# Patient Record
Sex: FEMALE | Race: WHITE | NOT HISPANIC OR LATINO | Employment: OTHER
[De-identification: names, ages, dates, MRNs, and addresses within clinical notes are randomized per-mention and may not be internally consistent; named-entity substitution may affect disease eponyms.]

---

## 2017-03-15 ENCOUNTER — RECORDS - HEALTHEAST (OUTPATIENT)
Dept: ADMINISTRATIVE | Facility: OTHER | Age: 82
End: 2017-03-15

## 2017-03-28 ASSESSMENT — MIFFLIN-ST. JEOR: SCORE: 1202.46

## 2017-03-29 ENCOUNTER — SURGERY - HEALTHEAST (OUTPATIENT)
Dept: SURGERY | Facility: HOSPITAL | Age: 82
End: 2017-03-29

## 2017-03-29 ENCOUNTER — ANESTHESIA - HEALTHEAST (OUTPATIENT)
Dept: SURGERY | Facility: HOSPITAL | Age: 82
End: 2017-03-29

## 2017-04-26 ENCOUNTER — RECORDS - HEALTHEAST (OUTPATIENT)
Dept: ADMINISTRATIVE | Facility: OTHER | Age: 82
End: 2017-04-26

## 2017-05-16 ENCOUNTER — COMMUNICATION - HEALTHEAST (OUTPATIENT)
Dept: ADMINISTRATIVE | Facility: HOSPITAL | Age: 82
End: 2017-05-16

## 2017-05-18 ENCOUNTER — OFFICE VISIT - HEALTHEAST (OUTPATIENT)
Dept: ONCOLOGY | Facility: HOSPITAL | Age: 82
End: 2017-05-18

## 2017-05-18 ENCOUNTER — AMBULATORY - HEALTHEAST (OUTPATIENT)
Dept: INFUSION THERAPY | Facility: HOSPITAL | Age: 82
End: 2017-05-18

## 2017-05-18 DIAGNOSIS — C18.9 MALIGNANT NEOPLASM OF COLON (H): ICD-10-CM

## 2017-05-18 DIAGNOSIS — C50.411 BREAST CANCER OF UPPER-OUTER QUADRANT OF RIGHT FEMALE BREAST (H): ICD-10-CM

## 2017-06-05 ENCOUNTER — COMMUNICATION - HEALTHEAST (OUTPATIENT)
Dept: ONCOLOGY | Facility: HOSPITAL | Age: 82
End: 2017-06-05

## 2017-06-07 ENCOUNTER — RECORDS - HEALTHEAST (OUTPATIENT)
Dept: ADMINISTRATIVE | Facility: OTHER | Age: 82
End: 2017-06-07

## 2017-09-13 ENCOUNTER — RECORDS - HEALTHEAST (OUTPATIENT)
Dept: LAB | Facility: CLINIC | Age: 82
End: 2017-09-13

## 2017-09-13 LAB
CHOLEST SERPL-MCNC: 202 MG/DL
FASTING STATUS PATIENT QL REPORTED: ABNORMAL
HDLC SERPL-MCNC: 49 MG/DL
LDLC SERPL CALC-MCNC: 121 MG/DL
TRIGL SERPL-MCNC: 161 MG/DL

## 2017-11-30 ENCOUNTER — COMMUNICATION - HEALTHEAST (OUTPATIENT)
Dept: ONCOLOGY | Facility: HOSPITAL | Age: 82
End: 2017-11-30

## 2018-02-13 ENCOUNTER — RECORDS - HEALTHEAST (OUTPATIENT)
Dept: LAB | Facility: CLINIC | Age: 83
End: 2018-02-13

## 2018-02-13 ENCOUNTER — RECORDS - HEALTHEAST (OUTPATIENT)
Dept: ADMINISTRATIVE | Facility: OTHER | Age: 83
End: 2018-02-13

## 2018-02-14 LAB
ALBUMIN SERPL-MCNC: 3.4 G/DL (ref 3.5–5)
ALP SERPL-CCNC: 91 U/L (ref 45–120)
ALT SERPL W P-5'-P-CCNC: 19 U/L (ref 0–45)
ANION GAP SERPL CALCULATED.3IONS-SCNC: 9 MMOL/L (ref 5–18)
AST SERPL W P-5'-P-CCNC: 19 U/L (ref 0–40)
BILIRUB SERPL-MCNC: 0.3 MG/DL (ref 0–1)
BUN SERPL-MCNC: 13 MG/DL (ref 8–28)
CALCIUM SERPL-MCNC: 9.3 MG/DL (ref 8.5–10.5)
CHLORIDE BLD-SCNC: 105 MMOL/L (ref 98–107)
CO2 SERPL-SCNC: 25 MMOL/L (ref 22–31)
CREAT SERPL-MCNC: 0.71 MG/DL (ref 0.6–1.1)
GFR SERPL CREATININE-BSD FRML MDRD: >60 ML/MIN/1.73M2
GLUCOSE BLD-MCNC: 111 MG/DL (ref 70–125)
POTASSIUM BLD-SCNC: 4.7 MMOL/L (ref 3.5–5)
PROT SERPL-MCNC: 7.3 G/DL (ref 6–8)
SODIUM SERPL-SCNC: 139 MMOL/L (ref 136–145)
TSH SERPL DL<=0.005 MIU/L-ACNC: 1.21 UIU/ML (ref 0.3–5)

## 2018-02-15 LAB — BACTERIA SPEC CULT: NORMAL

## 2018-05-17 ENCOUNTER — RECORDS - HEALTHEAST (OUTPATIENT)
Dept: ADMINISTRATIVE | Facility: OTHER | Age: 83
End: 2018-05-17

## 2018-05-17 ENCOUNTER — COMMUNICATION - HEALTHEAST (OUTPATIENT)
Dept: ONCOLOGY | Facility: HOSPITAL | Age: 83
End: 2018-05-17

## 2018-05-18 ENCOUNTER — COMMUNICATION - HEALTHEAST (OUTPATIENT)
Dept: ONCOLOGY | Facility: HOSPITAL | Age: 83
End: 2018-05-18

## 2018-05-28 ENCOUNTER — COMMUNICATION - HEALTHEAST (OUTPATIENT)
Dept: ONCOLOGY | Facility: HOSPITAL | Age: 83
End: 2018-05-28

## 2018-06-05 ENCOUNTER — RECORDS - HEALTHEAST (OUTPATIENT)
Dept: ADMINISTRATIVE | Facility: OTHER | Age: 83
End: 2018-06-05

## 2018-06-18 ENCOUNTER — COMMUNICATION - HEALTHEAST (OUTPATIENT)
Dept: ONCOLOGY | Facility: HOSPITAL | Age: 83
End: 2018-06-18

## 2018-08-20 ENCOUNTER — COMMUNICATION - HEALTHEAST (OUTPATIENT)
Dept: ONCOLOGY | Facility: HOSPITAL | Age: 83
End: 2018-08-20

## 2018-08-20 DIAGNOSIS — C50.411 BREAST CANCER OF UPPER-OUTER QUADRANT OF RIGHT FEMALE BREAST (H): ICD-10-CM

## 2018-08-31 ENCOUNTER — COMMUNICATION - HEALTHEAST (OUTPATIENT)
Dept: ONCOLOGY | Facility: HOSPITAL | Age: 83
End: 2018-08-31

## 2018-09-04 ENCOUNTER — OFFICE VISIT - HEALTHEAST (OUTPATIENT)
Dept: ONCOLOGY | Facility: HOSPITAL | Age: 83
End: 2018-09-04

## 2018-09-04 ENCOUNTER — AMBULATORY - HEALTHEAST (OUTPATIENT)
Dept: INFUSION THERAPY | Facility: HOSPITAL | Age: 83
End: 2018-09-04

## 2018-09-04 DIAGNOSIS — Z79.811 AROMATASE INHIBITOR USE: ICD-10-CM

## 2018-09-04 DIAGNOSIS — C50.411 BREAST CANCER OF UPPER-OUTER QUADRANT OF RIGHT FEMALE BREAST (H): ICD-10-CM

## 2018-09-04 DIAGNOSIS — Z17.0 MALIGNANT NEOPLASM OF UPPER-OUTER QUADRANT OF RIGHT BREAST IN FEMALE, ESTROGEN RECEPTOR POSITIVE (H): ICD-10-CM

## 2018-09-04 DIAGNOSIS — C50.411 MALIGNANT NEOPLASM OF UPPER-OUTER QUADRANT OF RIGHT BREAST IN FEMALE, ESTROGEN RECEPTOR POSITIVE (H): ICD-10-CM

## 2018-09-04 LAB
ALBUMIN SERPL-MCNC: 3.6 G/DL (ref 3.5–5)
ALP SERPL-CCNC: 76 U/L (ref 45–120)
ALT SERPL W P-5'-P-CCNC: 17 U/L (ref 0–45)
ANION GAP SERPL CALCULATED.3IONS-SCNC: 5 MMOL/L (ref 5–18)
AST SERPL W P-5'-P-CCNC: 14 U/L (ref 0–40)
BASOPHILS # BLD AUTO: 0 THOU/UL (ref 0–0.2)
BASOPHILS NFR BLD AUTO: 1 % (ref 0–2)
BILIRUB SERPL-MCNC: 0.4 MG/DL (ref 0–1)
BUN SERPL-MCNC: 13 MG/DL (ref 8–28)
CALCIUM SERPL-MCNC: 9 MG/DL (ref 8.5–10.5)
CHLORIDE BLD-SCNC: 107 MMOL/L (ref 98–107)
CO2 SERPL-SCNC: 28 MMOL/L (ref 22–31)
CREAT SERPL-MCNC: 0.76 MG/DL (ref 0.6–1.1)
EOSINOPHIL # BLD AUTO: 0.1 THOU/UL (ref 0–0.4)
EOSINOPHIL NFR BLD AUTO: 2 % (ref 0–6)
ERYTHROCYTE [DISTWIDTH] IN BLOOD BY AUTOMATED COUNT: 14.1 % (ref 11–14.5)
GFR SERPL CREATININE-BSD FRML MDRD: >60 ML/MIN/1.73M2
GLUCOSE BLD-MCNC: 133 MG/DL (ref 70–125)
HCT VFR BLD AUTO: 35.9 % (ref 35–47)
HGB BLD-MCNC: 11.8 G/DL (ref 12–16)
LYMPHOCYTES # BLD AUTO: 3.4 THOU/UL (ref 0.8–4.4)
LYMPHOCYTES NFR BLD AUTO: 49 % (ref 20–40)
MCH RBC QN AUTO: 31.8 PG (ref 27–34)
MCHC RBC AUTO-ENTMCNC: 32.9 G/DL (ref 32–36)
MCV RBC AUTO: 97 FL (ref 80–100)
MONOCYTES # BLD AUTO: 0.8 THOU/UL (ref 0–0.9)
MONOCYTES NFR BLD AUTO: 11 % (ref 2–10)
NEUTROPHILS # BLD AUTO: 2.6 THOU/UL (ref 2–7.7)
NEUTROPHILS NFR BLD AUTO: 38 % (ref 50–70)
PLATELET # BLD AUTO: 195 THOU/UL (ref 140–440)
PMV BLD AUTO: 10 FL (ref 8.5–12.5)
POTASSIUM BLD-SCNC: 4.3 MMOL/L (ref 3.5–5)
PROT SERPL-MCNC: 7.1 G/DL (ref 6–8)
RBC # BLD AUTO: 3.71 MILL/UL (ref 3.8–5.4)
SODIUM SERPL-SCNC: 140 MMOL/L (ref 136–145)
WBC: 6.9 THOU/UL (ref 4–11)

## 2018-09-27 ENCOUNTER — COMMUNICATION - HEALTHEAST (OUTPATIENT)
Dept: ONCOLOGY | Facility: HOSPITAL | Age: 83
End: 2018-09-27

## 2019-03-11 ENCOUNTER — RECORDS - HEALTHEAST (OUTPATIENT)
Dept: LAB | Facility: CLINIC | Age: 84
End: 2019-03-11

## 2019-03-11 LAB
ALBUMIN SERPL-MCNC: 3.6 G/DL (ref 3.5–5)
ALP SERPL-CCNC: 55 U/L (ref 45–120)
ALT SERPL W P-5'-P-CCNC: 17 U/L (ref 0–45)
ANION GAP SERPL CALCULATED.3IONS-SCNC: 8 MMOL/L (ref 5–18)
AST SERPL W P-5'-P-CCNC: 15 U/L (ref 0–40)
BILIRUB SERPL-MCNC: 0.3 MG/DL (ref 0–1)
BUN SERPL-MCNC: 11 MG/DL (ref 8–28)
CALCIUM SERPL-MCNC: 9.1 MG/DL (ref 8.5–10.5)
CHLORIDE BLD-SCNC: 104 MMOL/L (ref 98–107)
CHOLEST SERPL-MCNC: 194 MG/DL
CO2 SERPL-SCNC: 26 MMOL/L (ref 22–31)
CREAT SERPL-MCNC: 0.7 MG/DL (ref 0.6–1.1)
FASTING STATUS PATIENT QL REPORTED: ABNORMAL
GFR SERPL CREATININE-BSD FRML MDRD: >60 ML/MIN/1.73M2
GLUCOSE BLD-MCNC: 87 MG/DL (ref 70–125)
HDLC SERPL-MCNC: 52 MG/DL
LDLC SERPL CALC-MCNC: 102 MG/DL
LIPASE SERPL-CCNC: 40 U/L (ref 0–52)
POTASSIUM BLD-SCNC: 4.5 MMOL/L (ref 3.5–5)
PROT SERPL-MCNC: 7.1 G/DL (ref 6–8)
SODIUM SERPL-SCNC: 138 MMOL/L (ref 136–145)
TRIGL SERPL-MCNC: 198 MG/DL

## 2019-04-16 ENCOUNTER — COMMUNICATION - HEALTHEAST (OUTPATIENT)
Dept: ONCOLOGY | Facility: HOSPITAL | Age: 84
End: 2019-04-16

## 2019-05-23 ENCOUNTER — AMBULATORY - HEALTHEAST (OUTPATIENT)
Dept: CARDIOLOGY | Facility: CLINIC | Age: 84
End: 2019-05-23

## 2019-05-23 ENCOUNTER — RECORDS - HEALTHEAST (OUTPATIENT)
Dept: ADMINISTRATIVE | Facility: OTHER | Age: 84
End: 2019-05-23

## 2019-05-28 ENCOUNTER — OFFICE VISIT - HEALTHEAST (OUTPATIENT)
Dept: CARDIOLOGY | Facility: CLINIC | Age: 84
End: 2019-05-28

## 2019-05-28 DIAGNOSIS — R01.1 HEART MURMUR: ICD-10-CM

## 2019-05-28 DIAGNOSIS — R07.89 CHEST PRESSURE: ICD-10-CM

## 2019-05-28 DIAGNOSIS — I10 BENIGN ESSENTIAL HYPERTENSION: ICD-10-CM

## 2019-05-28 ASSESSMENT — MIFFLIN-ST. JEOR: SCORE: 1173.43

## 2019-06-05 ENCOUNTER — HOSPITAL ENCOUNTER (OUTPATIENT)
Dept: CARDIOLOGY | Facility: CLINIC | Age: 84
Discharge: HOME | End: 2019-06-05
Attending: INTERNAL MEDICINE

## 2019-06-05 ENCOUNTER — HOSPITAL ENCOUNTER (OUTPATIENT)
Dept: NUCLEAR MEDICINE | Facility: CLINIC | Age: 84
Discharge: HOME | End: 2019-06-05
Attending: INTERNAL MEDICINE

## 2019-06-05 DIAGNOSIS — R07.89 CHEST PRESSURE: ICD-10-CM

## 2019-06-05 DIAGNOSIS — R01.1 HEART MURMUR: ICD-10-CM

## 2019-06-05 LAB
AORTIC ROOT: 3.5 CM
AORTIC VALVE MEAN VELOCITY: 81.9 CM/S
AV CUSP SEPERATION: 1.5 CM
AV CUSP SEPERATION: 1.5 CM
AV DIMENSIONLESS INDEX VTI: 0.9
AV MEAN GRADIENT: 3 MMHG
AV PEAK GRADIENT: 6.7 MMHG
AV VALVE AREA: 4.1 CM2
AV VELOCITY RATIO: 0.8
BSA FOR ECHO PROCEDURE: 1.84 M2
CV ECHO HEIGHT: 63 IN
CV ECHO WEIGHT: 169 LBS
CV STRESS CURRENT BP HE: NORMAL
CV STRESS CURRENT HR HE: 59
CV STRESS CURRENT HR HE: 62
CV STRESS CURRENT HR HE: 64
CV STRESS CURRENT HR HE: 73
CV STRESS CURRENT HR HE: 76
CV STRESS CURRENT HR HE: 76
CV STRESS CURRENT HR HE: 78
CV STRESS CURRENT HR HE: 79
CV STRESS CURRENT HR HE: 79
CV STRESS CURRENT HR HE: 80
CV STRESS DEVIATION TIME HE: NORMAL
CV STRESS ECHO PERCENT HR HE: NORMAL
CV STRESS EXERCISE STAGE HE: NORMAL
CV STRESS FINAL RESTING BP HE: NORMAL
CV STRESS FINAL RESTING HR HE: 76
CV STRESS MAX HR HE: 81
CV STRESS MAX TREADMILL GRADE HE: 0
CV STRESS MAX TREADMILL SPEED HE: 0
CV STRESS PEAK DIA BP HE: NORMAL
CV STRESS PEAK SYS BP HE: NORMAL
CV STRESS PHASE HE: NORMAL
CV STRESS PROTOCOL HE: NORMAL
CV STRESS RESTING PT POSITION HE: NORMAL
CV STRESS ST DEVIATION AMOUNT HE: NORMAL
CV STRESS ST DEVIATION ELEVATION HE: NORMAL
CV STRESS ST EVELATION AMOUNT HE: NORMAL
CV STRESS TEST TYPE HE: NORMAL
CV STRESS TOTAL STAGE TIME MIN 1 HE: NORMAL
DOP CALC AO PEAK VEL: 129 CM/S
DOP CALC AO VTI: 28.8 CM
DOP CALC LVOT AREA: 4.52 CM2
DOP CALC LVOT DIAMETER: 2.4 CM
DOP CALC LVOT PEAK VEL: 101 CM/S
DOP CALC LVOT STROKE VOLUME: 117.6 CM3
DOP CALC MV VTI: 40 CM
DOP CALCLVOT PEAK VEL VTI: 26 CM
EJECTION FRACTION: 66 % (ref 55–75)
FRACTIONAL SHORTENING: 30.2 % (ref 28–44)
INTERVENTRICULAR SEPTUM IN END DIASTOLE: 1.2 CM (ref 0.6–0.9)
IVS/PW RATIO: 1
LA AREA 1: 19.2 CM2
LA AREA 2: 24.2 CM2
LEFT ATRIUM LENGTH: 5.93 CM
LEFT ATRIUM SIZE: 2.9 CM
LEFT ATRIUM VOLUME INDEX: 36.2 ML/M2
LEFT ATRIUM VOLUME: 66.6 ML
LEFT VENTRICLE CARDIAC INDEX: 3.5 L/MIN/M2
LEFT VENTRICLE CARDIAC OUTPUT: 6.3 L/MIN
LEFT VENTRICLE DIASTOLIC VOLUME INDEX: 33.2 CM3/M2 (ref 29–61)
LEFT VENTRICLE DIASTOLIC VOLUME: 61 CM3 (ref 46–106)
LEFT VENTRICLE HEART RATE: 54 BPM
LEFT VENTRICLE MASS INDEX: 100.4 G/M2
LEFT VENTRICLE SYSTOLIC VOLUME INDEX: 11.4 CM3/M2 (ref 8–24)
LEFT VENTRICLE SYSTOLIC VOLUME: 21 CM3 (ref 14–42)
LEFT VENTRICULAR INTERNAL DIMENSION IN DIASTOLE: 4.3 CM (ref 3.8–5.2)
LEFT VENTRICULAR INTERNAL DIMENSION IN SYSTOLE: 3 CM (ref 2.2–3.5)
LEFT VENTRICULAR MASS: 184.7 G
LEFT VENTRICULAR OUTFLOW TRACT MEAN GRADIENT: 2 MMHG
LEFT VENTRICULAR OUTFLOW TRACT MEAN VELOCITY: 75.4 CM/S
LEFT VENTRICULAR OUTFLOW TRACT PEAK GRADIENT: 4 MMHG
LEFT VENTRICULAR POSTERIOR WALL IN END DIASTOLE: 1.2 CM (ref 0.6–0.9)
LV STROKE VOLUME INDEX: 63.9 ML/M2
MITRAL VALVE DECELERATION SLOPE: 2850 MM/S2
MITRAL VALVE E/A RATIO: 0.7
MITRAL VALVE MEAN INFLOW VELOCITY: 56.1 CM/S
MITRAL VALVE PEAK VELOCITY: 131 CM/S
MITRAL VALVE PRESSURE HALF-TIME: 98 MS
MV AREA VTI: 2.94 CM2
MV AVERAGE E/E' RATIO: 18.8 CM/S
MV DECELERATION TIME: 261 MS
MV E'TISSUE VEL-LAT: 4.97 CM/S
MV E'TISSUE VEL-MED: 3.8 CM/S
MV LATERAL E/E' RATIO: 16.6
MV MEAN GRADIENT: 2 MMHG
MV MEDIAL E/E' RATIO: 21.7
MV PEAK A VELOCITY: 115 CM/S
MV PEAK E VELOCITY: 82.3 CM/S
MV PEAK GRADIENT: 6.9 MMHG
MV VALVE AREA BY CONTINUITY EQUATION: 2.9 CM2
MV VALVE AREA PRESSURE 1/2 METHOD: 2.2 CM2
NUC REST DIASTOLIC VOLUME INDEX: 2704 LBS
NUC REST SYSTOLIC VOLUME INDEX: 63 IN
NUC STRESS EJECTION FRACTION: 64 %
STRESS ECHO BASELINE BP: NORMAL
STRESS ECHO BASELINE HR: 59
STRESS ECHO CALCULATED PERCENT HR: 60 %
STRESS ECHO LAST STRESS BP: NORMAL
STRESS ECHO LAST STRESS HR: 78
TRICUSPID REGURGITATION PEAK PRESSURE GRADIENT: 32.5 MMHG
TRICUSPID VALVE ANULAR PLANE SYSTOLIC EXCURSION: 2.2 CM
TRICUSPID VALVE PEAK REGURGITANT VELOCITY: 285 CM/S

## 2019-06-05 ASSESSMENT — MIFFLIN-ST. JEOR: SCORE: 1170.71

## 2019-06-07 ENCOUNTER — COMMUNICATION - HEALTHEAST (OUTPATIENT)
Dept: CARDIOLOGY | Facility: CLINIC | Age: 84
End: 2019-06-07

## 2019-06-10 ENCOUNTER — COMMUNICATION - HEALTHEAST (OUTPATIENT)
Dept: CARDIOLOGY | Facility: CLINIC | Age: 84
End: 2019-06-10

## 2019-06-20 ENCOUNTER — RECORDS - HEALTHEAST (OUTPATIENT)
Dept: LAB | Facility: CLINIC | Age: 84
End: 2019-06-20

## 2019-06-22 LAB — BACTERIA SPEC CULT: ABNORMAL

## 2019-08-21 ENCOUNTER — OFFICE VISIT - HEALTHEAST (OUTPATIENT)
Dept: CARDIOLOGY | Facility: CLINIC | Age: 84
End: 2019-08-21

## 2019-08-21 ENCOUNTER — RECORDS - HEALTHEAST (OUTPATIENT)
Dept: ADMINISTRATIVE | Facility: OTHER | Age: 84
End: 2019-08-21

## 2019-08-21 DIAGNOSIS — I10 ESSENTIAL HYPERTENSION: ICD-10-CM

## 2019-08-21 ASSESSMENT — MIFFLIN-ST. JEOR: SCORE: 1176.61

## 2019-09-16 ENCOUNTER — COMMUNICATION - HEALTHEAST (OUTPATIENT)
Dept: GERIATRICS | Facility: CLINIC | Age: 84
End: 2019-09-16

## 2019-09-16 ENCOUNTER — OFFICE VISIT - HEALTHEAST (OUTPATIENT)
Dept: GERIATRICS | Facility: CLINIC | Age: 84
End: 2019-09-16

## 2019-09-16 DIAGNOSIS — M19.90 INFLAMMATORY ARTHRITIS: ICD-10-CM

## 2019-09-16 DIAGNOSIS — G89.4 CHRONIC PAIN SYNDROME: ICD-10-CM

## 2019-09-16 DIAGNOSIS — K21.9 GASTROESOPHAGEAL REFLUX DISEASE WITHOUT ESOPHAGITIS: ICD-10-CM

## 2019-09-16 DIAGNOSIS — I10 ESSENTIAL HYPERTENSION: ICD-10-CM

## 2019-09-16 DIAGNOSIS — E63.9 NUTRITIONAL DEFICIENCY: ICD-10-CM

## 2019-09-16 DIAGNOSIS — R53.81 PHYSICAL DEBILITY: ICD-10-CM

## 2019-09-16 DIAGNOSIS — K59.04 CHRONIC IDIOPATHIC CONSTIPATION: ICD-10-CM

## 2019-09-17 ENCOUNTER — RECORDS - HEALTHEAST (OUTPATIENT)
Dept: LAB | Facility: CLINIC | Age: 84
End: 2019-09-17

## 2019-09-17 LAB
ANION GAP SERPL CALCULATED.3IONS-SCNC: 9 MMOL/L (ref 5–18)
BUN SERPL-MCNC: 14 MG/DL (ref 8–28)
CALCIUM SERPL-MCNC: 9.6 MG/DL (ref 8.5–10.5)
CHLORIDE BLD-SCNC: 102 MMOL/L (ref 98–107)
CO2 SERPL-SCNC: 25 MMOL/L (ref 22–31)
CREAT SERPL-MCNC: 0.77 MG/DL (ref 0.6–1.1)
GFR SERPL CREATININE-BSD FRML MDRD: >60 ML/MIN/1.73M2
GLUCOSE BLD-MCNC: 105 MG/DL (ref 70–125)
POTASSIUM BLD-SCNC: 4.9 MMOL/L (ref 3.5–5)
SODIUM SERPL-SCNC: 136 MMOL/L (ref 136–145)

## 2019-09-27 ENCOUNTER — OFFICE VISIT - HEALTHEAST (OUTPATIENT)
Dept: GERIATRICS | Facility: CLINIC | Age: 84
End: 2019-09-27

## 2019-09-27 DIAGNOSIS — M25.561 CHRONIC PAIN OF RIGHT KNEE: ICD-10-CM

## 2019-09-27 DIAGNOSIS — G89.29 CHRONIC PAIN OF RIGHT KNEE: ICD-10-CM

## 2019-09-27 DIAGNOSIS — M25.511 CHRONIC PAIN OF BOTH SHOULDERS: ICD-10-CM

## 2019-09-27 DIAGNOSIS — M15.0 PRIMARY OSTEOARTHRITIS INVOLVING MULTIPLE JOINTS: ICD-10-CM

## 2019-09-27 DIAGNOSIS — G89.29 CHRONIC PAIN OF BOTH SHOULDERS: ICD-10-CM

## 2019-09-27 DIAGNOSIS — G89.4 CHRONIC PAIN SYNDROME: ICD-10-CM

## 2019-09-27 DIAGNOSIS — M25.512 CHRONIC PAIN OF BOTH SHOULDERS: ICD-10-CM

## 2019-09-28 DIAGNOSIS — Z53.9 DIAGNOSIS NOT YET DEFINED: Primary | ICD-10-CM

## 2019-09-28 PROCEDURE — G0180 MD CERTIFICATION HHA PATIENT: HCPCS | Performed by: INTERNAL MEDICINE

## 2019-09-30 ENCOUNTER — COMMUNICATION - HEALTHEAST (OUTPATIENT)
Dept: GERIATRICS | Facility: CLINIC | Age: 84
End: 2019-09-30

## 2019-09-30 DIAGNOSIS — G89.29 CHRONIC PAIN OF RIGHT KNEE: ICD-10-CM

## 2019-09-30 DIAGNOSIS — G89.4 CHRONIC PAIN SYNDROME: ICD-10-CM

## 2019-09-30 DIAGNOSIS — M25.561 CHRONIC PAIN OF RIGHT KNEE: ICD-10-CM

## 2019-09-30 DIAGNOSIS — M70.51 PATELLAR BURSITIS, RIGHT: ICD-10-CM

## 2019-10-09 ENCOUNTER — COMMUNICATION - HEALTHEAST (OUTPATIENT)
Dept: GERIATRICS | Facility: CLINIC | Age: 84
End: 2019-10-09

## 2019-10-23 ENCOUNTER — OFFICE VISIT - HEALTHEAST (OUTPATIENT)
Dept: GERIATRICS | Facility: CLINIC | Age: 84
End: 2019-10-23

## 2019-10-23 ENCOUNTER — COMMUNICATION - HEALTHEAST (OUTPATIENT)
Dept: GERIATRICS | Facility: CLINIC | Age: 84
End: 2019-10-23

## 2019-10-23 DIAGNOSIS — G89.29 CHRONIC PAIN OF BOTH SHOULDERS: ICD-10-CM

## 2019-10-23 DIAGNOSIS — M15.0 PRIMARY OSTEOARTHRITIS INVOLVING MULTIPLE JOINTS: ICD-10-CM

## 2019-10-23 DIAGNOSIS — M25.561 CHRONIC PAIN OF RIGHT KNEE: ICD-10-CM

## 2019-10-23 DIAGNOSIS — M25.512 CHRONIC PAIN OF BOTH SHOULDERS: ICD-10-CM

## 2019-10-23 DIAGNOSIS — M25.511 CHRONIC PAIN OF BOTH SHOULDERS: ICD-10-CM

## 2019-10-23 DIAGNOSIS — G89.29 CHRONIC PAIN OF BOTH KNEES: ICD-10-CM

## 2019-10-23 DIAGNOSIS — M25.562 CHRONIC PAIN OF BOTH KNEES: ICD-10-CM

## 2019-10-23 DIAGNOSIS — M25.561 CHRONIC PAIN OF BOTH KNEES: ICD-10-CM

## 2019-10-23 DIAGNOSIS — G89.29 CHRONIC PAIN OF RIGHT KNEE: ICD-10-CM

## 2019-10-23 DIAGNOSIS — G89.4 CHRONIC PAIN SYNDROME: ICD-10-CM

## 2019-10-29 ENCOUNTER — COMMUNICATION - HEALTHEAST (OUTPATIENT)
Dept: ADMINISTRATIVE | Facility: HOSPITAL | Age: 84
End: 2019-10-29

## 2019-10-31 ENCOUNTER — OFFICE VISIT - HEALTHEAST (OUTPATIENT)
Dept: GERIATRICS | Facility: CLINIC | Age: 84
End: 2019-10-31

## 2019-10-31 DIAGNOSIS — M15.0 PRIMARY OSTEOARTHRITIS INVOLVING MULTIPLE JOINTS: ICD-10-CM

## 2019-10-31 DIAGNOSIS — M25.561 CHRONIC PAIN OF RIGHT KNEE: ICD-10-CM

## 2019-10-31 DIAGNOSIS — G89.29 CHRONIC PAIN OF RIGHT KNEE: ICD-10-CM

## 2019-10-31 DIAGNOSIS — G89.4 CHRONIC PAIN SYNDROME: ICD-10-CM

## 2019-10-31 DIAGNOSIS — Z71.89 ADVANCE CARE PLANNING: ICD-10-CM

## 2019-11-08 ENCOUNTER — OFFICE VISIT - HEALTHEAST (OUTPATIENT)
Dept: GERIATRICS | Facility: CLINIC | Age: 84
End: 2019-11-08

## 2019-11-08 ENCOUNTER — COMMUNICATION - HEALTHEAST (OUTPATIENT)
Dept: GERIATRICS | Facility: CLINIC | Age: 84
End: 2019-11-08

## 2019-11-08 DIAGNOSIS — G89.4 CHRONIC PAIN SYNDROME: ICD-10-CM

## 2019-11-08 DIAGNOSIS — H61.23 BILATERAL IMPACTED CERUMEN: ICD-10-CM

## 2019-11-08 DIAGNOSIS — G89.29 CHRONIC LEFT SHOULDER PAIN: ICD-10-CM

## 2019-11-08 DIAGNOSIS — M25.561 CHRONIC PAIN OF RIGHT KNEE: ICD-10-CM

## 2019-11-08 DIAGNOSIS — G89.29 CHRONIC PAIN OF RIGHT KNEE: ICD-10-CM

## 2019-11-08 DIAGNOSIS — R52 PAIN: ICD-10-CM

## 2019-11-08 DIAGNOSIS — H92.01 RIGHT EAR PAIN: ICD-10-CM

## 2019-11-08 DIAGNOSIS — M25.512 CHRONIC LEFT SHOULDER PAIN: ICD-10-CM

## 2019-11-11 ENCOUNTER — OFFICE VISIT - HEALTHEAST (OUTPATIENT)
Dept: GERIATRICS | Facility: CLINIC | Age: 84
End: 2019-11-11

## 2019-11-11 ENCOUNTER — COMMUNICATION - HEALTHEAST (OUTPATIENT)
Dept: GERIATRICS | Facility: CLINIC | Age: 84
End: 2019-11-11

## 2019-11-11 DIAGNOSIS — M25.561 CHRONIC PAIN OF RIGHT KNEE: ICD-10-CM

## 2019-11-11 DIAGNOSIS — G89.29 CHRONIC PAIN OF RIGHT KNEE: ICD-10-CM

## 2019-11-11 DIAGNOSIS — G89.4 CHRONIC PAIN SYNDROME: ICD-10-CM

## 2019-11-15 ENCOUNTER — OFFICE VISIT - HEALTHEAST (OUTPATIENT)
Dept: GERIATRICS | Facility: CLINIC | Age: 84
End: 2019-11-15

## 2019-11-15 DIAGNOSIS — G89.4 CHRONIC PAIN SYNDROME: ICD-10-CM

## 2019-11-15 DIAGNOSIS — M25.561 CHRONIC PAIN OF RIGHT KNEE: ICD-10-CM

## 2019-11-15 DIAGNOSIS — G89.29 CHRONIC PAIN OF RIGHT KNEE: ICD-10-CM

## 2019-11-18 ENCOUNTER — RECORDS - HEALTHEAST (OUTPATIENT)
Dept: LAB | Facility: CLINIC | Age: 84
End: 2019-11-18

## 2019-11-18 LAB
ALBUMIN SERPL-MCNC: 3.6 G/DL (ref 3.5–5)
ALP SERPL-CCNC: 49 U/L (ref 45–120)
ALT SERPL W P-5'-P-CCNC: 17 U/L (ref 0–45)
ANION GAP SERPL CALCULATED.3IONS-SCNC: 10 MMOL/L (ref 5–18)
AST SERPL W P-5'-P-CCNC: 17 U/L (ref 0–40)
BILIRUB SERPL-MCNC: 0.4 MG/DL (ref 0–1)
BUN SERPL-MCNC: 18 MG/DL (ref 8–28)
C REACTIVE PROTEIN LHE: 0.3 MG/DL (ref 0–0.8)
CALCIUM SERPL-MCNC: 10.3 MG/DL (ref 8.5–10.5)
CHLORIDE BLD-SCNC: 103 MMOL/L (ref 98–107)
CO2 SERPL-SCNC: 23 MMOL/L (ref 22–31)
CREAT SERPL-MCNC: 0.79 MG/DL (ref 0.6–1.1)
ERYTHROCYTE [DISTWIDTH] IN BLOOD BY AUTOMATED COUNT: 12.9 % (ref 11–14.5)
ERYTHROCYTE [SEDIMENTATION RATE] IN BLOOD BY WESTERGREN METHOD: 13 MM/HR (ref 0–20)
GFR SERPL CREATININE-BSD FRML MDRD: >60 ML/MIN/1.73M2
GLUCOSE BLD-MCNC: 114 MG/DL (ref 70–125)
HCT VFR BLD AUTO: 36.7 % (ref 35–47)
HGB BLD-MCNC: 11.7 G/DL (ref 12–16)
MCH RBC QN AUTO: 32.1 PG (ref 27–34)
MCHC RBC AUTO-ENTMCNC: 31.9 G/DL (ref 32–36)
MCV RBC AUTO: 101 FL (ref 80–100)
PLATELET # BLD AUTO: 223 THOU/UL (ref 140–440)
PMV BLD AUTO: 11 FL (ref 8.5–12.5)
POTASSIUM BLD-SCNC: 4.6 MMOL/L (ref 3.5–5)
PROT SERPL-MCNC: 7.2 G/DL (ref 6–8)
RBC # BLD AUTO: 3.64 MILL/UL (ref 3.8–5.4)
SODIUM SERPL-SCNC: 136 MMOL/L (ref 136–145)
URATE SERPL-MCNC: 5.2 MG/DL (ref 2–7.5)
WBC: 8.2 THOU/UL (ref 4–11)

## 2019-11-19 ENCOUNTER — COMMUNICATION - HEALTHEAST (OUTPATIENT)
Dept: GERIATRICS | Facility: CLINIC | Age: 84
End: 2019-11-19

## 2019-11-21 ENCOUNTER — OFFICE VISIT - HEALTHEAST (OUTPATIENT)
Dept: GERIATRICS | Facility: CLINIC | Age: 84
End: 2019-11-21

## 2019-11-21 DIAGNOSIS — G89.4 CHRONIC PAIN SYNDROME: ICD-10-CM

## 2019-11-21 DIAGNOSIS — G89.29 CHRONIC PAIN OF RIGHT KNEE: ICD-10-CM

## 2019-11-21 DIAGNOSIS — M25.561 CHRONIC PAIN OF RIGHT KNEE: ICD-10-CM

## 2019-12-02 ENCOUNTER — COMMUNICATION - HEALTHEAST (OUTPATIENT)
Dept: GERIATRICS | Facility: CLINIC | Age: 84
End: 2019-12-02

## 2019-12-02 ENCOUNTER — OFFICE VISIT - HEALTHEAST (OUTPATIENT)
Dept: GERIATRICS | Facility: CLINIC | Age: 84
End: 2019-12-02

## 2019-12-02 DIAGNOSIS — G89.4 CHRONIC PAIN SYNDROME: ICD-10-CM

## 2019-12-02 DIAGNOSIS — G89.29 CHRONIC PAIN OF RIGHT KNEE: ICD-10-CM

## 2019-12-02 DIAGNOSIS — M25.561 CHRONIC PAIN OF RIGHT KNEE: ICD-10-CM

## 2019-12-02 DIAGNOSIS — K59.04 CHRONIC IDIOPATHIC CONSTIPATION: ICD-10-CM

## 2019-12-13 ENCOUNTER — OFFICE VISIT - HEALTHEAST (OUTPATIENT)
Dept: GERIATRICS | Facility: CLINIC | Age: 84
End: 2019-12-13

## 2019-12-13 DIAGNOSIS — M25.561 CHRONIC PAIN OF RIGHT KNEE: ICD-10-CM

## 2019-12-13 DIAGNOSIS — G89.29 CHRONIC PAIN OF RIGHT KNEE: ICD-10-CM

## 2019-12-13 DIAGNOSIS — G89.4 CHRONIC PAIN SYNDROME: ICD-10-CM

## 2019-12-16 ENCOUNTER — OFFICE VISIT - HEALTHEAST (OUTPATIENT)
Dept: GERIATRICS | Facility: CLINIC | Age: 84
End: 2019-12-16

## 2019-12-16 ENCOUNTER — COMMUNICATION - HEALTHEAST (OUTPATIENT)
Dept: ONCOLOGY | Facility: HOSPITAL | Age: 84
End: 2019-12-16

## 2019-12-16 DIAGNOSIS — G89.29 CHRONIC PAIN OF RIGHT KNEE: ICD-10-CM

## 2019-12-16 DIAGNOSIS — G89.4 CHRONIC PAIN SYNDROME: ICD-10-CM

## 2019-12-16 DIAGNOSIS — M25.561 CHRONIC PAIN OF RIGHT KNEE: ICD-10-CM

## 2019-12-20 ENCOUNTER — COMMUNICATION - HEALTHEAST (OUTPATIENT)
Dept: GERIATRICS | Facility: CLINIC | Age: 84
End: 2019-12-20

## 2019-12-20 DIAGNOSIS — G89.4 CHRONIC PAIN SYNDROME: ICD-10-CM

## 2019-12-27 ENCOUNTER — OFFICE VISIT - HEALTHEAST (OUTPATIENT)
Dept: GERIATRICS | Facility: CLINIC | Age: 84
End: 2019-12-27

## 2019-12-27 DIAGNOSIS — Z79.899 MEDICATION MANAGEMENT: ICD-10-CM

## 2019-12-27 DIAGNOSIS — I10 ESSENTIAL HYPERTENSION: ICD-10-CM

## 2019-12-27 DIAGNOSIS — M25.561 CHRONIC PAIN OF RIGHT KNEE: ICD-10-CM

## 2019-12-27 DIAGNOSIS — G89.29 CHRONIC PAIN OF RIGHT KNEE: ICD-10-CM

## 2019-12-27 DIAGNOSIS — M25.512 CHRONIC LEFT SHOULDER PAIN: ICD-10-CM

## 2019-12-27 DIAGNOSIS — G89.29 CHRONIC LEFT SHOULDER PAIN: ICD-10-CM

## 2019-12-27 DIAGNOSIS — G89.4 CHRONIC PAIN SYNDROME: ICD-10-CM

## 2019-12-31 ENCOUNTER — OFFICE VISIT - HEALTHEAST (OUTPATIENT)
Dept: GERIATRICS | Facility: CLINIC | Age: 84
End: 2019-12-31

## 2019-12-31 DIAGNOSIS — I10 ESSENTIAL HYPERTENSION: ICD-10-CM

## 2019-12-31 DIAGNOSIS — G89.29 CHRONIC PAIN OF RIGHT KNEE: ICD-10-CM

## 2019-12-31 DIAGNOSIS — Z79.899 MEDICATION MANAGEMENT: ICD-10-CM

## 2019-12-31 DIAGNOSIS — G89.4 CHRONIC PAIN SYNDROME: ICD-10-CM

## 2019-12-31 DIAGNOSIS — G89.29 CHRONIC LEFT SHOULDER PAIN: ICD-10-CM

## 2019-12-31 DIAGNOSIS — M25.561 CHRONIC PAIN OF RIGHT KNEE: ICD-10-CM

## 2019-12-31 DIAGNOSIS — M25.512 CHRONIC LEFT SHOULDER PAIN: ICD-10-CM

## 2020-01-09 ENCOUNTER — RECORDS - HEALTHEAST (OUTPATIENT)
Dept: LAB | Facility: HOSPITAL | Age: 85
End: 2020-01-09

## 2020-01-09 LAB
C REACTIVE PROTEIN LHE: 0.6 MG/DL (ref 0–0.8)
ERYTHROCYTE [SEDIMENTATION RATE] IN BLOOD BY WESTERGREN METHOD: 20 MM/HR (ref 0–20)

## 2020-01-28 ENCOUNTER — OFFICE VISIT - HEALTHEAST (OUTPATIENT)
Dept: GERIATRICS | Facility: CLINIC | Age: 85
End: 2020-01-28

## 2020-01-28 DIAGNOSIS — R42 LIGHTHEADEDNESS: ICD-10-CM

## 2020-01-28 DIAGNOSIS — G89.4 CHRONIC PAIN SYNDROME: ICD-10-CM

## 2020-01-28 DIAGNOSIS — I10 ESSENTIAL HYPERTENSION: ICD-10-CM

## 2020-01-28 DIAGNOSIS — G89.29 CHRONIC PAIN OF RIGHT KNEE: ICD-10-CM

## 2020-01-28 DIAGNOSIS — M25.561 CHRONIC PAIN OF RIGHT KNEE: ICD-10-CM

## 2020-01-30 ENCOUNTER — COMMUNICATION - HEALTHEAST (OUTPATIENT)
Dept: GERIATRICS | Facility: CLINIC | Age: 85
End: 2020-01-30

## 2020-01-30 DIAGNOSIS — I10 ESSENTIAL HYPERTENSION: ICD-10-CM

## 2020-02-06 ENCOUNTER — OFFICE VISIT - HEALTHEAST (OUTPATIENT)
Dept: GERIATRICS | Facility: CLINIC | Age: 85
End: 2020-02-06

## 2020-02-06 DIAGNOSIS — K59.01 SLOW TRANSIT CONSTIPATION: ICD-10-CM

## 2020-02-06 DIAGNOSIS — M25.561 CHRONIC PAIN OF RIGHT KNEE: ICD-10-CM

## 2020-02-06 DIAGNOSIS — G89.29 CHRONIC PAIN OF RIGHT KNEE: ICD-10-CM

## 2020-02-06 DIAGNOSIS — G89.4 CHRONIC PAIN SYNDROME: ICD-10-CM

## 2020-02-06 DIAGNOSIS — M25.512 ACUTE PAIN OF LEFT SHOULDER: ICD-10-CM

## 2020-02-06 DIAGNOSIS — M62.838 MUSCLE SPASM: ICD-10-CM

## 2020-02-11 ENCOUNTER — RECORDS - HEALTHEAST (OUTPATIENT)
Dept: LAB | Facility: CLINIC | Age: 85
End: 2020-02-11

## 2020-02-12 LAB
ALBUMIN SERPL-MCNC: 3.5 G/DL (ref 3.5–5)
ALP SERPL-CCNC: 68 U/L (ref 45–120)
ALT SERPL W P-5'-P-CCNC: 14 U/L (ref 0–45)
ANION GAP SERPL CALCULATED.3IONS-SCNC: 9 MMOL/L (ref 5–18)
AST SERPL W P-5'-P-CCNC: 16 U/L (ref 0–40)
BILIRUB SERPL-MCNC: 0.4 MG/DL (ref 0–1)
BUN SERPL-MCNC: 13 MG/DL (ref 8–28)
CALCIUM SERPL-MCNC: 9.2 MG/DL (ref 8.5–10.5)
CHLORIDE BLD-SCNC: 101 MMOL/L (ref 98–107)
CO2 SERPL-SCNC: 24 MMOL/L (ref 22–31)
CREAT SERPL-MCNC: 0.74 MG/DL (ref 0.6–1.1)
ERYTHROCYTE [DISTWIDTH] IN BLOOD BY AUTOMATED COUNT: 12.8 % (ref 11–14.5)
GFR SERPL CREATININE-BSD FRML MDRD: >60 ML/MIN/1.73M2
GLUCOSE BLD-MCNC: 95 MG/DL (ref 70–125)
HCT VFR BLD AUTO: 37.2 % (ref 35–47)
HGB BLD-MCNC: 11.5 G/DL (ref 12–16)
MAGNESIUM SERPL-MCNC: 1.8 MG/DL (ref 1.8–2.6)
MCH RBC QN AUTO: 32.1 PG (ref 27–34)
MCHC RBC AUTO-ENTMCNC: 30.9 G/DL (ref 32–36)
MCV RBC AUTO: 104 FL (ref 80–100)
PLATELET # BLD AUTO: 246 THOU/UL (ref 140–440)
PMV BLD AUTO: 10.1 FL (ref 8.5–12.5)
POTASSIUM BLD-SCNC: 4.5 MMOL/L (ref 3.5–5)
PROT SERPL-MCNC: 7.1 G/DL (ref 6–8)
RBC # BLD AUTO: 3.58 MILL/UL (ref 3.8–5.4)
SODIUM SERPL-SCNC: 134 MMOL/L (ref 136–145)
WBC: 9.9 THOU/UL (ref 4–11)

## 2020-02-13 ENCOUNTER — OFFICE VISIT - HEALTHEAST (OUTPATIENT)
Dept: GERIATRICS | Facility: CLINIC | Age: 85
End: 2020-02-13

## 2020-02-13 DIAGNOSIS — I10 ESSENTIAL HYPERTENSION: ICD-10-CM

## 2020-02-13 DIAGNOSIS — G89.4 CHRONIC PAIN SYNDROME: ICD-10-CM

## 2020-02-13 DIAGNOSIS — M25.561 CHRONIC PAIN OF RIGHT KNEE: ICD-10-CM

## 2020-02-13 DIAGNOSIS — G89.29 CHRONIC PAIN OF RIGHT KNEE: ICD-10-CM

## 2020-02-13 DIAGNOSIS — R41.89 COGNITIVE IMPAIRMENT: ICD-10-CM

## 2020-02-20 ENCOUNTER — OFFICE VISIT - HEALTHEAST (OUTPATIENT)
Dept: GERIATRICS | Facility: CLINIC | Age: 85
End: 2020-02-20

## 2020-02-20 DIAGNOSIS — R41.89 COGNITIVE IMPAIRMENT: ICD-10-CM

## 2020-02-20 DIAGNOSIS — G89.29 CHRONIC PAIN OF RIGHT KNEE: ICD-10-CM

## 2020-02-20 DIAGNOSIS — M25.561 CHRONIC PAIN OF RIGHT KNEE: ICD-10-CM

## 2020-02-20 DIAGNOSIS — G89.4 CHRONIC PAIN SYNDROME: ICD-10-CM

## 2020-02-20 DIAGNOSIS — I10 ESSENTIAL HYPERTENSION: ICD-10-CM

## 2020-03-02 ENCOUNTER — COMMUNICATION - HEALTHEAST (OUTPATIENT)
Dept: GERIATRICS | Facility: CLINIC | Age: 85
End: 2020-03-02

## 2020-03-02 DIAGNOSIS — G89.4 CHRONIC PAIN SYNDROME: ICD-10-CM

## 2020-05-22 ENCOUNTER — COMMUNICATION - HEALTHEAST (OUTPATIENT)
Dept: GERIATRICS | Facility: CLINIC | Age: 85
End: 2020-05-22

## 2020-05-22 DIAGNOSIS — G89.4 CHRONIC PAIN SYNDROME: ICD-10-CM

## 2020-05-30 ENCOUNTER — COMMUNICATION - HEALTHEAST (OUTPATIENT)
Dept: CARDIOLOGY | Facility: CLINIC | Age: 85
End: 2020-05-30

## 2020-05-30 DIAGNOSIS — I10 BENIGN ESSENTIAL HYPERTENSION: ICD-10-CM

## 2020-06-29 ENCOUNTER — OFFICE VISIT - HEALTHEAST (OUTPATIENT)
Dept: GERIATRICS | Facility: CLINIC | Age: 85
End: 2020-06-29

## 2020-06-29 DIAGNOSIS — G89.4 CHRONIC PAIN SYNDROME: ICD-10-CM

## 2020-06-29 DIAGNOSIS — M25.512 CHRONIC LEFT SHOULDER PAIN: ICD-10-CM

## 2020-06-29 DIAGNOSIS — I10 ESSENTIAL HYPERTENSION: ICD-10-CM

## 2020-06-29 DIAGNOSIS — G89.29 CHRONIC LEFT SHOULDER PAIN: ICD-10-CM

## 2020-06-29 DIAGNOSIS — R54 AGE-RELATED PHYSICAL DEBILITY: ICD-10-CM

## 2020-06-29 DIAGNOSIS — M15.0 PRIMARY OSTEOARTHRITIS INVOLVING MULTIPLE JOINTS: ICD-10-CM

## 2020-07-02 ENCOUNTER — AMBULATORY - HEALTHEAST (OUTPATIENT)
Dept: PALLIATIVE MEDICINE | Facility: OTHER | Age: 85
End: 2020-07-02

## 2020-07-02 DIAGNOSIS — G89.4 CHRONIC PAIN SYNDROME: ICD-10-CM

## 2020-07-15 ENCOUNTER — OFFICE VISIT - HEALTHEAST (OUTPATIENT)
Dept: ONCOLOGY | Facility: HOSPITAL | Age: 85
End: 2020-07-15

## 2020-07-15 DIAGNOSIS — Z17.0 MALIGNANT NEOPLASM OF UPPER-OUTER QUADRANT OF RIGHT BREAST IN FEMALE, ESTROGEN RECEPTOR POSITIVE (H): ICD-10-CM

## 2020-07-15 DIAGNOSIS — C50.411 MALIGNANT NEOPLASM OF UPPER-OUTER QUADRANT OF RIGHT BREAST IN FEMALE, ESTROGEN RECEPTOR POSITIVE (H): ICD-10-CM

## 2020-08-10 ENCOUNTER — COMMUNICATION - HEALTHEAST (OUTPATIENT)
Dept: GERIATRICS | Facility: CLINIC | Age: 85
End: 2020-08-10

## 2020-08-10 DIAGNOSIS — G89.4 CHRONIC PAIN SYNDROME: ICD-10-CM

## 2020-08-24 ENCOUNTER — COMMUNICATION - HEALTHEAST (OUTPATIENT)
Dept: CARDIOLOGY | Facility: CLINIC | Age: 85
End: 2020-08-24

## 2020-08-24 DIAGNOSIS — I10 BENIGN ESSENTIAL HYPERTENSION: ICD-10-CM

## 2020-10-07 ENCOUNTER — OFFICE VISIT - HEALTHEAST (OUTPATIENT)
Dept: GERIATRICS | Facility: CLINIC | Age: 85
End: 2020-10-07

## 2020-10-07 ENCOUNTER — AMBULATORY - HEALTHEAST (OUTPATIENT)
Dept: GERIATRICS | Facility: CLINIC | Age: 85
End: 2020-10-07

## 2020-10-07 DIAGNOSIS — G89.4 CHRONIC PAIN SYNDROME: ICD-10-CM

## 2020-10-07 DIAGNOSIS — I10 ESSENTIAL HYPERTENSION: ICD-10-CM

## 2020-10-07 DIAGNOSIS — H91.13 PRESBYCUSIS OF BOTH EARS: ICD-10-CM

## 2020-10-07 DIAGNOSIS — Z96.21 COCHLEAR IMPLANT STATUS: ICD-10-CM

## 2020-10-07 DIAGNOSIS — K59.01 SLOW TRANSIT CONSTIPATION: ICD-10-CM

## 2020-10-13 ENCOUNTER — AMBULATORY - HEALTHEAST (OUTPATIENT)
Dept: GERIATRICS | Facility: CLINIC | Age: 85
End: 2020-10-13

## 2020-10-13 DIAGNOSIS — H91.13 PRESBYCUSIS OF BOTH EARS: ICD-10-CM

## 2020-10-15 ENCOUNTER — COMMUNICATION - HEALTHEAST (OUTPATIENT)
Dept: GERIATRICS | Facility: CLINIC | Age: 85
End: 2020-10-15

## 2020-10-19 ENCOUNTER — COMMUNICATION - HEALTHEAST (OUTPATIENT)
Dept: PALLIATIVE MEDICINE | Facility: OTHER | Age: 85
End: 2020-10-19

## 2020-10-19 ENCOUNTER — HOSPITAL ENCOUNTER (OUTPATIENT)
Dept: PALLIATIVE MEDICINE | Facility: OTHER | Age: 85
Discharge: HOME | End: 2020-10-19
Attending: NURSE PRACTITIONER

## 2020-10-19 DIAGNOSIS — M25.512 CHRONIC LEFT SHOULDER PAIN: ICD-10-CM

## 2020-10-19 DIAGNOSIS — M15.0 PRIMARY OSTEOARTHRITIS INVOLVING MULTIPLE JOINTS: ICD-10-CM

## 2020-10-19 DIAGNOSIS — M25.561 CHRONIC PAIN OF RIGHT KNEE: ICD-10-CM

## 2020-10-19 DIAGNOSIS — G89.29 CHRONIC PAIN OF RIGHT KNEE: ICD-10-CM

## 2020-10-19 DIAGNOSIS — G89.29 CHRONIC LEFT SHOULDER PAIN: ICD-10-CM

## 2020-10-19 DIAGNOSIS — G89.4 CHRONIC PAIN SYNDROME: ICD-10-CM

## 2020-10-19 DIAGNOSIS — M79.672 PAIN IN BOTH FEET: ICD-10-CM

## 2020-10-19 DIAGNOSIS — M79.671 PAIN IN BOTH FEET: ICD-10-CM

## 2020-10-19 ASSESSMENT — MIFFLIN-ST. JEOR: SCORE: 1157.1

## 2020-10-20 ENCOUNTER — COMMUNICATION - HEALTHEAST (OUTPATIENT)
Dept: CARDIOLOGY | Facility: CLINIC | Age: 85
End: 2020-10-20

## 2020-10-21 ENCOUNTER — OFFICE VISIT - HEALTHEAST (OUTPATIENT)
Dept: CARDIOLOGY | Facility: CLINIC | Age: 85
End: 2020-10-21

## 2020-10-21 DIAGNOSIS — I10 ESSENTIAL HYPERTENSION: ICD-10-CM

## 2020-10-21 DIAGNOSIS — I25.10 CORONARY ARTERY CALCIFICATION SEEN ON CAT SCAN: ICD-10-CM

## 2020-10-21 LAB
6MAM UR QL: NOT DETECTED
7AMINOCLONAZEPAM UR QL: NOT DETECTED
A-OH ALPRAZ UR QL: NOT DETECTED
ALPRAZ UR QL: NOT DETECTED
AMPHET UR QL SCN: NOT DETECTED
ANION GAP SERPL CALCULATED.3IONS-SCNC: 9 MMOL/L (ref 5–18)
BARBITURATES UR QL: NOT DETECTED
BUN SERPL-MCNC: 13 MG/DL (ref 8–28)
BUPRENORPHINE UR QL: NOT DETECTED
BZE UR QL: NOT DETECTED
CALCIUM SERPL-MCNC: 9 MG/DL (ref 8.5–10.5)
CARBOXYTHC UR QL: NOT DETECTED
CARISOPRODOL UR QL: NOT DETECTED
CHLORIDE BLD-SCNC: 103 MMOL/L (ref 98–107)
CHOLEST SERPL-MCNC: 191 MG/DL
CLONAZEPAM UR QL: NOT DETECTED
CO2 SERPL-SCNC: 26 MMOL/L (ref 22–31)
CODEINE UR QL: NOT DETECTED
CREAT SERPL-MCNC: 0.69 MG/DL (ref 0.6–1.1)
CREAT UR-MCNC: 44.4 MG/DL (ref 20–400)
DIAZEPAM UR QL: NOT DETECTED
ETHYL GLUCURONIDE UR QL: NOT DETECTED
FASTING STATUS PATIENT QL REPORTED: NO
FENTANYL UR QL: NOT DETECTED
GFR SERPL CREATININE-BSD FRML MDRD: >60 ML/MIN/1.73M2
GLUCOSE BLD-MCNC: 92 MG/DL (ref 70–125)
HDLC SERPL-MCNC: 46 MG/DL
HYDROCODONE UR QL: NOT DETECTED
HYDROMORPHONE UR QL: NOT DETECTED
LDLC SERPL CALC-MCNC: 112 MG/DL
LORAZEPAM UR QL: NOT DETECTED
MDA UR QL: NOT DETECTED
MDEA UR QL: NOT DETECTED
MDMA UR QL: NOT DETECTED
ME-PHENIDATE UR QL: NOT DETECTED
MEPERIDINE UR QL: NOT DETECTED
METHADONE UR QL: NOT DETECTED
METHAMPHET UR QL: NOT DETECTED
MIDAZOLAM UR QL SCN: NOT DETECTED
MORPHINE UR QL: NOT DETECTED
NORBUPRENORPHINE UR QL CFM: NOT DETECTED
NORDIAZEPAM UR QL: NOT DETECTED
NORFENTANYL UR QL: NOT DETECTED
NORHYDROCODONE UR QL CFM: NOT DETECTED
NOROXYCODONE UR QL CFM: NOT DETECTED
NOROXYMORPHONE UR QL SCN: NOT DETECTED
OXAZEPAM UR QL: NOT DETECTED
OXYCODONE UR QL: NOT DETECTED
OXYMORPHONE UR QL: NOT DETECTED
PATHOLOGY STUDY: NORMAL
PCP UR QL: NOT DETECTED
PHENTERMINE UR QL: NOT DETECTED
POTASSIUM BLD-SCNC: 5.1 MMOL/L (ref 3.5–5)
PROPOXYPH UR QL: NOT DETECTED
SERVICE CMNT-IMP: NORMAL
SODIUM SERPL-SCNC: 138 MMOL/L (ref 136–145)
TAPENTADOL UR QL SCN: NOT DETECTED
TAPENTADOL UR QL SCN: NOT DETECTED
TEMAZEPAM UR QL: NOT DETECTED
TRAMADOL UR QL: PRESENT
TRIGL SERPL-MCNC: 166 MG/DL
ZOLPIDEM UR QL: NOT DETECTED

## 2020-10-21 ASSESSMENT — MIFFLIN-ST. JEOR: SCORE: 1157.1

## 2020-11-02 ENCOUNTER — OFFICE VISIT - HEALTHEAST (OUTPATIENT)
Dept: AUDIOLOGY | Facility: CLINIC | Age: 85
End: 2020-11-02

## 2020-11-02 ENCOUNTER — OFFICE VISIT - HEALTHEAST (OUTPATIENT)
Dept: OTOLARYNGOLOGY | Facility: CLINIC | Age: 85
End: 2020-11-02

## 2020-11-02 DIAGNOSIS — H92.03 EAR PAIN, BILATERAL: ICD-10-CM

## 2020-11-02 DIAGNOSIS — H93.13 TINNITUS OF BOTH EARS: ICD-10-CM

## 2020-11-02 DIAGNOSIS — H90.3 SENSORINEURAL HEARING LOSS, BILATERAL: ICD-10-CM

## 2020-11-02 DIAGNOSIS — H90.3 BILATERAL SENSORINEURAL HEARING LOSS: ICD-10-CM

## 2020-11-06 ENCOUNTER — COMMUNICATION - HEALTHEAST (OUTPATIENT)
Dept: GERIATRICS | Facility: CLINIC | Age: 85
End: 2020-11-06

## 2020-11-06 DIAGNOSIS — G89.4 CHRONIC PAIN SYNDROME: ICD-10-CM

## 2020-11-09 ENCOUNTER — HOSPITAL ENCOUNTER (OUTPATIENT)
Dept: PALLIATIVE MEDICINE | Facility: OTHER | Age: 85
Discharge: HOME | End: 2020-11-09
Attending: NURSE PRACTITIONER

## 2020-11-09 ENCOUNTER — COMMUNICATION - HEALTHEAST (OUTPATIENT)
Dept: PALLIATIVE MEDICINE | Facility: OTHER | Age: 85
End: 2020-11-09

## 2020-11-09 DIAGNOSIS — M25.512 CHRONIC LEFT SHOULDER PAIN: ICD-10-CM

## 2020-11-09 DIAGNOSIS — G89.29 CHRONIC PAIN OF RIGHT KNEE: ICD-10-CM

## 2020-11-09 DIAGNOSIS — M25.561 CHRONIC PAIN OF RIGHT KNEE: ICD-10-CM

## 2020-11-09 DIAGNOSIS — G89.29 CHRONIC LEFT SHOULDER PAIN: ICD-10-CM

## 2020-11-09 ASSESSMENT — MIFFLIN-ST. JEOR: SCORE: 1170.71

## 2020-11-11 ENCOUNTER — RECORDS - HEALTHEAST (OUTPATIENT)
Dept: RADIOLOGY | Facility: CLINIC | Age: 85
End: 2020-11-11

## 2020-11-11 ENCOUNTER — TELEPHONE (OUTPATIENT)
Dept: OTOLARYNGOLOGY | Facility: CLINIC | Age: 85
End: 2020-11-11

## 2020-11-11 ENCOUNTER — TRANSCRIBE ORDERS (OUTPATIENT)
Dept: OTHER | Age: 85
End: 2020-11-11

## 2020-11-11 DIAGNOSIS — H90.3 SENSORINEURAL HEARING LOSS, BILATERAL: Primary | ICD-10-CM

## 2020-11-11 NOTE — TELEPHONE ENCOUNTER
[facility] Call Center    Phone Message    May a detailed message be left on voicemail: no   FYI: Patient is leaving [place] in 1 month and return in May 2021    Reason for Call: Appointment Intake    Referring Provider Name: Dr. Wenceslao Nicole at [facility] for Cochlear implant consult  Diagnosis and/or Symptoms: Sensorineural hearing loss, bilateral     Action Taken: Message routed to:  [facility] ([facility]IN[facility]-ENT-EYE-DENTAL [[id number]]    Travel Screening: Not Applicable

## 2020-11-15 ENCOUNTER — COMMUNICATION - HEALTHEAST (OUTPATIENT)
Dept: CARDIOLOGY | Facility: CLINIC | Age: 85
End: 2020-11-15

## 2020-11-15 DIAGNOSIS — I10 BENIGN ESSENTIAL HYPERTENSION: ICD-10-CM

## 2020-11-23 ENCOUNTER — COMMUNICATION - HEALTHEAST (OUTPATIENT)
Dept: PHYSICAL MEDICINE AND REHAB | Facility: CLINIC | Age: 85
End: 2020-11-23

## 2020-11-25 ENCOUNTER — HOSPITAL ENCOUNTER (OUTPATIENT)
Dept: PHYSICAL MEDICINE AND REHAB | Facility: CLINIC | Age: 85
Discharge: HOME | End: 2020-11-25
Attending: PAIN MEDICINE

## 2020-11-25 DIAGNOSIS — M25.561 CHRONIC PAIN OF RIGHT KNEE: ICD-10-CM

## 2020-11-25 DIAGNOSIS — G89.29 CHRONIC PAIN OF RIGHT KNEE: ICD-10-CM

## 2020-12-04 ENCOUNTER — COMMUNICATION - HEALTHEAST (OUTPATIENT)
Dept: PHYSICAL MEDICINE AND REHAB | Facility: CLINIC | Age: 85
End: 2020-12-04

## 2020-12-08 ENCOUNTER — OFFICE VISIT - HEALTHEAST (OUTPATIENT)
Dept: GERIATRICS | Facility: CLINIC | Age: 85
End: 2020-12-08

## 2020-12-08 DIAGNOSIS — I10 ESSENTIAL HYPERTENSION: ICD-10-CM

## 2020-12-08 DIAGNOSIS — G89.29 CHRONIC PAIN OF RIGHT KNEE: ICD-10-CM

## 2020-12-08 DIAGNOSIS — M15.0 PRIMARY OSTEOARTHRITIS INVOLVING MULTIPLE JOINTS: ICD-10-CM

## 2020-12-08 DIAGNOSIS — G89.4 CHRONIC PAIN SYNDROME: ICD-10-CM

## 2020-12-08 DIAGNOSIS — M25.561 CHRONIC PAIN OF RIGHT KNEE: ICD-10-CM

## 2020-12-18 ENCOUNTER — COMMUNICATION - HEALTHEAST (OUTPATIENT)
Dept: GERIATRICS | Facility: CLINIC | Age: 85
End: 2020-12-18

## 2020-12-31 ENCOUNTER — OFFICE VISIT - HEALTHEAST (OUTPATIENT)
Dept: GERIATRICS | Facility: CLINIC | Age: 85
End: 2020-12-31

## 2020-12-31 DIAGNOSIS — M15.0 PRIMARY OSTEOARTHRITIS INVOLVING MULTIPLE JOINTS: ICD-10-CM

## 2020-12-31 DIAGNOSIS — M25.511 CHRONIC RIGHT SHOULDER PAIN: ICD-10-CM

## 2020-12-31 DIAGNOSIS — M25.611 DECREASED RANGE OF MOTION OF RIGHT SHOULDER: ICD-10-CM

## 2020-12-31 DIAGNOSIS — G89.29 CHRONIC RIGHT SHOULDER PAIN: ICD-10-CM

## 2020-12-31 DIAGNOSIS — M75.41 IMPINGEMENT SYNDROME OF RIGHT SHOULDER: ICD-10-CM

## 2021-01-12 ENCOUNTER — COMMUNICATION - HEALTHEAST (OUTPATIENT)
Dept: GERIATRICS | Facility: CLINIC | Age: 86
End: 2021-01-12

## 2021-01-12 DIAGNOSIS — G89.4 CHRONIC PAIN SYNDROME: ICD-10-CM

## 2021-02-16 ENCOUNTER — OFFICE VISIT - HEALTHEAST (OUTPATIENT)
Dept: GERIATRICS | Facility: CLINIC | Age: 86
End: 2021-02-16

## 2021-02-16 DIAGNOSIS — G89.4 CHRONIC PAIN SYNDROME: ICD-10-CM

## 2021-02-16 DIAGNOSIS — G89.29 CHRONIC PAIN OF RIGHT KNEE: ICD-10-CM

## 2021-02-16 DIAGNOSIS — G89.29 CHRONIC RIGHT SHOULDER PAIN: ICD-10-CM

## 2021-02-16 DIAGNOSIS — M19.90 OSTEOARTHRITIS, UNSPECIFIED OSTEOARTHRITIS TYPE, UNSPECIFIED SITE: ICD-10-CM

## 2021-02-16 DIAGNOSIS — M25.561 CHRONIC PAIN OF RIGHT KNEE: ICD-10-CM

## 2021-02-16 DIAGNOSIS — M25.511 CHRONIC RIGHT SHOULDER PAIN: ICD-10-CM

## 2021-03-25 ENCOUNTER — COMMUNICATION - HEALTHEAST (OUTPATIENT)
Dept: GERIATRICS | Facility: CLINIC | Age: 86
End: 2021-03-25

## 2021-03-25 DIAGNOSIS — G89.4 CHRONIC PAIN SYNDROME: ICD-10-CM

## 2021-04-23 ENCOUNTER — COMMUNICATION - HEALTHEAST (OUTPATIENT)
Dept: GERIATRICS | Facility: CLINIC | Age: 86
End: 2021-04-23

## 2021-04-23 DIAGNOSIS — G89.4 CHRONIC PAIN SYNDROME: ICD-10-CM

## 2021-05-25 ENCOUNTER — OFFICE VISIT - HEALTHEAST (OUTPATIENT)
Dept: GERIATRICS | Facility: CLINIC | Age: 86
End: 2021-05-25

## 2021-05-25 ENCOUNTER — RECORDS - HEALTHEAST (OUTPATIENT)
Dept: LAB | Facility: CLINIC | Age: 86
End: 2021-05-25

## 2021-05-25 ENCOUNTER — COMMUNICATION - HEALTHEAST (OUTPATIENT)
Dept: GERIATRICS | Facility: CLINIC | Age: 86
End: 2021-05-25

## 2021-05-25 DIAGNOSIS — I10 ESSENTIAL HYPERTENSION: ICD-10-CM

## 2021-05-25 DIAGNOSIS — G89.29 CHRONIC PAIN OF RIGHT KNEE: ICD-10-CM

## 2021-05-25 DIAGNOSIS — G89.4 CHRONIC PAIN SYNDROME: ICD-10-CM

## 2021-05-25 DIAGNOSIS — R54 AGE-RELATED PHYSICAL DEBILITY: ICD-10-CM

## 2021-05-25 DIAGNOSIS — M25.561 CHRONIC PAIN OF RIGHT KNEE: ICD-10-CM

## 2021-05-26 VITALS
HEART RATE: 80 BPM | HEART RATE: 82 BPM | TEMPERATURE: 98.2 F | SYSTOLIC BLOOD PRESSURE: 136 MMHG | OXYGEN SATURATION: 96 % | DIASTOLIC BLOOD PRESSURE: 82 MMHG | OXYGEN SATURATION: 95 % | RESPIRATION RATE: 18 BRPM | RESPIRATION RATE: 16 BRPM

## 2021-05-26 VITALS
HEART RATE: 65 BPM | OXYGEN SATURATION: 97 % | SYSTOLIC BLOOD PRESSURE: 118 MMHG | RESPIRATION RATE: 18 BRPM | DIASTOLIC BLOOD PRESSURE: 62 MMHG

## 2021-05-26 VITALS
DIASTOLIC BLOOD PRESSURE: 68 MMHG | TEMPERATURE: 99.1 F | SYSTOLIC BLOOD PRESSURE: 112 MMHG | OXYGEN SATURATION: 97 % | RESPIRATION RATE: 18 BRPM | HEART RATE: 59 BPM

## 2021-05-26 VITALS — RESPIRATION RATE: 16 BRPM | HEART RATE: 80 BPM

## 2021-05-26 VITALS — RESPIRATION RATE: 20 BRPM | HEART RATE: 88 BPM

## 2021-05-26 VITALS
SYSTOLIC BLOOD PRESSURE: 112 MMHG | HEART RATE: 61 BPM | DIASTOLIC BLOOD PRESSURE: 82 MMHG | OXYGEN SATURATION: 96 % | RESPIRATION RATE: 18 BRPM | TEMPERATURE: 98.6 F

## 2021-05-26 VITALS — HEART RATE: 78 BPM | RESPIRATION RATE: 20 BRPM

## 2021-05-26 VITALS
HEART RATE: 79 BPM | RESPIRATION RATE: 16 BRPM | SYSTOLIC BLOOD PRESSURE: 120 MMHG | DIASTOLIC BLOOD PRESSURE: 82 MMHG | TEMPERATURE: 99.1 F | OXYGEN SATURATION: 95 %

## 2021-05-26 VITALS
RESPIRATION RATE: 18 BRPM | HEART RATE: 80 BPM | OXYGEN SATURATION: 96 % | DIASTOLIC BLOOD PRESSURE: 62 MMHG | TEMPERATURE: 97.6 F | SYSTOLIC BLOOD PRESSURE: 108 MMHG

## 2021-05-26 VITALS
OXYGEN SATURATION: 98 % | HEART RATE: 78 BPM | DIASTOLIC BLOOD PRESSURE: 62 MMHG | RESPIRATION RATE: 16 BRPM | SYSTOLIC BLOOD PRESSURE: 134 MMHG | TEMPERATURE: 98.6 F

## 2021-05-26 VITALS — HEART RATE: 80 BPM | RESPIRATION RATE: 16 BRPM

## 2021-05-26 VITALS — RESPIRATION RATE: 16 BRPM | HEART RATE: 82 BPM

## 2021-05-26 LAB
ANION GAP SERPL CALCULATED.3IONS-SCNC: 11 MMOL/L (ref 5–18)
BUN SERPL-MCNC: 14 MG/DL (ref 8–28)
CALCIUM SERPL-MCNC: 8.2 MG/DL (ref 8.5–10.5)
CHLORIDE BLD-SCNC: 105 MMOL/L (ref 98–107)
CO2 SERPL-SCNC: 21 MMOL/L (ref 22–31)
CREAT SERPL-MCNC: 0.71 MG/DL (ref 0.6–1.1)
GFR SERPL CREATININE-BSD FRML MDRD: >60 ML/MIN/1.73M2
GLUCOSE BLD-MCNC: 152 MG/DL (ref 70–125)
POTASSIUM BLD-SCNC: 4 MMOL/L (ref 3.5–5)
SODIUM SERPL-SCNC: 137 MMOL/L (ref 136–145)

## 2021-05-27 VITALS
SYSTOLIC BLOOD PRESSURE: 120 MMHG | OXYGEN SATURATION: 96 % | RESPIRATION RATE: 16 BRPM | HEART RATE: 80 BPM | DIASTOLIC BLOOD PRESSURE: 83 MMHG

## 2021-05-27 VITALS
DIASTOLIC BLOOD PRESSURE: 82 MMHG | OXYGEN SATURATION: 97 % | SYSTOLIC BLOOD PRESSURE: 120 MMHG | HEART RATE: 61 BPM | RESPIRATION RATE: 16 BRPM

## 2021-05-27 VITALS — HEART RATE: 82 BPM | RESPIRATION RATE: 16 BRPM

## 2021-05-27 VITALS
HEART RATE: 60 BPM | DIASTOLIC BLOOD PRESSURE: 56 MMHG | OXYGEN SATURATION: 98 % | RESPIRATION RATE: 16 BRPM | SYSTOLIC BLOOD PRESSURE: 122 MMHG

## 2021-05-27 VITALS
DIASTOLIC BLOOD PRESSURE: 80 MMHG | HEART RATE: 86 BPM | RESPIRATION RATE: 16 BRPM | SYSTOLIC BLOOD PRESSURE: 122 MMHG | OXYGEN SATURATION: 96 %

## 2021-05-27 NOTE — TELEPHONE ENCOUNTER
Marina calls in today wanting to know if she can stop her anastrozole as she is having too much bone achy and pains.  Per Dr Guillen, she can stop it but he still wants her to follow-up in the clinic in September 2019.  She will stop and verbalized understanding.    Cris Marquez RN

## 2021-05-29 ENCOUNTER — RECORDS - HEALTHEAST (OUTPATIENT)
Dept: ADMINISTRATIVE | Facility: CLINIC | Age: 86
End: 2021-05-29

## 2021-05-29 NOTE — PATIENT INSTRUCTIONS - HE
- Start taking spironolactone 25mg in the morning to help lower your blood pressure    - Echocardiogram to look at the pump function of your heart and to check out your valves    - Stress test to check the blood flow to your heart    - Call if the chest pain happens again in the mean time    - We will call with results and recommendations

## 2021-05-29 NOTE — TELEPHONE ENCOUNTER
----- Message from Yoly Dick sent at 6/10/2019  1:48 PM CDT -----  Contact: Outgoing  Caller: Roseanna    Primary cardiologist: Dr. Silva    Detailed reason for call: Marina is due for follow up w/Dr. Silva in July and she gets a ride from her niece Roseanna who is available now but will be unavailable in July. Roseanna would like a return call with the results of Marina's recent test(s), her ph nbr is [phone].     Best phone number: [phone]       Best time to contact: Any    Ok to leave a detailed message? Yes    Device? No

## 2021-05-29 NOTE — TELEPHONE ENCOUNTER
----- Message from Lisandra Bucio sent at 6/7/2019  3:14 PM CDT -----  Contact: Patient  General phone call:    Caller:   Primary cardiologist: Ricardo  Detailed reason for call: Pt states she had a vm from [facility] and thinks it was about her NM Stress Test results  New or active symptoms?   Best phone number: [phone]  Best time to contact:   Ok to leave a detailed message?   Device?     Additional Info:

## 2021-05-29 NOTE — TELEPHONE ENCOUNTER
Spoke with niece. Review of results and recommendations from [facility]. Verbalized understanding. Will connect with  directly for follow-up in 1-2 months, which may be in August per availability. Niece states that she would need a later appointment as she works. Niece denies any further questions, connecting with a  to complete. KALA,RN

## 2021-05-29 NOTE — PROGRESS NOTES
Thank you for asking the [facility] Heart Care team to see Marina Neves in consultation  to evaluate chest pain.      Assessment/Plan:   Chest pain - concerning for angina. Will set up for nuclear stress test    Elevated BNP and early systolic murmur - echo    Hypertension - start aldactone    F/U pending above results     Current History:   Marina Neves is a 85 y.o. pleasant woman who lives in a senior apartment and was out shopping with her niece last week and developed severe central chest pain while waiting in the car at the grocery. It did not radiate but did make her short of breath. It lasted 10-20 minutes and was gone by the time she got to the ER. EKG and troponin were negative but BNP was 208. She denies pnd/orthopnea, palpitations or syncope. She does sitting exercises without chest pain or dyspnea. There is no history of acid reflux or difficulty swallowing.     Past Medical History:     Past Medical History:   Diagnosis Date     Anemia      Arthritis      Bladder infection 01/03/2015    dx'd- on antibiotics     Blood type AB-     with Anti-C     Breast cancer (H)      Bursitis, knee      Cancer (H) 2005    left breast     Colon cancer (H)      Depression      Gout      History of transfusion      Hypertension      Insomnia      Macular degeneration      Osteopenia      Ovarian cyst      Rosacea      Vitamin D deficiency        Past Surgical History:     Past Surgical History:   Procedure Laterality Date     BACK SURGERY Bilateral     L3-L5, L5-S1 decompression lami     BACK SURGERY      posterior spinal reconstruction     BREAST SURGERY      left mastectomy     CATARACT EXTRACTION Bilateral      COLON SURGERY       DILATION AND CURETTAGE OF UTERUS      onsil     EYE SURGERY       GASTRECTOMY       left breast biopsy      needle core     left knee arthroscopy       MASTECTOMY, RADICAL Left      NEUROMA SURGERY Bilateral     feet     LA LAP,FULGURATE/EXCISE LESIONS N/A 3/29/2017    Procedure:  LAPAROSCOPIC BILATERAL SALPING OOPHORECTOMY PELVIC WASHINGS;  Surgeon: Eduardo Smart MD;  Location: [facility] OR;  Service: Gynecology     WI MASTECTOMY, SIMPLE, COMPLETE Right 1/7/2015    Procedure: RIGHT BREAST MASTECTOMY;  Surgeon: Meliton Bazan MD;  Location: [facility];  Service: General     WI PART REMOVAL COLON W ANASTOMOSIS N/A 6/16/2014    Procedure: COLECTOMY;  Surgeon: Meliton Bazan MD;  Location: [facility];  Service: General     REPLACEMENT TOTAL KNEE Right      right shoulder replacement       TONSILLECTOMY AND ADENOIDECTOMY         Family History:     Family History   Problem Relation Age of Onset     Squamous cell carcinoma Mother      Colon cancer Sister      Breast cancer Maternal Aunt      Bone cancer Maternal Aunt      Colon cancer Paternal Aunt      Breast cancer Cousin        Social History:    reports that she has never smoked. She has never used smokeless tobacco. She reports that she does not drink alcohol or use drugs.    Meds:     Current Outpatient Medications   Medication Sig     acetaminophen (TYLENOL) 650 MG CR tablet Take 650 mg by mouth 2 (two) times a day as needed for pain.     aspirin 81 MG EC tablet Take 81 mg by mouth daily.     atenolol (TENORMIN) 100 MG tablet Take 100 mg by mouth bedtime.      calcium carbonate-vitamin D2 500 mg(1,250mg) -200 unit tablet Take 1 tablet by mouth 2 (two) times a day.     cholecalciferol, vitamin D3, 1,000 unit tablet Take 2,000 Units by mouth daily.      docoshexanoic acid-eicosapent 500 mg (FISH OIL) 500-100 mg cap capsule Take 1,400 mg by mouth daily.      docusate sodium (COLACE) 100 MG capsule Take 100 mg by mouth 2 (two) times a day as needed for constipation.     losartan (COZAAR) 25 MG tablet 50 mg every morning.      multivitamin therapeutic (THERAGRAN) tablet Take 1 tablet by mouth daily.     polyvinyl alcohol (LIQUIFILM TEARS) 1.4 % ophthalmic solution Administer 1-2 drops to both eyes 4 (four) times a  "day as needed for dry eyes.     ranitidine (ZANTAC) 150 MG tablet Take 150 mg by mouth 2 (two) times a day as needed (upset stomach).      traMADol (ULTRAM) 50 mg tablet Take 1 tablet by mouth as needed.     VIT C/LUDY AC/LUT/COPPER/ZNOX (PRESERVISION LUTEIN ORAL) Take 1 tablet by mouth 2 (two) times a day.     anastrozole (ARIMIDEX) 1 mg tablet Take 1 tablet (1 mg total) by mouth daily.     loratadine (CLARITIN) 10 mg tablet Take 10 mg by mouth daily as needed for allergies.     spironolactone (ALDACTONE) 25 MG tablet Take 1 tablet (25 mg total) by mouth daily.       Allergies:   Blood-group specific substance; Celebrex [celecoxib]; Lisinopril; Tetanus vaccines and toxoid; Piroxicam; and Sulfa (sulfonamide antibiotics)    Review of Systems:   Review of Systems:   General: WNL  Eyes: WNL  Ears/Nose/Throat: WNL  Lungs: WNL  Heart: WNL  Stomach: WNL  Bladder: WNL  Muscle/Joints: WNL  Skin: WNL  Nervous System: WNL  Mental Health: WNL     Blood: WNL       Objective:      Physical Exam  @LASTENCWT:3@  5' 3\" (1.6 m)  @BMI:3@  /80 (Patient Site: Left Arm, Patient Position: Sitting, Cuff Size: Adult Regular)   Pulse (!) 58   Resp 16   Ht 5' 3\" (1.6 m)   Wt 169 lb 9.6 oz (76.9 kg)   BMI 30.04 kg/m      General Appearance:   Alert, cooperative and in no acute distress.   HEENT:  No scleral icterus; the mucous membranes were pink and moist.   Neck: JVP flat. No thyromegaly. No HJR   Chest: The spine was straight. The chest was symmetric.   Lungs:   Respirations unlabored; the lungs are clear to auscultation.   Cardiovascular:   S1 and S2 normal and with early systolic murmur. No clicks or rubs. No carotid bruits noted. Right DP, PT, and radial pulses 2+. Left DP, PT, and radial pulses 2+.   Abdomen:  No organomegaly, masses, bruits, or tenderness. Bowels sounds are present   Extremities: No cyanosis, clubbing, or edema.   Skin: No xanthelasma.   Neurologic: Mood and affect are appropriate.         Lab Review   Lab " Results   Component Value Date     05/21/2019     03/11/2019     09/04/2018    K 3.9 05/21/2019    K 4.5 03/11/2019    K 4.3 09/04/2018     05/21/2019     03/11/2019     09/04/2018    CO2 23 05/21/2019    CO2 26 03/11/2019    CO2 28 09/04/2018    BUN 11 05/21/2019    BUN 11 03/11/2019    BUN 13 09/04/2018    CREATININE 0.71 05/21/2019    CREATININE 0.70 03/11/2019    CREATININE 0.76 09/04/2018    CALCIUM 8.9 05/21/2019    CALCIUM 9.1 03/11/2019    CALCIUM 9.0 09/04/2018     Lab Results   Component Value Date    WBC 7.8 05/21/2019    WBC 6.9 09/04/2018    WBC 7.7 05/18/2017    WBC 12.9 (H) 08/28/2015    WBC 10.3 01/08/2015    WBC 6.9 01/03/2015    HGB 12.5 05/21/2019    HGB 11.8 (L) 09/04/2018    HGB 12.2 05/18/2017    HCT 39.0 05/21/2019    HCT 35.9 09/04/2018    HCT 37.7 05/18/2017    MCV 99 05/21/2019    MCV 97 09/04/2018    MCV 96 05/18/2017     05/21/2019     09/04/2018     05/18/2017     Lab Results   Component Value Date    CHOL 194 03/11/2019    CHOL 202 (H) 09/13/2017    TRIG 198 (H) 03/11/2019    TRIG 161 (H) 09/13/2017    HDL 52 03/11/2019    HDL 49 (L) 09/13/2017     Lab Results   Component Value Date     (H) 05/21/2019         Louann Silva M.D.

## 2021-05-29 NOTE — TELEPHONE ENCOUNTER
Patient contacted today, reviewed that we had spoken yesterday regarding her test results and that there is no documentation of any new information/or call from [facility] to her today.   Reviewed the information from yesterday and offered to call her niece with the information as well, which she declined.     See also phone note dated 6/6/19.      Juliette Jackson RN BSN CACP

## 2021-05-30 ENCOUNTER — RECORDS - HEALTHEAST (OUTPATIENT)
Dept: ADMINISTRATIVE | Facility: CLINIC | Age: 86
End: 2021-05-30

## 2021-05-30 VITALS — WEIGHT: 176 LBS | HEIGHT: 63 IN | BODY MASS INDEX: 31.18 KG/M2

## 2021-05-31 VITALS — WEIGHT: 172.5 LBS | BODY MASS INDEX: 30.56 KG/M2

## 2021-05-31 NOTE — PATIENT INSTRUCTIONS - HE
- Walk daily    - No change in your medications    - Let us know if you have any chest pain, otherwise you can follow up with Dr. Whatley

## 2021-05-31 NOTE — PROGRESS NOTES
Thank you for asking the [facility] Heart Care team to see Marina Neves.      Assessment/Plan:   Chest pain - no recurrences    HTN - controlled with addition of aldactone    F/U PCP     Current History:   Marina Neves is a 85 y.o. with hypertension who I met for complaints of chest pain with elevated BNP. Echo and stress test were grossly normal. Marina denies any further chest pain or dyspnea. She continues to grocery shop weekly.     Past Medical History:     Past Medical History:   Diagnosis Date     Anemia      Arthritis      Bladder infection 01/03/2015    dx'd- on antibiotics     Blood type AB-     with Anti-C     Breast cancer (H)      Bursitis, knee      Cancer (H) 2005    left breast     Colon cancer (H)      Depression      Gout      History of transfusion      Hypertension      Insomnia      Macular degeneration      Osteopenia      Ovarian cyst      Rosacea      Vitamin D deficiency        Past Surgical History:     Past Surgical History:   Procedure Laterality Date     BACK SURGERY Bilateral     L3-L5, L5-S1 decompression lami     BACK SURGERY      posterior spinal reconstruction     BREAST SURGERY      left mastectomy     CATARACT EXTRACTION Bilateral      COLON SURGERY       DILATION AND CURETTAGE OF UTERUS      onsil     EYE SURGERY       GASTRECTOMY       left breast biopsy      needle core     left knee arthroscopy       MASTECTOMY, RADICAL Left      NEUROMA SURGERY Bilateral     feet     NH LAP,FULGURATE/EXCISE LESIONS N/A 3/29/2017    Procedure: LAPAROSCOPIC BILATERAL SALPING OOPHORECTOMY PELVIC WASHINGS;  Surgeon: Eduardo Smart MD;  Location: [facility] OR;  Service: Gynecology     NH MASTECTOMY, SIMPLE, COMPLETE Right 1/7/2015    Procedure: RIGHT BREAST MASTECTOMY;  Surgeon: Meliton Bazan MD;  Location: [facility] OR;  Service: General     NH PART REMOVAL COLON W ANASTOMOSIS N/A 6/16/2014    Procedure: COLECTOMY;  Surgeon: Meliton Bazan MD;  Location: [facility]  OR;  Service: General     REPLACEMENT TOTAL KNEE Right      right shoulder replacement       TONSILLECTOMY AND ADENOIDECTOMY         Family History:     Family History   Problem Relation Age of Onset     Squamous cell carcinoma Mother      Colon cancer Sister      Breast cancer Maternal Aunt      Bone cancer Maternal Aunt      Colon cancer Paternal Aunt      Breast cancer Cousin        Social History:    reports that she has never smoked. She has never used smokeless tobacco. She reports that she does not drink alcohol or use drugs.    Meds:     Current Outpatient Medications   Medication Sig     acetaminophen (TYLENOL) 650 MG CR tablet Take 650 mg by mouth 2 (two) times a day as needed for pain.     aspirin 81 MG EC tablet Take 81 mg by mouth daily.     atenolol (TENORMIN) 100 MG tablet Take 100 mg by mouth bedtime.      calcium carbonate-vitamin D2 500 mg(1,250mg) -200 unit tablet Take 1 tablet by mouth 2 (two) times a day.     cholecalciferol, vitamin D3, 1,000 unit tablet Take 2,000 Units by mouth daily.      docoshexanoic acid-eicosapent 500 mg (FISH OIL) 500-100 mg cap capsule Take 1,400 mg by mouth daily.      docusate sodium (COLACE) 100 MG capsule Take 100 mg by mouth 2 (two) times a day as needed for constipation.     loratadine (CLARITIN) 10 mg tablet Take 10 mg by mouth daily as needed for allergies.     losartan (COZAAR) 25 MG tablet 50 mg every morning.      multivitamin therapeutic (THERAGRAN) tablet Take 1 tablet by mouth daily.     polyvinyl alcohol (LIQUIFILM TEARS) 1.4 % ophthalmic solution Administer 1-2 drops to both eyes 4 (four) times a day as needed for dry eyes.     ranitidine (ZANTAC) 150 MG tablet Take 150 mg by mouth 2 (two) times a day as needed (upset stomach).      spironolactone (ALDACTONE) 25 MG tablet Take 1 tablet (25 mg total) by mouth daily.     traMADol (ULTRAM) 50 mg tablet Take 1 tablet by mouth as needed.     VIT C/LUDY AC/LUT/COPPER/ZNOX (PRESERVISION LUTEIN ORAL) Take 1  "tablet by mouth 2 (two) times a day.     anastrozole (ARIMIDEX) 1 mg tablet Take 1 tablet (1 mg total) by mouth daily.       Allergies:   Blood-group specific substance; Celebrex [celecoxib]; Lisinopril; Tetanus vaccines and toxoid; Piroxicam; and Sulfa (sulfonamide antibiotics)    Review of Systems:   Review of Systems:   General: WNL  Eyes: WNL  Ears/Nose/Throat: WNL  Lungs: WNL  Heart: WNL  Stomach: WNL  Bladder: WNL  Muscle/Joints: Joint Pain  Skin: WNL  Nervous System: WNL  Mental Health: WNL     Blood: WNL       Objective:      Physical Exam  @LASTENCWT:3@  5' 3\" (1.6 m)  @BMI:3@  /70 (Patient Site: Left Arm, Patient Position: Sitting, Cuff Size: Adult Large)   Pulse 64   Resp 20   Ht 5' 3\" (1.6 m)   Wt 170 lb 4.8 oz (77.2 kg)   BMI 30.17 kg/m      General Appearance:   Alert, cooperative and in no acute distress.   HEENT:  No scleral icterus; the mucous membranes were pink and moist.   Neck: JVP flat. No thyromegaly. No HJR   Chest: The spine was straight. The chest was symmetric.   Lungs:   Respirations unlabored; the lungs are clear to auscultation.   Cardiovascular:   S1 and S2 normal and without murmur. No clicks or rubs. No carotid bruits noted. Right DP, PT, and radial pulses 2+. Left DP, PT, and radial pulses 2+.   Abdomen:  No organomegaly, masses, bruits, or tenderness. Bowels sounds are present   Extremities: No cyanosis, clubbing, or edema.   Skin: No xanthelasma.   Neurologic: Mood and affect are appropriate.         Lab Review   Lab Results   Component Value Date     05/21/2019     03/11/2019     09/04/2018    K 3.9 05/21/2019    K 4.5 03/11/2019    K 4.3 09/04/2018     05/21/2019     03/11/2019     09/04/2018    CO2 23 05/21/2019    CO2 26 03/11/2019    CO2 28 09/04/2018    BUN 11 05/21/2019    BUN 11 03/11/2019    BUN 13 09/04/2018    CREATININE 0.71 05/21/2019    CREATININE 0.70 03/11/2019    CREATININE 0.76 09/04/2018    CALCIUM 8.9 05/21/2019    " CALCIUM 9.1 03/11/2019    CALCIUM 9.0 09/04/2018     Lab Results   Component Value Date    WBC 7.8 05/21/2019    WBC 6.9 09/04/2018    WBC 7.7 05/18/2017    WBC 12.9 (H) 08/28/2015    WBC 10.3 01/08/2015    WBC 6.9 01/03/2015    HGB 12.5 05/21/2019    HGB 11.8 (L) 09/04/2018    HGB 12.2 05/18/2017    HCT 39.0 05/21/2019    HCT 35.9 09/04/2018    HCT 37.7 05/18/2017    MCV 99 05/21/2019    MCV 97 09/04/2018    MCV 96 05/18/2017     05/21/2019     09/04/2018     05/18/2017     Lab Results   Component Value Date    CHOL 194 03/11/2019    CHOL 202 (H) 09/13/2017    TRIG 198 (H) 03/11/2019    TRIG 161 (H) 09/13/2017    HDL 52 03/11/2019    HDL 49 (L) 09/13/2017     Lab Results   Component Value Date     (H) 05/21/2019         Louann Silva M.D.

## 2021-06-01 NOTE — PROGRESS NOTES
[facility]    Facility:   [facility] [[id number]]   Code Status: POLST AVAILABLE      CHIEF COMPLAINT/REASON FOR VISIT:  Chief Complaint   Patient presents with     Review Of Multiple Medical Conditions       HISTORY:      HPI: Marina is a 85 y.o. female who is a long-term care resident at [facility].  Marina  has a past medical history of Anemia, Arthritis, Bladder infection (01/03/2015), Blood type AB-, Breast cancer (H), Bursitis, knee, Cancer (H) (2005), Colon cancer (H), Depression, Gout, History of transfusion, Hypertension, Insomnia, Macular degeneration, Osteopenia, Ovarian cyst, Rosacea, and Vitamin D deficiency.    Today Ms. Neves is being evaluated for a review of multiple medical conditions.  She said that she has lived in [place] her whole life and does not know exactly how many years she's lived in the [facility] [place] but it has been quite awhile.  She has a niece nearby that helps her with getting her groceries and picking up her prescriptions.  She has a cousin also nearby that helps bring her to appointments during the warm months and lives in [place] the rest of the year.  She denies any specific concerns at this visit.  We reviewed her medications and their uses and she was very knowlegeble about timing, use, and dosages.  She manages her medications on her own and does not have any nursing services through [facility] at this time.  She did request that she has physical and occupational therapy for bilateral shoulder pain r/t osteoarthritis.  She said that she has been taking Tylenol three times a day for pain daily with moderate relief.  We discussed using low-dose prednisone to treat her arthritis for further pain management and she was open to trying this for pain relief.  Her blood pressure has been well-controlled on her current antihypertensive medications with -140s over the past year.  She monitors her blood pressure monthly at her  apartment health screenings.  She enjoys her group of friends in the apartment building and goes to many of the [employer] activities.  She said that she used to enjoy reading but her vision has deteriorated to the point where it is very frustrating for her to read.  The patient denied lightheadedness, dizziness, breathing difficulty, chest pain, palpitations, constipation, urinary symptoms, numbness or tingling in extremities, and pain.  She did not want any family members updated on her status at this time as she is her own decision-maker.    Past Medical History:   Diagnosis Date     Anemia      Arthritis      Bladder infection 01/03/2015    dx'd- on antibiotics     Blood type AB-     with Anti-C     Breast cancer (H)      Bursitis, knee      Cancer (H) 2005    left breast     Colon cancer (H)      Depression      Gout      History of transfusion      Hypertension      Insomnia      Macular degeneration      Osteopenia      Ovarian cyst      Rosacea      Vitamin D deficiency              Family History   Problem Relation Age of Onset     Squamous cell carcinoma Mother      Colon cancer Sister      Breast cancer Maternal Aunt      Bone cancer Maternal Aunt      Colon cancer Paternal Aunt      Breast cancer Cousin      Social History     Socioeconomic History     Marital status:      Spouse name: None     Number of children: None     Years of education: None     Highest education level: None   Occupational History     None   Social Needs     Financial resource strain: None     Food insecurity:     Worry: None     Inability: None     Transportation needs:     Medical: None     Non-medical: None   Tobacco Use     Smoking status: Never Smoker     Smokeless tobacco: Never Used   Substance and Sexual Activity     Alcohol use: No     Drug use: No     Sexual activity: Never   Lifestyle     Physical activity:     Days per week: None     Minutes per session: None     Stress: None   Relationships     Social  connections:     Talks on phone: None     Gets together: None     Attends Anabaptist service: None     Active member of club or organization: None     Attends meetings of clubs or organizations: None     Relationship status: None     Intimate partner violence:     Fear of current or ex partner: None     Emotionally abused: None     Physically abused: None     Forced sexual activity: None   Other Topics Concern     None   Social History Narrative     None       REVIEW OF SYSTEM:  Pertinent items are noted in HPI.  A 12 point comprehensive review of systems was negative except as noted.    PHYSICAL EXAM:     General Appearance:    Alert, cooperative, no distress, appears stated age   Head:    Normocephalic, without obvious abnormality, atraumatic   Eyes:    PERRL, conjunctiva/corneas clear, both eyes; wears glasses   Ears:    Normal external ear canals, both ears   Nose:   Nares normal, septum midline, mucosa normal, no drainage    or sinus tenderness   Throat:   Lips, mucosa, and tongue normal; teeth and gums normal   Neck:   Supple, symmetrical, trachea midline, no adenopathy;     thyroid:  no enlargement/tenderness/nodules; no carotid    bruit or JVD   Back:     Symmetric, no curvature, ROM normal, no CVA tenderness   Lungs:     Clear to auscultation bilaterally, respirations unlabored   Chest Wall:    No tenderness or deformity    Heart:    Regular rate and rhythm, S1 and S2 normal, no murmur, rub   or gallop   Abdomen:     Soft, non-tender, bowel sounds active all four quadrants,     no masses, no organomegaly   Extremities:   Extremities normal, atraumatic, no cyanosis or edema   Pulses:   2+ and symmetric all extremities   Skin:   Skin color, texture, turgor normal, no rashes or lesions   Neurologic:   Oriented to person, place, time, and situation; exhibits logical thought processes; generalized weakness; ambulatory with walker           LABS:     None ordered at this visit.    ASSESSMENT:      ICD-10-CM    1.  Inflammatory arthritis M19.90    2. Essential hypertension I10    3. Gastroesophageal reflux disease without esophagitis K21.9    4. Chronic idiopathic constipation K59.04    5. Nutritional deficiency E63.9    6. Chronic pain syndrome G89.4    7. Physical debility R53.81        PLAN:      START prednisone 10 mg daily for inflammatory arthritis with follow-up in 1-2 weeks.  Consult to [facility] PT/OT for physical debility.  Otherwise continue current care plan for all other chronic medical conditions, as they are stable. Encouraged patient to engage in healthy lifestyle behaviors such as engaging in social activities, exercising (PT/OT), eating well, and following care plan. Follow up for routine check-up, or sooner if needed. Will continue to monitor patient and work with nursing staff collaboratively to work toward positive patient outcomes.     Electronically signed by: Kiana Reyes CNP     Total floor/unit time was 90 minutes and 65 minutes was spent in counseling patient on pain management, bowel management, antihypertensive management, and acute rehabilitation for physical debility and coordination of care with home health PT/OT.

## 2021-06-01 NOTE — PROGRESS NOTES
[facility]    Facility:   [facility] [[id number]]   Code Status: POLST AVAILABLE      CHIEF COMPLAINT/REASON FOR VISIT:  Chief Complaint   Patient presents with     Problem Visit     arthritis       HISTORY:      HPI: Marina is a 85 y.o. female who is an assisted-living resident at [facility].  Marian  has a past medical history of Anemia, Arthritis, Bladder infection (01/03/2015), Blood type AB-, Breast cancer (H), Bursitis, knee, Cancer (H) (2005), Colon cancer (H), Depression, Gout, History of transfusion, Hypertension, Insomnia, Macular degeneration, Osteopenia, Ovarian cyst, Rosacea, and Vitamin D deficiency.    Today Ms. Neves is being evaluated for chronic pain.  She was started on low-dose prednisone two weeks ago and has not noticed any change in her right knee pain or bilateral shoulder pain.  Her main concern at this visit is increased right knee pain while working with physical therapy.  We discussed steroid injections to her knee and bilateral shoulders for chronic pain that she rated at 5-6/10 in her joints.  She has been taking Tylenol arthritis three times a day for pain and she tried a lidocaine pain patch on her right knee without pain relief.  She reported that she still has good range of motion in her bilateral shoulders but is limping due to her knee pain.  The patient denied lightheadedness, dizziness, breathing difficulty, chest pain, palpitations, constipation, urinary symptoms, and numbness or tingling in extremities.     Past Medical History:   Diagnosis Date     Anemia      Arthritis      Bladder infection 01/03/2015    dx'd- on antibiotics     Blood type AB-     with Anti-C     Breast cancer (H)      Bursitis, knee      Cancer (H) 2005    left breast     Colon cancer (H)      Depression      Gout      History of transfusion      Hypertension      Insomnia      Macular degeneration      Osteopenia      Ovarian cyst      Rosacea      Vitamin D  deficiency              Family History   Problem Relation Age of Onset     Squamous cell carcinoma Mother      Colon cancer Sister      Breast cancer Maternal Aunt      Bone cancer Maternal Aunt      Colon cancer Paternal Aunt      Breast cancer Cousin      Social History     Socioeconomic History     Marital status:      Spouse name: None     Number of children: None     Years of education: None     Highest education level: None   Occupational History     None   Social Needs     Financial resource strain: None     Food insecurity:     Worry: None     Inability: None     Transportation needs:     Medical: None     Non-medical: None   Tobacco Use     Smoking status: Never Smoker     Smokeless tobacco: Never Used   Substance and Sexual Activity     Alcohol use: No     Drug use: No     Sexual activity: Never   Lifestyle     Physical activity:     Days per week: None     Minutes per session: None     Stress: None   Relationships     Social connections:     Talks on phone: None     Gets together: None     Attends Latter day service: None     Active member of club or organization: None     Attends meetings of clubs or organizations: None     Relationship status: None     Intimate partner violence:     Fear of current or ex partner: None     Emotionally abused: None     Physically abused: None     Forced sexual activity: None   Other Topics Concern     None   Social History Narrative     None       REVIEW OF SYSTEM:  Pertinent items are noted in HPI.  A 12 point comprehensive review of systems was negative except as noted.    PHYSICAL EXAM:     General Appearance:    Alert, cooperative, no distress, appears stated age   Head:    Normocephalic, without obvious abnormality, atraumatic   Eyes:    PERRL, conjunctiva/corneas clear, both eyes; wears glasses   Ears:    Normal external ear canals, both ears; hearing impairment in bilateral ears with bilateral HAs   Nose:   Nares normal, septum midline, mucosa normal, no  drainage    or sinus tenderness   Throat:   Lips, mucosa, and tongue normal; teeth and gums normal   Neck:   Supple, symmetrical, trachea midline, no adenopathy;     thyroid:  no enlargement/tenderness/nodules; no carotid    bruit or JVD   Back:     Symmetric, no curvature, ROM normal, no CVA tenderness   Lungs:     Clear to auscultation bilaterally, respirations unlabored   Chest Wall:    No tenderness or deformity    Heart:    Regular rate and rhythm, S1 and S2 normal, no murmur, rub   or gallop   Abdomen:     Soft, non-tender, bowel sounds active all four quadrants,     no masses, no organomegaly   Extremities:   Extremities normal, atraumatic, no cyanosis or edema   Pulses:   2+ and symmetric all extremities   Skin:   Skin color, texture, turgor normal, no rashes or lesions   Neurologic:   Oriented to person and situation; forgetful at times; exhibits tangetial thought processes; generalized weakness; unsteady gait; ambulatory with walker           LABS:   Lab Results   Component Value Date    WBC 7.8 05/21/2019    HGB 12.5 05/21/2019    HCT 39.0 05/21/2019     05/21/2019    CHOL 194 03/11/2019    TRIG 198 (H) 03/11/2019    HDL 52 03/11/2019    ALT 18 05/21/2019    AST 20 05/21/2019     09/17/2019    K 4.9 09/17/2019     09/17/2019    CREATININE 0.77 09/17/2019    BUN 14 09/17/2019    CO2 25 09/17/2019    TSH 1.21 02/13/2018    INR 0.94 05/21/2019        ASSESSMENT:      ICD-10-CM    1. Chronic pain of right knee M25.561     G89.29    2. Chronic pain syndrome G89.4    3. Primary osteoarthritis involving multiple joints M15.0    4. Chronic pain of both shoulders M25.511     G89.29     M25.512        PLAN:      Chronic pain  -Check x-ray of right knee and bilateral shoulders prior to cortisone injection  -Continue PT/OT with [facility]  -Continue Tylenol as prescribed  -Encouraged patient to utilize integrative therapies such as distraction and deep breathing for pain management  -Notify  provider if patient has new or worsening pain     Otherwise continue current care plan for all other chronic medical conditions, as they are stable. Encouraged patient to engage in healthy lifestyle behaviors such as engaging in social activities, exercising (PT/OT), eating well, and following care plan. Follow up for routine check-up, or sooner if needed. Will continue to monitor patient and work with nursing staff collaboratively to work toward positive patient outcomes.     Electronically signed by: Kiana Reyes CNP

## 2021-06-02 ENCOUNTER — RECORDS - HEALTHEAST (OUTPATIENT)
Dept: ADMINISTRATIVE | Facility: CLINIC | Age: 86
End: 2021-06-02

## 2021-06-02 VITALS — WEIGHT: 171 LBS | BODY MASS INDEX: 30.29 KG/M2

## 2021-06-02 NOTE — PROGRESS NOTES
[facility]    Facility:   [facility] [[id number]]   Code Status: POLST AVAILABLE      CHIEF COMPLAINT/REASON FOR VISIT:  Chief Complaint   Patient presents with     Problem Visit     osteoarthritis       HISTORY:      HPI: Marina is a 85 y.o. female who is an assisted-living resident at [facility].  Marina  has a past medical history of Anemia, Arthritis, Bladder infection (01/03/2015), Blood type AB-, Breast cancer (H), Bursitis, knee, Cancer (H) (2005), Colon cancer (H), Depression, Gout, History of transfusion, Hypertension, Insomnia, Macular degeneration, Osteopenia, Ovarian cyst, Rosacea, and Vitamin D deficiency.    Today Ms. Neves is being evaluated for chronic pain.  We discussed her x-ray results of bilateral shoulders and bilateral knees done three weeks ago that she was given the results of three weeks ago and does not recall the phone conversation.  She continues to work with [facility] for bilateral knee and shoulder pain described as aching, intermittent pain rated 5-6/10.  She is currently taking Tylenol arthritis three times a day and tramadol four times a day PRN for pain with mild pain relief.  She asked that she get another medication to alleviate her pain at this time.  We discussed steroid injection after completion of PT and she was agreeable to this.  She has not had any functional limitations with her osteoarthritic pain with independent ADLs at this time.  We discussed meeting with her cousin to fill out her POLST as she is having increased memory impairment with last SLUMS 13 in August 2019 and she needs more assistance remembering information from healthcare visits.  Plan to meet for follow-up appointment on 10/31/19.  Her insurance has not approved her knee brace recommended by PT yet at this time.  The patient denied lightheadedness, dizziness, breathing difficulty, chest pain, palpitations, constipation, urinary symptoms, and numbness  or tingling in extremities.     Past Medical History:   Diagnosis Date     Anemia      Arthritis      Bladder infection 01/03/2015    dx'd- on antibiotics     Blood type AB-     with Anti-C     Breast cancer (H)      Bursitis, knee      Cancer (H) 2005    left breast     Colon cancer (H)      Depression      Gout      History of transfusion      Hypertension      Insomnia      Macular degeneration      Osteopenia      Ovarian cyst      Rosacea      Vitamin D deficiency              Family History   Problem Relation Age of Onset     Squamous cell carcinoma Mother      Colon cancer Sister      Breast cancer Maternal Aunt      Bone cancer Maternal Aunt      Colon cancer Paternal Aunt      Breast cancer Cousin      Social History     Socioeconomic History     Marital status:      Spouse name: Not on file     Number of children: Not on file     Years of education: Not on file     Highest education level: Not on file   Occupational History     Not on file   Social Needs     Financial resource strain: Not on file     Food insecurity:     Worry: Not on file     Inability: Not on file     Transportation needs:     Medical: Not on file     Non-medical: Not on file   Tobacco Use     Smoking status: Never Smoker     Smokeless tobacco: Never Used   Substance and Sexual Activity     Alcohol use: No     Drug use: No     Sexual activity: Never   Lifestyle     Physical activity:     Days per week: Not on file     Minutes per session: Not on file     Stress: Not on file   Relationships     Social connections:     Talks on phone: Not on file     Gets together: Not on file     Attends Episcopal service: Not on file     Active member of club or organization: Not on file     Attends meetings of clubs or organizations: Not on file     Relationship status: Not on file     Intimate partner violence:     Fear of current or ex partner: Not on file     Emotionally abused: Not on file     Physically abused: Not on file     Forced sexual  activity: Not on file   Other Topics Concern     Not on file   Social History Narrative     Not on file       REVIEW OF SYSTEM:  Pertinent items are noted in HPI.  A 12 point comprehensive review of systems was negative except as noted.    PHYSICAL EXAM:     General Appearance:    Alert, cooperative, no distress, appears stated age   Head:    Normocephalic, without obvious abnormality, atraumatic   Eyes:    PERRL, conjunctiva/corneas clear, both eyes; wears glasses   Ears:    Normal external ear canals, both ears; hearing impairment in bilateral ears with bilateral HAs   Nose:   Nares normal, septum midline, mucosa normal, no drainage    or sinus tenderness   Throat:   Lips, mucosa, and tongue normal; teeth and gums normal   Neck:   Supple, symmetrical, trachea midline, no adenopathy;     thyroid:  no enlargement/tenderness/nodules; no carotid    bruit or JVD   Back:     Symmetric, no curvature, ROM normal, no CVA tenderness   Lungs:     Clear to auscultation bilaterally, respirations unlabored   Chest Wall:    No tenderness or deformity    Heart:    Regular rate and rhythm, S1 and S2 normal, no murmur, rub   or gallop   Abdomen:     Soft, non-tender, bowel sounds active all four quadrants,     no masses, no organomegaly   Extremities:   Extremities normal, atraumatic, no cyanosis or edema   Pulses:   2+ and symmetric all extremities   Skin:   Skin color, texture, turgor normal, no rashes or lesions   Neurologic:   Oriented to person and situation; forgetful at times; exhibits tangetial thought processes; generalized weakness; unsteady gait; ambulatory with walker           LABS:   Lab Results   Component Value Date    WBC 7.8 05/21/2019    HGB 12.5 05/21/2019    HCT 39.0 05/21/2019     05/21/2019    CHOL 194 03/11/2019    TRIG 198 (H) 03/11/2019    HDL 52 03/11/2019    ALT 18 05/21/2019    AST 20 05/21/2019     09/17/2019    K 4.9 09/17/2019     09/17/2019    CREATININE 0.77 09/17/2019    BUN 14  09/17/2019    CO2 25 09/17/2019    TSH 1.21 02/13/2018    INR 0.94 05/21/2019        ASSESSMENT:      ICD-10-CM    1. Chronic pain syndrome G89.4    2. Chronic pain of right knee M25.561     G89.29    3. Primary osteoarthritis involving multiple joints M15.0        PLAN:      Chronic pain  -START diclofenac 1% gel apply 2 g to bilateral knees and bilateral shoulders three times a day   -Continue PT/OT with [facility] through 10/28/19  -Continue Tylenol 625 mg as prescribed  -Continue tramadol 50 mg four times a day PRN  -Encouraged patient to utilize integrative therapies such as distraction and deep breathing for pain management  -Notify provider if patient has new or worsening pain   -Follow-up appointment with PCP scheduled for 10/31/19 at 1000    Otherwise continue current care plan for all other chronic medical conditions, as they are stable. Encouraged patient to engage in healthy lifestyle behaviors such as engaging in social activities, exercising (PT/OT), eating well, and following care plan. Follow up for routine check-up, or sooner if needed. Will continue to monitor patient and work with nursing staff collaboratively to work toward positive patient outcomes.     Electronically signed by: Kiana Reyes, RYLIE

## 2021-06-02 NOTE — TELEPHONE ENCOUNTER
[facility] Nurse Triage Telephone Note      Provider: GUSTAVO Deshpande  Facility: [facility]     Facility Type: [facility]    Caller: Aparna- Jacques WEINER    Allergies: Blood-group specific substance; Celebrex [celecoxib]; Lisinopril; Tetanus vaccines and toxoid; Piroxicam; and Sulfa (sulfonamide antibiotics)    Reason for call: Awaiting knee brace, request to continue PT HC once weekly for 3 weeks for balance, gait, stregthening.    Verbal Order/Direction given by Provider: OK    Provider giving order: GUSTAVO Deshpande    Verbal order given to: Aparna Sharma RN

## 2021-06-02 NOTE — TELEPHONE ENCOUNTER
Patient would like something topical for management of osteoarthritis pain in bilateral knees and shoulders.  She is already currently taking Tylenol and tramadol for pain management at this time.

## 2021-06-03 ENCOUNTER — RECORDS - HEALTHEAST (OUTPATIENT)
Dept: ADMINISTRATIVE | Facility: CLINIC | Age: 86
End: 2021-06-03

## 2021-06-03 VITALS — BODY MASS INDEX: 30.05 KG/M2 | WEIGHT: 169.6 LBS | HEIGHT: 63 IN

## 2021-06-03 VITALS — WEIGHT: 170.3 LBS | HEIGHT: 63 IN | BODY MASS INDEX: 30.18 KG/M2

## 2021-06-03 VITALS — HEIGHT: 63 IN | BODY MASS INDEX: 29.95 KG/M2 | WEIGHT: 169 LBS

## 2021-06-03 NOTE — PROGRESS NOTES
[facility]    Facility:   [facility] [[id number]]   Code Status: POLST AVAILABLE      CHIEF COMPLAINT/REASON FOR VISIT:  Chief Complaint   Patient presents with     Problem Visit     pain management       HISTORY:      HPI: Marina is a 85 y.o. female who is an assisted-living resident at [facility].  Marina  has a past medical history of Anemia, Arthritis, Bladder infection (01/03/2015), Blood type AB-, Breast cancer (H), Bursitis, knee, Cancer (H) (2005), Colon cancer (H), Depression, Gout, History of transfusion, Hypertension, Insomnia, Macular degeneration, Osteopenia, Ovarian cyst, Rosacea, and Vitamin D deficiency.    Today Ms. Neevs is being evaluated for a review of multiple medical conditions.  Marina feels that the tramadol has not alleviated her pain at all and is upset that she has not had any pain relief with therapy and medication changes.  She described her pain as intermittent, aching pain rated 5-6/10 worse with ambulation.  She refused to buy knee brace as recommended by therapy.  She said that she is frustrated that she cannot have a cortisone injection in her knee.  We discussed changing her tramadol to oxycodone for pain management and she was agreeable to this plan with follow-up scheduled on Friday for further evaluation of chronic knee pain.  She had her ears irrigated today by nursing staff and she reported that her right ear pain has since resolved with continued moderate hearing impairment with hearing aids in place.  The patient denied lightheadedness, dizziness, breathing difficulty, chest pain, palpitations, constipation, urinary symptoms, and numbness or tingling in extremities.  She has started safety checks at her AL facility daily with her progressive cognitive impairment.    Past Medical History:   Diagnosis Date     Anemia      Arthritis      Bladder infection 01/03/2015    dx'd- on antibiotics     Blood type AB-     with Anti-C      Breast cancer (H)      Bursitis, knee      Cancer (H) 2005    left breast     Colon cancer (H)      Depression      Gout      History of transfusion      Hypertension      Insomnia      Macular degeneration      Osteopenia      Ovarian cyst      Rosacea      Vitamin D deficiency              Family History   Problem Relation Age of Onset     Squamous cell carcinoma Mother      Colon cancer Sister      Breast cancer Maternal Aunt      Bone cancer Maternal Aunt      Colon cancer Paternal Aunt      Breast cancer Cousin      Social History     Socioeconomic History     Marital status:      Spouse name: None     Number of children: None     Years of education: None     Highest education level: None   Occupational History     None   Social Needs     Financial resource strain: None     Food insecurity:     Worry: None     Inability: None     Transportation needs:     Medical: None     Non-medical: None   Tobacco Use     Smoking status: Never Smoker     Smokeless tobacco: Never Used   Substance and Sexual Activity     Alcohol use: No     Drug use: No     Sexual activity: Never   Lifestyle     Physical activity:     Days per week: None     Minutes per session: None     Stress: None   Relationships     Social connections:     Talks on phone: None     Gets together: None     Attends Christianity service: None     Active member of club or organization: None     Attends meetings of clubs or organizations: None     Relationship status: None     Intimate partner violence:     Fear of current or ex partner: None     Emotionally abused: None     Physically abused: None     Forced sexual activity: None   Other Topics Concern     None   Social History Narrative     None       REVIEW OF SYSTEM:  Pertinent items are noted in HPI.  A 12 point comprehensive review of systems was negative except as noted.    PHYSICAL EXAM:     General Appearance:    Alert, cooperative, no distress, appears stated age   Head:    Normocephalic, without  obvious abnormality, atraumatic   Eyes:    PERRL, conjunctiva/corneas clear, both eyes; wears glasses   Ears:    Normal external ear canals, both ears; hearing impairment in bilateral ears with bilateral Has; bilateral cerumen impaction   Nose:   Nares normal, septum midline, mucosa normal, no drainage    or sinus tenderness   Throat:   Lips, mucosa, and tongue normal; teeth and gums normal   Neck:   Supple, symmetrical, trachea midline, no adenopathy;     thyroid:  no enlargement/tenderness/nodules; no carotid    bruit or JVD   Back:     Symmetric, no curvature, ROM normal, no CVA tenderness   Lungs:     Clear to auscultation bilaterally, respirations unlabored   Chest Wall:    No tenderness or deformity    Heart:    Regular rate and rhythm, S1 and S2 normal, no murmur, rub   or gallop   Abdomen:     Soft, non-tender, bowel sounds active all four quadrants,     no masses, no organomegaly   Extremities:   Extremities normal, atraumatic, no cyanosis or edema; point tenderness over lower patella   Pulses:   2+ and symmetric all extremities   Skin:   Skin color, texture, turgor normal, no rashes or lesions   Neurologic:   Oriented to person and situation; forgetful at times; exhibits tangetial thought processes; generalized weakness; unsteady gait; ambulatory with walker           LABS:   Lab Results   Component Value Date    WBC 7.8 05/21/2019    HGB 12.5 05/21/2019    HCT 39.0 05/21/2019     05/21/2019    CHOL 194 03/11/2019    TRIG 198 (H) 03/11/2019    HDL 52 03/11/2019    ALT 18 05/21/2019    AST 20 05/21/2019     09/17/2019    K 4.9 09/17/2019     09/17/2019    CREATININE 0.77 09/17/2019    BUN 14 09/17/2019    CO2 25 09/17/2019    TSH 1.21 02/13/2018    INR 0.94 05/21/2019        ASSESSMENT:      ICD-10-CM    1. Chronic pain of right knee M25.561     G89.29    2. Chronic pain syndrome G89.4        PLAN:      Chronic pain  -START oxycodone 2.5 mg three times a day for pain  -DISCONTINUE  diclofenac 1% gel apply 2 g to bilateral knees and bilateral shoulders three times a day   -Continue Tylenol 625 mg as prescribed  -DISCONTINUE tramadol 100 mg three times a day PRN  -Encouraged patient to utilize integrative therapies such as distraction and deep breathing for pain management  -Notify provider if patient has new or worsening pain   -Follow-up appointment with PCP on 11/11/19    Cerumen impaction/Right ear pain  -Notify provider if patient has s/s of systemic infection including fever, chills, night sweats     Otherwise continue current care plan for all other chronic medical conditions, as they are stable. Encouraged patient to engage in healthy lifestyle behaviors such as engaging in social activities, exercising (PT/OT), eating well, and following care plan. Follow up for routine check-up, or sooner if needed. Will continue to monitor patient and work with nursing staff collaboratively to work toward positive patient outcomes.     Total floor/unit time was 60 minutes and 45 minutes was spent in counseling patient on pain management, hearing management, and follow-up plan and coordination of care with patient and family.      Electronically signed by: Kiana Reyes, RYLIE

## 2021-06-03 NOTE — PROGRESS NOTES
"[facility]    Facility:   [facility] [[id number]]   Code Status:   PCP: Kiana Reyes CNP   Phone: [phone]   Fax: [phone]  Patient seen on-site for C/O left shoulder pain, also has had Right shoulder pain, Right knee pain. Both R Shoulder and Right knee has been replaced; Patient lives in [facility]; using Walker. For ADL's      Exam : Today, seated, allows PROM to both UE; able to raise both right and Left shoulders above shoulder height;   Left Shoulder AROM 90 FE/ ER 30/ IR hip/ Abduction 45/ Has given me hs of joints replaced , however does not remember time, place, surgeon, or hospital.I have discussed reason for visit; with patient and friends attending today's visit.   Patient stated \" my shoulder feel's better today, and provides my with topical ointment that she has been using; BIOFREEZE;    I have offered a steroid injection: Patient would prefer to wait :    Assessment/Plan:     Will continue to follow. PRN for left shoulder pain    Subjective:       Current Outpatient Medications   Medication Sig Dispense Refill     acetaminophen (TYLENOL) 650 MG CR tablet Take 650 mg by mouth 2 (two) times a day as needed for pain.       anastrozole (ARIMIDEX) 1 mg tablet Take 1 tablet (1 mg total) by mouth daily. 90 tablet 3     aspirin 81 MG EC tablet Take 81 mg by mouth daily.       atenolol (TENORMIN) 100 MG tablet Take 100 mg by mouth bedtime.        calcium carbonate-vitamin D2 500 mg(1,250mg) -200 unit tablet Take 1 tablet by mouth 2 (two) times a day.       cholecalciferol, vitamin D3, 1,000 unit tablet Take 2,000 Units by mouth daily.        diclofenac sodium (VOLTAREN) 1 % Gel Apply 2g to bilateral knees and shoulders three times daily as needed for pain. 1 Tube 0     docoshexanoic acid-eicosapent 500 mg (FISH OIL) 500-100 mg cap capsule Take 1,400 mg by mouth daily.        docusate sodium (COLACE) 100 MG capsule Take 100 mg by mouth 2 (two) times a day as needed for " constipation.       loratadine (CLARITIN) 10 mg tablet Take 10 mg by mouth daily as needed for allergies.       losartan (COZAAR) 25 MG tablet 50 mg every morning.        multivitamin therapeutic (THERAGRAN) tablet Take 1 tablet by mouth daily.       polyvinyl alcohol (LIQUIFILM TEARS) 1.4 % ophthalmic solution Administer 1-2 drops to both eyes 4 (four) times a day as needed for dry eyes. 15 mL 0     ranitidine (ZANTAC) 150 MG tablet Take 150 mg by mouth 2 (two) times a day as needed (upset stomach).        spironolactone (ALDACTONE) 25 MG tablet Take 1 tablet (25 mg total) by mouth daily. 30 tablet 11     traMADol (ULTRAM) 50 mg tablet Take 2 tablets (100 mg total) by mouth 3 (three) times a day as needed. 180 tablet 0     VIT C/LUDY AC/LUT/COPPER/ZNOX (PRESERVISION LUTEIN ORAL) Take 1 tablet by mouth 2 (two) times a day.       No current facility-administered medications for this visit.        Allergies   Allergen Reactions     Blood-Group Specific Substance Other (See Comments)     Anti-D and Anti-C present. A delay in compatible RBC's may occur.     Celebrex [Celecoxib] Unknown     Avoids due to sulfa allergy     Lisinopril Cough     Tetanus Vaccines And Toxoid Other (See Comments)     High fever (temp 104), arm swelling, fever blisters     Piroxicam Rash     Sulfa (Sulfonamide Antibiotics) Rash       Immunization History   Administered Date(s) Administered     Influenza [id number], 12/07/2009     Influenza, inj, historic,unspecified 10/28/2006, 10/20/2007, 10/25/2008, 09/09/2009, 09/22/2010, 08/25/2011, 09/28/2012, 10/15/2013, 10/08/2014, 10/05/2016     Pneumo Conj 13-V (2010&after) 10/08/2014     Pneumo Polysac 23-V 08/25/1999, 11/18/2010, 08/25/2011     Td,adult,historic,unspecified 07/24/1998, 11/03/2008     Tdap 07/24/1998, 11/03/2008     ZOSTER, LIVE 04/02/2009       Patient Active Problem List   Diagnosis     Malignant neoplasm of colon, unspecified site     HTN (hypertension)     S/P right colectomy      Nutritional deficiency     Dry eyes     GERD (gastroesophageal reflux disease)     Constipation     Allergy     Breast cancer of upper-outer quadrant of right female breast (H)     Colitis     Abdominal pain in female patient     Osteopenia     Inflammatory arthritis     Chronic pain syndrome     Chronic pain of right knee     Patellar bursitis, right       Past Medical History:   Diagnosis Date     Anemia      Arthritis      Bladder infection 01/03/2015    dx'd- on antibiotics     Blood type AB-     with Anti-C     Breast cancer (H)      Bursitis, knee      Cancer (H) 2005    left breast     Colon cancer (H)      Depression      Gout      History of transfusion      Hypertension      Insomnia      Macular degeneration      Osteopenia      Ovarian cyst      Rosacea      Vitamin D deficiency        Social History     Socioeconomic History     Marital status:      Spouse name: Not on file     Number of children: Not on file     Years of education: Not on file     Highest education level: Not on file   Occupational History     Not on file   Social Needs     Financial resource strain: Not on file     Food insecurity:     Worry: Not on file     Inability: Not on file     Transportation needs:     Medical: Not on file     Non-medical: Not on file   Tobacco Use     Smoking status: Never Smoker     Smokeless tobacco: Never Used   Substance and Sexual Activity     Alcohol use: No     Drug use: No     Sexual activity: Never   Lifestyle     Physical activity:     Days per week: Not on file     Minutes per session: Not on file     Stress: Not on file   Relationships     Social connections:     Talks on phone: Not on file     Gets together: Not on file     Attends Mosque service: Not on file     Active member of club or organization: Not on file     Attends meetings of clubs or organizations: Not on file     Relationship status: Not on file     Intimate partner violence:     Fear of current or ex partner: Not on file      Emotionally abused: Not on file     Physically abused: Not on file     Forced sexual activity: Not on file   Other Topics Concern     Not on file   Social History Narrative     Not on file               Review of Systems            Objective:          Electronically signed by: DEBRA Cano PA-C

## 2021-06-03 NOTE — PROGRESS NOTES
[facility]    Facility:   [facility] [[id number]]   Code Status: POLST AVAILABLE      CHIEF COMPLAINT/REASON FOR VISIT:  Chief Complaint   Patient presents with     Problem Visit     pain management       HISTORY:      HPI: Marina is a 85 y.o. female who is an assisted-living resident at [facility].  Marina  has a past medical history of Anemia, Arthritis, Bladder infection (01/03/2015), Blood type AB-, Breast cancer (H), Bursitis, knee, Cancer (H) (2005), Colon cancer (H), Depression, Gout, History of transfusion, Hypertension, Insomnia, Macular degeneration, Osteopenia, Ovarian cyst, Rosacea, and Vitamin D deficiency.    Today Ms. Neves is being evaluated for chronic pain.  Marina completed therapy with [facility] on 10/28/19.  She refused to buy the knee brace that was recommended by physical therapy for knee pain because it had metal in it and she did not like that style.  We discussed other options for braces and wraps and she did not want to try any at this time.  She did not start the diclofenac gel that was ordered at her last visit because she was nervous about side effects listed on the box and could not remember how to get a hold of her provider for further information.  She continues to rate her chronic bilateral knee and shoulder pain at 5-6/10 using Tylenol and tramadol for management of intermittent, aching pain.  Her cousin and his wife who are her next of kin were also at this visit to discuss advanced care planning.  We discussed her most recent SLUMS score as 13/30 indicative of dementia.  Her family and Marina do not feel that she has any memory impairment.  It was recommended that she undergo further neuropsychiatric testing to determine her decision-making capacity and they declined at this time.  Her cousin and his wife will be leaving for [place] in the next couple weeks and it was recommended that Marina sign onto nursing services at her  AL to have someone check in on her daily while they are out of state and they agreed to this plan.  She said that she would like full treatment for all health conditions at this time and would like to be hospitalized for anything.  The patient denied lightheadedness, dizziness, breathing difficulty, chest pain, palpitations, constipation, urinary symptoms, and numbness or tingling in extremities.    Past Medical History:   Diagnosis Date     Anemia      Arthritis      Bladder infection 01/03/2015    dx'd- on antibiotics     Blood type AB-     with Anti-C     Breast cancer (H)      Bursitis, knee      Cancer (H) 2005    left breast     Colon cancer (H)      Depression      Gout      History of transfusion      Hypertension      Insomnia      Macular degeneration      Osteopenia      Ovarian cyst      Rosacea      Vitamin D deficiency              Family History   Problem Relation Age of Onset     Squamous cell carcinoma Mother      Colon cancer Sister      Breast cancer Maternal Aunt      Bone cancer Maternal Aunt      Colon cancer Paternal Aunt      Breast cancer Cousin      Social History     Socioeconomic History     Marital status:      Spouse name: None     Number of children: None     Years of education: None     Highest education level: None   Occupational History     None   Social Needs     Financial resource strain: None     Food insecurity:     Worry: None     Inability: None     Transportation needs:     Medical: None     Non-medical: None   Tobacco Use     Smoking status: Never Smoker     Smokeless tobacco: Never Used   Substance and Sexual Activity     Alcohol use: No     Drug use: No     Sexual activity: Never   Lifestyle     Physical activity:     Days per week: None     Minutes per session: None     Stress: None   Relationships     Social connections:     Talks on phone: None     Gets together: None     Attends Tenriism service: None     Active member of club or organization: None     Attends  meetings of clubs or organizations: None     Relationship status: None     Intimate partner violence:     Fear of current or ex partner: None     Emotionally abused: None     Physically abused: None     Forced sexual activity: None   Other Topics Concern     None   Social History Narrative     None       REVIEW OF SYSTEM:  Pertinent items are noted in HPI.  A 12 point comprehensive review of systems was negative except as noted.    PHYSICAL EXAM:     General Appearance:    Alert, cooperative, no distress, appears stated age   Head:    Normocephalic, without obvious abnormality, atraumatic   Eyes:    PERRL, conjunctiva/corneas clear, both eyes; wears glasses   Ears:    Normal external ear canals, both ears; hearing impairment in bilateral ears with bilateral HAs   Nose:   Nares normal, septum midline, mucosa normal, no drainage    or sinus tenderness   Throat:   Lips, mucosa, and tongue normal; teeth and gums normal   Neck:   Supple, symmetrical, trachea midline, no adenopathy;     thyroid:  no enlargement/tenderness/nodules; no carotid    bruit or JVD   Back:     Symmetric, no curvature, ROM normal, no CVA tenderness   Lungs:     Clear to auscultation bilaterally, respirations unlabored   Chest Wall:    No tenderness or deformity    Heart:    Regular rate and rhythm, S1 and S2 normal, no murmur, rub   or gallop   Abdomen:     Soft, non-tender, bowel sounds active all four quadrants,     no masses, no organomegaly   Extremities:   Extremities normal, atraumatic, no cyanosis or edema   Pulses:   2+ and symmetric all extremities   Skin:   Skin color, texture, turgor normal, no rashes or lesions   Neurologic:   Oriented to person and situation; forgetful at times; exhibits tangetial thought processes; generalized weakness; unsteady gait; ambulatory with walker           LABS:   Lab Results   Component Value Date    WBC 7.8 05/21/2019    HGB 12.5 05/21/2019    HCT 39.0 05/21/2019     05/21/2019    CHOL 194  03/11/2019    TRIG 198 (H) 03/11/2019    HDL 52 03/11/2019    ALT 18 05/21/2019    AST 20 05/21/2019     09/17/2019    K 4.9 09/17/2019     09/17/2019    CREATININE 0.77 09/17/2019    BUN 14 09/17/2019    CO2 25 09/17/2019    TSH 1.21 02/13/2018    INR 0.94 05/21/2019        ASSESSMENT:      ICD-10-CM    1. Chronic pain of right knee M25.561     G89.29    2. Chronic pain syndrome G89.4    3. Primary osteoarthritis involving multiple joints M15.0    4. Advance care planning Z71.89        PLAN:      Chronic pain  -START diclofenac 1% gel apply 2 g to bilateral knees and bilateral shoulders three times a day   -Continue Tylenol 625 mg as prescribed  -Continue tramadol 50 mg four times a day PRN  -Encouraged patient to utilize integrative therapies such as distraction and deep breathing for pain management  -Notify provider if patient has new or worsening pain   -Follow-up appointment with PCP as needed    Advanced care planning  -Confirmed POLST with patient and her next of kin    Otherwise continue current care plan for all other chronic medical conditions, as they are stable. Encouraged patient to engage in healthy lifestyle behaviors such as engaging in social activities, exercising (PT/OT), eating well, and following care plan. Follow up for routine check-up, or sooner if needed. Will continue to monitor patient and work with nursing staff collaboratively to work toward positive patient outcomes.     Total floor/unit time was 60 minutes and 60 minutes was spent in counseling patient on pain management and advanced care planning and coordination of care with her next of kin.    Electronically signed by: Kiana Reyes, CNP

## 2021-06-03 NOTE — PROGRESS NOTES
[facility]    Facility:   [facility] [[id number]]   Code Status: POLST AVAILABLE      CHIEF COMPLAINT/REASON FOR VISIT:  Chief Complaint   Patient presents with     Problem Visit     pain management       HISTORY:      HPI: Marina is a 85 y.o. female who is an assisted-living resident at [facility].  Marina  has a past medical history of Anemia, Arthritis, Bladder infection (01/03/2015), Blood type AB-, Breast cancer (H), Bursitis, knee, Cancer (H) (2005), Colon cancer (H), Depression, Gout, History of transfusion, Hypertension, Insomnia, Macular degeneration, Osteopenia, Ovarian cyst, Rosacea, and Vitamin D deficiency.    Today Ms. Neves is being evaluated for pain management.  Marina reported that her right knee pain has been worse over the past week with rating 7-8/10 described as aching intermittent pain.  She is currently taking Tylenol two times a day, ibuprofen 200 mg three times a day, and oxycodone 2.5 mg three times a day for moderate pain relief.  She never filled her Cymbalta as her niece said that she doesn't need it because she does not have depressive symptoms.  We discussed the purpose of duloxetine for chronic pain syndrome and she said that she would like to hold off on starting this at this visit.  She was agreeable to changing her oxycodone back to tramadol for pain as she felt like it was more useful.  She was also encouraged to use heat and cold therapy for pain management.  The patient denied lightheadedness, dizziness, breathing difficulty, chest pain, palpitations, constipation, urinary symptoms, and numbness or tingling in extremities.     Past Medical History:   Diagnosis Date     Anemia      Arthritis      Bladder infection 01/03/2015    dx'd- on antibiotics     Blood type AB-     with Anti-C     Breast cancer (H)      Bursitis, knee      Cancer (H) 2005    left breast     Colon cancer (H)      Depression      Gout      History of transfusion       Hypertension      Insomnia      Macular degeneration      Osteopenia      Ovarian cyst      Rosacea      Vitamin D deficiency              Family History   Problem Relation Age of Onset     Squamous cell carcinoma Mother      Colon cancer Sister      Breast cancer Maternal Aunt      Bone cancer Maternal Aunt      Colon cancer Paternal Aunt      Breast cancer Cousin      Social History     Socioeconomic History     Marital status:      Spouse name: Not on file     Number of children: Not on file     Years of education: Not on file     Highest education level: Not on file   Occupational History     Not on file   Social Needs     Financial resource strain: Not on file     Food insecurity:     Worry: Not on file     Inability: Not on file     Transportation needs:     Medical: Not on file     Non-medical: Not on file   Tobacco Use     Smoking status: Never Smoker     Smokeless tobacco: Never Used   Substance and Sexual Activity     Alcohol use: No     Drug use: No     Sexual activity: Never   Lifestyle     Physical activity:     Days per week: Not on file     Minutes per session: Not on file     Stress: Not on file   Relationships     Social connections:     Talks on phone: Not on file     Gets together: Not on file     Attends Baptism service: Not on file     Active member of club or organization: Not on file     Attends meetings of clubs or organizations: Not on file     Relationship status: Not on file     Intimate partner violence:     Fear of current or ex partner: Not on file     Emotionally abused: Not on file     Physically abused: Not on file     Forced sexual activity: Not on file   Other Topics Concern     Not on file   Social History Narrative     Not on file       REVIEW OF SYSTEM:  Pertinent items are noted in HPI.  A 12 point comprehensive review of systems was negative except as noted.    PHYSICAL EXAM:     General Appearance:    Alert, cooperative, no distress, appears stated age   Head:     Normocephalic, without obvious abnormality, atraumatic   Eyes:    PERRL, conjunctiva/corneas clear, both eyes; wears glasses   Ears:    Normal external ear canals, both ears; hearing impairment in bilateral ears with bilateral Has; bilateral cerumen impaction   Nose:   Nares normal, septum midline, mucosa normal, no drainage    or sinus tenderness   Throat:   Lips, mucosa, and tongue normal; teeth and gums normal   Neck:   Supple, symmetrical, trachea midline, no adenopathy;     thyroid:  no enlargement/tenderness/nodules; no carotid    bruit or JVD   Back:     Symmetric, no curvature, ROM normal, no CVA tenderness   Lungs:     Clear to auscultation bilaterally, respirations unlabored   Chest Wall:    No tenderness or deformity    Heart:    Regular rate and rhythm, S1 and S2 normal, no murmur, rub   or gallop   Abdomen:     Soft, non-tender, bowel sounds active all four quadrants,     no masses, no organomegaly   Extremities:   Extremities normal, atraumatic, no cyanosis or edema; point tenderness over lower right patella   Pulses:   2+ and symmetric all extremities   Skin:   Skin color, texture, turgor normal, no rashes or lesions   Neurologic:   Oriented to person and situation; forgetful at times; exhibits tangetial thought processes; generalized weakness; ambulatory with walker           LABS:   Lab Results   Component Value Date    WBC 8.2 11/18/2019    HGB 11.7 (L) 11/18/2019    HCT 36.7 11/18/2019     11/18/2019    CHOL 194 03/11/2019    TRIG 198 (H) 03/11/2019    HDL 52 03/11/2019    ALT 17 11/18/2019    AST 17 11/18/2019     11/18/2019    K 4.6 11/18/2019     11/18/2019    CREATININE 0.79 11/18/2019    BUN 18 11/18/2019    CO2 23 11/18/2019    TSH 1.21 02/13/2018    INR 0.94 05/21/2019        ASSESSMENT:      ICD-10-CM    1. Chronic pain of right knee M25.561     G89.29    2. Chronic pain syndrome G89.4        PLAN:      Chronic pain  -Continue ibuprofen 200 mg three times a  day  -DISCONTINUE oxycodone 2.5 mg three times a day for pain  -Continue Tylenol 625 mg as prescribed  -RESTART tramadol 100 mg three times a day   -Encouraged patient to utilize integrative therapies such as distraction and deep breathing for pain management  -Notify provider if patient has new or worsening pain   -Follow-up appointment with PCP on 12/2/19    Otherwise continue current care plan for all other chronic medical conditions, as they are stable. Encouraged patient to engage in healthy lifestyle behaviors such as engaging in social activities, exercising (PT/OT), eating well, and following care plan. Follow up for routine check-up, or sooner if needed. Will continue to monitor patient and work with nursing staff collaboratively to work toward positive patient outcomes.     Electronically signed by: Kiana Reyes CNP

## 2021-06-03 NOTE — PROGRESS NOTES
[facility]    Facility:   [facility] [[id number]]   Code Status: POLST AVAILABLE      CHIEF COMPLAINT/REASON FOR VISIT:  Chief Complaint   Patient presents with     Problem Visit     pain management       HISTORY:      HPI: Marina is a 85 y.o. female who is an assisted-living resident at [facility].  Marina  has a past medical history of Anemia, Arthritis, Bladder infection (01/03/2015), Blood type AB-, Breast cancer (H), Bursitis, knee, Cancer (H) (2005), Colon cancer (H), Depression, Gout, History of transfusion, Hypertension, Insomnia, Macular degeneration, Osteopenia, Ovarian cyst, Rosacea, and Vitamin D deficiency.    Today Ms. Neves is being evaluated for pain management.  She was transitioned from tramadol to oxycodone earlier this week as she denied pain relief with tramadol therapy.  Marina feels that he pain has been unbearable over the past two days with aching, constant pain rated 8-9/10.  She said that this has not affected her functional ability at all and she still continues to participate in facility activities and perform all of her ADLs independently.  She said that she cannot rest as that is too boring for her.  We discussed that she could choose to get the right knee brace that PT recommended for pain management with ambulation and she continues to refuse it at this time.  We discussed further pain evaluation of knee pain looking at inflammatory markers next week and she was agreeable to this plan.  She has ibuprofen available at her apartment today and we discussed taking it for 1-2 weeks to manage her acute increase in pain with pain goal 2-3/10.  She also continues to use Tylenol arthritis and Biofreeze with moderate short-acting pain relief at this time.  We discussed her going out to orthopedic provider for further evaluation of her chronic pain and she said that she does not want to do this as they have told her in the past that she would be a  poor surgical candidate due to her multiple comorbidities. We discussed adding Cymbalta as a pain adjuvant for chronic pain at this time and she was agreeable to this plan with anticipated benefit after 2-4 weeks.  Plan discussed for follow-up visit on 11/21/19 at 1100 for evaluation of pain regimen.  The patient denied lightheadedness, dizziness, breathing difficulty, chest pain, palpitations, constipation, urinary symptoms, and numbness or tingling in extremities.     Past Medical History:   Diagnosis Date     Anemia      Arthritis      Bladder infection 01/03/2015    dx'd- on antibiotics     Blood type AB-     with Anti-C     Breast cancer (H)      Bursitis, knee      Cancer (H) 2005    left breast     Colon cancer (H)      Depression      Gout      History of transfusion      Hypertension      Insomnia      Macular degeneration      Osteopenia      Ovarian cyst      Rosacea      Vitamin D deficiency              Family History   Problem Relation Age of Onset     Squamous cell carcinoma Mother      Colon cancer Sister      Breast cancer Maternal Aunt      Bone cancer Maternal Aunt      Colon cancer Paternal Aunt      Breast cancer Cousin      Social History     Socioeconomic History     Marital status:      Spouse name: None     Number of children: None     Years of education: None     Highest education level: None   Occupational History     None   Social Needs     Financial resource strain: None     Food insecurity:     Worry: None     Inability: None     Transportation needs:     Medical: None     Non-medical: None   Tobacco Use     Smoking status: Never Smoker     Smokeless tobacco: Never Used   Substance and Sexual Activity     Alcohol use: No     Drug use: No     Sexual activity: Never   Lifestyle     Physical activity:     Days per week: None     Minutes per session: None     Stress: None   Relationships     Social connections:     Talks on phone: None     Gets together: None     Attends Scientology  service: None     Active member of club or organization: None     Attends meetings of clubs or organizations: None     Relationship status: None     Intimate partner violence:     Fear of current or ex partner: None     Emotionally abused: None     Physically abused: None     Forced sexual activity: None   Other Topics Concern     None   Social History Narrative     None       REVIEW OF SYSTEM:  Pertinent items are noted in HPI.  A 12 point comprehensive review of systems was negative except as noted.    PHYSICAL EXAM:     General Appearance:    Alert, cooperative, no distress, appears stated age   Head:    Normocephalic, without obvious abnormality, atraumatic   Eyes:    PERRL, conjunctiva/corneas clear, both eyes; wears glasses   Ears:    Normal external ear canals, both ears; hearing impairment in bilateral ears with bilateral Has; bilateral cerumen impaction   Nose:   Nares normal, septum midline, mucosa normal, no drainage    or sinus tenderness   Throat:   Lips, mucosa, and tongue normal; teeth and gums normal   Neck:   Supple, symmetrical, trachea midline, no adenopathy;     thyroid:  no enlargement/tenderness/nodules; no carotid    bruit or JVD   Back:     Symmetric, no curvature, ROM normal, no CVA tenderness   Lungs:     Clear to auscultation bilaterally, respirations unlabored   Chest Wall:    No tenderness or deformity    Heart:    Regular rate and rhythm, S1 and S2 normal, no murmur, rub   or gallop   Abdomen:     Soft, non-tender, bowel sounds active all four quadrants,     no masses, no organomegaly   Extremities:   Extremities normal, atraumatic, no cyanosis or edema; point tenderness over lower right patella   Pulses:   2+ and symmetric all extremities   Skin:   Skin color, texture, turgor normal, no rashes or lesions   Neurologic:   Oriented to person and situation; forgetful at times; exhibits tangetial thought processes; generalized weakness; ambulatory with walker           LABS:   Lab Results    Component Value Date    WBC 8.2 11/18/2019    HGB 11.7 (L) 11/18/2019    HCT 36.7 11/18/2019     11/18/2019    CHOL 194 03/11/2019    TRIG 198 (H) 03/11/2019    HDL 52 03/11/2019    ALT 17 11/18/2019    AST 17 11/18/2019     11/18/2019    K 4.6 11/18/2019     11/18/2019    CREATININE 0.79 11/18/2019    BUN 18 11/18/2019    CO2 23 11/18/2019    TSH 1.21 02/13/2018    INR 0.94 05/21/2019        ASSESSMENT:      ICD-10-CM    1. Chronic pain of right knee M25.561     G89.29    2. Chronic pain syndrome G89.4        PLAN:      Chronic pain  -Check CMP, CBC, Uric acid level, ESR, and CRP on 11/18/19  -START ibuprofen 200 mg three times a day  -START Cymbalta 20 mg daily   -Continue oxycodone 2.5 mg three times a day for pain  -DISCONTINUE diclofenac 1% gel apply 2 g to bilateral knees and bilateral shoulders three times a day   -Continue Tylenol 625 mg as prescribed  -DISCONTINUE tramadol 100 mg three times a day PRN  -Encouraged patient to utilize integrative therapies such as distraction and deep breathing for pain management  -Notify provider if patient has new or worsening pain   -Follow-up appointment with PCP on 11/21/19    Otherwise continue current care plan for all other chronic medical conditions, as they are stable. Encouraged patient to engage in healthy lifestyle behaviors such as engaging in social activities, exercising (PT/OT), eating well, and following care plan. Follow up for routine check-up, or sooner if needed. Will continue to monitor patient and work with nursing staff collaboratively to work toward positive patient outcomes.     Total floor/unit time was 60 minutes and 55 minutes was spent in counseling patient on pain management and follow-up plan and coordination of care with patient and family.    Electronically signed by: Kiana Reyes CNP

## 2021-06-03 NOTE — PROGRESS NOTES
[facility]    Facility:   [facility] [[id number]]   Code Status: POLST AVAILABLE      CHIEF COMPLAINT/REASON FOR VISIT:  Chief Complaint   Patient presents with     Review Of Multiple Medical Conditions       HISTORY:      HPI: Marina is a 85 y.o. female who is an assisted-living resident at [facility].  Marina  has a past medical history of Anemia, Arthritis, Bladder infection (01/03/2015), Blood type AB-, Breast cancer (H), Bursitis, knee, Cancer (H) (2005), Colon cancer (H), Depression, Gout, History of transfusion, Hypertension, Insomnia, Macular degeneration, Osteopenia, Ovarian cyst, Rosacea, and Vitamin D deficiency.    Today Ms. Neves is being evaluated for a review of multiple medical conditions.  Marina reported that diclofenac gel has not made any difference in her bilateral knee and shoulder pain described as intermittent, aching pain rated 5-6/10 today.  She continues to take Tylenol 1 g three times a day and tramadol 50 mg four times a day for pain management.  We discussed increasing her tramadol for chronic pain and she was agreeable to this plan with her cousin helping get her medications for her.  Ortho PA plan to inject cortisone in left shoulder for chronic pain today.  She also reported that she has been having intermittent, sharp right ear pain over that past 24 hours that has improved today.  She has a bilateral cerumen impaction and is agreeable to using Debrox drops over the next three days with irrigation prior to starting treatment for ear pain.  The patient denied lightheadedness, dizziness, breathing difficulty, chest pain, palpitations, constipation, urinary symptoms, numbness or tingling in extremities, and pain.  Nursing staff will be starting services next week per report.    Past Medical History:   Diagnosis Date     Anemia      Arthritis      Bladder infection 01/03/2015    dx'd- on antibiotics     Blood type AB-     with Anti-C      Breast cancer (H)      Bursitis, knee      Cancer (H) 2005    left breast     Colon cancer (H)      Depression      Gout      History of transfusion      Hypertension      Insomnia      Macular degeneration      Osteopenia      Ovarian cyst      Rosacea      Vitamin D deficiency              Family History   Problem Relation Age of Onset     Squamous cell carcinoma Mother      Colon cancer Sister      Breast cancer Maternal Aunt      Bone cancer Maternal Aunt      Colon cancer Paternal Aunt      Breast cancer Cousin      Social History     Socioeconomic History     Marital status:      Spouse name: Not on file     Number of children: Not on file     Years of education: Not on file     Highest education level: Not on file   Occupational History     Not on file   Social Needs     Financial resource strain: Not on file     Food insecurity:     Worry: Not on file     Inability: Not on file     Transportation needs:     Medical: Not on file     Non-medical: Not on file   Tobacco Use     Smoking status: Never Smoker     Smokeless tobacco: Never Used   Substance and Sexual Activity     Alcohol use: No     Drug use: No     Sexual activity: Never   Lifestyle     Physical activity:     Days per week: Not on file     Minutes per session: Not on file     Stress: Not on file   Relationships     Social connections:     Talks on phone: Not on file     Gets together: Not on file     Attends [employer] service: Not on file     Active member of club or organization: Not on file     Attends meetings of clubs or organizations: Not on file     Relationship status: Not on file     Intimate partner violence:     Fear of current or ex partner: Not on file     Emotionally abused: Not on file     Physically abused: Not on file     Forced sexual activity: Not on file   Other Topics Concern     Not on file   Social History Narrative     Not on file       REVIEW OF SYSTEM:  Pertinent items are noted in HPI.  A 12 point comprehensive  review of systems was negative except as noted.    PHYSICAL EXAM:     General Appearance:    Alert, cooperative, no distress, appears stated age   Head:    Normocephalic, without obvious abnormality, atraumatic   Eyes:    PERRL, conjunctiva/corneas clear, both eyes; wears glasses   Ears:    Normal external ear canals, both ears; hearing impairment in bilateral ears with bilateral Has; bilateral cerumen impaction   Nose:   Nares normal, septum midline, mucosa normal, no drainage    or sinus tenderness   Throat:   Lips, mucosa, and tongue normal; teeth and gums normal   Neck:   Supple, symmetrical, trachea midline, no adenopathy;     thyroid:  no enlargement/tenderness/nodules; no carotid    bruit or JVD   Back:     Symmetric, no curvature, ROM normal, no CVA tenderness   Lungs:     Clear to auscultation bilaterally, respirations unlabored   Chest Wall:    No tenderness or deformity    Heart:    Regular rate and rhythm, S1 and S2 normal, no murmur, rub   or gallop   Abdomen:     Soft, non-tender, bowel sounds active all four quadrants,     no masses, no organomegaly   Extremities:   Extremities normal, atraumatic, no cyanosis or edema   Pulses:   2+ and symmetric all extremities   Skin:   Skin color, texture, turgor normal, no rashes or lesions   Neurologic:   Oriented to person and situation; forgetful at times; exhibits tangetial thought processes; generalized weakness; unsteady gait; ambulatory with walker           LABS:   Lab Results   Component Value Date    WBC 7.8 05/21/2019    HGB 12.5 05/21/2019    HCT 39.0 05/21/2019     05/21/2019    CHOL 194 03/11/2019    TRIG 198 (H) 03/11/2019    HDL 52 03/11/2019    ALT 18 05/21/2019    AST 20 05/21/2019     09/17/2019    K 4.9 09/17/2019     09/17/2019    CREATININE 0.77 09/17/2019    BUN 14 09/17/2019    CO2 25 09/17/2019    TSH 1.21 02/13/2018    INR 0.94 05/21/2019        ASSESSMENT:      ICD-10-CM    1. Chronic pain of right knee M25.561      G89.29    2. Chronic pain syndrome G89.4    3. Bilateral impacted cerumen H61.23    4. Right ear pain H92.01        PLAN:      Chronic pain  -DISCONTINUE diclofenac 1% gel apply 2 g to bilateral knees and bilateral shoulders three times a day   -Continue Tylenol 625 mg as prescribed  -INCREASE tramadol from 50 mg four times a day PRN to 100 mg three times a day PRN  -Encouraged patient to utilize integrative therapies such as distraction and deep breathing for pain management  -Notify provider if patient has new or worsening pain   -Follow-up appointment with PCP on 11/11/19    Cerumen impaction/Right ear pain  -START Debrox 5 gtts daily x 3 days  -Nursing staff to irrigate bilateral ears on 11/11/19 AM  -Notify provider if patient has s/s of systemic infection including fever, chills, night sweats     Otherwise continue current care plan for all other chronic medical conditions, as they are stable. Encouraged patient to engage in healthy lifestyle behaviors such as engaging in social activities, exercising (PT/OT), eating well, and following care plan. Follow up for routine check-up, or sooner if needed. Will continue to monitor patient and work with nursing staff collaboratively to work toward positive patient outcomes.     Electronically signed by: Kiana Reyes CNP

## 2021-06-04 VITALS
SYSTOLIC BLOOD PRESSURE: 146 MMHG | TEMPERATURE: 97.7 F | DIASTOLIC BLOOD PRESSURE: 87 MMHG | OXYGEN SATURATION: 98 % | BODY MASS INDEX: 29.42 KG/M2 | WEIGHT: 166.1 LBS | HEART RATE: 99 BPM

## 2021-06-04 NOTE — PROGRESS NOTES
[facility]    Facility:   [facility] [[id number]]   Code Status: POLST AVAILABLE      CHIEF COMPLAINT/REASON FOR VISIT:  Chief Complaint   Patient presents with     Problem Visit     right knee pain       HISTORY:      HPI: Marina is a 85 y.o. female who is an assisted-living resident at [facility].  Marina  has a past medical history of Anemia, Arthritis, Bladder infection (01/03/2015), Blood type AB-, Breast cancer (H), Bursitis, knee, Cancer (H) (2005), Colon cancer (H), Depression, Gout, History of transfusion, Hypertension, Insomnia, Macular degeneration, Osteopenia, Ovarian cyst, Rosacea, and Vitamin D deficiency.    Today Ms. Neves is being evaluated for pain management per patient request.  Marina called the nursing service three times yesterday for help with medication management and to notify provider of continued right knee pain.  Marina noted that the quality, intensity, and location of her pain have not changed over the past week since our last visit.  She continues to described her pain as aching, intermittent right knee pain rated 5-7/10 exacerbated by ambulation.  She continues to take tramadol 100 mg three times a day, Tylenol arthritis, Biofreeze gel, and duloxetine for pain at this time.  She was started on the duloxetine for chronic pain management 10 days ago and does not feel that she has seen any benefit yet at this time.  We discussed giving the medication time to start working and she was agreeable to this.  We also discussed trying to obtain the brace that the PT recommended when she was doing therapy as she is saying that she is willing to try anything at this point to alleviate her pain.  She continues to walk around the facility throughout the day and has not seen any functional limitations with her right knee pain at this time.  She feels that a reasonable pain goal would be 3/10 which she feels that she gets down to while she is resting in  her recliner.  We discussed further evaluation of her right knee pain ultrasound vs MRI and she opted for ultrasound today as she does not want to leave her apartment for outside appointments at this time.  The patient denied changes in mood or appetite, sleep disturbances, lightheadedness, dizziness, breathing difficulty, chest pain, palpitations, constipation, urinary symptoms, and numbness or tingling in extremities.     Past Medical History:   Diagnosis Date     Anemia      Arthritis      Bladder infection 01/03/2015    dx'd- on antibiotics     Blood type AB-     with Anti-C     Breast cancer (H)      Bursitis, knee      Cancer (H) 2005    left breast     Colon cancer (H)      Depression      Gout      History of transfusion      Hypertension      Insomnia      Macular degeneration      Osteopenia      Ovarian cyst      Rosacea      Vitamin D deficiency              Family History   Problem Relation Age of Onset     Squamous cell carcinoma Mother      Colon cancer Sister      Breast cancer Maternal Aunt      Bone cancer Maternal Aunt      Colon cancer Paternal Aunt      Breast cancer Cousin      Social History     Socioeconomic History     Marital status:      Spouse name: None     Number of children: None     Years of education: None     Highest education level: None   Occupational History     None   Social Needs     Financial resource strain: None     Food insecurity:     Worry: None     Inability: None     Transportation needs:     Medical: None     Non-medical: None   Tobacco Use     Smoking status: Never Smoker     Smokeless tobacco: Never Used   Substance and Sexual Activity     Alcohol use: No     Drug use: No     Sexual activity: Never   Lifestyle     Physical activity:     Days per week: None     Minutes per session: None     Stress: None   Relationships     Social connections:     Talks on phone: None     Gets together: None     Attends Confucianist service: None     Active member of club or  organization: None     Attends meetings of clubs or organizations: None     Relationship status: None     Intimate partner violence:     Fear of current or ex partner: None     Emotionally abused: None     Physically abused: None     Forced sexual activity: None   Other Topics Concern     None   Social History Narrative     None       REVIEW OF SYSTEM:  Pertinent items are noted in HPI.  A 12 point comprehensive review of systems was negative except as noted.    PHYSICAL EXAM:     General Appearance:    Alert, cooperative, no distress, appears stated age   Head:    Normocephalic, without obvious abnormality, atraumatic   Eyes:    PERRL, conjunctiva/corneas clear, both eyes; wears glasses   Ears:    Normal external ear canals, both ears; hearing impairment in bilateral ears with bilateral Has; bilateral cerumen impaction   Nose:   Nares normal, septum midline, mucosa normal, no drainage    or sinus tenderness   Throat:   Lips, mucosa, and tongue normal; teeth and gums normal   Neck:   Supple, symmetrical, trachea midline, no adenopathy;     thyroid:  no enlargement/tenderness/nodules; no carotid    bruit or JVD   Back:     Symmetric, no curvature, ROM normal, no CVA tenderness   Lungs:     Clear to auscultation bilaterally, respirations unlabored   Chest Wall:    No tenderness or deformity    Heart:    Regular rate and rhythm, S1 and S2 normal, no murmur, rub   or gallop   Abdomen:     Soft, non-tender, bowel sounds active all four quadrants,     no masses, no organomegaly   Extremities:   Extremities normal, atraumatic, no cyanosis or edema; point tenderness over lower right patella   Pulses:   2+ and symmetric all extremities   Skin:   Skin color, texture, turgor normal, no rashes or lesions   Neurologic:   Oriented to person and situation; forgetful at times; exhibits tangetial thought processes; generalized weakness; ambulatory with walker           LABS:   Lab Results   Component Value Date    WBC 8.2 11/18/2019     HGB 11.7 (L) 11/18/2019    HCT 36.7 11/18/2019     11/18/2019    CHOL 194 03/11/2019    TRIG 198 (H) 03/11/2019    HDL 52 03/11/2019    ALT 17 11/18/2019    AST 17 11/18/2019     11/18/2019    K 4.6 11/18/2019     11/18/2019    CREATININE 0.79 11/18/2019    BUN 18 11/18/2019    CO2 23 11/18/2019    TSH 1.21 02/13/2018    INR 0.94 05/21/2019        ASSESSMENT:      ICD-10-CM    1. Chronic pain of right knee M25.561     G89.29    2. Chronic pain syndrome G89.4        PLAN:      Chronic pain  -Ultrasound of right knee to rule out acute processes ordered ASAP  -Continue duloxetine 20 mg daily at HS  -Continue Tylenol 625 mg as prescribed  -Continue tramadol 100 mg three times a day   -Encouraged patient to utilize integrative therapies such as distraction and deep breathing for pain management  -Notify provider if patient has new or worsening pain   -Follow-up appointment with PCP on 12/16/19    Otherwise continue current care plan for all other chronic medical conditions, as they are stable. Encouraged patient to engage in healthy lifestyle behaviors such as engaging in social activities, exercising (PT/OT), eating well, and following care plan. Follow up for routine check-up, or sooner if needed. Will continue to monitor patient and work with nursing staff collaboratively to work toward positive patient outcomes.     Electronically signed by: Kiana Reyes, RYLIE

## 2021-06-04 NOTE — PROGRESS NOTES
[facility]    Facility:   [facility] [[id number]]   Code Status: POLST AVAILABLE      CHIEF COMPLAINT/REASON FOR VISIT:  Chief Complaint   Patient presents with     FVP Care Coordination - Regulatory       HISTORY:      HPI: Marina is a 85 y.o. female who is an assisted-living resident at [facility].  Marina  has a past medical history of Anemia, Arthritis, Bladder infection (01/03/2015), Blood type AB-, Breast cancer (H), Bursitis, knee, Cancer (H) (2005), Colon cancer (H), Depression, Gout, History of transfusion, Hypertension, Insomnia, Macular degeneration, Osteopenia, Ovarian cyst, Rosacea, and Vitamin D deficiency.    Today Ms. Neves is being evaluated for a review of multiple medical conditions.  Marina called nurse triage about left shoulder pain that she would like evaluated.  Orthopedic PA is available to see patient in her apartment later this morning for joint injection that she had previously declined in left shoulder for pain related to osteoarthritis.  She reported that she is not able to move her left shoulder without pain describing her pain as aching, intermittent pain rated 7/10 at this time.  She said that she continues to have right knee pain described as aching, intermittent rated 4-8/10 with movement.  She is agreeable to further evaluation at [facility] for continued right knee pain at this visit.  Marina also noted that she has felt woozy when she changes positions in the morning.  Her SBP has been 100-130s over the past few months at her appointments with low heart rate 50-70s taking losartan, spironolactone, and atenolol for antihypertensive management at this time.  We discussed decreasing her atenolol as likely culprit for her lightheadedness and she was agreeable to this plan.  She asked if there were any other medications that could be decreased at this visit and she was agreeable to discontinuation of aspirin for primary CVD  prevention at this time.  The patient denied sleep disturbances, changes in mood or appetite, dizziness, breathing difficulty, chest pain, palpitations, constipation, urinary symptoms, and numbness or tingling in extremities.     Past Medical History:   Diagnosis Date     Anemia      Arthritis      Bladder infection 01/03/2015    dx'd- on antibiotics     Blood type AB-     with Anti-C     Breast cancer (H)      Bursitis, knee      Cancer (H) 2005    left breast     Colon cancer (H)      Depression      Gout      History of transfusion      Hypertension      Insomnia      Macular degeneration      Osteopenia      Ovarian cyst      Rosacea      Vitamin D deficiency              Family History   Problem Relation Age of Onset     Squamous cell carcinoma Mother      Colon cancer Sister      Breast cancer Maternal Aunt      Bone cancer Maternal Aunt      Colon cancer Paternal Aunt      Breast cancer Cousin      Social History     Socioeconomic History     Marital status:      Spouse name: None     Number of children: None     Years of education: None     Highest education level: None   Occupational History     None   Social Needs     Financial resource strain: None     Food insecurity:     Worry: None     Inability: None     Transportation needs:     Medical: None     Non-medical: None   Tobacco Use     Smoking status: Never Smoker     Smokeless tobacco: Never Used   Substance and Sexual Activity     Alcohol use: No     Drug use: No     Sexual activity: Never   Lifestyle     Physical activity:     Days per week: None     Minutes per session: None     Stress: None   Relationships     Social connections:     Talks on phone: None     Gets together: None     Attends Latter day service: None     Active member of club or organization: None     Attends meetings of clubs or organizations: None     Relationship status: None     Intimate partner violence:     Fear of current or ex partner: None     Emotionally abused: None      Physically abused: None     Forced sexual activity: None   Other Topics Concern     None   Social History Narrative     None       REVIEW OF SYSTEM:  Pertinent items are noted in HPI.  A 12 point comprehensive review of systems was negative except as noted.    PHYSICAL EXAM:     General Appearance:    Alert, cooperative, no distress, appears stated age   Head:    Normocephalic, without obvious abnormality, atraumatic   Eyes:    PERRL, conjunctiva/corneas clear, both eyes; wears glasses   Ears:    Normal external ear canals, both ears; hearing impairment in bilateral ears with bilateral Has; bilateral cerumen impaction   Nose:   Nares normal, septum midline, mucosa normal, no drainage    or sinus tenderness   Throat:   Lips, mucosa, and tongue normal; teeth and gums normal   Neck:   Supple, symmetrical, trachea midline, no adenopathy;     thyroid:  no enlargement/tenderness/nodules; no carotid    bruit or JVD   Back:     Symmetric, no curvature, ROM normal, no CVA tenderness   Lungs:     Clear to auscultation bilaterally, respirations unlabored   Chest Wall:    No tenderness or deformity    Heart:    Regular rate and rhythm, S1 and S2 normal, no murmur, rub   or gallop   Abdomen:     Soft, non-tender, bowel sounds active all four quadrants,     no masses, no organomegaly   Extremities:   Extremities normal, atraumatic, no cyanosis or edema; point tenderness over lower right patella   Pulses:   2+ and symmetric all extremities   Skin:   Skin color, texture, turgor normal, no rashes or lesions   Neurologic:   Oriented to person and situation; forgetful at times; exhibits tangetial thought processes; generalized weakness; ambulatory with walker           LABS:   Lab Results   Component Value Date    WBC 8.2 11/18/2019    HGB 11.7 (L) 11/18/2019    HCT 36.7 11/18/2019     11/18/2019    CHOL 194 03/11/2019    TRIG 198 (H) 03/11/2019    HDL 52 03/11/2019    ALT 17 11/18/2019    AST 17 11/18/2019      11/18/2019    K 4.6 11/18/2019     11/18/2019    CREATININE 0.79 11/18/2019    BUN 18 11/18/2019    CO2 23 11/18/2019    TSH 1.21 02/13/2018    INR 0.94 05/21/2019        ASSESSMENT:      ICD-10-CM    1. Chronic left shoulder pain M25.512     G89.29    2. Chronic pain of right knee M25.561     G89.29    3. Chronic pain syndrome G89.4    4. Essential hypertension I10    5. Medication management Z79.899        PLAN:      Chronic pain  -Consult to orthopedic provider for right knee pain  -Continue duloxetine 20 mg daily at HS  -Continue Tylenol 625 mg as prescribed  -Continue tramadol 100 mg three times a day   -Encouraged patient to utilize integrative therapies such as distraction and deep breathing for pain management  -Notify provider if patient has new or worsening pain     Left shoulder pain  -cortisone injection per orthopedic PA today for pain  -Follow-up appointment on 12/31/19     Hypertension  -DECREASE atenolol from 100 mg to 50 mg daily  -Continue losartan 75 mg daily  -Continue spironolactone 25 mg daily  -Encouraged cardiac diet, low sodium diet, exercise and stress reduction techniques.   -Emphasized importance of blood pressure control.   -Notify provider if pt's SBP<90 or >180 and DBP <50 or >100     Medication management  -Discontinue low-dose aspirin    Otherwise continue current care plan for all other chronic medical conditions, as they are stable. Encouraged patient to engage in healthy lifestyle behaviors such as engaging in social activities, exercising (PT/OT), eating well, and following care plan. Follow up for routine check-up, or sooner if needed. Will continue to monitor patient and work with nursing staff collaboratively to work toward positive patient outcomes.     Electronically signed by: Kiana Reyes, CNP

## 2021-06-04 NOTE — PATIENT INSTRUCTIONS - HE
[facility]  Ortho Nursing home visit      Marina Neves is a 85 y.o. female who resides at [facility] with NP.    Patient is seen today for Left shoulder pain , has hs of chronic shoulder pain, 2nd to OA, and RTC pathology, I have seen patient in the past, with refusal to have shoulder injection, today however, she is welcoming th opportunity .    I have been asked to see her again today due to left shoulder pain.      Past Medical History:   Diagnosis Date     Anemia      Arthritis      Bladder infection 01/03/2015    dx'd- on antibiotics     Blood type AB-     with Anti-C     Breast cancer (H)      Bursitis, knee      Cancer (H) 2005    left breast     Colon cancer (H)      Depression      Gout      History of transfusion      Hypertension      Insomnia      Macular degeneration      Osteopenia      Ovarian cyst      Rosacea      Vitamin D deficiency       Past Surgical History:   Procedure Laterality Date     BACK SURGERY Bilateral     L3-L5, L5-S1 decompression lami     BACK SURGERY      posterior spinal reconstruction     BREAST SURGERY      left mastectomy     CATARACT EXTRACTION Bilateral      COLON SURGERY       DILATION AND CURETTAGE OF UTERUS      onsil     EYE SURGERY       GASTRECTOMY       left breast biopsy      needle core     left knee arthroscopy       MASTECTOMY, RADICAL Left      NEUROMA SURGERY Bilateral     feet     WI LAP,FULGURATE/EXCISE LESIONS N/A 3/29/2017    Procedure: LAPAROSCOPIC BILATERAL SALPING OOPHORECTOMY PELVIC WASHINGS;  Surgeon: Eduardo Smart MD;  Location: [facility] OR;  Service: Gynecology     WI MASTECTOMY, SIMPLE, COMPLETE Right 1/7/2015    Procedure: RIGHT BREAST MASTECTOMY;  Surgeon: Meliton Bazan MD;  Location: [facility] OR;  Service: General     WI PART REMOVAL COLON W ANASTOMOSIS N/A 6/16/2014    Procedure: COLECTOMY;  Surgeon: Meliton Bazan MD;  Location: [facility] OR;  Service: General     REPLACEMENT TOTAL  KNEE Right      right shoulder replacement       TONSILLECTOMY AND ADENOIDECTOMY          Allergies   Allergen Reactions     Blood-Group Specific Substance Other (See Comments)     Anti-D and Anti-C present. A delay in compatible RBC's may occur.     Celebrex [Celecoxib] Unknown     Avoids due to sulfa allergy     Lisinopril Cough     Tetanus Vaccines And Toxoid Other (See Comments)     High fever (temp 104), arm swelling, fever blisters     Piroxicam Rash     Sulfa (Sulfonamide Antibiotics) Rash      There were no vitals taken for this visit.     Exam:  Seated today with NP; allows PROM to left shoulder, with moderate crepitus, no swelling or effusion, AROM 90 FE/ 45 ER/ L-5 IR/ 40 abduction          X-rays not done today,    ASSESSMENT/ PLAN    After patients consent for left shoulder injection; Left shoulder prepped, injected with 1 cc depo medrol, 4cc 1% lidocaine, sub/acromial injection; tolerated well no complaints,    ICE/ no heat, will continue to follow,    [id number]          Alberto HOWARD-C With Kiana Reyes NP  [phone] Cell

## 2021-06-04 NOTE — PROGRESS NOTES
[facility]    Facility:   [facility] [[id number]]   Code Status: POLST AVAILABLE      CHIEF COMPLAINT/REASON FOR VISIT:  Chief Complaint   Patient presents with     Problem Visit     pain management       HISTORY:      HPI: Marina is a 85 y.o. female who is an assisted-living resident at [facility].  Marina  has a past medical history of Anemia, Arthritis, Bladder infection (01/03/2015), Blood type AB-, Breast cancer (H), Bursitis, knee, Cancer (H) (2005), Colon cancer (H), Depression, Gout, History of transfusion, Hypertension, Insomnia, Macular degeneration, Osteopenia, Ovarian cyst, Rosacea, and Vitamin D deficiency.    Today Ms. Neves is being evaluated for pain management per patient request.  Marina continues to described her pain as aching, intermittent pain rated 4-7/10 today.  She is taking duloxetine, Tylenol, tramadol, and Biofreeze for pain management.  We discussed that the results of her right knee ultrasound as negative for any acute processes on Friday 12/16/19.  We discussed further evaluation of her right knee pain with MRI and she said that she would like to check that it would be covered by her insurance before it is ordered.  She denied any other concerns at this visit.  The patient denied lightheadedness, dizziness, breathing difficulty, chest pain, palpitations, constipation, urinary symptoms, and numbness or tingling in extremities.     Past Medical History:   Diagnosis Date     Anemia      Arthritis      Bladder infection 01/03/2015    dx'd- on antibiotics     Blood type AB-     with Anti-C     Breast cancer (H)      Bursitis, knee      Cancer (H) 2005    left breast     Colon cancer (H)      Depression      Gout      History of transfusion      Hypertension      Insomnia      Macular degeneration      Osteopenia      Ovarian cyst      Rosacea      Vitamin D deficiency              Family History   Problem Relation Age of Onset     Squamous cell  carcinoma Mother      Colon cancer Sister      Breast cancer Maternal Aunt      Bone cancer Maternal Aunt      Colon cancer Paternal Aunt      Breast cancer Cousin      Social History     Socioeconomic History     Marital status:      Spouse name: Not on file     Number of children: Not on file     Years of education: Not on file     Highest education level: Not on file   Occupational History     Not on file   Social Needs     Financial resource strain: Not on file     Food insecurity:     Worry: Not on file     Inability: Not on file     Transportation needs:     Medical: Not on file     Non-medical: Not on file   Tobacco Use     Smoking status: Never Smoker     Smokeless tobacco: Never Used   Substance and Sexual Activity     Alcohol use: No     Drug use: No     Sexual activity: Never   Lifestyle     Physical activity:     Days per week: Not on file     Minutes per session: Not on file     Stress: Not on file   Relationships     Social connections:     Talks on phone: Not on file     Gets together: Not on file     Attends Denominational service: Not on file     Active member of club or organization: Not on file     Attends meetings of clubs or organizations: Not on file     Relationship status: Not on file     Intimate partner violence:     Fear of current or ex partner: Not on file     Emotionally abused: Not on file     Physically abused: Not on file     Forced sexual activity: Not on file   Other Topics Concern     Not on file   Social History Narrative     Not on file       REVIEW OF SYSTEM:  Pertinent items are noted in HPI.  A 12 point comprehensive review of systems was negative except as noted.    PHYSICAL EXAM:     General Appearance:    Alert, cooperative, no distress, appears stated age   Head:    Normocephalic, without obvious abnormality, atraumatic   Eyes:    PERRL, conjunctiva/corneas clear, both eyes; wears glasses   Ears:    Normal external ear canals, both ears; hearing impairment in bilateral  ears with bilateral Has; bilateral cerumen impaction   Nose:   Nares normal, septum midline, mucosa normal, no drainage    or sinus tenderness   Throat:   Lips, mucosa, and tongue normal; teeth and gums normal   Neck:   Supple, symmetrical, trachea midline, no adenopathy;     thyroid:  no enlargement/tenderness/nodules; no carotid    bruit or JVD   Back:     Symmetric, no curvature, ROM normal, no CVA tenderness   Lungs:     Clear to auscultation bilaterally, respirations unlabored   Chest Wall:    No tenderness or deformity    Heart:    Regular rate and rhythm, S1 and S2 normal, no murmur, rub   or gallop   Abdomen:     Soft, non-tender, bowel sounds active all four quadrants,     no masses, no organomegaly   Extremities:   Extremities normal, atraumatic, no cyanosis or edema; point tenderness over lower right patella   Pulses:   2+ and symmetric all extremities   Skin:   Skin color, texture, turgor normal, no rashes or lesions   Neurologic:   Oriented to person and situation; forgetful at times; exhibits tangetial thought processes; generalized weakness; ambulatory with walker           LABS:   Lab Results   Component Value Date    WBC 8.2 11/18/2019    HGB 11.7 (L) 11/18/2019    HCT 36.7 11/18/2019     11/18/2019    CHOL 194 03/11/2019    TRIG 198 (H) 03/11/2019    HDL 52 03/11/2019    ALT 17 11/18/2019    AST 17 11/18/2019     11/18/2019    K 4.6 11/18/2019     11/18/2019    CREATININE 0.79 11/18/2019    BUN 18 11/18/2019    CO2 23 11/18/2019    TSH 1.21 02/13/2018    INR 0.94 05/21/2019        ASSESSMENT:      ICD-10-CM    1. Chronic pain of right knee M25.561     G89.29    2. Chronic pain syndrome G89.4        PLAN:      Chronic pain  -MRI of right knee per patient's insurance  -Continue duloxetine 20 mg daily at HS  -Continue Tylenol 625 mg as prescribed  -Continue tramadol 100 mg three times a day   -Encouraged patient to utilize integrative therapies such as distraction and deep breathing  for pain management  -Notify provider if patient has new or worsening pain     Otherwise continue current care plan for all other chronic medical conditions, as they are stable. Encouraged patient to engage in healthy lifestyle behaviors such as engaging in social activities, exercising (PT/OT), eating well, and following care plan. Follow up for routine check-up, or sooner if needed. Will continue to monitor patient and work with nursing staff collaboratively to work toward positive patient outcomes.     Electronically signed by: Kiana eRyes CNP

## 2021-06-04 NOTE — PROGRESS NOTES
[facility]    Facility:   [facility] [[id number]]   Code Status: POLST AVAILABLE      CHIEF COMPLAINT/REASON FOR VISIT:  Chief Complaint   Patient presents with     Review Of Multiple Medical Conditions       HISTORY:      HPI: Marina is a 85 y.o. female who is an assisted-living resident at [facility].  Marina  has a past medical history of Anemia, Arthritis, Bladder infection (01/03/2015), Blood type AB-, Breast cancer (H), Bursitis, knee, Cancer (H) (2005), Colon cancer (H), Depression, Gout, History of transfusion, Hypertension, Insomnia, Macular degeneration, Osteopenia, Ovarian cyst, Rosacea, and Vitamin D deficiency.    Today Ms. Neves is being evaluated for a review of multiple medical conditions.  Marina reported that her left shoulder pain has resolved since her cortisone injection last week.  She reported that she continues to have aching, intermittent right knee pain rated 3-7/10 and has not made her orthopedic appointment yet at this time as she has been waiting to secure a ride.  We discussed her medications and she would like to discontinue anything that isn't necessary at this time with discontinuation of her calcium supplementation and multivitamin at this visit.  She has been tolerating decrease in her atenolol dose with decreased lightheadedness and SBP 110s and HR 60s.  The patient denied lightheadedness, dizziness, breathing difficulty, chest pain, palpitations, constipation, urinary symptoms, and numbness or tingling in extremities.     Past Medical History:   Diagnosis Date     Anemia      Arthritis      Bladder infection 01/03/2015    dx'd- on antibiotics     Blood type AB-     with Anti-C     Breast cancer (H)      Bursitis, knee      Cancer (H) 2005    left breast     Colon cancer (H)      Depression      Gout      History of transfusion      Hypertension      Insomnia      Macular degeneration      Osteopenia      Ovarian cyst      Rosacea       Vitamin D deficiency              Family History   Problem Relation Age of Onset     Squamous cell carcinoma Mother      Colon cancer Sister      Breast cancer Maternal Aunt      Bone cancer Maternal Aunt      Colon cancer Paternal Aunt      Breast cancer Cousin      Social History     Socioeconomic History     Marital status:      Spouse name: Not on file     Number of children: Not on file     Years of education: Not on file     Highest education level: Not on file   Occupational History     Not on file   Social Needs     Financial resource strain: Not on file     Food insecurity:     Worry: Not on file     Inability: Not on file     Transportation needs:     Medical: Not on file     Non-medical: Not on file   Tobacco Use     Smoking status: Never Smoker     Smokeless tobacco: Never Used   Substance and Sexual Activity     Alcohol use: No     Drug use: No     Sexual activity: Never   Lifestyle     Physical activity:     Days per week: Not on file     Minutes per session: Not on file     Stress: Not on file   Relationships     Social connections:     Talks on phone: Not on file     Gets together: Not on file     Attends Jehovah's witness service: Not on file     Active member of club or organization: Not on file     Attends meetings of clubs or organizations: Not on file     Relationship status: Not on file     Intimate partner violence:     Fear of current or ex partner: Not on file     Emotionally abused: Not on file     Physically abused: Not on file     Forced sexual activity: Not on file   Other Topics Concern     Not on file   Social History Narrative     Not on file       REVIEW OF SYSTEM:  Pertinent items are noted in HPI.  A 12 point comprehensive review of systems was negative except as noted.    PHYSICAL EXAM:     General Appearance:    Alert, cooperative, no distress, appears stated age   Head:    Normocephalic, without obvious abnormality, atraumatic   Eyes:    PERRL, conjunctiva/corneas clear, both  eyes; wears glasses   Ears:    Normal external ear canals, both ears; hearing impairment in bilateral ears with bilateral Has; bilateral cerumen impaction   Nose:   Nares normal, septum midline, mucosa normal, no drainage    or sinus tenderness   Throat:   Lips, mucosa, and tongue normal; teeth and gums normal   Neck:   Supple, symmetrical, trachea midline, no adenopathy;     thyroid:  no enlargement/tenderness/nodules; no carotid    bruit or JVD   Back:     Symmetric, no curvature, ROM normal, no CVA tenderness   Lungs:     Clear to auscultation bilaterally, respirations unlabored   Chest Wall:    No tenderness or deformity    Heart:    Regular rate and rhythm, S1 and S2 normal, no murmur, rub   or gallop   Abdomen:     Soft, non-tender, bowel sounds active all four quadrants,     no masses, no organomegaly   Extremities:   Extremities normal, atraumatic, no cyanosis or edema; point tenderness over lower right patella   Pulses:   2+ and symmetric all extremities   Skin:   Skin color, texture, turgor normal, no rashes or lesions   Neurologic:   Oriented to person and situation; forgetful at times; exhibits tangetial thought processes; generalized weakness; ambulatory with walker           LABS:   Lab Results   Component Value Date    WBC 8.2 11/18/2019    HGB 11.7 (L) 11/18/2019    HCT 36.7 11/18/2019     11/18/2019    CHOL 194 03/11/2019    TRIG 198 (H) 03/11/2019    HDL 52 03/11/2019    ALT 17 11/18/2019    AST 17 11/18/2019     11/18/2019    K 4.6 11/18/2019     11/18/2019    CREATININE 0.79 11/18/2019    BUN 18 11/18/2019    CO2 23 11/18/2019    TSH 1.21 02/13/2018    INR 0.94 05/21/2019        ASSESSMENT:      ICD-10-CM    1. Chronic pain of right knee M25.561     G89.29    2. Chronic pain syndrome G89.4    3. Essential hypertension I10    4. Chronic left shoulder pain M25.512     G89.29    5. Medication management Z79.899        PLAN:      Chronic pain  -Consult to orthopedic provider for  right knee pain  -Continue duloxetine 20 mg daily at HS  -Continue Tylenol 625 mg as prescribed  -Continue tramadol 100 mg three times a day   -Encouraged patient to utilize integrative therapies such as distraction and deep breathing for pain management  -Notify provider if patient has new or worsening pain     Left shoulder pain- Pain resolved    Hypertension  -Continue atenolol 50 mg daily  -Continue losartan 75 mg daily  -Continue spironolactone 25 mg daily  -Encouraged cardiac diet, low sodium diet, exercise and stress reduction techniques.   -Emphasized importance of blood pressure control.   -Notify provider if pt's SBP<90 or >180 and DBP <50 or >100     Medication management  -Discontinue calcium supplement and multivitamin    Otherwise continue current care plan for all other chronic medical conditions, as they are stable. Encouraged patient to engage in healthy lifestyle behaviors such as engaging in social activities, exercising (PT/OT), eating well, and following care plan. Follow up for routine check-up, or sooner if needed. Will continue to monitor patient and work with nursing staff collaboratively to work toward positive patient outcomes.     Electronically signed by: Kiana Reyes, RYLIE

## 2021-06-04 NOTE — PROGRESS NOTES
[facility]    Facility:   [facility] [[id number]]   Code Status: POLST AVAILABLE      CHIEF COMPLAINT/REASON FOR VISIT:  Chief Complaint   Patient presents with     Problem Visit     right knee pain       HISTORY:      HPI: Marina is a 85 y.o. female who is an assisted-living resident at [facility].  Marina  has a past medical history of Anemia, Arthritis, Bladder infection (01/03/2015), Blood type AB-, Breast cancer (H), Bursitis, knee, Cancer (H) (2005), Colon cancer (H), Depression, Gout, History of transfusion, Hypertension, Insomnia, Macular degeneration, Osteopenia, Ovarian cyst, Rosacea, and Vitamin D deficiency.    Today Ms. Neves is being evaluated for pain management per patient request.  Marina called nursing service multiple times since her previous visit and reported that she has uncontrolled pain and needed to be seen ASAP.  Marina described her pain as being the same as it has always been rated 4-7/10 with aching, intermittent right knee pain exacerbated by ambulating.  We discussed including rest periods when she is up ambulating around her AL facility to improve her pain tolerance.  We also discussed using adjust therapies such as heat and ice to help manage her pain when it is above her goal of 3/10.  She is currently taking Tylenol, ibuprofen, and tramadol for pain management at this time.  Marina reported that she did not have an appreciable difference in her pain with ibuprofen over the past week and we discussed discontinuation due to risk for adverse effects and she was agreeable to this plan.  We also discussed the possibility of Marina ordering her knee brace as recommended by physical therapy and she continues to say that she would rather try other therapies at this time.  She was agreeable to starting duloxetine for chronic pain management at this time and said that she would have her niece pick it up from the pharmacy today to start this  evening.  Marina has been having difficulty remembering her pain regimen with her losing her paper that her regimen is written down on calling nursing triage to help her recall her regimen multiple times since her last visit.  We discussed that she could go to an orthopedic provider or pain clinic for further evaluation of her pain with continued uncontrolled pain and she declined these recommendations at this time.  We discussed further imaging of her knee and she declined at this time because she does not want to go out for anything.  The patient denied lightheadedness, dizziness, breathing difficulty, chest pain, palpitations, constipation, urinary symptoms, and numbness or tingling in extremities.     Past Medical History:   Diagnosis Date     Anemia      Arthritis      Bladder infection 01/03/2015    dx'd- on antibiotics     Blood type AB-     with Anti-C     Breast cancer (H)      Bursitis, knee      Cancer (H) 2005    left breast     Colon cancer (H)      Depression      Gout      History of transfusion      Hypertension      Insomnia      Macular degeneration      Osteopenia      Ovarian cyst      Rosacea      Vitamin D deficiency              Family History   Problem Relation Age of Onset     Squamous cell carcinoma Mother      Colon cancer Sister      Breast cancer Maternal Aunt      Bone cancer Maternal Aunt      Colon cancer Paternal Aunt      Breast cancer Cousin      Social History     Socioeconomic History     Marital status:      Spouse name: Not on file     Number of children: Not on file     Years of education: Not on file     Highest education level: Not on file   Occupational History     Not on file   Social Needs     Financial resource strain: Not on file     Food insecurity:     Worry: Not on file     Inability: Not on file     Transportation needs:     Medical: Not on file     Non-medical: Not on file   Tobacco Use     Smoking status: Never Smoker     Smokeless tobacco: Never Used    Substance and Sexual Activity     Alcohol use: No     Drug use: No     Sexual activity: Never   Lifestyle     Physical activity:     Days per week: Not on file     Minutes per session: Not on file     Stress: Not on file   Relationships     Social connections:     Talks on phone: Not on file     Gets together: Not on file     Attends Jain service: Not on file     Active member of club or organization: Not on file     Attends meetings of clubs or organizations: Not on file     Relationship status: Not on file     Intimate partner violence:     Fear of current or ex partner: Not on file     Emotionally abused: Not on file     Physically abused: Not on file     Forced sexual activity: Not on file   Other Topics Concern     Not on file   Social History Narrative     Not on file       REVIEW OF SYSTEM:  Pertinent items are noted in HPI.  A 12 point comprehensive review of systems was negative except as noted.    PHYSICAL EXAM:     General Appearance:    Alert, cooperative, no distress, appears stated age   Head:    Normocephalic, without obvious abnormality, atraumatic   Eyes:    PERRL, conjunctiva/corneas clear, both eyes; wears glasses   Ears:    Normal external ear canals, both ears; hearing impairment in bilateral ears with bilateral Has; bilateral cerumen impaction   Nose:   Nares normal, septum midline, mucosa normal, no drainage    or sinus tenderness   Throat:   Lips, mucosa, and tongue normal; teeth and gums normal   Neck:   Supple, symmetrical, trachea midline, no adenopathy;     thyroid:  no enlargement/tenderness/nodules; no carotid    bruit or JVD   Back:     Symmetric, no curvature, ROM normal, no CVA tenderness   Lungs:     Clear to auscultation bilaterally, respirations unlabored   Chest Wall:    No tenderness or deformity    Heart:    Regular rate and rhythm, S1 and S2 normal, no murmur, rub   or gallop   Abdomen:     Soft, non-tender, bowel sounds active all four quadrants,     no masses, no  organomegaly   Extremities:   Extremities normal, atraumatic, no cyanosis or edema; point tenderness over lower right patella   Pulses:   2+ and symmetric all extremities   Skin:   Skin color, texture, turgor normal, no rashes or lesions   Neurologic:   Oriented to person and situation; forgetful at times; exhibits tangetial thought processes; generalized weakness; ambulatory with walker           LABS:   Lab Results   Component Value Date    WBC 8.2 11/18/2019    HGB 11.7 (L) 11/18/2019    HCT 36.7 11/18/2019     11/18/2019    CHOL 194 03/11/2019    TRIG 198 (H) 03/11/2019    HDL 52 03/11/2019    ALT 17 11/18/2019    AST 17 11/18/2019     11/18/2019    K 4.6 11/18/2019     11/18/2019    CREATININE 0.79 11/18/2019    BUN 18 11/18/2019    CO2 23 11/18/2019    TSH 1.21 02/13/2018    INR 0.94 05/21/2019        ASSESSMENT:      ICD-10-CM    1. Chronic pain syndrome G89.4    2. Chronic pain of right knee M25.561     G89.29        PLAN:      Chronic pain  -START duloxetine 20 mg daily at HS  -DISCONTINUE ibuprofen 200 mg three times a day  -Continue Tylenol 625 mg as prescribed  -Continue tramadol 100 mg three times a day   -Encouraged patient to utilize integrative therapies such as distraction and deep breathing for pain management  -Notify provider if patient has new or worsening pain   -Follow-up appointment with PCP on 12/16/19    Otherwise continue current care plan for all other chronic medical conditions, as they are stable. Encouraged patient to engage in healthy lifestyle behaviors such as engaging in social activities, exercising (PT/OT), eating well, and following care plan. Follow up for routine check-up, or sooner if needed. Will continue to monitor patient and work with nursing staff collaboratively to work toward positive patient outcomes.     Electronically signed by: Kiana Reyes CNP     Total floor/unit time was 55 minutes and 55 minutes was spent in counseling patient on pain  management in coordination of care with patient and family.

## 2021-06-05 VITALS
BODY MASS INDEX: 29.41 KG/M2 | WEIGHT: 166 LBS | HEART RATE: 92 BPM | DIASTOLIC BLOOD PRESSURE: 80 MMHG | SYSTOLIC BLOOD PRESSURE: 122 MMHG | HEIGHT: 63 IN | RESPIRATION RATE: 18 BRPM

## 2021-06-05 VITALS — WEIGHT: 166 LBS | BODY MASS INDEX: 29.41 KG/M2 | HEIGHT: 63 IN

## 2021-06-05 VITALS — HEIGHT: 63 IN | WEIGHT: 169 LBS | BODY MASS INDEX: 29.95 KG/M2

## 2021-06-05 NOTE — PROGRESS NOTES
[facility]    Facility:   [facility] [[id number]]   Code Status: POLST AVAILABLE      CHIEF COMPLAINT/REASON FOR VISIT:  Chief Complaint   Patient presents with     Problem Visit     pain management       HISTORY:      HPI: Marina is a 85 y.o. female who is an assisted-living resident at [facility].  Marina  has a past medical history of Anemia, Arthritis, Bladder infection (01/03/2015), Blood type AB-, Breast cancer (H), Bursitis, knee, Cancer (H) (2005), Colon cancer (H), Depression, Gout, History of transfusion, Hypertension, Insomnia, Macular degeneration, Osteopenia, Ovarian cyst, Rosacea, and Vitamin D deficiency.    Today Ms. Neves is being evaluated for a review of multiple medical conditions per patient request.  Marina noted that she continues to have lightheadedness with movement that improved a little with decrease in her atenolol dose.  Her blood pressure has been SBP 120s over the past three months and she can tolerate further decrease in her antihypertensive medications at this time.  She said that her pain in her right knee has improved with PT/OT at the [facility].  She continues to take Tylenol PRN, Biofreeze PRN, duloxetine 20 mg at HS, and tramadol 100 mg three times a day.  She was agreeable to decreasing the tramadol with improvement in her pain to help alleviate lightheadedness as well.  She denied pain at this visit sitting in her recliner.  The patient denied changes in mood or appetite, sleep disturbances, breathing difficulty, chest pain, palpitations, constipation, urinary symptoms, numbness or tingling in extremities, and pain.  Nursing staff noted that the patient is no longer receiving daily checks as she refuses to pay for it.  She told me that she does not feel that she needs checks at this time as her neighbors check in on her frequently.      Past Medical History:   Diagnosis Date     Anemia      Arthritis      Bladder  infection 01/03/2015    dx'd- on antibiotics     Blood type AB-     with Anti-C     Breast cancer (H)      Bursitis, knee      Cancer (H) 2005    left breast     Colon cancer (H)      Depression      Gout      History of transfusion      Hypertension      Insomnia      Macular degeneration      Osteopenia      Ovarian cyst      Rosacea      Vitamin D deficiency              Family History   Problem Relation Age of Onset     Squamous cell carcinoma Mother      Colon cancer Sister      Breast cancer Maternal Aunt      Bone cancer Maternal Aunt      Colon cancer Paternal Aunt      Breast cancer Cousin      Social History     Socioeconomic History     Marital status:      Spouse name: None     Number of children: None     Years of education: None     Highest education level: None   Occupational History     None   Social Needs     Financial resource strain: None     Food insecurity:     Worry: None     Inability: None     Transportation needs:     Medical: None     Non-medical: None   Tobacco Use     Smoking status: Never Smoker     Smokeless tobacco: Never Used   Substance and Sexual Activity     Alcohol use: No     Drug use: No     Sexual activity: Never   Lifestyle     Physical activity:     Days per week: None     Minutes per session: None     Stress: None   Relationships     Social connections:     Talks on phone: None     Gets together: None     Attends Anabaptist service: None     Active member of club or organization: None     Attends meetings of clubs or organizations: None     Relationship status: None     Intimate partner violence:     Fear of current or ex partner: None     Emotionally abused: None     Physically abused: None     Forced sexual activity: None   Other Topics Concern     None   Social History Narrative     None       REVIEW OF SYSTEM:  Pertinent items are noted in HPI.  A 12 point comprehensive review of systems was negative except as noted.    PHYSICAL EXAM:     General Appearance:     Alert, cooperative, no distress, appears stated age   Head:    Normocephalic, without obvious abnormality, atraumatic   Eyes:    PERRL, conjunctiva/corneas clear, both eyes; wears glasses   Ears:    Normal external ear canals, both ears; hearing impairment in bilateral ears with bilateral Has; bilateral cerumen impaction   Nose:   Nares normal, septum midline, mucosa normal, no drainage    or sinus tenderness   Throat:   Lips, mucosa, and tongue normal; teeth and gums normal   Neck:   Supple, symmetrical, trachea midline, no adenopathy;     thyroid:  no enlargement/tenderness/nodules; no carotid    bruit or JVD   Back:     Symmetric, no curvature, ROM normal, no CVA tenderness   Lungs:     Clear to auscultation bilaterally, respirations unlabored   Chest Wall:    No tenderness or deformity    Heart:    Regular rate and rhythm, S1 and S2 normal, no murmur, rub   or gallop   Abdomen:     Soft, non-tender, bowel sounds active all four quadrants,     no masses, no organomegaly   Extremities:   Extremities normal, atraumatic, no cyanosis or edema; point tenderness over lower right patella   Pulses:   2+ and symmetric all extremities   Skin:   Skin color, texture, turgor normal, no rashes or lesions   Neurologic:   Oriented to person and situation; forgetful at times; exhibits tangetial thought processes; generalized weakness; ambulatory with walker           LABS:   Lab Results   Component Value Date    WBC 8.2 11/18/2019    HGB 11.7 (L) 11/18/2019    HCT 36.7 11/18/2019     11/18/2019    CHOL 194 03/11/2019    TRIG 198 (H) 03/11/2019    HDL 52 03/11/2019    ALT 17 11/18/2019    AST 17 11/18/2019     11/18/2019    K 4.6 11/18/2019     11/18/2019    CREATININE 0.79 11/18/2019    BUN 18 11/18/2019    CO2 23 11/18/2019    TSH 1.21 02/13/2018    INR 0.94 05/21/2019        ASSESSMENT:      ICD-10-CM    1. Chronic pain of right knee M25.561     G89.29    2. Chronic pain syndrome G89.4    3. Essential  hypertension I10    4. Lightheadedness R42        PLAN:      Chronic pain  -Continue PT/OT as recommended  -Continue duloxetine 20 mg daily at HS  -Continue Tylenol 625 mg as prescribed  -DECREASE tramadol from 100 mg to 50 mg three times a day   -Encouraged patient to utilize integrative therapies such as distraction and deep breathing for pain management  -Notify provider if patient has new or worsening pain     Hypertension/Lightheadedness  -DECREASE atenolol from 50 mg to 25 mg daily  -Continue losartan 75 mg daily  -Continue spironolactone 25 mg daily  -Encouraged cardiac diet, low sodium diet, exercise and stress reduction techniques.   -Emphasized importance of blood pressure control.   -Notify provider if pt's SBP<90 or >180 and DBP <50 or >100     Otherwise continue current care plan for all other chronic medical conditions, as they are stable. Encouraged patient to engage in healthy lifestyle behaviors such as engaging in social activities, exercising (PT/OT), eating well, and following care plan. Follow up for routine check-up, or sooner if needed. Will continue to monitor patient and work with nursing staff collaboratively to work toward positive patient outcomes.     Electronically signed by: Kiana Reyes, CNP

## 2021-06-06 NOTE — PROGRESS NOTES
[facility]    Facility:   [facility] [[id number]]   Code Status: POLST AVAILABLE      CHIEF COMPLAINT/REASON FOR VISIT:  Chief Complaint   Patient presents with     Problem Visit     pain management       HISTORY:      HPI: Marina is a 85 y.o. female who is an assisted-living resident at [facility].  Marina  has a past medical history of Anemia, Arthritis, Bladder infection (01/03/2015), Blood type AB-, Breast cancer (H), Bursitis, knee, Cancer (H) (2005), Colon cancer (H), Depression, Gout, History of transfusion, Hypertension, Insomnia, Macular degeneration, Osteopenia, Ovarian cyst, Rosacea, and Vitamin D deficiency.    Today Ms. Neves is being evaluated for a review of multiple medical conditions per patient request.  Marina's concern at this visit is her continued chronic right knee pain described as aching intermittent rated 0/10 at rest and 5/10 with movement.  We discussed her pain regimen and she said that her left shoulder pain resolved with ibuprofen therapy.  She has also been utilizing Tylenol, tramadol, and Biofreeze for chronic pain management.   Marina denied lightheadedness, dizziness, breathing difficulty, chest pain, palpitations, constipation, urinary symptoms, and numbness or tingling in extremities.  She had been taking duloxetine for pain management but had the pill bottle hidden under her TV stand and does not remember when she stopped taking it.  She has 19 pills missing out of 90 af this time.  We discussed discontinuation of this medication as she is unclear whether she has been taking it or not.  She reported that she only takes the medications that are in her medicine basket in her medicine cabinet.  We discussed having nursing staff assist her with setting up her medications again at this visit as she is having difficulty remembering which medications she is taking and has been misplacing medication bottles and she said that she would not  like to pay for this service at her facility.  She was agreeable to talking to her niece, Roseanna, to see if she would be able to help her manage her medications weekly.  Roseanna was updated and was agreeable to setting up Marina's medications in a pill box for her.  We also discussed helping Marina make medical decisions as her next of kin as her cognitive impairment has progressed with increased forgetfulness.  Roseanna reported that she would start to look into getting financial and healthcare POA for when Marina's cognition deteriorates further.    Past Medical History:   Diagnosis Date     Anemia      Arthritis      Bladder infection 01/03/2015    dx'd- on antibiotics     Blood type AB-     with Anti-C     Breast cancer (H)      Bursitis, knee      Cancer (H) 2005    left breast     Colon cancer (H)      Depression      Gout      History of transfusion      Hypertension      Insomnia      Macular degeneration      Osteopenia      Ovarian cyst      Rosacea      Vitamin D deficiency              Family History   Problem Relation Age of Onset     Squamous cell carcinoma Mother      Colon cancer Sister      Breast cancer Maternal Aunt      Bone cancer Maternal Aunt      Colon cancer Paternal Aunt      Breast cancer Cousin      Social History     Socioeconomic History     Marital status:      Spouse name: None     Number of children: None     Years of education: None     Highest education level: None   Occupational History     None   Social Needs     Financial resource strain: None     Food insecurity:     Worry: None     Inability: None     Transportation needs:     Medical: None     Non-medical: None   Tobacco Use     Smoking status: Never Smoker     Smokeless tobacco: Never Used   Substance and Sexual Activity     Alcohol use: No     Drug use: No     Sexual activity: Never   Lifestyle     Physical activity:     Days per week: None     Minutes per session: None     Stress: None   Relationships     Social connections:      Talks on phone: None     Gets together: None     Attends Christianity service: None     Active member of club or organization: None     Attends meetings of clubs or organizations: None     Relationship status: None     Intimate partner violence:     Fear of current or ex partner: None     Emotionally abused: None     Physically abused: None     Forced sexual activity: None   Other Topics Concern     None   Social History Narrative     None       REVIEW OF SYSTEM:  Pertinent items are noted in HPI.  A 12 point comprehensive review of systems was negative except as noted.    PHYSICAL EXAM:     General Appearance:    Alert, cooperative, no distress, appears stated age   Head:    Normocephalic, without obvious abnormality, atraumatic   Eyes:    PERRL, conjunctiva/corneas clear, both eyes; wears glasses   Ears:    Normal external ear canals, both ears; hearing impairment in bilateral ears with bilateral Has; bilateral cerumen impaction   Nose:   Nares normal, septum midline, mucosa normal, no drainage    or sinus tenderness   Throat:   Lips, mucosa, and tongue normal; teeth and gums normal   Neck:   Supple, symmetrical, trachea midline, no adenopathy;     thyroid:  no enlargement/tenderness/nodules; no carotid    bruit or JVD   Back:     Symmetric, no curvature, ROM normal, no CVA tenderness   Lungs:     Clear to auscultation bilaterally, respirations unlabored   Chest Wall:    No tenderness or deformity    Heart:    Regular rate and rhythm, S1 and S2 normal, no murmur, rub   or gallop   Abdomen:     Soft, non-tender, bowel sounds active all four quadrants,     no masses, no organomegaly   Extremities:   Extremities normal, atraumatic, no cyanosis or edema; point tenderness over lower right patella   Pulses:   2+ and symmetric all extremities   Skin:   Skin color, texture, turgor normal, no rashes or lesions   Neurologic:   Oriented to person and situation; forgetful at times; exhibits tangetial thought processes;  generalized weakness; ambulatory with walker           LABS:   Lab Results   Component Value Date    WBC 9.9 02/12/2020    HGB 11.5 (L) 02/12/2020    HCT 37.2 02/12/2020     02/12/2020    CHOL 194 03/11/2019    TRIG 198 (H) 03/11/2019    HDL 52 03/11/2019    ALT 14 02/12/2020    AST 16 02/12/2020     (L) 02/12/2020    K 4.5 02/12/2020     02/12/2020    CREATININE 0.74 02/12/2020    BUN 13 02/12/2020    CO2 24 02/12/2020    TSH 1.21 02/13/2018    INR 0.94 05/21/2019        ASSESSMENT:      ICD-10-CM    1. Chronic pain of right knee M25.561     G89.29    2. Chronic pain syndrome G89.4    3. Essential hypertension I10    4. Cognitive impairment R41.89        PLAN:      Chronic pain  -DISCONTINUE ibuprofen 400 mg three times a day  -Continue PT/OT as recommended  -DISCONTINUE duloxetine 20 mg daily at HS  -Continue Tylenol 625 mg as prescribed  -Continue tramadol from 50 mg three times a day   -Encouraged patient to ice left shoulder three times a day and PRN  -Encouraged patient to utilize integrative therapies such as distraction and deep breathing for pain management  -Notify provider if patient has new or worsening pain     Hypertension   -DISCONTINUE atenolol  -Continue spironolactone as prescribed  -Continue losartan as prescribed  -Encouraged cardiac diet, low sodium diet, exercise and stress reduction techniques.   -Emphasized importance of blood pressure control.   -Notify provider if pt's SBP<90 or >180 and DBP <50 or >100     Cognitive impairment  -Pt's niece to set up her pills for her weekly    Otherwise continue current care plan for all other chronic medical conditions, as they are stable. Encouraged patient to engage in healthy lifestyle behaviors such as engaging in social activities, exercising (PT/OT), eating well, and following care plan. Follow up for routine check-up, or sooner if needed. Will continue to monitor patient and work with nursing staff collaboratively to work toward  positive patient outcomes.     Electronically signed by: Kiana Reyes, RYLIE

## 2021-06-06 NOTE — PROGRESS NOTES
[facility]    Facility:   [facility] [[id number]]   Code Status: POLST AVAILABLE      CHIEF COMPLAINT/REASON FOR VISIT:  Chief Complaint   Patient presents with     Review Of Multiple Medical Conditions       HISTORY:      HPI: Marina is a 85 y.o. female who is an assisted-living resident at [facility].  Marina  has a past medical history of Anemia, Arthritis, Bladder infection (01/03/2015), Blood type AB-, Breast cancer (H), Bursitis, knee, Cancer (H) (2005), Colon cancer (H), Depression, Gout, History of transfusion, Hypertension, Insomnia, Macular degeneration, Osteopenia, Ovarian cyst, Rosacea, and Vitamin D deficiency.    Today Ms. Neves is being evaluated for a review of multiple medical conditions per patient request.  Marina said that her pain has been uncontrolled over the past.  She reported that her right knee pain has been stable taking Tylenol, tramadol, and Biofreeze for pain management.  She has been working with PT and OT at the [facility] for her right knee pain.  She said that she has had acute left shoulder pain rated 5-10/10 aching pain with her ROM intact.  She asked if she could have another steroid injection with last one on 12/27/20 which would be too early at this point.  She denied any acute injuries to her left shoulder.  We discussed a short course of ibuprofen as this helped previously with acute pain and icing her shoulder three times a day and she was agreeable to this plan at this time.  She said that her lightheadedness has resolved since her last visit when her tramadol and atenolol were decreased last week.  Her blood pressure has been stable with SBP 120s with goal SBP < 150.  She also asked if she could take more of her Miralax daily for constipation and she was encouraged to take an extra dose of her daily Miralax if she has gone more than three days without a BM.  Marina also noted that she would like to re-start taking magnesium  oxide 400 mg tabs that she has in her medicine cabinet for muscle spasm.  We discussed benefits versus risk of magnesium supplementation and she was agreeable to electrolyte laboratory monitoring next week.  Her follow-up with her PCP will be at 1100 on 2/13/20.  The patient denied lightheadedness, dizziness, breathing difficulty, chest pain, palpitations, urinary symptoms, and numbness or tingling in extremities.     Past Medical History:   Diagnosis Date     Anemia      Arthritis      Bladder infection 01/03/2015    dx'd- on antibiotics     Blood type AB-     with Anti-C     Breast cancer (H)      Bursitis, knee      Cancer (H) 2005    left breast     Colon cancer (H)      Depression      Gout      History of transfusion      Hypertension      Insomnia      Macular degeneration      Osteopenia      Ovarian cyst      Rosacea      Vitamin D deficiency              Family History   Problem Relation Age of Onset     Squamous cell carcinoma Mother      Colon cancer Sister      Breast cancer Maternal Aunt      Bone cancer Maternal Aunt      Colon cancer Paternal Aunt      Breast cancer Cousin      Social History     Socioeconomic History     Marital status:      Spouse name: None     Number of children: None     Years of education: None     Highest education level: None   Occupational History     None   Social Needs     Financial resource strain: None     Food insecurity:     Worry: None     Inability: None     Transportation needs:     Medical: None     Non-medical: None   Tobacco Use     Smoking status: Never Smoker     Smokeless tobacco: Never Used   Substance and Sexual Activity     Alcohol use: No     Drug use: No     Sexual activity: Never   Lifestyle     Physical activity:     Days per week: None     Minutes per session: None     Stress: None   Relationships     Social connections:     Talks on phone: None     Gets together: None     Attends Protestant service: None     Active member of club or  organization: None     Attends meetings of clubs or organizations: None     Relationship status: None     Intimate partner violence:     Fear of current or ex partner: None     Emotionally abused: None     Physically abused: None     Forced sexual activity: None   Other Topics Concern     None   Social History Narrative     None       REVIEW OF SYSTEM:  Pertinent items are noted in HPI.  A 12 point comprehensive review of systems was negative except as noted.    PHYSICAL EXAM:     General Appearance:    Alert, cooperative, no distress, appears stated age   Head:    Normocephalic, without obvious abnormality, atraumatic   Eyes:    PERRL, conjunctiva/corneas clear, both eyes; wears glasses   Ears:    Normal external ear canals, both ears; hearing impairment in bilateral ears with bilateral Has; bilateral cerumen impaction   Nose:   Nares normal, septum midline, mucosa normal, no drainage    or sinus tenderness   Throat:   Lips, mucosa, and tongue normal; teeth and gums normal   Neck:   Supple, symmetrical, trachea midline, no adenopathy;     thyroid:  no enlargement/tenderness/nodules; no carotid    bruit or JVD   Back:     Symmetric, no curvature, ROM normal, no CVA tenderness   Lungs:     Clear to auscultation bilaterally, respirations unlabored   Chest Wall:    No tenderness or deformity    Heart:    Regular rate and rhythm, S1 and S2 normal, no murmur, rub   or gallop   Abdomen:     Soft, non-tender, bowel sounds active all four quadrants,     no masses, no organomegaly   Extremities:   Extremities normal, atraumatic, no cyanosis or edema; point tenderness over lower right patella; point tenderness on left shoulder glenohumeral joint   Pulses:   2+ and symmetric all extremities   Skin:   Skin color, texture, turgor normal, no rashes or lesions   Neurologic:   Oriented to person and situation; forgetful at times; exhibits tangetial thought processes; generalized weakness; ambulatory with walker           LABS:    Lab Results   Component Value Date    WBC 8.2 11/18/2019    HGB 11.7 (L) 11/18/2019    HCT 36.7 11/18/2019     11/18/2019    CHOL 194 03/11/2019    TRIG 198 (H) 03/11/2019    HDL 52 03/11/2019    ALT 17 11/18/2019    AST 17 11/18/2019     11/18/2019    K 4.6 11/18/2019     11/18/2019    CREATININE 0.79 11/18/2019    BUN 18 11/18/2019    CO2 23 11/18/2019    TSH 1.21 02/13/2018    INR 0.94 05/21/2019        ASSESSMENT:      ICD-10-CM    1. Chronic pain of right knee M25.561     G89.29    2. Chronic pain syndrome G89.4    3. Acute pain of left shoulder M25.512    4. Slow transit constipation K59.01    5. Muscle spasm M62.838        PLAN:      Chronic pain  -START ibuprofen 400 mg three times a day  -Continue PT/OT as recommended  -Continue duloxetine 20 mg daily at HS  -Continue Tylenol 625 mg as prescribed  -Continue tramadol from 50 mg three times a day   -Encouraged patient to ice left shoulder three times a day and PRN  -Encouraged patient to utilize integrative therapies such as distraction and deep breathing for pain management  -Notify provider if patient has new or worsening pain     Constipation  -Continue Miralax 17 g daily  -START Miralax 17 g daily PRN  -Continue senna-docusate as prescribed  -Encouraged patient to increase fiber in diet, eat a lot of fruits and vegetables, increase water intake  -Exercise as much as possible  -Notify provider if patient has loose stools or no BM for >3 days     Muscle spasm  -START magnesium oxide 400 mg daily  -Check Mg and BMP on 2/12/20    Otherwise continue current care plan for all other chronic medical conditions, as they are stable. Encouraged patient to engage in healthy lifestyle behaviors such as engaging in social activities, exercising (PT/OT), eating well, and following care plan. Follow up for routine check-up, or sooner if needed. Will continue to monitor patient and work with nursing staff collaboratively to work toward positive  patient outcomes.     Electronically signed by: Kiana Reyes, RYLIE

## 2021-06-06 NOTE — PATIENT INSTRUCTIONS - HE
Results for orders placed or performed in visit on 02/12/20   Comprehensive Metabolic Panel   Result Value Ref Range    Sodium 134 (L) 136 - 145 mmol/L    Potassium 4.5 3.5 - 5.0 mmol/L    Chloride 101 98 - 107 mmol/L    CO2 24 22 - 31 mmol/L    Anion Gap, Calculation 9 5 - 18 mmol/L    Glucose 95 70 - 125 mg/dL    BUN 13 8 - 28 mg/dL    Creatinine 0.74 0.60 - 1.10 mg/dL    GFR MDRD Af Amer >60 >60 mL/min/1.73m2    GFR MDRD Non Af Amer >60 >60 mL/min/1.73m2    Bilirubin, Total 0.4 0.0 - 1.0 mg/dL    Calcium 9.2 8.5 - 10.5 mg/dL    Protein, Total 7.1 6.0 - 8.0 g/dL    Albumin 3.5 3.5 - 5.0 g/dL    Alkaline Phosphatase 68 45 - 120 U/L    AST 16 0 - 40 U/L    ALT 14 0 - 45 U/L     Magnesium 1.8 ref range 1.8-2.6 on 2/12/20    Lab Results   Component Value Date    WBC 9.9 02/12/2020    HGB 11.5 (L) 02/12/2020    HCT 37.2 02/12/2020     (H) 02/12/2020     02/12/2020     Lab Results   Component Value Date    CRP 0.6 01/09/2020     Lab Results   Component Value Date    SEDRATE 20 01/09/2020      Lab Results   Component Value Date    URICACID 5.2 11/18/2019      Your laboratory results are at your baseline over the past few months.

## 2021-06-06 NOTE — PROGRESS NOTES
[facility]    Facility:   [facility] [[id number]]   Code Status: POLST AVAILABLE      CHIEF COMPLAINT/REASON FOR VISIT:  Chief Complaint   Patient presents with     Review Of Multiple Medical Conditions       HISTORY:      HPI: Marina is a 85 y.o. female who is an assisted-living resident at [facility].  Marina  has a past medical history of Anemia, Arthritis, Bladder infection (01/03/2015), Blood type AB-, Breast cancer (H), Bursitis, knee, Cancer (H) (2005), Colon cancer (H), Depression, Gout, History of transfusion, Hypertension, Insomnia, Macular degeneration, Osteopenia, Ovarian cyst, Rosacea, and Vitamin D deficiency.    Today Ms. Neves is being evaluated for a review of multiple medical conditions per patient request.  Marina's niece, Roseanna, left a list of concerns for her aunt to address at this visit.  We discussed that the patient was titrated off her atenolol due to the patient reporting lightheadedness with standing up in the morning at her 12/27/19 visit and decreased heart rate 50-60s over the past few months.  We also discussed discontinuation of her calcium supplement and multivitamin to decrease risk for constipation that she often discusses and risk for polypharmacy effects with > 7 medications at this time.  Marina noted that she did not recall ever having lightheadedness or why her medications have been adjusted.  She said that she has lost the papers with her written instructions from her visits.  Her niece also noted that she has increased bilateral ankle edema taking spironolactone for chronic diuresis.  We discussed options to treat her increased edema including having nurses help her with knee-high compression stockings, ordering compression stockings for her to put on herself, and increasing her spironolactone.  Marina said that she does not want to pay for nursing services.  She does not feel that she can put on compression stockings on her own  due to arthritis in her hands.  She does not want her spironolactone increased due to increased risk for dry eye at this time.  She was encouraged to elevate her feet when she is in her apartment as much as possible for treatment of edema.  Her blood pressure has been stable with discontinuation of atenolol with SBP 120s over the past month.  Marina was agreeable to further evaluation of her cognition with OT at [facility] to try to remain in her AL apartment as long as possible without utilizing nursing services.  Her niece, Roseanna, will be setting up her medications per her medication list which was reviewed at this visit.  Marina continues to have pain in her right knee but denied pain at this time upon palpation sitting in her recliner.  She also denied pain in her left shoulder and has been reporting continued left shoulder pain to her niece prompting request to pain clinic for chronic pain management.  Marina was agreeable to following with pain clinic for pain management with the help of her niece.  The patient denied changes in mood or appetite, sleep disturbances, lightheadedness, dizziness, breathing difficulty, chest pain, palpitations, urinary symptoms, and numbness or tingling in extremities.     Past Medical History:   Diagnosis Date     Anemia      Arthritis      Bladder infection 01/03/2015    dx'd- on antibiotics     Blood type AB-     with Anti-C     Breast cancer (H)      Bursitis, knee      Cancer (H) 2005    left breast     Colon cancer (H)      Depression      Gout      History of transfusion      Hypertension      Insomnia      Macular degeneration      Osteopenia      Ovarian cyst      Rosacea      Vitamin D deficiency              Family History   Problem Relation Age of Onset     Squamous cell carcinoma Mother      Colon cancer Sister      Breast cancer Maternal Aunt      Bone cancer Maternal Aunt      Colon cancer Paternal Aunt      Breast cancer Cousin      Social History      Socioeconomic History     Marital status:      Spouse name: None     Number of children: None     Years of education: None     Highest education level: None   Occupational History     None   Social Needs     Financial resource strain: None     Food insecurity:     Worry: None     Inability: None     Transportation needs:     Medical: None     Non-medical: None   Tobacco Use     Smoking status: Never Smoker     Smokeless tobacco: Never Used   Substance and Sexual Activity     Alcohol use: No     Drug use: No     Sexual activity: Never   Lifestyle     Physical activity:     Days per week: None     Minutes per session: None     Stress: None   Relationships     Social connections:     Talks on phone: None     Gets together: None     Attends Orthodox service: None     Active member of club or organization: None     Attends meetings of clubs or organizations: None     Relationship status: None     Intimate partner violence:     Fear of current or ex partner: None     Emotionally abused: None     Physically abused: None     Forced sexual activity: None   Other Topics Concern     None   Social History Narrative     None       REVIEW OF SYSTEM:  Pertinent items are noted in HPI.  A 12 point comprehensive review of systems was negative except as noted.    PHYSICAL EXAM:     General Appearance:    Alert, cooperative, no distress, appears stated age   Head:    Normocephalic, without obvious abnormality, atraumatic   Eyes:    PERRL, conjunctiva/corneas clear, both eyes; wears glasses   Ears:    Normal external ear canals, both ears; hearing impairment in bilateral ears with bilateral Has; bilateral cerumen impaction   Nose:   Nares normal, septum midline, mucosa normal, no drainage    or sinus tenderness   Throat:   Lips, mucosa, and tongue normal; teeth and gums normal   Neck:   Supple, symmetrical, trachea midline, no adenopathy;     thyroid:  no enlargement/tenderness/nodules; no carotid    bruit or JVD   Back:      Symmetric, no curvature, ROM normal, no CVA tenderness   Lungs:     Clear to auscultation bilaterally, respirations unlabored   Chest Wall:    No tenderness or deformity    Heart:    Regular rate and rhythm, S1 and S2 normal, no murmur, rub   or gallop   Abdomen:     Soft, non-tender, bowel sounds active all four quadrants,     no masses, no organomegaly   Extremities:   Extremities normal, atraumatic, no cyanosis; moderate nonpitting BLE edema   Pulses:   2+ and symmetric all extremities   Skin:   Skin color, texture, turgor normal, no rashes or lesions   Neurologic:   Oriented to person and situation; forgetful at times; exhibits tangetial thought processes; generalized weakness; ambulatory with walker           LABS:   Lab Results   Component Value Date    WBC 9.9 02/12/2020    HGB 11.5 (L) 02/12/2020    HCT 37.2 02/12/2020     02/12/2020    CHOL 194 03/11/2019    TRIG 198 (H) 03/11/2019    HDL 52 03/11/2019    ALT 14 02/12/2020    AST 16 02/12/2020     (L) 02/12/2020    K 4.5 02/12/2020     02/12/2020    CREATININE 0.74 02/12/2020    BUN 13 02/12/2020    CO2 24 02/12/2020    TSH 1.21 02/13/2018    INR 0.94 05/21/2019        ASSESSMENT:      ICD-10-CM    1. Chronic pain of right knee M25.561     G89.29    2. Chronic pain syndrome G89.4    3. Essential hypertension I10    4. Cognitive impairment R41.89        PLAN:      Chronic pain  -Continue PT/OT as recommended  -Continue Tylenol 625 mg as prescribed  -Continue tramadol from 50 mg three times a day   -Encouraged patient to ice left shoulder three times a day and PRN  -Encouraged patient to utilize integrative therapies such as distraction and deep breathing for pain management  -Notify provider if patient has new or worsening pain   -Pt's niece to help her set-up pain clinic appointment    Hypertension   -Continue spironolactone as prescribed  -Continue losartan as prescribed  -Encouraged cardiac diet, low sodium diet, exercise and stress  reduction techniques.   -Emphasized importance of blood pressure control.   -Notify provider if pt's SBP<90 or >180 and DBP <50 or >100     Cognitive impairment  -Consult placed to [facility] OT for cognitive impairment  -Pt's niece to set up her pills for her weekly    Otherwise continue current care plan for all other chronic medical conditions, as they are stable. Encouraged patient to engage in healthy lifestyle behaviors such as engaging in social activities, exercising (PT/OT), eating well, and following care plan. Follow up for routine check-up, or sooner if needed. Will continue to monitor patient and work with nursing staff collaboratively to work toward positive patient outcomes.     Electronically signed by: Kiana Reyes, RYLIE
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
## 2021-06-09 NOTE — ANESTHESIA CARE TRANSFER NOTE
Last vitals:   Vitals:    03/29/17 1116   BP: (!) 220/98   Pulse: 66   Resp: 16   Temp: 36.1  C (96.9  F)   SpO2: 98%     Patient's level of consciousness is drowsy  Spontaneous respirations: yes  Maintains airway independently: yes  Dentition unchanged: yes  Oropharynx: oropharynx clear of all foreign objects    QCDR Measures:  ASA# 20 - Surgical Safety Checklist: ASA20A - Safety Checks Done  PQRS# 430 - Adult PONV Prevention: 4558F - Pt received => 2 anti-emetic agents (different classes) preop & intraop  ASA# 8 - Peds PONV Prevention: NA - Not pediatric patient, not GA or 2 or more risk factors NOT present  PQRS# 424 - Yady-op Temp Management: 4559F - At least one body temp DOCUMENTED => 35.5C or 95.9F within required timeframe  PQRS# 426 - PACU Transfer Protocol: - Transfer of care checklist used  ASA# 14 - Acute Post-op Pain: ASA14B - Patient did NOT experience pain >= 7 out of 10     Additional Fentanyl 50 mcg administered, BP trending down. Patient currently denies pain.     I completed my SBAR handoff to the receiving nurse per policy and procedure.

## 2021-06-09 NOTE — PROGRESS NOTES
[facility]    Facility:   [facility] [[id number]]   Code Status: POLST AVAILABLE      CHIEF COMPLAINT/REASON FOR VISIT:  Chief Complaint   Patient presents with     P Care Coordination - Regulatory       HISTORY:      HPI: Marina is a 86 y.o. female who is an assisted-living resident at [facility].  Marina  has a past medical history of Anemia, Arthritis, Bladder infection (01/03/2015), Blood type AB-, Breast cancer (H), Bursitis, knee, Cancer (H) (2005), Colon cancer (H), Depression, Gout, History of transfusion, Hypertension, Insomnia, Macular degeneration, Osteopenia, Ovarian cyst, Rosacea, and Vitamin D deficiency.    Today Ms. Neves is being evaluated for a review of multiple medical conditions.  Marina reported that everything is been stable for over the past 3 months.  She continues to have 5-6 out of 10 at multiple joint aching, intermittent pain.  She continues to take Tylenol as needed, ice packs, and tramadol 3 times daily for her chronic pain management.  She has an ice pack on her left shoulder at this visit and reported 8 out of 10 sharp pain.  She was agreeable to plan to follow-up with orthopedic provider who provided previous steroid injection in her apartment later this week.  She has not followed up with pain clinic at this time due to pandemic precautions.  She was agreeable to plan for pain consult to [facility] pain clinic with her cousin Yo scheduling the appointment and taking her.  Her blood pressure is low she has been monitoring it through her facility with systolic blood pressure 120s to 130s on her current antihypertensive regimen.  The patient denied changes in mood or appetite, sleep disturbances, lightheadedness, dizziness, breathing difficulty, chest pain, palpitations, constipation, urinary symptoms, and numbness or tingling in extremities.    FACE-TO-FACE ENCOUNTER  The patient has mobility limitation significantly impairing her  ability to participate in mobility-related activities of daily living, such as toileting, feeding, dressing, grooming, and bathing in customary locations in the home. The mobility limitation cannot be sufficiently and safely resolved without use of an appropriately fitted walker. The patient or caregiver is able to safely use a walker. The patient's functional mobility deficit can be sufficiently resolved by the use of a walker.  The patient has mobility limitations that impairs her ability to perform ADLs without use of a walker to be mobile in the home.    Length of Need:  99 years  Weight: 75.3 kg  Height:  152 cm      Past Medical History:   Diagnosis Date     Anemia      Arthritis      Bladder infection 01/03/2015    dx'd- on antibiotics     Blood type AB-     with Anti-C     Breast cancer (H)      Bursitis, knee      Cancer (H) 2005    left breast     Colon cancer (H)      Depression      Gout      History of transfusion      Hypertension      Insomnia      Macular degeneration      Osteopenia      Ovarian cyst      Rosacea      Vitamin D deficiency              Family History   Problem Relation Age of Onset     Squamous cell carcinoma Mother      Colon cancer Sister      Breast cancer Maternal Aunt      Bone cancer Maternal Aunt      Colon cancer Paternal Aunt      Breast cancer Cousin      Social History     Socioeconomic History     Marital status:      Spouse name: Not on file     Number of children: Not on file     Years of education: Not on file     Highest education level: Not on file   Occupational History     Not on file   Social Needs     Financial resource strain: Not on file     Food insecurity     Worry: Not on file     Inability: Not on file     Transportation needs     Medical: Not on file     Non-medical: Not on file   Tobacco Use     Smoking status: Never Smoker     Smokeless tobacco: Never Used   Substance and Sexual Activity     Alcohol use: No     Drug use: No     Sexual activity:  Never   Lifestyle     Physical activity     Days per week: Not on file     Minutes per session: Not on file     Stress: Not on file   Relationships     Social connections     Talks on phone: Not on file     Gets together: Not on file     Attends Religion service: Not on file     Active member of club or organization: Not on file     Attends meetings of clubs or organizations: Not on file     Relationship status: Not on file     Intimate partner violence     Fear of current or ex partner: Not on file     Emotionally abused: Not on file     Physically abused: Not on file     Forced sexual activity: Not on file   Other Topics Concern     Not on file   Social History Narrative     Not on file       REVIEW OF SYSTEM:  Pertinent items are noted in HPI.  A 12 point comprehensive review of systems was negative except as noted.    PHYSICAL EXAM:     General Appearance:    Alert, cooperative, no distress, appears stated age   Head:    Normocephalic, without obvious abnormality, atraumatic   Eyes:    PERRL, conjunctiva/corneas clear, both eyes; wears glasses   Ears:    Normal external ear canals, both ears; hearing impairment in bilateral ears with bilateral Has; bilateral cerumen impaction   Nose:   Nares normal, septum midline, mucosa normal, no drainage    or sinus tenderness   Throat:   Lips, mucosa, and tongue normal; teeth and gums normal   Neck:   Supple, symmetrical, trachea midline, no adenopathy;     thyroid:  no enlargement/tenderness/nodules; no carotid    bruit or JVD   Back:     Symmetric, no curvature, ROM normal, no CVA tenderness   Lungs:     Clear to auscultation bilaterally, respirations unlabored   Chest Wall:    No tenderness or deformity    Heart:    Regular rate and rhythm, S1 and S2 normal, no murmur, rub   or gallop   Abdomen:     Soft, non-tender, bowel sounds active all four quadrants,     no masses, no organomegaly   Extremities:   Extremities normal, atraumatic, no cyanosis; moderate nonpitting  BLE edema   Pulses:   2+ and symmetric all extremities   Skin:   Skin color, texture, turgor normal, no rashes or lesions   Neurologic:   Oriented to person and situation; forgetful at times; exhibits tangetial thought processes; generalized weakness; ambulatory with walker           LABS:   Lab Results   Component Value Date    WBC 9.9 02/12/2020    HGB 11.5 (L) 02/12/2020    HCT 37.2 02/12/2020     02/12/2020    CHOL 194 03/11/2019    TRIG 198 (H) 03/11/2019    HDL 52 03/11/2019    ALT 14 02/12/2020    AST 16 02/12/2020     (L) 02/12/2020    K 4.5 02/12/2020     02/12/2020    CREATININE 0.74 02/12/2020    BUN 13 02/12/2020    CO2 24 02/12/2020    TSH 1.21 02/13/2018    INR 0.94 05/21/2019      Case Management:  I have reviewed the Assisted Living care plan, current immunizations and preventive care/cancer screening.. Future cancer screening is not clinically indicated secondary to age/goals of care.   Patient's desire to return to the community is not assessible due to cognitive impairment.    Advance Directive Discussion:    I reviewed the current advanced directives as reflected in [facility] and the facility chart. I did not due to cognitive impairment review the advance directives with the resident.     Team Discussion:  I communicated with the appropriate disciplines involved with the Plan of Care:   Nursing      Patient Goal:  Patient's goal is pain control and comfort and family's/responsible party's goal for the patient is comfort.    Information reviewed:  Medications, vital signs, orders, and nursing notes.     ASSESSMENT:      ICD-10-CM    1. Essential hypertension  I10    2. Chronic pain syndrome  G89.4    3. Chronic left shoulder pain  M25.512     G89.29        PLAN:      Left shoulder pain  -Consult placed to  Geriatrics orthopedic provider  -Consider steroid injection for pain management    Chronic pain syndrome  -Consult placed to [facility] pain clinic for chronic pain  syndrome  -Continue Tylenol 625 mg as prescribed  -Continue tramadol from 50 mg three times a day   -Encouraged patient to ice left shoulder three times a day and PRN  -Encouraged patient to utilize integrative therapies such as distraction and deep breathing for pain management  -Notify provider if patient has new or worsening pain     Hypertension   -Continue spironolactone as prescribed  -Continue losartan as prescribed  -Encouraged cardiac diet, low sodium diet, exercise and stress reduction techniques.   -Emphasized importance of blood pressure control.   -Notify provider if pt's SBP<90 or >180 and DBP <50 or >100     Otherwise continue current care plan for all other chronic medical conditions, as they are stable. Encouraged patient to engage in healthy lifestyle behaviors such as engaging in social activities, exercising (PT/OT), eating well, and following care plan. Follow up for routine check-up, or sooner if needed. Will continue to monitor patient and work with nursing staff collaboratively to work toward positive patient outcomes.     Electronically signed by: Kiana Reyes CNP

## 2021-06-09 NOTE — ANESTHESIA POSTPROCEDURE EVALUATION
Patient: Marina Neves  LAPAROSCOPIC BILATERAL SALPING OOPHORECTOMY PELVIC WASHINGS  Anesthesia type: general    Patient location: Phase II Recovery  Last vitals:   Vitals:    03/29/17 1330   BP: 143/66   Pulse: 79   Resp: 16   Temp:    SpO2: 97%     Post vital signs: stable  Level of consciousness: awake and responds to simple questions  Post-anesthesia pain: pain controlled  Post-anesthesia nausea and vomiting: no  Pulmonary: unassisted, return to baseline  Cardiovascular: stable and blood pressure at baseline  Hydration: adequate  Anesthetic events: no    QCDR Measures:  ASA# 11 - Yady-op Cardiac Arrest: ASA11B - Patient did NOT experience unanticipated cardiac arrest  ASA# 12 - Yady-op Mortality Rate: ASA12B - Patient did NOT die  ASA# 13 - PACU Re-Intubation Rate: ASA13B - Patient did NOT require a new airway mgmt  ASA# 10 - Composite Anes Safety: ASA10A - No serious adverse event  ASA# 38 - New Corneal Injury: ASA38A - No new exposure keratitis or corneal abrasion in PACU    Additional Notes:
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
## 2021-06-12 NOTE — TELEPHONE ENCOUNTER
Discussion with pt primary care provider -    PT has been done for the shoulder and knee - last completed 12/2019. I this it is reasonable to consider the genicular nerve block. For the right knee. Can also consider a block for the shoulder.    Discussion of Duloxetine - provider will look into this medication to determine if the pt has elected to initiate. She will assist with getting the medication ordered if needed.    Discussion of opioid medication consider butrans would need to have a nurse available to manage this medication once per week. Provider indicates this service is available.

## 2021-06-12 NOTE — PATIENT INSTRUCTIONS - HE
You have downsloping hearing loss in both ears.  Your word understanding is very poor in the left ear (4% correct); reduced in the right ear (56%)  You would benefit from cochlear implantation

## 2021-06-12 NOTE — TELEPHONE ENCOUNTER
Wellness Screening Tool  Symptom Screening:  Do you have one of the following NEW symptoms:    Fever (subjective or >100.0)?  No    A new cough?  No    Shortness of breath?  No     Chills? No     New loss of taste or smell? No     Generalized body aches? No     New persistent headache? No     New sore throat? No     Nausea, vomiting, or diarrhea?  No    Within the past 2 weeks, have you been exposed to someone with a known positive illness below:    COVID-19 (known or suspected)?  No    Chicken pox?  No    Mealses?  No    Pertussis?  No    Patient notified of visitor policy- They may have one person accompany them to their appointment, but they will need to wear a mask and will be screened upon arrival for symptoms: Yes  Pt informed to wear a mask: Yes  Pt notified if they develop any symptoms listed above, prior to their appointment, they are to call the clinic directly at [phone] for further instructions.  Yes  Patient's appointment status: Patient will be seen in clinic as scheduled on 10/21

## 2021-06-12 NOTE — PROGRESS NOTES
CHIEF COMPLAINT:   Chief Complaint   Patient presents with     Hearing Loss     hearing loss, my be interested in CI         HISTORY OF PRESENT ILLNESS    Marina was seen at the behest of Kiana Reyes for hearing loss.  Currently wears a hearing aid in the right ear which is from [employer] which she derives little benefit.   No dizziness, ear pain, or ear drainage.  No history of otologic surgery.  She is accompanied by her daughter today.   She is interested in cochlear implantation surgery.  She will be leaving for [place] in 1 month returning in May of 2021.   Chief Complaint   Patient presents with     Hearing Loss     hearing loss, my be interested in CI            REVIEW OF SYSTEMS    Review of Systems: a 10-system review is reviewed at this encounter.  See scanned document.     Blood-group specific substance, Celebrex [celecoxib], Lisinopril, Oxybutynin, Tetanus vaccines and toxoid, Tolterodine, Piroxicam, and Sulfa (sulfonamide antibiotics)     PHYSICAL EXAM:        HEAD: Normal appearance and symmetry:  No cutaneous lesions.      EARS:    Normal TM's bilaterally. Normal auditory canals and external ears. Non-tender.         NOSE:    Dorsum:   straight  Septum: normal  Mucosa:  moist  Inferior turbinates:  normal       ORAL CAVITY/OROPHARYNX:    Lips:  Normal.  Tongue: normal, midline  Mucosa:   no lesions  Tonsils:  2+      NECK:  Trachea:  midline.   Thyroid:  normal   Adenopathy:  none       NEURO:   Alert and Oriented    GAIT AND STATION:  normal     RESPIRATORY:   Symmetry and Respiratory effort    PSYCH:   normal mood and affect    SKIN:  warm and dry     Audiogram:  Moderate to severe SNHL in both ears with 56% WR on the right, and only 4% WR on the left.     IMPRESSION:    Encounter Diagnosis   Name Primary?     Bilateral sensorineural hearing loss Yes          RECOMMENDATIONS:      Orders Placed This Encounter   Procedures     Ambulatory referral to Audiology     Referral Priority:   Routine      Referral Type:   Audiology     Referral Reason:   Evaluation and Treatment     Requested Specialty:   Audiology     Number of Visits Requested:   1      No medications were ordered this encounter      Patient is a candidate for cochlear implantation.  She will be referred referred to the [facility] for further evaluation evaluation and management.  All questions were answered she is agreeable to this plan of care

## 2021-06-12 NOTE — PROGRESS NOTES
Subjective:   Marina Neves is a 86 y.o. female who presents to [facility] for evaluation of PMH significant for cancer, CHF and surgical hisotry significant for breast surgery, colon surgery, knee replacement, cataract surgery. Evaluated today for  Chronic pain of the rt knee, left shoulder and bilateral feet. Reviewed the rooming evaluation. Patient was last seen 10/19/20 reviewed record.       Visit Details  Type of Service: Clinic Visit  Today I wrote every thing in large print on paper and held it for the pt to read this was a much better way to communicate with her.     CC: Pain. Review of current status.     Major issues:  1. Chronic pain of right knee    2. Chronic left shoulder pain      Patient Active Problem List   Diagnosis     Malignant neoplasm of colon, unspecified site     HTN (hypertension)     S/P right colectomy     Nutritional deficiency     Dry eyes     GERD (gastroesophageal reflux disease)     Constipation     Allergy     Breast cancer of upper-outer quadrant of right female breast (H)     Colitis     Abdominal pain in female patient     Osteopenia     Chronic pain syndrome     Chronic pain of right knee     Patellar bursitis, right     Age-related physical debility     Primary osteoarthritis involving multiple joints     Coronary artery calcification seen on CAT scan       HPI:    Location of the pain: right knee, left shoulder and bilateral feet - bit dorsal and some at the pad of the foot (she reports she has a hammer toe and this accounts for some of her discomfort)  Severity:  8  Timing: all the time except when sitting  Quality: sharp, tingling  Aggravating factors: standing, walking  Alleviating factors: medication, sleep, heat, cold, sitting down, relaxation  DME: walker, ice pack, new orthopedic shoes    Severity: Today: 8      Functional Symptoms: Pain interferes with:  Sleep: she is sleeping ok  Walking:               Ambulation/Transfer: Pt is  "ambulatory.She uses a walker.  It is painful to walk and she is unable to use stairs. Transfers independently.   Work:               Employment: retired - seamstress               Volunteering: She use to be a person who volunteered in her building  ADL's:               Bathing: showers and has a stool. She has a sprayer and many  bars low tub              Dressing: she is getting dressed ok.              Cooking: limiting her water after 4PM - lives in [facility] she is getting meals delivered to her apartment.               Shopping: Roseanna is doing her grocery shopping. She will go to meet her and she will bring the groceries up on her walker and with then put her groceries away              Housekeeping: she is making her bed daily she is doing her cleaning and occasionally will come and clean.               Toileting: independent  Concentration: no concerns however she finds the pain is distracting and \"I hardly know who I am then\"  Transportation: family will drive her - some issues wihen family is out of town in [place] during he winter months  Relationships/Social: Mostly socializing with family. She is locked down in her apartment except to get mail and groceries.  Services: none at this time        Pain Plan of Care Review:   Medication:   Last opioid dose was Tramadol last taken on 11/9/20 @ 1pm Takes one at 7am, 1pm and 7 pm typically      Current Outpatient Medications:      acetaminophen (TYLENOL) 650 MG CR tablet, Take 650 mg by mouth 2 (two) times a day as needed for pain., Disp: , Rfl:      CHOLECALCIFEROL 25 mcg (1,000 unit) tablet, Take 1 tablet by mouth 2 (two) times a day., Disp: , Rfl:      docoshexanoic acid-eicosapent 500 mg (FISH OIL) 500-100 mg cap capsule, Take 1,400 mg by mouth daily. , Disp: , Rfl:      docusate sodium (COLACE) 100 MG capsule, Take 100 mg by mouth 2 (two) times a day as needed for constipation., Disp: , Rfl:      ibuprofen (ADVIL,MOTRIN) 200 MG tablet, Take 200 mg by " mouth every 6 (six) hours as needed for pain., Disp: , Rfl:      lifitegrast (XIIDRA) 5 % Dpet, Apply to eye 2 (two) times a day., Disp: , Rfl:      losartan (COZAAR) 25 MG tablet, 50 mg every morning. , Disp: , Rfl:      magnesium oxide (MAG-OX) 400 mg (241.3 mg magnesium) tablet, Take 400 mg by mouth daily., Disp: , Rfl:      polyvinyl alcohol (LIQUIFILM TEARS) 1.4 % ophthalmic solution, Administer 1-2 drops to both eyes 4 (four) times a day as needed for dry eyes., Disp: 15 mL, Rfl: 0     spironolactone (ALDACTONE) 25 MG tablet, Take 1 tablet (25 mg total) by mouth daily., Disp: 90 tablet, Rfl: 0     traMADoL (ULTRAM) 50 mg tablet, Take 1 tablet (50 mg total) by mouth 3 (three) times a day., Disp: 90 tablet, Rfl: 2     VIT C/LUDY AC/LUT/COPPER/ZNOX (PRESERVISION LUTEIN ORAL), Take 1 tablet by mouth 2 (two) times a day., Disp: , Rfl:        Interventional:   Discussed with the pt today genicular nerve block and RFA - she is also interested in the shoulder being done as well    Rehabilitation:  Physical Therapy: discussed with PCP pt has completed PT for both the knee and the shoulder in the last year      Review Of System: As reviewed on the patient intake paperwork  Constitutional- -  Integumentary: -   Head: -   Ears: -   Eyes: cataracts  Nose: -   Throat: -  Respiratory/Cardiovascular- -  Breast: -  GI: -   :  -  GYN-  -   Vascular- -  Musculoskeletal- muscle/joint stiffenss   Neuro- tingling   Hematologic/Immunologic: -  Endocrine- -   Psych-  -  Withdrawal symptoms: -    Social  Family History   Problem Relation Age of Onset     Squamous cell carcinoma Mother      Colon cancer Sister      Breast cancer Maternal Aunt      Bone cancer Maternal Aunt      Colon cancer Paternal Aunt      Breast cancer Cousin      Social History     Tobacco Use   Smoking Status Never Smoker   Smokeless Tobacco Never Used     Social History     Substance and Sexual Activity   Alcohol Use No     Social History     Substance and  "Sexual Activity   Drug Use No     Social History     Substance and Sexual Activity   Sexual Activity Never       Objective:     Vitals:    11/09/20 1434   BP: 136/78   Pulse: 88   Resp: 18   Weight: 169 lb (76.7 kg)   Height: 5' 3\" (1.6 m)   PainSc:   8       Constitutional:  Pleasant and cooperative female who presents with family today.   Psychiatric: Mood and affect are appropriate for the situation, setting and topic of discussion.  Patient does not appear sedated.  Integumentary:  Observed skin WNL.   HEENT: EOM's grossly intact.  She is unable to hear the provider - moved to writing on paper the visit  Chest: Breathing is non-labored.   Neurological:  Alert and oriented in all spheres including: time, place, person and situation.  Durable Medical Equipment: mask and walker    Diagnostics:   Lab:  Reviewed note 10/19/20 Last dose of medication was tramadol 10/19/20 0700     UDT:  Detected tramadol (expected)    :   11/9/2020 Reviewed to aid with decision regarding medication management  Narcotic= 330  Sedative= 130  Stimulant= 000  OD risk= 090    Scheduled medication from other professionals: +    Medication(s): tramadol     single provider prescribing    Assessment:   Marina Neves is a 86 y.o. female seen in clinic today for cancer, CHF and surgical hisotry significant for breast surgery, colon surgery, knee replacement, cataract surgery. Evaluated today for  Chronic pain of the rt knee, left shoulder and bilateral feet.    Following the last visit communicated with the family to obtain agreement from all stakeholders.  Patient  Primary care  Yo Cousin  Roseanna Toribio     Family will be leaving for [place] and they will not be available to bring the pt to visit. Will facilitate care through her primary and nursing services.     PT was done within the last year this was confirmed with primary care. Ordering injections with the [facility]. Consider diagnostics.     AVS 18 font due to low vision. Office " visit conducted by writing on paper in large print so the patient could read. Need a copy of the diagnostics     *Universal Precautions:   UDT/Swab-  10/19/2020   Consent- if prescribing opioids  Agreement- if prescribing opioids  Pharmacy- as documented   Count- n/a  Psychological evaluation - information provided  Pharmacogenetic testing- n/a  MME-   MTM - consider  Naloxone safety:           The patient has chronic pain and is being recommended for start on a ER/LA opioid for pain symptoms severe enough to warrant around the clock care with an opioid medication.    Management of opioid medication is inherently a moderate to high complex medial interaction based on the risk management required at each contact r/t risks and side effects.      Previously tried medications: H=Helpful. NH=Not Helpful. U=Unsure. (n/a)=Not applicable  Acetaminophen: (h)  NSAIDs:              Ibuprofen: (na)               Naproxen (na)               Celecobix (na)              Diclofenac (na)  Gabapentinoids:               Gabapentin: (na)              Pregabalin: (na)  Antidepressants:               Amitriptyline: (na)              Nortriptyline: (na)              Duloxetine: (at this time unclear if she started the duloxetine to aid with her pain)              Venlafaxine: (na)  Muscle Relaxants:               Tizanidine: (na)              Methocarbamol: (na)              Cyclobenzaprine: (na)              Metaxalone: (na)              Carisoprodol: (na)              Baclofen: (na)    Topicals:               Lidocaine: (na)              Diclofenac gel: (na)              Compounded pain creams: (na)              OTC/Herbal topical pain applications: (biofreeze - helps for a short time - I have tried every rub that is out there)  Opioids:               Codeine: (na)              Hydrocodone: (na)              Oxycodone: (I see oxycodone discussed in the chart but I do not see this medication on the )              Tramadol: (H)               Morphine: (na)              Hydromorphone: (na)              Methadone: (na)              Fentanyl: (na)              Buprenorphine: (butrans 5mcg patch is being recommended - needs to have a nurse do the patch change weekly and I expect I will hear how she is doing and will be able to make adjustments in the patch - pt in agreement)              Tapentadol: (na)              Oxymorphone: (na)     Plan:   Plan of Care / NextSteps:     Contact [phone] to reserve a time. You need to follow up by: 1-2 months     Education:  We are now titled [facility].    Please call Monday-Friday for problems or questions and one of the clinical support staff (CSS) will help to get things figured out. The number is [phone].     [employer] is a means to send an e-mail (Teliport[employer] message) to communicate any concerns.     Today we reviewed the plan of care and answered questions.      Records: Reviewed to assist with preparation for the office visit and are reflected throughout the note.    Primary Care: You need to have an annual physical and check in with your primary care folks on a regular basis. Talk with your primary care about the frequency of expected visits.    COVID19: Contact your primary care clinic for any health related concerns or for any recommendations. You may also communicate with your primary care clinic for questions; The [facility] Hotline phone number is [phone] or [phone]; or you may login to [email or web address] or call [phone].      Consultation/Specialists: Consulting the [facility] Dr. Rascon to look at the (right) genicular nerve block and potential radiofrequency of the knee. I will also request he look at the shoulder (Left)    Medication prescribed / to be continued:   Medication prescribed today:    I will talk with Kenya about getting the butrans started. I need a nurse to be changing the patch one per week. I expect we will need to  make adjustments and I need help to make this happen.    I would like you to start the duloxetine since this should help with the pain. I understood you may have a prescription if you do not I will connect with Kenya about helping to get that going.         Savannah Lea APRN FNP-BC  [address] [facility] [address]   [phone]  [phone]

## 2021-06-12 NOTE — PROGRESS NOTES
Patient presents to the clinic today for a follow up with Savannah Lea CNP regarding shoulder and knee pain. Patient describes their pain as aching and throbbing. Her/His function score is 8.    Does the pain interfere with:  Work ----- yes  Walking ------ yes  Sleep ------- yes  Daily activities ------yes  Relationships -------yes  F=8

## 2021-06-12 NOTE — TELEPHONE ENCOUNTER
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 She indicates she has taken her aunt to multiple people to address her pain symptoms - multiple folks for her knee and shoulder. We discussed the options for care. She endorses she thinks it would be assistive to consider the duloxetine she thinks COVID has been hard on her aunt. She endorses her aunt does prefer physicians. She is getting calls from her aunt. At this time she does think the family is doing all they are able to be supportive given the restrictions COVID  has placed. Confirmed she has the number for the clinic and the lines of communication are open.      TC placed to Yo - number on the ARASELI is incorrect. Called Danika and was able to connect with both. She is climbing the walls. Quite a bit of stress. Surrounding care.     Discussed will move forward and work through the primary as much sa possible given the Toña and Danika will be headed out of town.

## 2021-06-12 NOTE — TELEPHONE ENCOUNTER
Placed to Yo following the office visit [phone] - unable to leave a V.M.    Placed call to primary care Kenya - left V.M.   Looking to coordinate to have care come from her service to limit the office visits to the pain center. Interested in determining if willing to start Butrans 5mcg patch and reduction of the tramadol to discontinue. Would like to discuss nurse feedback about how the pt is doing since I expect we will need to adjust the dose of the Butrans.    Placed a call to Roseanna [phone] discussed the visit and the plan going forward - duloxetine, butrans (nurse administered) and the referral to the [facility] Center for the procedures. No specific questions at this time.

## 2021-06-12 NOTE — PATIENT INSTRUCTIONS - HE
Plan:   Plan and next steps - please review and refer back to the after visit summary - key pieces are highlighted    Follow up: 3-4 weeks (40minute appt with Cristiane CARR)      Education:  Today you saw Cristiane Lea Nurse Practitioner.    The plan of care was reviewed, justification provided and questions answered.     I recognized you may have participated in treatments in the past. When troubles first start it is often an expectation you will be cured. Once we are looking at a long term experience, when cure is not likely, the achievable outcomes of treatment are adjust and full participation is expected.    Pain is complex and the treatment approaches are complex. Address of pain will focus on non-medication, non-opioid (non-narcotic) medication, noting occasionally opioids (narcotics) are included into a pain management program. There have been many changes over the last several years as it relates to opioid (narcotic) medication and these changes apply to everyone.     Health Maintenance: You will continue to see primary care for your general health care.    Behavioral Medicine: The body accepts sensory input. The input travels to the brain where the brain based on previous experience, education or culture comes up with a response. Since your brain is very powerful you have the opportunity to learn to adjust the response. Research has demonstrated over-and-over that people who are active participants with behavioral medicine have better management of their pain. COVID has been very hard for people and feels discouraged by COVID has influenced pain some as well.     Interventional: A genicular nerve block may offer some assistance for the right knee. We can consider repeating the shoulder injection and we can consider a block of the shoulder as well.     Rehabilitation: I am recommending PT for the left shoulder and the right knee - this is the first place to start    Integrative Approaches: Acupuncture is an  option.    Medication: [facility] does not assume responsibility for prescribing at the time of a consult. I did not provide you with any prescriptions. Continue with your current provider and they will continue your pain care in the fashion they deem is most appropriate.     You do not like pills so I think topicals would be reasonable you may find voltaren topically over the counter 4gm three times a day to the knee and shoulder assistive. This si no longer covered by insurance since it went over the counter. Topical lidocaine may help with some of the discomfort - salan paws are a lidocaine patch that can be applied to the knee and shoulder.     Butrans is an opioid that is a patch that will give coverage over 24 hours and this can be used in people who have never taken an opioid so this is an option for you.    Duloxetine (Cymbalta) is a medication used for depression and for pain management. The focus here is for pain management there are a lot of studies to address pain and support duloxetine for pain.     Diagnostics: UDT collected 10/19/20 results are pending.  UDT  Patient required a random Urine Drug Testing, due to the need to comply with [place] Model Policy Guidelines and CDC Guideline for the use of any controlled substances. This is to ensure that patient is compliant with treatment, and monitor for risks such as diversion, abuse, or any other aberrant behaviors. Patient is either being considered for or taking a controlled substance. Unexpected findings will be discussed and treatment decision may be adjusted. Testing is being implemented across the board randomly w/o bias related to age, race, gender, socioeconomic status or Faith affiliation.     Records: Reviewed to assist with preparation for the office visit and are reflected throughout the note. Patient intake sheet also reviewed.    Social: Release of Information to talk with shalomclaudio Roseanna to understand her  expectation for addressing the symptoms.

## 2021-06-12 NOTE — PATIENT INSTRUCTIONS - HE
Plan:   Plan of Care / NextSteps:     Contact [phone] to reserve a time. You need to follow up by: 1-2 months     We are going to try to work with Kenya so you do not need visits here at the pain center    Education:  We are now titled [facility].    Please call Monday-Friday for problems or questions and one of the clinical support staff (CSS) will help to get things figured out. The number is [phone].     [employer] is a means to send an e-mail ([employer]) to communicate any concerns.     Today we reviewed the plan of care and answered questions.      Records: Reviewed to assist with preparation for the office visit and are reflected throughout the note.    Primary Care: You need to have an annual physical and check in with your primary care folks on a regular basis. Talk with your primary care about the frequency of expected visits.    COVID19: Contact your primary care clinic for any health related concerns or for any recommendations. You may also communicate with your primary care clinic for questions; The [facility] Hotline phone number is [phone] or [phone]; or you may login to [email or web address] or call [phone].      Consultation/Specialists: Consulting the [facility] Dr. Rascon to look at the (right) genicular nerve block and potential radiofrequency of the knee. I will also request he look at the shoulder (Left)    Medication prescribed / to be continued:   Medication prescribed today:    I will talk with Kenya about getting the butrans started. I need a nurse to be changing the patch one per week. I expect we will need to make adjustments and I need help to make this happen.    I would like you to start the duloxetine since this should help with the pain. I understood you may have a prescription if you do not I will connect with Kenya about helping to get that going.

## 2021-06-12 NOTE — TELEPHONE ENCOUNTER
"[facility] Nurse Triage Telephone Note      Provider: GUSTAVO Deshpande  Facility: [facility]     Facility Type: [facility]    Caller: Elizabeth  Call Back Number:  [phone]    Allergies: Blood-group specific substance, Celebrex [celecoxib], Lisinopril, Oxybutynin, Tetanus vaccines and toxoid, Tolterodine, Piroxicam, and Sulfa (sulfonamide antibiotics)    Reason for call: Nurse reporting that patient is c/o a \"roaring in her right ear\".  No ear pain noted.  No nasal congestion or cough.  VSS.  Patient does endorse feeling dizzy with walking and nurse encouraged her to keep using her walker.  Nurse examined patient's ear with an otoscope, and notice a small amount of cerumen.  Of note, patient is scheduled to see audiology and ENT on 11/2/20.       Verbal Order/Direction given by Provider: Call ENT.      Provider giving order: GUSTAVO Deshpande    Verbal order given to: Elizabeth Deal RN      "

## 2021-06-12 NOTE — PROGRESS NOTES
"PAIN CLINIC NEW PATIENT H&P    Subjective:      Marina Neves is a 86 y.o. female who presents for a new patient evaluation per Kiana Reyes, CNP . Consult form indicates chronic pain. Consult initiated 7/2/20. Reviewed rooming evaluation and patient intake form.    Visit Details  Clinic visit   Start Time: 0910  End Time: 1009    CC: Pain    Functional Goals:  Marina specific goals with the pain center are pain relief     Marina indicates pain is limiting and they would like to walk without pain     HPI: Pain Story:  Records reviewed: intake paperwork      Reviewed note 6/29/20  'Today Ms. Neves is being evaluated for a review of multiple medical conditions.  Marina reported that everything is been stable for over the past 3 months.  She continues to have 5-6 out of 10 at multiple joint aching, intermittent pain.  She continues to take Tylenol as needed, ice packs, and tramadol 3 times daily for her chronic pain management.  She has an ice pack on her left shoulder at this visit and reported 8 out of 10 sharp pain.  She was agreeable to plan to follow-up with orthopedic provider who provided previous steroid injection in her apartment later this week.  She has not followed up with pain clinic at this time due to pandemic precautions.  She was agreeable to plan for pain consult to [facility] pain clinic with her cousin Yo scheduling the appointment and taking her.\"    Work/Auto no    Marina began experiencing pain pain she had a knee replacement and has been struggling with pain after her knee surgery. She reports a right shoulder replacement and significant pain in the left shoulder. She reports her feet hurt she has new shoes she indicates she has a hammer toe and thinks some of the foot pain may come from the hammer toe. She does reports a bit of discomfort on the dorsal aspect of the foot.     History was difficult to obtain b/c the pt is hard of hear and low vision and she was unable to communicate " well.  Obtained some information from family present at the visit.      Relevant Diagnoses:  1. Chronic pain syndrome    2. Chronic pain of right knee    3. Primary osteoarthritis involving multiple joints    4. Chronic left shoulder pain    5. Pain in both feet        Associated symptoms:    Weight loss: -   Weight gain: -   Fever and/or Chills: -   Rash: -   Swelling: -   Numbness: no   Weakness: left shoulder feels week   Bladder or Bowel loss of control: no issues   Night pain:no  Location of the pain: right knee, left shoulder and bilateral feet - bit dorsal and some at the pad of the foot (she reports she has a hammer toe and this accounts for some of her discomfort)  Severity:  Average: 6. Best past week: 3. Worst past week 10.  Usual Pain Intensity: moderate  Timing: all the time except when sitting  Quality: sharp, tingling  Aggravating factors: standing, walking  Alleviating factors: medication, sleep, heat, cold, sitting down, relaxation  DME: walker, ice pack, new orthopedic shoes    Functional Symptoms: Pain interferes with:  Sleep: she is sleeping ok  Walking:    Ambulation/Transfer: Pt is ambulatory.She uses a walker.  It is painful to walk and she is unable to use stairs. Transfers independently.   Work:    Employment: retired - seamstress    Volunteering: She use to be a person who volunteered in her building  ADL's:    Bathing: showers and has a stool. She has a sprayer and many  bars low tub   Dressing: she is getting dressed ok.   Cooking: limiting her water after 4PM - lives in [facility] she is getting meals delivered to her apartment.    Shopping: Roseanna is doing her grocery shopping. She will go to meet her and she will bring the groceries up on her walker and with then put her groceries away   Housekeeping: she is making her bed daily she is doing her cleaning and occasionally will come and clean.    Toileting: independent  Concentration: no concerns however she finds the pain is  "distracting and \"I hardly know who I am then\"  Transportation: family will drive her - some issues heather family is out of town in [place] during he winter months  Relationships/Social: Mostly socializing with family. She is locked down in her apartment except to get mail and groceries.  Services: none at this time    Previous Treatment for Pain:    Interventional: Per discussion with the patient and review of the record Marina had the following interventional approaches:   Injections: interested in an injection.    Joint injection: left shoulder injection x 2 with limited benefit     Past Surgical History:   Procedure Laterality Date     BACK SURGERY Bilateral     L3-L5, L5-S1 decompression lami     BACK SURGERY      posterior spinal reconstruction     BREAST SURGERY      left mastectomy     CATARACT EXTRACTION Bilateral      COLON SURGERY       DILATION AND CURETTAGE OF UTERUS      onsil     EYE SURGERY       GASTRECTOMY       left breast biopsy      needle core     left knee arthroscopy       MASTECTOMY, RADICAL Left      NEUROMA SURGERY Bilateral     feet     ID LAP,FULGURATE/EXCISE LESIONS N/A 3/29/2017    Procedure: LAPAROSCOPIC BILATERAL SALPING OOPHORECTOMY PELVIC WASHINGS;  Surgeon: Eduardo Smart MD;  Location: [facility];  Service: Gynecology     ID MASTECTOMY, SIMPLE, COMPLETE Right 1/7/2015    Procedure: RIGHT BREAST MASTECTOMY;  Surgeon: Meliton Bazan MD;  Location: [facility] OR;  Service: General     ID PART REMOVAL COLON W ANASTOMOSIS N/A 6/16/2014    Procedure: COLECTOMY;  Surgeon: Meliton Bazan MD;  Location: [facility];  Service: General     REPLACEMENT TOTAL KNEE Right      right shoulder replacement       TONSILLECTOMY AND ADENOIDECTOMY         Rehabilitation PT/OT: Marina reports participation in PT/OT. No PT for the left shoulder. After surgery had PT for the rt knee.    Pain Psychology/Counseling/Behavioral Medicine:  Patient no specific behavioral health " "strategies for management of pain. She finds the pain irritating she tells the pain to \"shut up & go away\"    Integrative Approaches:  Marina reports participating in:  Chiropractic: no  Acupuncture: no  Massage: self  Heat/Ice: ice  Home Exercise Program: she does as much as she is able. Walking is very important for her to exercising. She does a lot of squat type exercises. Picking stuff up off the floor  Meditation/Spiritual Practices: Worship services on channel 13.     Behavioral Medicine/Mental Health History:   Patient denies being diagnosed with  Patient denies current psychiatrist or psychologist.  Patient denies  hospitalizations for mental illness.   Patient denies suicidal ideation. Patient denies previous suicidal attempts.  Patient denies physical/sexual/emotional abuse or neglect.  Patient denies current concerns about possible abuse/neglect in their home.     Family reports a lot of calls about pain and unhappiness. It has been worse with COVID19.  The senior apartments are opening up the schedule to allow visitation. She is also not able to eat with others and this is believed to be influential. Family is getting calls every 2 days. The calls last 1-1.5 hours. The calls are a source of stress.     Pt reports she does not call Roseanna rhoades/malena Condon is working. She reports Roseanna is getting her groceries. There is a lack of clarity if when folks are out of town if Roseanna will be able to help with visits.    Chemical History:   Patient no any chemical dependency evaluation or treatment in the past.  Nicotine use: no  Alcohol Use:  no  Marijuana: no- neighbor brought it up as an option but she is not interested  Kratom: no  LSD no  Methamphetamine: no  Heroin: no  Cocaine: no  Use of prescribed medication in a fashion other than intended: no  Use of others opioids: no  Legal history related to drugs or alcohol (DUI/DWI/Possession): no      Impact of current Pain treatment:   Analgesia: fair when sitting    Pertinent " Pain Medications:  Last dose of medication was tramadol 10/19/20 0700    She does not want to take pills b/c she is afraid of having stomach trouble.    She currently takes:       Current Outpatient Medications:      acetaminophen (TYLENOL) 650 MG CR tablet, Take 650 mg by mouth 2 (two) times a day as needed for pain., Disp: , Rfl:      CHOLECALCIFEROL 25 mcg (1,000 unit) tablet, Take 1 tablet by mouth 2 (two) times a day., Disp: , Rfl:      docoshexanoic acid-eicosapent 500 mg (FISH OIL) 500-100 mg cap capsule, Take 1,400 mg by mouth daily. , Disp: , Rfl:      docusate sodium (COLACE) 100 MG capsule, Take 100 mg by mouth 2 (two) times a day as needed for constipation., Disp: , Rfl:      lifitegrast (XIIDRA) 5 % Dpet, Apply to eye 2 (two) times a day., Disp: , Rfl:      losartan (COZAAR) 25 MG tablet, 50 mg every morning. , Disp: , Rfl:      magnesium oxide (MAG-OX) 400 mg (241.3 mg magnesium) tablet, Take 400 mg by mouth daily., Disp: , Rfl:      polyvinyl alcohol (LIQUIFILM TEARS) 1.4 % ophthalmic solution, Administer 1-2 drops to both eyes 4 (four) times a day as needed for dry eyes., Disp: 15 mL, Rfl: 0     spironolactone (ALDACTONE) 25 MG tablet, Take 1 tablet (25 mg total) by mouth daily., Disp: 90 tablet, Rfl: 0     traMADoL (ULTRAM) 50 mg tablet, Take 1 tablet (50 mg total) by mouth 3 (three) times a day., Disp: 90 tablet, Rfl: 2 she will notice impact in about 10 minutes she she indicates the medication is working ok -     VIT C/LUDY AC/LUT/COPPER/ZNOX (PRESERVISION LUTEIN ORAL), Take 1 tablet by mouth 2 (two) times a day., Disp: , Rfl:     Previously tried medications: H=Helpful. NH=Not Helpful. U=Unsure. (n/a)=Not applicable  Acetaminophen: (h)  NSAIDs:   Ibuprofen: (na)    Naproxen (na)    Celecobix (na)   Diclofenac (na)  Gabapentinoids:    Gabapentin: (na)   Pregabalin: (na)  Antidepressants:    Amitriptyline: (na)   Nortriptyline: (na)   Duloxetine: (na)   Venlafaxine: (na)  Muscle Relaxants:     Tizanidine: (na)   Methocarbamol: (na)   Cyclobenzaprine: (na)   Metaxalone: (na)   Carisoprodol: (na)   Baclofen: (na)   Topicals:    Lidocaine: (na)   Diclofenac gel: (na)   Compounded pain creams: (na)   OTC/Herbal topical pain applications: (biofreeze - helps for a short time - I have tried every rub that is out there)  Opioids:    Codeine: (na)   Hydrocodone: (na)   Oxycodone: (I see oxycodone discussed in the chart but I do not see this medication on the )   Tramadol: (H)   Morphine: (na)   Hydromorphone: (na)   Methadone: (na)   Fentanyl: (na)   Buprenorphine: (na)   Tapentadol: (na)   Oxymorphone: (na)    Drug Allergies: as documented:    Pain Clinic Hx: no    Past Medical History:   Diagnosis Date     Age-related physical debility      Anemia      Arthritis      Bladder infection 01/03/2015    dx'd- on antibiotics     Blood type AB-     with Anti-C     Breast cancer (H)      Bursitis, knee      Cancer (H) 2005    left breast     Colon cancer (H)      Depression      Gout      History of transfusion      Hypertension      Insomnia      Macular degeneration      Osteoarthritis of multiple joints      Osteopenia      Ovarian cyst      Rosacea      Vitamin D deficiency        Family History   Problem Relation Age of Onset     Squamous cell carcinoma Mother      Colon cancer Sister      Breast cancer Maternal Aunt      Bone cancer Maternal Aunt      Colon cancer Paternal Aunt      Breast cancer Cousin        Social hx:  Marital status:   Patient lives in a senior apartment   Patient resides: alone  In a crisis patient would rely on cou sin    It is noted mark Condon, Pearl martin Jerry and the primary care provider are not always in agreement with the plan of care. There has been an understanding that the pt niece and the pt do not want her seeing nurse practitioners. This information had not been communicated to the pain center.    Social History     Tobacco Use   Smoking Status Never Smoker  "  Smokeless Tobacco Never Used     Social History     Substance and Sexual Activity   Alcohol Use No     Social History     Substance and Sexual Activity   Drug Use No     Social History     Substance and Sexual Activity   Sexual Activity Never         Review Of System: As reviewed on the patient intake paperwork  Constitutional- -  Integumentary: -   Head: -   Ears: -   Eyes: cataracts  Nose: -   Throat: -  Respiratory/Cardiovascular- -  Breast: -  GI: -   :  -  GYN-  -   Vascular- -  Musculoskeletal- muscle/joint stiffenss   Neuro- tingling   Hematologic/Immunologic: -  Endocrine- -   Psych-  -  Withdrawal symptoms: -    Objective:     Vitals:    10/19/20 0909   BP: 140/82   Pulse: 100   Resp: 20   Temp: 98  F (36.7  C)   TempSrc: Oral   Weight: 166 lb (75.3 kg)   Height: 5' 3\" (1.6 m)   PainSc:   8   PainLoc: Shoulder       Constitutional:  Pleasant and cooperative female who presents cousin Danika Alberto today.  She has pants on but she keeps the right pant keg pulled up over her knee. Pt is very [place] and the visit was very difficult. She is low vision and so writing is difficult as well.     Psychiatric: Mood and affect are appropriate for the situation, setting and topic of discussion.  Patient does not appear sedated.     Integumentary:  Observed skin WNL.      HEENT: EOM's grossly intact.       Chest: Non-labored breathing.     Spine:   Cervical Spine: Tenderness more limited Tension +  w/ palpation of the  upper trapezius musculature.      Thoracic Spine: Decreased ROM of the left shoulder. Discomfort reported: +. Tension + Tenderness - w/ palpation of the thoracic paraspinals, infra/supra spinatus and the teres. Tenderness + w/ palpation of the AC joint, biceps tendon, biceps. No chest wall tenderness.      Pelvis: . Tenderness no with palpation of the greater trochanteric region  bilaterally. Tenderness no  with palpation over the SI joint bilaterally. Tension and tenderness are noted with palpation of " the gluteus patricia, medius and minimus. Tenderness none along the coccyx. Tenderness + w/ evaluation of the IT band right.      Knee:  + bony deformity secondary to osteoarthritis changes right. . ROM fair.  Tenderness + with palpation of the lateral,  joint line right. Tenderness limited with palpation of the patellar tendon. Tenderness Limited with palpation of the popliteal region right. Tenderness +, Tension + with palpation of the gastrocnemius or soleus right.     Ankle/Foot: She is able to engage in foot tapping     Neurological:   Alert and oriented in all spheres including: time, place, person and situation.    Durable Medical Equipment:walker, mask, ice back, orthopedic shoes    Lab:  Last UDT on 10/19/2020 was obtained.      Imaging:  I do not see any diagnostics in the chart related to the rt knee, left shoulder or the feet.    :   10/19/2020 Reviewed to aid with decision regarding medication management  Narcotic= 331  Sedative= 130  Stimulant= 000  Overdose Risk= 090    Last script:  Date: 10/7/20  Medication: tramadol  Dose: 50,mg   Dispensed: 90   Prescriber: AB     Single prescriber    Assessment: Dated 10/19/2020      ORT  10/19/2020   Assessment:   Marina Neves is a 86 y.o. female PMH significant for cancer, CHF and surgical hisotry significant for breast surgery, colon surgery, knee replacement, cataract surgery. Evaluated today for  Chronic pain of the rt knee, left shoulder and bilateral feet.    Visit was difficult as the pt could not hear me and it made communication exceptionally complex. Family offered support for the communication. I think it will be prudent to communicate with all stakeholders and the pt was in agreement b/c I appears differing opinions on approaches to care exist. One key piece was the preference for a physician and lack of communication to the pain center when scheduling.     I do no think in the world of chronic pain we can ignore mood an the influence of mood on  pain and there has been a lot of stressors with limited outlets. I see this as a strain for the patient. This strain for her is transferring to family via telephone and the general helplessness of the situation surrounding COVID is a strain on all involved. Family supporting one another as it relates to these stressors for themselves and for the pain is an important part of the plan of care. Will make an effort to communicate with family members individually.    I am of the opinion the place to start is with physical therapy and I understand this is available in the patients building. I have listed injection options and medication options. Consider diagnostics.    AVS 18 font due to low vision.    *Universal Precautions:   UDT/Swab-  10/19/2020   Consent- if prescribing opioids  Agreement- if prescribing opioids  Pharmacy- as documented   Count- n/a  Psychological evaluation - information provided  Pharmacogenetic testing- n/a  MME-   MTM - consider  Naloxone safety:       Plan:   Plan and next steps - please review and refer back to the after visit summary - key pieces are highlighted    Follow up: 3-4 weeks (40minute appt with Cristiane CARR)      Education:  Today you saw Cristiane Lea Nurse Practitioner.    The plan of care was reviewed, justification provided and questions answered.     I recognized you may have participated in treatments in the past. When troubles first start it is often an expectation you will be cured. Once we are looking at a long term experience, when cure is not likely, the achievable outcomes of treatment are adjust and full participation is expected.    Pain is complex and the treatment approaches are complex. Address of pain will focus on non-medication, non-opioid (non-narcotic) medication, noting occasionally opioids (narcotics) are included into a pain management program. There have been many changes over the last several years as it relates to opioid (narcotic) medication and these  changes apply to everyone.     Health Maintenance: You will continue to see primary care for your general health care.    Behavioral Medicine: The body accepts sensory input. The input travels to the brain where the brain based on previous experience, education or culture comes up with a response. Since your brain is very powerful you have the opportunity to learn to adjust the response. Research has demonstrated over-and-over that people who are active participants with behavioral medicine have better management of their pain. COVID has been very hard for people and feels discouraged by COVID has influenced pain some as well.     Interventional: A genicular nerve block may offer some assistance for the right knee. We can consider repeating the shoulder injection and we can consider a block of the shoulder as well.     Rehabilitation: I am recommending PT for the left shoulder and the right knee - this is the first place to start    Integrative Approaches: Acupuncture is an option.    Medication: [facility] does not assume responsibility for prescribing at the time of a consult. I did not provide you with any prescriptions. Continue with your current provider and they will continue your pain care in the fashion they deem is most appropriate.     You do not like pills so I think topicals would be reasonable you may find voltaren topically over the counter 4gm three times a day to the knee and shoulder assistive. This si no longer covered by insurance since it went over the counter. Topical lidocaine may help with some of the discomfort - salan paws are a lidocaine patch that can be applied to the knee and shoulder.     Butrans is an opioid that is a patch that will give coverage over 24 hours and this can be used in people who have never taken an opioid so this is an option for you.    Duloxetine (Cymbalta) is a medication used for depression and for pain management. The focus here is  for pain management there are a lot of studies to address pain and support duloxetine for pain.     Diagnostics: UDT collected 10/19/20 results are pending.  UDT  Patient required a random Urine Drug Testing, due to the need to comply with [place] [employer] Policy Guidelines and CDC Guideline for the use of any controlled substances. This is to ensure that patient is compliant with treatment, and monitor for risks such as diversion, abuse, or any other aberrant behaviors. Patient is either being considered for or taking a controlled substance. Unexpected findings will be discussed and treatment decision may be adjusted. Testing is being implemented across the board randomly w/o bias related to age, race, gender, socioeconomic status or Restorationist affiliation.     Records: Reviewed to assist with preparation for the office visit and are reflected throughout the note. Patient intake sheet also reviewed.    Social: Release of Information to talk with mark Condon to understand her expectation for addressing the symptoms.         Savannah HERNANDEZ FNP-BC  [address] [facility] [address]   [phone]  [phone]

## 2021-06-12 NOTE — PROGRESS NOTES
[facility]    Facility:   [facility] [[id number]]   Code Status: POLST AVAILABLE      CHIEF COMPLAINT/REASON FOR VISIT:  Chief Complaint   Patient presents with     P Care Coordination - Regulatory       HISTORY:      HPI: Marina is a 86 y.o. female who is an assisted-living resident at [facility].  Marina  has a past medical history of Age-related physical debility, Anemia, Arthritis, Bladder infection (01/03/2015), Blood type AB-, Breast cancer (H), Bursitis, knee, Cancer (H) (2005), Colon cancer (H), Depression, Gout, History of transfusion, Hypertension, Insomnia, Macular degeneration, Osteoarthritis of multiple joints, Osteopenia, Ovarian cyst, Rosacea, and Vitamin D deficiency.    Today Ms. Neves is being evaluated for a review of multiple medical conditions.  Marina reported that she continues to have chronic pain that does not keep her from performing her ADLs throughout the day.  She rated her pain at 5 out of 10 today mainly in her left shoulder and left knee described as aching intermittent pain.  She continues to take tramadol 50 mg 3 times a day and Tylenol as needed for pain management.  She is frustrated with her vision impairment and hearing impairment that limit her from communicating with her family and friends.  She saw her audiologist and they recommended cochlear implant to improve her hearing but she is not able to do that at this time as it requires many appointments and her family will be leaving for [place] soon.  She said that her bowels have been moving regularly taking her Colace every day with last bowel movement yesterday.  Her family was updated with this visit and they noted that she has been going out to [facility] pain clinic and they recommended that she restart her duloxetine for chronic pain.  She also vocalized that she has been frustrated being isolated in her apartment due to pandemic precautions in her building.  The patient  denied fever, chills, diaphoresis, changes in mood or appetite, sleep disturbances, nasal congestion, sore throat, lightheadedness, dizziness, breathing difficulty, chest pain, palpitations, constipation, urinary symptoms, or numbness or tingling in extremities.      Past Medical History:   Diagnosis Date     Age-related physical debility      Anemia      Arthritis      Bladder infection 01/03/2015    dx'd- on antibiotics     Blood type AB-     with Anti-C     Breast cancer (H)      Bursitis, knee      Cancer (H) 2005    left breast     Colon cancer (H)      Depression      Gout      History of transfusion      Hypertension      Insomnia      Macular degeneration      Osteoarthritis of multiple joints      Osteopenia      Ovarian cyst      Rosacea      Vitamin D deficiency              Family History   Problem Relation Age of Onset     Squamous cell carcinoma Mother      Colon cancer Sister      Breast cancer Maternal Aunt      Bone cancer Maternal Aunt      Colon cancer Paternal Aunt      Breast cancer Cousin      Social History     Socioeconomic History     Marital status:      Spouse name: Not on file     Number of children: Not on file     Years of education: Not on file     Highest education level: Not on file   Occupational History     Not on file   Social Needs     Financial resource strain: Not on file     Food insecurity     Worry: Not on file     Inability: Not on file     Transportation needs     Medical: Not on file     Non-medical: Not on file   Tobacco Use     Smoking status: Never Smoker     Smokeless tobacco: Never Used   Substance and Sexual Activity     Alcohol use: No     Drug use: No     Sexual activity: Never   Lifestyle     Physical activity     Days per week: Not on file     Minutes per session: Not on file     Stress: Not on file   Relationships     Social connections     Talks on phone: Not on file     Gets together: Not on file     Attends Yazidi service: Not on file     Active  member of club or organization: Not on file     Attends meetings of clubs or organizations: Not on file     Relationship status: Not on file     Intimate partner violence     Fear of current or ex partner: Not on file     Emotionally abused: Not on file     Physically abused: Not on file     Forced sexual activity: Not on file   Other Topics Concern     Not on file   Social History Narrative     Not on file       REVIEW OF SYSTEM:  Pertinent items are noted in HPI.  A 12 point comprehensive review of systems was negative except as noted.    PHYSICAL EXAM:     General Appearance:    Alert, cooperative, no distress, appears stated age   Head:    Normocephalic, without obvious abnormality, atraumatic   Eyes:    PERRL, conjunctiva/corneas clear, both eyes; wears glasses   Ears:    Normal external ear canals, both ears; hearing impairment in bilateral ears with bilateral Has; bilateral cerumen impaction   Nose:   Nares normal, septum midline, mucosa normal, no drainage    or sinus tenderness   Throat:   Lips, mucosa, and tongue normal; teeth and gums normal   Neck:   Supple, symmetrical, trachea midline, no adenopathy;     thyroid:  no enlargement/tenderness/nodules; no carotid    bruit or JVD   Back:     Symmetric, no curvature, ROM normal, no CVA tenderness   Lungs:     Clear to auscultation bilaterally, respirations unlabored   Chest Wall:    No tenderness or deformity    Heart:    Regular rate and rhythm, S1 and S2 normal, no murmur, rub   or gallop   Abdomen:     Soft, non-tender, bowel sounds active all four quadrants,     no masses, no organomegaly   Extremities:   Extremities normal, atraumatic, no cyanosis; moderate nonpitting BLE edema   Pulses:   2+ and symmetric all extremities   Skin:   Skin color, texture, turgor normal, no rashes or lesions   Neurologic:   Oriented to person and situation; forgetful at times; exhibits tangetial thought processes; generalized weakness; ambulatory with walker           LABS:    Lab Results   Component Value Date    WBC 9.9 02/12/2020    HGB 11.5 (L) 02/12/2020    HCT 37.2 02/12/2020     02/12/2020    CHOL 191 10/21/2020    TRIG 166 (H) 10/21/2020    HDL 46 (L) 10/21/2020    ALT 14 02/12/2020    AST 16 02/12/2020     10/21/2020    K 5.1 (H) 10/21/2020     10/21/2020    CREATININE 0.69 10/21/2020    BUN 13 10/21/2020    CO2 26 10/21/2020    TSH 1.21 02/13/2018    INR 0.94 05/21/2019      Case Management:  I have reviewed the Assisted Living care plan, current immunizations and preventive care/cancer screening.. Future cancer screening is not clinically indicated secondary to age/goals of care.   Patient's desire to return to the community is not assessible due to cognitive impairment.    Advance Directive Discussion:    I reviewed the current advanced directives as reflected in [facility] and the facility chart. I did not due to cognitive impairment review the advance directives with the resident.     Team Discussion:  I communicated with the appropriate disciplines involved with the Plan of Care:   Nursing      Patient Goal:  Patient's goal is pain control and comfort and family's/responsible party's goal for the patient is comfort.    Information reviewed:  Medications, vital signs, orders, and nursing notes.     ASSESSMENT:      ICD-10-CM    1. Essential hypertension  I10    2. Chronic pain syndrome  G89.4    3. Slow transit constipation  K59.01        PLAN:    Constipation  -Continue colace as prescribed  -Encouraged patient to increase fiber in diet, eat a lot of fruits and vegetables, increase water intake  -Exercise as much as possible  -Notify provider if patient has loose stools or no BM for >3 days     Chronic pain syndrome  -Consult placed to [facility] pain clinic for chronic pain syndrome  -Continue Tylenol 625 mg as prescribed  -Continue tramadol from 50 mg three times a day   -Encouraged patient to ice left shoulder three times a day and PRN  -Encouraged patient  to utilize integrative therapies such as distraction and deep breathing for pain management  -Notify provider if patient has new or worsening pain     Hypertension   -Continue spironolactone as prescribed  -Continue losartan as prescribed  -Encouraged cardiac diet, low sodium diet, exercise and stress reduction techniques.   -Emphasized importance of blood pressure control.   -Notify provider if pt's SBP<90 or >180 and DBP <50 or >100     Otherwise continue current care plan for all other chronic medical conditions, as they are stable. Encouraged patient to engage in healthy lifestyle behaviors such as engaging in social activities, exercising (PT/OT), eating well, and following care plan. Follow up for routine check-up, or sooner if needed. Will continue to monitor patient and work with nursing staff collaboratively to work toward positive patient outcomes.     Electronically signed by: Kiana Reyes CNP

## 2021-06-13 NOTE — PROGRESS NOTES
[facility]    Facility:   [facility] [[id number]]   Code Status: UNKNOWN      CHIEF COMPLAINT/REASON FOR VISIT:  Chief Complaint   Patient presents with     Problem Visit     asked to see, pain,        HISTORY:      HPI: Marina is a 86 y.o. female who I was asked to see secondary to history of chronic pain including the left shoulder and osteoarthritis of her bilateral knees.  She even had one total knee replacement.  The pain clinic did call me and they were wondering if we could try the Butrans 5 mcg patch weekly for her chronic pain.  I did write for that prescription but unfortunately is not covered through her insurance and I did not find out about that right away so therefore we will have to have another conversation in the future regarding pain management control.  During her visit today I did talk about her current pain medications also did again talk about cortisone injections.  She is able to move about her apartment with her walker.  Vital signs are not regularly performed in the apartment.  She does get her medications from the staff on a weekly basis.  We also had [place] her   and his love of pheasant hunting and [place] hunting in [place].  She also did enjoy fishing and does enjoy smoke fish.  She does have eye issues macular degeneration and does have an ophthalmology visit today and also hard of hearing and does wear hearing aids.  I did have a chance to talk about pain management with her as well as her current medications as well as recommendation from the pain clinic.  Her appetite is good.  Sleeping well at night.    Past Medical History:   Diagnosis Date     Age-related physical debility      Anemia      Arthritis      Bladder infection 2015    dx'd- on antibiotics     Blood type AB-     with Anti-C     Breast cancer (H)      Bursitis, knee      Cancer (H)     left breast      Colon cancer (H)      Depression      Gout      History of transfusion      Hypertension      Insomnia      Macular degeneration      Osteoarthritis of multiple joints      Osteopenia      Ovarian cyst      Rosacea      Vitamin D deficiency              Family History   Problem Relation Age of Onset     Squamous cell carcinoma Mother      Colon cancer Sister      Breast cancer Maternal Aunt      Bone cancer Maternal Aunt      Colon cancer Paternal Aunt      Breast cancer Cousin      Social History     Socioeconomic History     Marital status:      Spouse name: Not on file     Number of children: Not on file     Years of education: Not on file     Highest education level: Not on file   Occupational History     Not on file   Social Needs     Financial resource strain: Not on file     Food insecurity     Worry: Not on file     Inability: Not on file     Transportation needs     Medical: Not on file     Non-medical: Not on file   Tobacco Use     Smoking status: Never Smoker     Smokeless tobacco: Never Used   Substance and Sexual Activity     Alcohol use: No     Drug use: No     Sexual activity: Never   Lifestyle     Physical activity     Days per week: Not on file     Minutes per session: Not on file     Stress: Not on file   Relationships     Social connections     Talks on phone: Not on file     Gets together: Not on file     Attends Faith service: Not on file     Active member of club or organization: Not on file     Attends meetings of clubs or organizations: Not on file     Relationship status: Not on file     Intimate partner violence     Fear of current or ex partner: Not on file     Emotionally abused: Not on file     Physically abused: Not on file     Forced sexual activity: Not on file   Other Topics Concern     Not on file   Social History Narrative     Not on file         Review of Systems  Currently denies any new symptoms of fever chills fatigue cough or cold or flulike symptoms nausea vomiting  diarrhea dysuria headaches rashes stiff neck swollen glands.      Current Outpatient Medications:      acetaminophen (TYLENOL) 650 MG CR tablet, Take 650 mg by mouth 2 (two) times a day as needed for pain., Disp: , Rfl:      CHOLECALCIFEROL 25 mcg (1,000 unit) tablet, Take 1 tablet by mouth 2 (two) times a day., Disp: , Rfl:      docoshexanoic acid-eicosapent 500 mg (FISH OIL) 500-100 mg cap capsule, Take 1,400 mg by mouth daily. , Disp: , Rfl:      docusate sodium (COLACE) 100 MG capsule, Take 100 mg by mouth 2 (two) times a day as needed for constipation., Disp: , Rfl:      ibuprofen (ADVIL,MOTRIN) 200 MG tablet, Take 200 mg by mouth every 6 (six) hours as needed for pain., Disp: , Rfl:      lifitegrast (XIIDRA) 5 % Dpet, Apply to eye 2 (two) times a day., Disp: , Rfl:      losartan (COZAAR) 25 MG tablet, 50 mg every morning. , Disp: , Rfl:      magnesium oxide (MAG-OX) 400 mg (241.3 mg magnesium) tablet, Take 400 mg by mouth daily., Disp: , Rfl:      polyvinyl alcohol (LIQUIFILM TEARS) 1.4 % ophthalmic solution, Administer 1-2 drops to both eyes 4 (four) times a day as needed for dry eyes., Disp: 15 mL, Rfl: 0     spironolactone (ALDACTONE) 25 MG tablet, Take 1 tablet (25 mg total) by mouth daily., Disp: 90 tablet, Rfl: 2     traMADoL (ULTRAM) 50 mg tablet, Take 1 tablet (50 mg total) by mouth 3 (three) times a day., Disp: 90 tablet, Rfl: 2     VIT C/LUDY AC/LUT/COPPER/ZNOX (PRESERVISION LUTEIN ORAL), Take 1 tablet by mouth 2 (two) times a day., Disp: , Rfl:     Vitals:    12/08/20 1336   Pulse: 78   Resp: 20       Physical Exam  No acute distress.  Head is normocephalic.  Hearing aids.  Neck is supple without adenopathy.  Lung sounds are clear throughout.  Cardiovascular has an S1-S2 regular rate and rhythm.  No lower extremity edema.  Gastrointestinal nontender nondistended to musculoskeletal history of osteoarthritis to her major joints including a knee replacement.  Psychiatric: Pleasant affect.  LABS:   Lab  Results   Component Value Date    WBC 9.9 02/12/2020    HGB 11.5 (L) 02/12/2020    HCT 37.2 02/12/2020     (H) 02/12/2020     02/12/2020     Results for orders placed or performed in visit on 10/21/20   Basic Metabolic Panel   Result Value Ref Range    Sodium 138 136 - 145 mmol/L    Potassium 5.1 (H) 3.5 - 5.0 mmol/L    Chloride 103 98 - 107 mmol/L    CO2 26 22 - 31 mmol/L    Anion Gap, Calculation 9 5 - 18 mmol/L    Glucose 92 70 - 125 mg/dL    Calcium 9.0 8.5 - 10.5 mg/dL    BUN 13 8 - 28 mg/dL    Creatinine 0.69 0.60 - 1.10 mg/dL    GFR MDRD Af Amer >60 >60 mL/min/1.73m2    GFR MDRD Non Af Amer >60 >60 mL/min/1.73m2           ASSESSMENT:      ICD-10-CM    1. Primary osteoarthritis involving multiple joints  M89.49    2. Essential hypertension  I10    3. Chronic pain of right knee  M25.561     G89.29    4. Chronic pain syndrome  G89.4        PLAN:    I did discuss her pain medication as well as her history of chronic pain and the use of ibuprofen and tramadol.  At the time I was not aware that she could not get Butrans because of insurance reasons so we will have to come up with another plan in the near future.  Otherwise she does have an ophthalmology visit today.  She did not have any other questions.      Electronically signed by: Michael Duane Johnson, CNP

## 2021-06-13 NOTE — TELEPHONE ENCOUNTER
TC placed to [place] case is being turned over to Bry Jurado - Call paced to discussed the case and the pt. Bry Jurado is ok with getting treatment started - discussed goal of Butrans 5mcg patch to be changed by a nurse once per week. Also discussed initiating cymbalta. I have discussed treatment with the family and all members are in agreement.

## 2021-06-13 NOTE — PATIENT INSTRUCTIONS - HE
Please complete your pain diary and return the diary to the [facility] at your earliest convenience.  The [facility] will contact you to schedule your next appointment after your pain diary is reviewed by your doctor.  Thank you.    DISCHARGE INSTRUCTIONS    During office hours (8:00 a.m.- 4:00 p.m.) questions or concerns may be answered  by calling Spine Center Navigation Nurses at  [phone].  Messages received after hours will be returned the following business day.      In the case of an emergency, please dial 911 or seek assistance at the nearest Emergency Room/Urgent Care facility.     All Patients:  ? You may experience an increase in your symptoms for the first 2 days, once the numbing medication wears off.    ? You may resume your regular medication, no pain medication until you have completed your diary    ? You may shower. No swimming, tub bath or hot tub for 24 hours; remove bandage after 4 hours    ? Continue your activities that can cause you pain to test the blocks.                         ? You should not drive for the next 3 to 5 hours (have someone drive you)           POSSIBLE PROCEDURE SIDE EFFECTS  -Call Spine Center if concerned-    Increased Pain  Increased numbness/tingling     Nausea/Vomiting  Bruising/bleeding at site (hematoma)             Swelling at site (edema) Headache  Difficulty walking  Infection        Fever greater than 100.5

## 2021-06-13 NOTE — TELEPHONE ENCOUNTER
"[facility] Nurse Triage Telephone Note      Provider: MARY Arreola  Facility: [facility]     Facility Type: [facility]    Caller: patient  Call Back Number:  [phone]    Allergies: Blood-group specific substance, Celebrex [celecoxib], Lisinopril, Oxybutynin, Tetanus vaccines and toxoid, Tolterodine, Piroxicam, and Sulfa (sulfonamide antibiotics)    Reason for call: Patient is calling along with the  at the [facility] about concerns of no BM x 7 days.  Patient is passing gas without issue.  No abdominal pain or distention reported.  She has been taking Colace 100mg two times a day.  She also is asking about what further can be done about her chronic shoulder pain.  She has orders for Tramadol 50mg three times a day, but says she doesn't like it because it makes her \"sleepy\".       Verbal Order/Direction given by Provider: Increase Colace to 2 tabs two times a day.  See if patient would reconsider steroid injections in her shoulder.      Provider giving order: MARY Arreola    Verbal order given to: (); patient unable to hear on the phone.        Meliton Deal RN      "

## 2021-06-13 NOTE — TELEPHONE ENCOUNTER
The patient's cousin, Silvino, contacted this author requesting additional information on the diagnostic knee injections that Marina was scheduled to have completed by Dr. Barahona on [facility].      Because the patient has not been included on the consent to communicate form by the patient, he was informed that specific questions regarding Marina's care were not able to be answered at this time.    General questions regarding the procedure were answered to Silvino's satisfaction and he was encouraged to have Marina include him on the consent to communicate (if she wished to do so) at the time of her upcoming visit.    He verbalized understanding of the plan.

## 2021-06-13 NOTE — TELEPHONE ENCOUNTER
Patient's emergency contact, Yo Alberto, contacted and informed that Dr. Barahona was recommending that the patient follow-up with the Pain Center to discuss care plan recommendations as her knee blocks were unsuccessful.    He verbalized understanding and stated they would contact the Pain Center to set up an appointment.  The patient was encouraged to contact the [facility] Center if she had any questions or concerns in the meantime.

## 2021-06-13 NOTE — TELEPHONE ENCOUNTER
Silvino who is Marina's cousin (handles all of her appointments) and his spouse Pearl called to clarify appointment date/time. They have additional questions about this injection (R Knee block) and would like a call back. The patient is scheduled for 11/25/20 with Dr. Barahona.

## 2021-06-14 NOTE — PROGRESS NOTES
[facility]    Facility:   [facility] [[id number]]   Code Status: UNKNOWN      CHIEF COMPLAINT/REASON FOR VISIT:  Chief Complaint   Patient presents with     Problem Visit     rt shoulder pain,       HISTORY:      HPI: Marina is a 86 y.o. female who I was asked to see secondary to persistent right shoulder discomfort which is chronic with a history of a shoulder replacement but also recently went to the emergency department on December 23, 2020 secondary to shoulder pain and also discussed again a cortisone injection into the right shoulder.  When she was in the emergency department x-rays of bilateral shoulders were obtained.  The left shoulder has arthritic changes the right does show intact hardware from previous shoulder replacement.  She does have chronic decreased range of motion and in particular the right shoulder does have an impingement which she states does impede her activities of daily living in looking at her and putting her through her range of motion is quite limited.  Also again talked about the risks and benefits of a steroid injection using Kenalog and Xylocaine into the shoulder joint.  She states she has had them before with variable results and I agreed with that and I confirmed that the results are variable but at any rate she would like to pursue the injection and in general hope that it is helpful so she could decrease her pain as well as improve her quality of life and independence.  While in the apartment she does use her 4 wheeled walker for mobility.    Past Medical History:   Diagnosis Date     Age-related physical debility      Anemia      Arthritis      Bladder infection 01/03/2015    dx'd- on antibiotics     Blood type AB-     with Anti-C     Breast cancer (H)      Bursitis, knee      Cancer (H) 2005    left breast     Colon cancer (H)      Depression      Gout      History of transfusion      Hypertension      Insomnia      Macular degeneration       Osteoarthritis of multiple joints      Osteopenia      Ovarian cyst      Rosacea      Vitamin D deficiency              Family History   Problem Relation Age of Onset     Squamous cell carcinoma Mother      Colon cancer Sister      Breast cancer Maternal Aunt      Bone cancer Maternal Aunt      Colon cancer Paternal Aunt      Breast cancer Cousin      Social History     Socioeconomic History     Marital status:      Spouse name: Not on file     Number of children: Not on file     Years of education: Not on file     Highest education level: Not on file   Occupational History     Not on file   Social Needs     Financial resource strain: Not on file     Food insecurity     Worry: Not on file     Inability: Not on file     Transportation needs     Medical: Not on file     Non-medical: Not on file   Tobacco Use     Smoking status: Never Smoker     Smokeless tobacco: Never Used   Substance and Sexual Activity     Alcohol use: No     Drug use: No     Sexual activity: Never   Lifestyle     Physical activity     Days per week: Not on file     Minutes per session: Not on file     Stress: Not on file   Relationships     Social connections     Talks on phone: Not on file     Gets together: Not on file     Attends [employer] service: Not on file     Active member of club or organization: Not on file     Attends meetings of clubs or organizations: Not on file     Relationship status: Not on file     Intimate partner violence     Fear of current or ex partner: Not on file     Emotionally abused: Not on file     Physically abused: Not on file     Forced sexual activity: Not on file   Other Topics Concern     Not on file   Social History Narrative     Not on file         Review of Systems  She currently denies any new symptoms of fever chills fatigue cough or cold flulike symptoms nausea vomiting diarrhea dysuria headache or stiff neck.    Current Outpatient Medications   Medication Sig     acetaminophen (TYLENOL) 650 MG CR  tablet Take 650 mg by mouth 2 (two) times a day as needed for pain.     CHOLECALCIFEROL 25 mcg (1,000 unit) tablet Take 1 tablet by mouth 2 (two) times a day.     docoshexanoic acid-eicosapent 500 mg (FISH OIL) 500-100 mg cap capsule Take 1,400 mg by mouth daily.      docusate sodium (COLACE) 100 MG capsule Take 200 mg by mouth 2 (two) times a day.      ibuprofen (ADVIL,MOTRIN) 200 MG tablet Take 200 mg by mouth every 6 (six) hours as needed for pain.     lifitegrast (XIIDRA) 5 % Dpet Apply to eye 2 (two) times a day.     losartan (COZAAR) 25 MG tablet 50 mg every morning.      magnesium oxide (MAG-OX) 400 mg (241.3 mg magnesium) tablet Take 400 mg by mouth daily.     polyvinyl alcohol (LIQUIFILM TEARS) 1.4 % ophthalmic solution Administer 1-2 drops to both eyes 4 (four) times a day as needed for dry eyes.     spironolactone (ALDACTONE) 25 MG tablet Take 1 tablet (25 mg total) by mouth daily.     traMADoL (ULTRAM) 50 mg tablet Take 1 tablet (50 mg total) by mouth 3 (three) times a day.     VIT C/LUDY AC/LUT/COPPER/ZNOX (PRESERVISION LUTEIN ORAL) Take 1 tablet by mouth 2 (two) times a day.       There were no vitals filed for this visit.  Heart rate 78 respirations 16  Physical Exam  Lung sounds are clear.  Cardiovascular is normal without murmurs.  S1-S2.  Left shoulder does have arthritis she states it is sore but in going through the internal and external rotation abduction abduction she does not have a lot of extra limitation although she does have some arthritic changes in the right she has significant decreased range of motion with internal and external rotation abduction abduction she also has tenderness with the range of motion.  Good CMS to her hands.  Once again did address the variability of the injection she did give her consent so the right posterior shoulder was prepped and then injected with a total of 40 mg of Kenalog mixed with 2% Xylocaine.  She was injected in the posterior fashion and actually a  tolerated extremely well.  The area was prepped once again cleansed.  She is aware of the aftereffects of the injection as well as how to take care of this in her apartment with ice packs if she would need some otherwise she can use some Tylenol.  The nursing staff in the apartment as well as the charge nurse are also aware of doing the injection today.  She did not have any issues after some time from after a conversation and no side effects of the medication or the injection.  LABS:   Lab Results   Component Value Date    WBC 9.9 02/12/2020    HGB 11.5 (L) 02/12/2020    HCT 37.2 02/12/2020     (H) 02/12/2020     02/12/2020         ASSESSMENT:      ICD-10-CM    1. Chronic right shoulder pain  M25.511     G89.29    2. Impingement syndrome of right shoulder  M75.41    3. Primary osteoarthritis involving multiple joints  M89.49    4. Decreased range of motion of right shoulder  M25.611        PLAN:    Once again she is aware of the injection itself that the results could be variable.  She actually is asking as well for injections of her left shoulder and bilateral knees which we did address that as well and certainly the concerns regarding her chronic arthritic conditions and that the injections will not cure the problem that this point the right shoulder may improve and that she may have increased mobility with ADLs in her apartment but will not be permanent.  She did not have any other questions.    Electronically signed by: Michael Duane Johnson, CNP

## 2021-06-15 NOTE — PROGRESS NOTES
[facility]    Facility:   [facility] [[id number]]   Code Status: UNKNOWN      CHIEF COMPLAINT/REASON FOR VISIT:  Chief Complaint   Patient presents with     Problem Visit     med mgmt, asked to see.        HISTORY:      HPI: Marina is a 86 y.o. female who I was asked to follow-up and revisit with secondary to a history of chronic pain or history of chronic pain syndrome along with her current medications as well as discussion of her right shoulder and right knee.  Regarding her right shoulder I did inject her right shoulder with cortisone back in December 2020 and she did get a good month plus of relief with the injection but she states that now starting to bother her once again.  She also has a chronic right knee which many years ago she did have a total knee arthroplasty.  She does try to ambulate with her four-wheel walker.  Along with the pain she does take Ultram 50 mg 3 times a day and also does have ibuprofen as needed as well as Tylenol but I did talk to her about scheduling an extra strength Tylenol 3 times a day with her tramadol to see if that will help her out with her pain.  I also talked about chronic osteoarthritic pain that she might get some success release to consult with a rheumatologist or perhaps she can go revisit the pain clinic.  Various pain patches are not covered which we have tried in the past.  Also she has used various over-the-counter creams and gels including IcyHot Aspercreme and Biofreeze.  They are temporary in their usefulness.  I also the chance to go over her other medications for hypertension as well as talk about nutrition and also talk about depression and anxiety secondary to having chronic pain and she is aware of Cymbalta.  We also talked about perhaps talking with a counselor.  I also then eventually called her cousin Danika who asked me to call her after the visit for follow-up.    Past Medical History:   Diagnosis Date     Age-related  physical debility      Anemia      Arthritis      Bladder infection 01/03/2015    dx'd- on antibiotics     Blood type AB-     with Anti-C     Breast cancer (H)      Bursitis, knee      Cancer (H) 2005    left breast     Colon cancer (H)      Depression      Gout      History of transfusion      Hypertension      Insomnia      Macular degeneration      Osteoarthritis of multiple joints      Osteopenia      Ovarian cyst      Rosacea      Vitamin D deficiency              Family History   Problem Relation Age of Onset     Squamous cell carcinoma Mother      Colon cancer Sister      Breast cancer Maternal Aunt      Bone cancer Maternal Aunt      Colon cancer Paternal Aunt      Breast cancer Cousin      Social History     Socioeconomic History     Marital status:      Spouse name: Not on file     Number of children: Not on file     Years of education: Not on file     Highest education level: Not on file   Occupational History     Not on file   Social Needs     Financial resource strain: Not on file     Food insecurity     Worry: Not on file     Inability: Not on file     Transportation needs     Medical: Not on file     Non-medical: Not on file   Tobacco Use     Smoking status: Never Smoker     Smokeless tobacco: Never Used   Substance and Sexual Activity     Alcohol use: No     Drug use: No     Sexual activity: Never   Lifestyle     Physical activity     Days per week: Not on file     Minutes per session: Not on file     Stress: Not on file   Relationships     Social connections     Talks on phone: Not on file     Gets together: Not on file     Attends [employer] service: Not on file     Active member of club or organization: Not on file     Attends meetings of clubs or organizations: Not on file     Relationship status: Not on file     Intimate partner violence     Fear of current or ex partner: Not on file     Emotionally abused: Not on file     Physically abused: Not on file     Forced sexual activity: Not on  file   Other Topics Concern     Not on file   Social History Narrative     Not on file         Review of Systems  Currently denies any new symptoms of fever chills fatigue cough or cold or flulike symptoms nausea vomiting diarrhea dysuria headaches rashes stiff neck swollen glands.    Current Outpatient Medications   Medication Sig     acetaminophen (TYLENOL) 650 MG CR tablet Take 650 mg by mouth 2 (two) times a day as needed for pain.     CHOLECALCIFEROL 25 mcg (1,000 unit) tablet Take 1 tablet by mouth 2 (two) times a day.     docoshexanoic acid-eicosapent 500 mg (FISH OIL) 500-100 mg cap capsule Take 1,400 mg by mouth daily.      docusate sodium (COLACE) 100 MG capsule Take 200 mg by mouth 2 (two) times a day.      ibuprofen (ADVIL,MOTRIN) 200 MG tablet Take 200 mg by mouth every 6 (six) hours as needed for pain.     lifitegrast (XIIDRA) 5 % Dpet Apply to eye 2 (two) times a day.     losartan (COZAAR) 25 MG tablet 50 mg every morning.      magnesium oxide (MAG-OX) 400 mg (241.3 mg magnesium) tablet Take 400 mg by mouth daily.     polyvinyl alcohol (LIQUIFILM TEARS) 1.4 % ophthalmic solution Administer 1-2 drops to both eyes 4 (four) times a day as needed for dry eyes.     spironolactone (ALDACTONE) 25 MG tablet Take 1 tablet (25 mg total) by mouth daily.     traMADoL (ULTRAM) 50 mg tablet Take 1 tablet (50 mg total) by mouth 3 (three) times a day.     VIT C/LUDY AC/LUT/COPPER/ZNOX (PRESERVISION LUTEIN ORAL) Take 1 tablet by mouth 2 (two) times a day.       There were no vitals filed for this visit.  Heart rate 82 respirations 18  Physical Exam  No acute distress.  Head is normocephalic.  Hearing aids.  Neck is supple without adenopathy.  Lung sounds are clear throughout.  Cardiovascular has an S1-S2 regular rate and rhythm.  No lower extremity edema.  Gastrointestinal nontender nondistended to musculoskeletal history of osteoarthritis to her major joints including a right knee replacement, chronic shoulder  degenerative joint disease. Psychiatric: Pleasant affect.    LABS:   Lab Results   Component Value Date    WBC 9.9 02/12/2020    HGB 11.5 (L) 02/12/2020    HCT 37.2 02/12/2020     (H) 02/12/2020     02/12/2020     Results for orders placed or performed in visit on 10/21/20   Basic Metabolic Panel   Result Value Ref Range    Sodium 138 136 - 145 mmol/L    Potassium 5.1 (H) 3.5 - 5.0 mmol/L    Chloride 103 98 - 107 mmol/L    CO2 26 22 - 31 mmol/L    Anion Gap, Calculation 9 5 - 18 mmol/L    Glucose 92 70 - 125 mg/dL    Calcium 9.0 8.5 - 10.5 mg/dL    BUN 13 8 - 28 mg/dL    Creatinine 0.69 0.60 - 1.10 mg/dL    GFR MDRD Af Amer >60 >60 mL/min/1.73m2    GFR MDRD Non Af Amer >60 >60 mL/min/1.73m2           ASSESSMENT:      ICD-10-CM    1. Osteoarthritis, unspecified osteoarthritis type, unspecified site  M19.90    2. Chronic pain of right knee  M25.561     G89.29    3. Chronic right shoulder pain  M25.511     G89.29    4. Chronic pain syndrome  G89.4        PLAN:    I did again discuss with her the injections and how they were can hold her temporary also she can see a rheumatologist she can also go to a pain clinic she can also try Cymbalta or we have been talked about perhaps going to long-term care if her need and goals have changed significantly and unable to completely care for herself.  She will think about some of these options and let me know her answer.    For documentation purposes total visit 45 minutes which were 50% initially was spent with the patient going over her care, medications, chronic pain, and nutrition various over-the-counter creams and gels, antidepressants, other specialists and the remainder the time was spent talking with her cousin sandra..    Electronically signed by: Michael Duane Johnson, CNP

## 2021-06-17 NOTE — PROGRESS NOTES
[facility]    Facility:   [facility] [[id number]]   Code Status: POLST AVAILABLE      CHIEF COMPLAINT/REASON FOR VISIT:  Chief Complaint   Patient presents with     P Care Coordination - Regulatory       HISTORY:      HPI: Marina is a 87 y.o. female who is being seen secondary to quarterly visit secondary to discuss chronic medical conditions as well as medication management.  She is happy to have the conversation today.  She does complain of her chronic pain.  In the past has been to the pain clinic.  She currently is being treated for pain with Tylenol scheduled and tramadol scheduled.  We again talked about her chronic pain and her pain does revolve around her shoulders and in particular her right knee.  She is still able to get from a seated position to a standing position and use her four-wheel walker to move about and to ambulate.  I did observe her do some ambulation today as well as sitting and standing.  We talked about her pain management at this point does not want to go back to the pain clinic.  Many months ago I did inject her shoulder secondary to arthritis and impingement which was effective.  We also talked about her complaints of foot pain and in looking at her and with her it does appear to have a neuropathy component so at this point we will add Lyrica 25 mg twice daily to see if that will help we can certainly increment the dose in the near future as well.  She is also due for a BMP her last was in November and see results below.  She is on spironolactone for diuretic and for blood pressure.  They check her blood pressures once a month.  She does have an automatic cuff but not able to use it.  Listening to her heart is regular no significant edema.  Her appetite is good.  She is also hard of hearing and does wear glasses.  I did go through the opportunity to go through each one of her medications.    Past Medical History:   Diagnosis Date     Age-related physical  debility      Anemia      Arthritis      Bladder infection 01/03/2015    dx'd- on antibiotics     Blood type AB-     with Anti-C     Breast cancer (H)      Bursitis, knee      Cancer (H) 2005    left breast     Colon cancer (H)      Depression      Gout      History of transfusion      Hypertension      Insomnia      Macular degeneration      Osteoarthritis of multiple joints      Osteopenia      Ovarian cyst      Rosacea      Vitamin D deficiency              Family History   Problem Relation Age of Onset     Squamous cell carcinoma Mother      Colon cancer Sister      Breast cancer Maternal Aunt      Bone cancer Maternal Aunt      Colon cancer Paternal Aunt      Breast cancer Cousin      Social History     Socioeconomic History     Marital status:      Spouse name: Not on file     Number of children: Not on file     Years of education: Not on file     Highest education level: Not on file   Occupational History     Not on file   Social Needs     Financial resource strain: Not on file     Food insecurity     Worry: Not on file     Inability: Not on file     Transportation needs     Medical: Not on file     Non-medical: Not on file   Tobacco Use     Smoking status: Never Smoker     Smokeless tobacco: Never Used   Substance and Sexual Activity     Alcohol use: No     Drug use: No     Sexual activity: Never   Lifestyle     Physical activity     Days per week: Not on file     Minutes per session: Not on file     Stress: Not on file   Relationships     Social connections     Talks on phone: Not on file     Gets together: Not on file     Attends Protestant service: Not on file     Active member of club or organization: Not on file     Attends meetings of clubs or organizations: Not on file     Relationship status: Not on file     Intimate partner violence     Fear of current or ex partner: Not on file     Emotionally abused: Not on file     Physically abused: Not on file     Forced sexual activity: Not on file    Other Topics Concern     Not on file   Social History Narrative     Not on file         Review of Systems  Currently denies any new symptoms of fever chills fatigue cough or cold or flulike symptoms nausea vomiting diarrhea dysuria headaches rashes stiff neck swollen glands.    Current Outpatient Medications   Medication Sig     pregabalin (LYRICA) 25 MG capsule Take 25 mg by mouth 2 (two) times a day.     acetaminophen (TYLENOL) 650 MG CR tablet Take 650 mg by mouth 2 (two) times a day as needed for pain.     CHOLECALCIFEROL 25 mcg (1,000 unit) tablet Take 1 tablet by mouth 2 (two) times a day.     docoshexanoic acid-eicosapent 500 mg (FISH OIL) 500-100 mg cap capsule Take 1,400 mg by mouth daily.      docusate sodium (COLACE) 100 MG capsule Take 200 mg by mouth 2 (two) times a day.      ibuprofen (ADVIL,MOTRIN) 200 MG tablet Take 200 mg by mouth every 6 (six) hours as needed for pain.     lifitegrast (XIIDRA) 5 % Dpet Apply to eye 2 (two) times a day.     losartan (COZAAR) 25 MG tablet 50 mg every morning.      magnesium oxide (MAG-OX) 400 mg (241.3 mg magnesium) tablet Take 400 mg by mouth daily.     polyvinyl alcohol (LIQUIFILM TEARS) 1.4 % ophthalmic solution Administer 1-2 drops to both eyes 4 (four) times a day as needed for dry eyes.     spironolactone (ALDACTONE) 25 MG tablet Take 1 tablet (25 mg total) by mouth daily.     traMADoL (ULTRAM) 50 mg tablet Take 1 tablet (50 mg total) by mouth 3 (three) times a day.     VIT C/LUDY AC/LUT/COPPER/ZNOX (PRESERVISION LUTEIN ORAL) Take 1 tablet by mouth 2 (two) times a day.       There were no vitals filed for this visit.  Her heart rate is 78 with respirations of 18.  Physical Exam  Pleasant female.  No acute distress.  Head is normocephalic.  Hearing aids.  Glasses.  Neck is supple without adenopathy.  Lung sounds are clear throughout.  Cardiovascular has an S1-S2 regular rate and rhythm.  No lower extremity edema.  Gastrointestinal nontender nondistended to  musculoskeletal history of osteoarthritis to her major joints including a right knee replacement, chronic shoulder degenerative joint disease. Psychiatric: Pleasant affect.     LABS:   Lab Results   Component Value Date    WBC 9.9 02/12/2020    HGB 11.5 (L) 02/12/2020    HCT 37.2 02/12/2020     (H) 02/12/2020     02/12/2020     Results for orders placed or performed in visit on 10/21/20   Basic Metabolic Panel   Result Value Ref Range    Sodium 138 136 - 145 mmol/L    Potassium 5.1 (H) 3.5 - 5.0 mmol/L    Chloride 103 98 - 107 mmol/L    CO2 26 22 - 31 mmol/L    Anion Gap, Calculation 9 5 - 18 mmol/L    Glucose 92 70 - 125 mg/dL    Calcium 9.0 8.5 - 10.5 mg/dL    BUN 13 8 - 28 mg/dL    Creatinine 0.69 0.60 - 1.10 mg/dL    GFR MDRD Af Amer >60 >60 mL/min/1.73m2    GFR MDRD Non Af Amer >60 >60 mL/min/1.73m2         Case Management:  I have reviewed the Assisted Living care plan, current immunizations and preventive care/cancer screening.. Future cancer screening is not clinically indicated secondary to age/goals of care.   Patient's desire to return to the community is present, but is not able due to care needs .    Information reviewed:  Medications, vital signs, orders, and nursing notes.    ASSESSMENT:      ICD-10-CM    1. Chronic pain syndrome  G89.4    2. Chronic pain of right knee  M25.561     G89.29    3. Essential hypertension  I10    4. Age-related physical debility  R54        PLAN:    There were no other staff available to communicate with today.  In the meantime we will get a BMP for tomorrow since the last was in October and she is on spironolactone plus we will be adding in Lyrica 25 mg twice daily.  We did talk about the medication itself as well as any potential side effects as well as the untoward reactions and therapeutic effects.  The hope is that she will have some improvement to her pain and neuropathy symptoms.  In the meantime we did talk about the automatic blood pressure cuff.   She also keep me updated for any other problems or concerns along the way.  She does see podiatry tomorrow.  She did not have any other questions.      Electronically signed by: Michael Duane Johnson, CNP

## 2021-06-19 NOTE — LETTER
Letter by Kiana Reyes CNP at      Author: Kiana Reyes CNP Service: -- Author Type: --    Filed:  Encounter Date: 11/8/2019 Status: Signed         Patient: Marina Neves   MR Number: [id number]   YOB: 1934   Date of Visit: 11/8/2019     [facility]    Facility:   [facility] [[id number]]   Code Status: POLST AVAILABLE      CHIEF COMPLAINT/REASON FOR VISIT:  Chief Complaint   Patient presents with   ? Review Of Multiple Medical Conditions       HISTORY:      HPI: Marina is a 85 y.o. female who is an assisted-living resident at [facility].  Marina  has a past medical history of Anemia, Arthritis, Bladder infection (01/03/2015), Blood type AB-, Breast cancer (H), Bursitis, knee, Cancer (H) (2005), Colon cancer (H), Depression, Gout, History of transfusion, Hypertension, Insomnia, Macular degeneration, Osteopenia, Ovarian cyst, Rosacea, and Vitamin D deficiency.    Today Ms. Neves is being evaluated for a review of multiple medical conditions.  Marina reported that diclofenac gel has not made any difference in her bilateral knee and shoulder pain described as intermittent, aching pain rated 5-6/10 today.  She continues to take Tylenol 1 g three times a day and tramadol 50 mg four times a day for pain management.  We discussed increasing her tramadol for chronic pain and she was agreeable to this plan with her cousin helping get her medications for her.  Ortho PA plan to inject cortisone in left shoulder for chronic pain today.  She also reported that she has been having intermittent, sharp right ear pain over that past 24 hours that has improved today.  She has a bilateral cerumen impaction and is agreeable to using Debrox drops over the next three days with irrigation prior to starting treatment for ear pain.  The patient denied lightheadedness, dizziness, breathing difficulty, chest pain, palpitations, constipation, urinary symptoms, numbness or  tingling in extremities, and pain.  Nursing staff will be starting services next week per report.    Past Medical History:   Diagnosis Date   ? Anemia    ? Arthritis    ? Bladder infection 01/03/2015    dx'd- on antibiotics   ? Blood type AB-     with Anti-C   ? Breast cancer (H)    ? Bursitis, knee    ? Cancer (H) 2005    left breast   ? Colon cancer (H)    ? Depression    ? Gout    ? History of transfusion    ? Hypertension    ? Insomnia    ? Macular degeneration    ? Osteopenia    ? Ovarian cyst    ? Rosacea    ? Vitamin D deficiency              Family History   Problem Relation Age of Onset   ? Squamous cell carcinoma Mother    ? Colon cancer Sister    ? Breast cancer Maternal Aunt    ? Bone cancer Maternal Aunt    ? Colon cancer Paternal Aunt    ? Breast cancer Cousin      Social History     Socioeconomic History   ? Marital status:      Spouse name: Not on file   ? Number of children: Not on file   ? Years of education: Not on file   ? Highest education level: Not on file   Occupational History   ? Not on file   Social Needs   ? Financial resource strain: Not on file   ? Food insecurity:     Worry: Not on file     Inability: Not on file   ? Transportation needs:     Medical: Not on file     Non-medical: Not on file   Tobacco Use   ? Smoking status: Never Smoker   ? Smokeless tobacco: Never Used   Substance and Sexual Activity   ? Alcohol use: No   ? Drug use: No   ? Sexual activity: Never   Lifestyle   ? Physical activity:     Days per week: Not on file     Minutes per session: Not on file   ? Stress: Not on file   Relationships   ? Social connections:     Talks on phone: Not on file     Gets together: Not on file     Attends Rastafarian service: Not on file     Active member of club or organization: Not on file     Attends meetings of clubs or organizations: Not on file     Relationship status: Not on file   ? Intimate partner violence:     Fear of current or ex partner: Not on file     Emotionally  abused: Not on file     Physically abused: Not on file     Forced sexual activity: Not on file   Other Topics Concern   ? Not on file   Social History Narrative   ? Not on file       REVIEW OF SYSTEM:  Pertinent items are noted in HPI.  A 12 point comprehensive review of systems was negative except as noted.    PHYSICAL EXAM:     General Appearance:    Alert, cooperative, no distress, appears stated age   Head:    Normocephalic, without obvious abnormality, atraumatic   Eyes:    PERRL, conjunctiva/corneas clear, both eyes; wears glasses   Ears:    Normal external ear canals, both ears; hearing impairment in bilateral ears with bilateral Has; bilateral cerumen impaction   Nose:   Nares normal, septum midline, mucosa normal, no drainage    or sinus tenderness   Throat:   Lips, mucosa, and tongue normal; teeth and gums normal   Neck:   Supple, symmetrical, trachea midline, no adenopathy;     thyroid:  no enlargement/tenderness/nodules; no carotid    bruit or JVD   Back:     Symmetric, no curvature, ROM normal, no CVA tenderness   Lungs:     Clear to auscultation bilaterally, respirations unlabored   Chest Wall:    No tenderness or deformity    Heart:    Regular rate and rhythm, S1 and S2 normal, no murmur, rub   or gallop   Abdomen:     Soft, non-tender, bowel sounds active all four quadrants,     no masses, no organomegaly   Extremities:   Extremities normal, atraumatic, no cyanosis or edema   Pulses:   2+ and symmetric all extremities   Skin:   Skin color, texture, turgor normal, no rashes or lesions   Neurologic:   Oriented to person and situation; forgetful at times; exhibits tangetial thought processes; generalized weakness; unsteady gait; ambulatory with walker           LABS:   Lab Results   Component Value Date    WBC 7.8 05/21/2019    HGB 12.5 05/21/2019    HCT 39.0 05/21/2019     05/21/2019    CHOL 194 03/11/2019    TRIG 198 (H) 03/11/2019    HDL 52 03/11/2019    ALT 18 05/21/2019    AST 20 05/21/2019      09/17/2019    K 4.9 09/17/2019     09/17/2019    CREATININE 0.77 09/17/2019    BUN 14 09/17/2019    CO2 25 09/17/2019    TSH 1.21 02/13/2018    INR 0.94 05/21/2019        ASSESSMENT:      ICD-10-CM    1. Chronic pain of right knee M25.561     G89.29    2. Chronic pain syndrome G89.4    3. Bilateral impacted cerumen H61.23    4. Right ear pain H92.01        PLAN:      Chronic pain  -DISCONTINUE diclofenac 1% gel apply 2 g to bilateral knees and bilateral shoulders three times a day   -Continue Tylenol 625 mg as prescribed  -INCREASE tramadol from 50 mg four times a day PRN to 100 mg three times a day PRN  -Encouraged patient to utilize integrative therapies such as distraction and deep breathing for pain management  -Notify provider if patient has new or worsening pain   -Follow-up appointment with PCP on 11/11/19    Cerumen impaction/Right ear pain  -START Debrox 5 gtts daily x 3 days  -Nursing staff to irrigate bilateral ears on 11/11/19 AM  -Notify provider if patient has s/s of systemic infection including fever, chills, night sweats     Otherwise continue current care plan for all other chronic medical conditions, as they are stable. Encouraged patient to engage in healthy lifestyle behaviors such as engaging in social activities, exercising (PT/OT), eating well, and following care plan. Follow up for routine check-up, or sooner if needed. Will continue to monitor patient and work with nursing staff collaboratively to work toward positive patient outcomes.     Electronically signed by: Kiana Reyes CNP

## 2021-06-19 NOTE — LETTER
Letter by Kiana Reyes CNP at      Author: Kiana Reyes CNP Service: -- Author Type: --    Filed:  Encounter Date: 10/31/2019 Status: Signed         Patient: Marina Neves   MR Number: [id number]   YOB: 1934   Date of Visit: 10/31/2019     [facility]    Facility:   [facility] [[id number]]   Code Status: POLST AVAILABLE      CHIEF COMPLAINT/REASON FOR VISIT:  Chief Complaint   Patient presents with   ? Problem Visit     pain management       HISTORY:      HPI: Marina is a 85 y.o. female who is an assisted-living resident at [facility].  Marina  has a past medical history of Anemia, Arthritis, Bladder infection (01/03/2015), Blood type AB-, Breast cancer (H), Bursitis, knee, Cancer (H) (2005), Colon cancer (H), Depression, Gout, History of transfusion, Hypertension, Insomnia, Macular degeneration, Osteopenia, Ovarian cyst, Rosacea, and Vitamin D deficiency.    Today Ms. Neves is being evaluated for chronic pain.  Marina completed therapy with [facility] on 10/28/19.  She refused to buy the knee brace that was recommended by physical therapy for knee pain because it had metal in it and she did not like that style.  We discussed other options for braces and wraps and she did not want to try any at this time.  She did not start the diclofenac gel that was ordered at her last visit because she was nervous about side effects listed on the box and could not remember how to get a hold of her provider for further information.  She continues to rate her chronic bilateral knee and shoulder pain at 5-6/10 using Tylenol and tramadol for management of intermittent, aching pain.  Her cousin and his wife who are her next of kin were also at this visit to discuss advanced care planning.  We discussed her most recent SLUMS score as 13/30 indicative of dementia.  Her family and Marina do not feel that she has any memory impairment.  It was recommended  that she undergo further neuropsychiatric testing to determine her decision-making capacity and they declined at this time.  Her cousin and his wife will be leaving for [place] in the next couple weeks and it was recommended that Marina sign onto nursing services at her AL to have someone check in on her daily while they are out of state and they agreed to this plan.  She said that she would like full treatment for all health conditions at this time and would like to be hospitalized for anything.  The patient denied lightheadedness, dizziness, breathing difficulty, chest pain, palpitations, constipation, urinary symptoms, and numbness or tingling in extremities.    Past Medical History:   Diagnosis Date   ? Anemia    ? Arthritis    ? Bladder infection 01/03/2015    dx'd- on antibiotics   ? Blood type AB-     with Anti-C   ? Breast cancer (H)    ? Bursitis, knee    ? Cancer (H) 2005    left breast   ? Colon cancer (H)    ? Depression    ? Gout    ? History of transfusion    ? Hypertension    ? Insomnia    ? Macular degeneration    ? Osteopenia    ? Ovarian cyst    ? Rosacea    ? Vitamin D deficiency              Family History   Problem Relation Age of Onset   ? Squamous cell carcinoma Mother    ? Colon cancer Sister    ? Breast cancer Maternal Aunt    ? Bone cancer Maternal Aunt    ? Colon cancer Paternal Aunt    ? Breast cancer Cousin      Social History     Socioeconomic History   ? Marital status:      Spouse name: None   ? Number of children: None   ? Years of education: None   ? Highest education level: None   Occupational History   ? None   Social Needs   ? Financial resource strain: None   ? Food insecurity:     Worry: None     Inability: None   ? Transportation needs:     Medical: None     Non-medical: None   Tobacco Use   ? Smoking status: Never Smoker   ? Smokeless tobacco: Never Used   Substance and Sexual Activity   ? Alcohol use: No   ? Drug use: No   ? Sexual activity: Never   Lifestyle   ?  Physical activity:     Days per week: None     Minutes per session: None   ? Stress: None   Relationships   ? Social connections:     Talks on phone: None     Gets together: None     Attends Anabaptist service: None     Active member of club or organization: None     Attends meetings of clubs or organizations: None     Relationship status: None   ? Intimate partner violence:     Fear of current or ex partner: None     Emotionally abused: None     Physically abused: None     Forced sexual activity: None   Other Topics Concern   ? None   Social History Narrative   ? None       REVIEW OF SYSTEM:  Pertinent items are noted in HPI.  A 12 point comprehensive review of systems was negative except as noted.    PHYSICAL EXAM:     General Appearance:    Alert, cooperative, no distress, appears stated age   Head:    Normocephalic, without obvious abnormality, atraumatic   Eyes:    PERRL, conjunctiva/corneas clear, both eyes; wears glasses   Ears:    Normal external ear canals, both ears; hearing impairment in bilateral ears with bilateral HAs   Nose:   Nares normal, septum midline, mucosa normal, no drainage    or sinus tenderness   Throat:   Lips, mucosa, and tongue normal; teeth and gums normal   Neck:   Supple, symmetrical, trachea midline, no adenopathy;     thyroid:  no enlargement/tenderness/nodules; no carotid    bruit or JVD   Back:     Symmetric, no curvature, ROM normal, no CVA tenderness   Lungs:     Clear to auscultation bilaterally, respirations unlabored   Chest Wall:    No tenderness or deformity    Heart:    Regular rate and rhythm, S1 and S2 normal, no murmur, rub   or gallop   Abdomen:     Soft, non-tender, bowel sounds active all four quadrants,     no masses, no organomegaly   Extremities:   Extremities normal, atraumatic, no cyanosis or edema   Pulses:   2+ and symmetric all extremities   Skin:   Skin color, texture, turgor normal, no rashes or lesions   Neurologic:   Oriented to person and situation;  forgetful at times; exhibits tangetial thought processes; generalized weakness; unsteady gait; ambulatory with walker           LABS:   Lab Results   Component Value Date    WBC 7.8 05/21/2019    HGB 12.5 05/21/2019    HCT 39.0 05/21/2019     05/21/2019    CHOL 194 03/11/2019    TRIG 198 (H) 03/11/2019    HDL 52 03/11/2019    ALT 18 05/21/2019    AST 20 05/21/2019     09/17/2019    K 4.9 09/17/2019     09/17/2019    CREATININE 0.77 09/17/2019    BUN 14 09/17/2019    CO2 25 09/17/2019    TSH 1.21 02/13/2018    INR 0.94 05/21/2019        ASSESSMENT:      ICD-10-CM    1. Chronic pain of right knee M25.561     G89.29    2. Chronic pain syndrome G89.4    3. Primary osteoarthritis involving multiple joints M15.0    4. Advance care planning Z71.89        PLAN:      Chronic pain  -START diclofenac 1% gel apply 2 g to bilateral knees and bilateral shoulders three times a day   -Continue Tylenol 625 mg as prescribed  -Continue tramadol 50 mg four times a day PRN  -Encouraged patient to utilize integrative therapies such as distraction and deep breathing for pain management  -Notify provider if patient has new or worsening pain   -Follow-up appointment with PCP as needed    Advanced care planning  -Confirmed POLST with patient and her next of kin    Otherwise continue current care plan for all other chronic medical conditions, as they are stable. Encouraged patient to engage in healthy lifestyle behaviors such as engaging in social activities, exercising (PT/OT), eating well, and following care plan. Follow up for routine check-up, or sooner if needed. Will continue to monitor patient and work with nursing staff collaboratively to work toward positive patient outcomes.     Total floor/unit time was 60 minutes and 60 minutes was spent in counseling patient on pain management and advanced care planning and coordination of care with her next of kin.    Electronically signed by: Kiana Reyes, CNP

## 2021-06-19 NOTE — LETTER
"Letter by DEBRA Cano PA-C at      Author: DEBRA Cano PA-C Service: -- Author Type: --    Filed:  Encounter Date: 11/8/2019 Status: Signed         Patient: Marina Neves   MR Number: [id number]   YOB: 1934   Date of Visit: 11/8/2019     [facility]    Facility:   [facility] [[id number]]   Code Status:   PCP: Kiana Reyes CNP   Phone: [phone]   Fax: [phone]  Patient seen on-site for C/O left shoulder pain, also has had Right shoulder pain, Right knee pain. Both R Shoulder and Right knee has been replaced; Patient lives in [facility]; using Walker. For ADL's      Exam : Today, seated, allows PROM to both UE; able to raise both right and Left shoulders above shoulder height;   Left Shoulder AROM 90 FE/ ER 30/ IR hip/ Abduction 45/ Has given me hs of joints replaced , however does not remember time, place, surgeon, or hospital.I have discussed reason for visit; with patient and friends attending today's visit.   Patient stated \" my shoulder feel's better today, and provides my with topical ointment that she has been using; BIOFREEZE;    I have offered a steroid injection: Patient would prefer to wait :    Assessment/Plan:     Will continue to follow. PRN for left shoulder pain    Subjective:       Current Outpatient Medications   Medication Sig Dispense Refill   ? acetaminophen (TYLENOL) 650 MG CR tablet Take 650 mg by mouth 2 (two) times a day as needed for pain.     ? anastrozole (ARIMIDEX) 1 mg tablet Take 1 tablet (1 mg total) by mouth daily. 90 tablet 3   ? aspirin 81 MG EC tablet Take 81 mg by mouth daily.     ? atenolol (TENORMIN) 100 MG tablet Take 100 mg by mouth bedtime.      ? calcium carbonate-vitamin D2 500 mg(1,250mg) -200 unit tablet Take 1 tablet by mouth 2 (two) times a day.     ? cholecalciferol, vitamin D3, 1,000 unit tablet Take 2,000 Units by mouth daily.      ? diclofenac sodium (VOLTAREN) 1 % Gel Apply 2g to bilateral knees and " shoulders three times daily as needed for pain. 1 Tube 0   ? docoshexanoic acid-eicosapent 500 mg (FISH OIL) 500-100 mg cap capsule Take 1,400 mg by mouth daily.      ? docusate sodium (COLACE) 100 MG capsule Take 100 mg by mouth 2 (two) times a day as needed for constipation.     ? loratadine (CLARITIN) 10 mg tablet Take 10 mg by mouth daily as needed for allergies.     ? losartan (COZAAR) 25 MG tablet 50 mg every morning.      ? multivitamin therapeutic (THERAGRAN) tablet Take 1 tablet by mouth daily.     ? polyvinyl alcohol (LIQUIFILM TEARS) 1.4 % ophthalmic solution Administer 1-2 drops to both eyes 4 (four) times a day as needed for dry eyes. 15 mL 0   ? ranitidine (ZANTAC) 150 MG tablet Take 150 mg by mouth 2 (two) times a day as needed (upset stomach).      ? spironolactone (ALDACTONE) 25 MG tablet Take 1 tablet (25 mg total) by mouth daily. 30 tablet 11   ? traMADol (ULTRAM) 50 mg tablet Take 2 tablets (100 mg total) by mouth 3 (three) times a day as needed. 180 tablet 0   ? VIT C/LUDY AC/LUT/COPPER/ZNOX (PRESERVISION LUTEIN ORAL) Take 1 tablet by mouth 2 (two) times a day.       No current facility-administered medications for this visit.        Allergies   Allergen Reactions   ? Blood-Group Specific Substance Other (See Comments)     Anti-D and Anti-C present. A delay in compatible RBC's may occur.   ? Celebrex [Celecoxib] Unknown     Avoids due to sulfa allergy   ? Lisinopril Cough   ? Tetanus Vaccines And Toxoid Other (See Comments)     High fever (temp 104), arm swelling, fever blisters   ? Piroxicam Rash   ? Sulfa (Sulfonamide Antibiotics) Rash       Immunization History   Administered Date(s) Administered   ? Influenza [id number], 12/07/2009   ? Influenza, inj, historic,unspecified 10/28/2006, 10/20/2007, 10/25/2008, 09/09/2009, 09/22/2010, 08/25/2011, 09/28/2012, 10/15/2013, 10/08/2014, 10/05/2016   ? Pneumo Conj 13-V (2010&after) 10/08/2014   ? Pneumo Polysac 23-V 08/25/1999, 11/18/2010, 08/25/2011   ?  Td,adult,historic,unspecified 07/24/1998, 11/03/2008   ? Tdap 07/24/1998, 11/03/2008   ? ZOSTER, LIVE 04/02/2009       Patient Active Problem List   Diagnosis   ? Malignant neoplasm of colon, unspecified site   ? HTN (hypertension)   ? S/P right colectomy   ? Nutritional deficiency   ? Dry eyes   ? GERD (gastroesophageal reflux disease)   ? Constipation   ? Allergy   ? Breast cancer of upper-outer quadrant of right female breast (H)   ? Colitis   ? Abdominal pain in female patient   ? Osteopenia   ? Inflammatory arthritis   ? Chronic pain syndrome   ? Chronic pain of right knee   ? Patellar bursitis, right       Past Medical History:   Diagnosis Date   ? Anemia    ? Arthritis    ? Bladder infection 01/03/2015    dx'd- on antibiotics   ? Blood type AB-     with Anti-C   ? Breast cancer (H)    ? Bursitis, knee    ? Cancer (H) 2005    left breast   ? Colon cancer (H)    ? Depression    ? Gout    ? History of transfusion    ? Hypertension    ? Insomnia    ? Macular degeneration    ? Osteopenia    ? Ovarian cyst    ? Rosacea    ? Vitamin D deficiency        Social History     Socioeconomic History   ? Marital status:      Spouse name: Not on file   ? Number of children: Not on file   ? Years of education: Not on file   ? Highest education level: Not on file   Occupational History   ? Not on file   Social Needs   ? Financial resource strain: Not on file   ? Food insecurity:     Worry: Not on file     Inability: Not on file   ? Transportation needs:     Medical: Not on file     Non-medical: Not on file   Tobacco Use   ? Smoking status: Never Smoker   ? Smokeless tobacco: Never Used   Substance and Sexual Activity   ? Alcohol use: No   ? Drug use: No   ? Sexual activity: Never   Lifestyle   ? Physical activity:     Days per week: Not on file     Minutes per session: Not on file   ? Stress: Not on file   Relationships   ? Social connections:     Talks on phone: Not on file     Gets together: Not on file     Attends  Baptist service: Not on file     Active member of club or organization: Not on file     Attends meetings of clubs or organizations: Not on file     Relationship status: Not on file   ? Intimate partner violence:     Fear of current or ex partner: Not on file     Emotionally abused: Not on file     Physically abused: Not on file     Forced sexual activity: Not on file   Other Topics Concern   ? Not on file   Social History Narrative   ? Not on file               Review of Systems            Objective:          Electronically signed by: DEBRA Cano PA-C

## 2021-06-19 NOTE — LETTER
Letter by Kiana Reyes CNP at      Author: Kiana Reyes CNP Service: -- Author Type: --    Filed:  Encounter Date: 9/16/2019 Status: Signed         Patient: Marina Neves   MR Number: [id number]   YOB: 1934   Date of Visit: 9/16/2019     [facility]    Facility:   [facility] [[id number]]   Code Status: POLST AVAILABLE      CHIEF COMPLAINT/REASON FOR VISIT:  Chief Complaint   Patient presents with   ? Review Of Multiple Medical Conditions       HISTORY:      HPI: Marina is a 85 y.o. female who is a long-term care resident at [facility].  Marina  has a past medical history of Anemia, Arthritis, Bladder infection (01/03/2015), Blood type AB-, Breast cancer (H), Bursitis, knee, Cancer (H) (2005), Colon cancer (H), Depression, Gout, History of transfusion, Hypertension, Insomnia, Macular degeneration, Osteopenia, Ovarian cyst, Rosacea, and Vitamin D deficiency.    Today Ms. Neves is being evaluated for a review of multiple medical conditions.  She said that she has lived in [place] her whole life and does not know exactly how many years she's lived in the [facility] but it has been quite awhile.  She has a niece nearby that helps her with getting her groceries and picking up her prescriptions.  She has a cousin also nearby that helps bring her to appointments during the warm months and lives in [place] the rest of the year.  She denies any specific concerns at this visit.  We reviewed her medications and their uses and she was very knowlegeble about timing, use, and dosages.  She manages her medications on her own and does not have any nursing services through [facility] at this time.  She did request that she has physical and occupational therapy for bilateral shoulder pain r/t osteoarthritis.  She said that she has been taking Tylenol three times a day for pain daily with moderate relief.  We discussed using low-dose prednisone to treat her  arthritis for further pain management and she was open to trying this for pain relief.  Her blood pressure has been well-controlled on her current antihypertensive medications with -140s over the past year.  She monitors her blood pressure monthly at her apartment health screenings.  She enjoys her group of friends in the apartment building and goes to many of the [place] activities.  She said that she used to enjoy reading but her vision has deteriorated to the point where it is very frustrating for her to read.  The patient denied lightheadedness, dizziness, breathing difficulty, chest pain, palpitations, constipation, urinary symptoms, numbness or tingling in extremities, and pain.  She did not want any family members updated on her status at this time as she is her own decision-maker.    Past Medical History:   Diagnosis Date   ? Anemia    ? Arthritis    ? Bladder infection 01/03/2015    dx'd- on antibiotics   ? Blood type AB-     with Anti-C   ? Breast cancer (H)    ? Bursitis, knee    ? Cancer (H) 2005    left breast   ? Colon cancer (H)    ? Depression    ? Gout    ? History of transfusion    ? Hypertension    ? Insomnia    ? Macular degeneration    ? Osteopenia    ? Ovarian cyst    ? Rosacea    ? Vitamin D deficiency              Family History   Problem Relation Age of Onset   ? Squamous cell carcinoma Mother    ? Colon cancer Sister    ? Breast cancer Maternal Aunt    ? Bone cancer Maternal Aunt    ? Colon cancer Paternal Aunt    ? Breast cancer Cousin      Social History     Socioeconomic History   ? Marital status:      Spouse name: None   ? Number of children: None   ? Years of education: None   ? Highest education level: None   Occupational History   ? None   Social Needs   ? Financial resource strain: None   ? Food insecurity:     Worry: None     Inability: None   ? Transportation needs:     Medical: None     Non-medical: None   Tobacco Use   ? Smoking status: Never Smoker   ?  Smokeless tobacco: Never Used   Substance and Sexual Activity   ? Alcohol use: No   ? Drug use: No   ? Sexual activity: Never   Lifestyle   ? Physical activity:     Days per week: None     Minutes per session: None   ? Stress: None   Relationships   ? Social connections:     Talks on phone: None     Gets together: None     Attends Christianity service: None     Active member of club or organization: None     Attends meetings of clubs or organizations: None     Relationship status: None   ? Intimate partner violence:     Fear of current or ex partner: None     Emotionally abused: None     Physically abused: None     Forced sexual activity: None   Other Topics Concern   ? None   Social History Narrative   ? None       REVIEW OF SYSTEM:  Pertinent items are noted in HPI.  A 12 point comprehensive review of systems was negative except as noted.    PHYSICAL EXAM:     General Appearance:    Alert, cooperative, no distress, appears stated age   Head:    Normocephalic, without obvious abnormality, atraumatic   Eyes:    PERRL, conjunctiva/corneas clear, both eyes; wears glasses   Ears:    Normal external ear canals, both ears   Nose:   Nares normal, septum midline, mucosa normal, no drainage    or sinus tenderness   Throat:   Lips, mucosa, and tongue normal; teeth and gums normal   Neck:   Supple, symmetrical, trachea midline, no adenopathy;     thyroid:  no enlargement/tenderness/nodules; no carotid    bruit or JVD   Back:     Symmetric, no curvature, ROM normal, no CVA tenderness   Lungs:     Clear to auscultation bilaterally, respirations unlabored   Chest Wall:    No tenderness or deformity    Heart:    Regular rate and rhythm, S1 and S2 normal, no murmur, rub   or gallop   Abdomen:     Soft, non-tender, bowel sounds active all four quadrants,     no masses, no organomegaly   Extremities:   Extremities normal, atraumatic, no cyanosis or edema   Pulses:   2+ and symmetric all extremities   Skin:   Skin color, texture, turgor  normal, no rashes or lesions   Neurologic:   Oriented to person, place, time, and situation; exhibits logical thought processes; generalized weakness; ambulatory with walker           LABS:     None ordered at this visit.    ASSESSMENT:      ICD-10-CM    1. Inflammatory arthritis M19.90    2. Essential hypertension I10    3. Gastroesophageal reflux disease without esophagitis K21.9    4. Chronic idiopathic constipation K59.04    5. Nutritional deficiency E63.9    6. Chronic pain syndrome G89.4    7. Physical debility R53.81        PLAN:      START prednisone 10 mg daily for inflammatory arthritis with follow-up in 1-2 weeks.  Consult to [facility] PT/OT for physical debility.  Otherwise continue current care plan for all other chronic medical conditions, as they are stable. Encouraged patient to engage in healthy lifestyle behaviors such as engaging in social activities, exercising (PT/OT), eating well, and following care plan. Follow up for routine check-up, or sooner if needed. Will continue to monitor patient and work with nursing staff collaboratively to work toward positive patient outcomes.     Electronically signed by: Kiana Reyes CNP     Total floor/unit time was 90 minutes and 65 minutes was spent in counseling patient on pain management, bowel management, antihypertensive management, and acute rehabilitation for physical debility and coordination of care with home health PT/OT.

## 2021-06-19 NOTE — LETTER
Letter by Kiana Reyes CNP at      Author: Kiana Reyes CNP Service: -- Author Type: --    Filed:  Encounter Date: 11/15/2019 Status: Signed         Patient: Marina Neves   MR Number: [id number]   YOB: 1934   Date of Visit: 11/15/2019     [facility]    Facility:   [facility] [[id number]]   Code Status: POLST AVAILABLE      CHIEF COMPLAINT/REASON FOR VISIT:  Chief Complaint   Patient presents with   ? Problem Visit     pain management       HISTORY:      HPI: Marina is a 85 y.o. female who is an assisted-living resident at [facility].  Marina  has a past medical history of Anemia, Arthritis, Bladder infection (01/03/2015), Blood type AB-, Breast cancer (H), Bursitis, knee, Cancer (H) (2005), Colon cancer (H), Depression, Gout, History of transfusion, Hypertension, Insomnia, Macular degeneration, Osteopenia, Ovarian cyst, Rosacea, and Vitamin D deficiency.    Today Ms. Neves is being evaluated for pain management.  She was transitioned from tramadol to oxycodone earlier this week as she denied pain relief with tramadol therapy.  Marina feels that he pain has been unbearable over the past two days with aching, constant pain rated 8-9/10.  She said that this has not affected her functional ability at all and she still continues to participate in facility activities and perform all of her ADLs independently.  She said that she cannot rest as that is too boring for her.  We discussed that she could choose to get the right knee brace that PT recommended for pain management with ambulation and she continues to refuse it at this time.  We discussed further pain evaluation of knee pain looking at inflammatory markers next week and she was agreeable to this plan.  She has ibuprofen available at her apartment today and we discussed taking it for 1-2 weeks to manage her acute increase in pain with pain goal 2-3/10.  She also continues to use Tylenol  arthritis and Biofreeze with moderate short-acting pain relief at this time.  We discussed her going out to orthopedic provider for further evaluation of her chronic pain and she said that she does not want to do this as they have told her in the past that she would be a poor surgical candidate due to her multiple comorbidities. We discussed adding Cymbalta as a pain adjuvant for chronic pain at this time and she was agreeable to this plan with anticipated benefit after 2-4 weeks.  Plan discussed for follow-up visit on 11/21/19 at 1100 for evaluation of pain regimen.  The patient denied lightheadedness, dizziness, breathing difficulty, chest pain, palpitations, constipation, urinary symptoms, and numbness or tingling in extremities.     Past Medical History:   Diagnosis Date   ? Anemia    ? Arthritis    ? Bladder infection 01/03/2015    dx'd- on antibiotics   ? Blood type AB-     with Anti-C   ? Breast cancer (H)    ? Bursitis, knee    ? Cancer (H) 2005    left breast   ? Colon cancer (H)    ? Depression    ? Gout    ? History of transfusion    ? Hypertension    ? Insomnia    ? Macular degeneration    ? Osteopenia    ? Ovarian cyst    ? Rosacea    ? Vitamin D deficiency              Family History   Problem Relation Age of Onset   ? Squamous cell carcinoma Mother    ? Colon cancer Sister    ? Breast cancer Maternal Aunt    ? Bone cancer Maternal Aunt    ? Colon cancer Paternal Aunt    ? Breast cancer Cousin      Social History     Socioeconomic History   ? Marital status:      Spouse name: None   ? Number of children: None   ? Years of education: None   ? Highest education level: None   Occupational History   ? None   Social Needs   ? Financial resource strain: None   ? Food insecurity:     Worry: None     Inability: None   ? Transportation needs:     Medical: None     Non-medical: None   Tobacco Use   ? Smoking status: Never Smoker   ? Smokeless tobacco: Never Used   Substance and Sexual Activity   ? Alcohol  use: No   ? Drug use: No   ? Sexual activity: Never   Lifestyle   ? Physical activity:     Days per week: None     Minutes per session: None   ? Stress: None   Relationships   ? Social connections:     Talks on phone: None     Gets together: None     Attends Spiritism service: None     Active member of club or organization: None     Attends meetings of clubs or organizations: None     Relationship status: None   ? Intimate partner violence:     Fear of current or ex partner: None     Emotionally abused: None     Physically abused: None     Forced sexual activity: None   Other Topics Concern   ? None   Social History Narrative   ? None       REVIEW OF SYSTEM:  Pertinent items are noted in HPI.  A 12 point comprehensive review of systems was negative except as noted.    PHYSICAL EXAM:     General Appearance:    Alert, cooperative, no distress, appears stated age   Head:    Normocephalic, without obvious abnormality, atraumatic   Eyes:    PERRL, conjunctiva/corneas clear, both eyes; wears glasses   Ears:    Normal external ear canals, both ears; hearing impairment in bilateral ears with bilateral Has; bilateral cerumen impaction   Nose:   Nares normal, septum midline, mucosa normal, no drainage    or sinus tenderness   Throat:   Lips, mucosa, and tongue normal; teeth and gums normal   Neck:   Supple, symmetrical, trachea midline, no adenopathy;     thyroid:  no enlargement/tenderness/nodules; no carotid    bruit or JVD   Back:     Symmetric, no curvature, ROM normal, no CVA tenderness   Lungs:     Clear to auscultation bilaterally, respirations unlabored   Chest Wall:    No tenderness or deformity    Heart:    Regular rate and rhythm, S1 and S2 normal, no murmur, rub   or gallop   Abdomen:     Soft, non-tender, bowel sounds active all four quadrants,     no masses, no organomegaly   Extremities:   Extremities normal, atraumatic, no cyanosis or edema; point tenderness over lower right patella   Pulses:   2+ and  symmetric all extremities   Skin:   Skin color, texture, turgor normal, no rashes or lesions   Neurologic:   Oriented to person and situation; forgetful at times; exhibits tangetial thought processes; generalized weakness; ambulatory with walker           LABS:   Lab Results   Component Value Date    WBC 8.2 11/18/2019    HGB 11.7 (L) 11/18/2019    HCT 36.7 11/18/2019     11/18/2019    CHOL 194 03/11/2019    TRIG 198 (H) 03/11/2019    HDL 52 03/11/2019    ALT 17 11/18/2019    AST 17 11/18/2019     11/18/2019    K 4.6 11/18/2019     11/18/2019    CREATININE 0.79 11/18/2019    BUN 18 11/18/2019    CO2 23 11/18/2019    TSH 1.21 02/13/2018    INR 0.94 05/21/2019        ASSESSMENT:      ICD-10-CM    1. Chronic pain of right knee M25.561     G89.29    2. Chronic pain syndrome G89.4        PLAN:      Chronic pain  -Check CMP, CBC, Uric acid level, ESR, and CRP on 11/18/19  -START ibuprofen 200 mg three times a day  -START Cymbalta 20 mg daily   -Continue oxycodone 2.5 mg three times a day for pain  -DISCONTINUE diclofenac 1% gel apply 2 g to bilateral knees and bilateral shoulders three times a day   -Continue Tylenol 625 mg as prescribed  -DISCONTINUE tramadol 100 mg three times a day PRN  -Encouraged patient to utilize integrative therapies such as distraction and deep breathing for pain management  -Notify provider if patient has new or worsening pain   -Follow-up appointment with PCP on 11/21/19    Otherwise continue current care plan for all other chronic medical conditions, as they are stable. Encouraged patient to engage in healthy lifestyle behaviors such as engaging in social activities, exercising (PT/OT), eating well, and following care plan. Follow up for routine check-up, or sooner if needed. Will continue to monitor patient and work with nursing staff collaboratively to work toward positive patient outcomes.     Total floor/unit time was 60 minutes and 55 minutes was spent in counseling  patient on pain management and follow-up plan and coordination of care with patient and family.    Electronically signed by: Kiana Reyes CNP

## 2021-06-19 NOTE — LETTER
Letter by Kiana Reyes CNP at      Author: Kiana Reyes CNP Service: -- Author Type: --    Filed:  Encounter Date: 10/23/2019 Status: Signed         Patient: Marina Neves   MR Number: [id number]   YOB: 1934   Date of Visit: 10/23/2019     [facility]    Facility:   [facility] [[id number]]   Code Status: POLST AVAILABLE      CHIEF COMPLAINT/REASON FOR VISIT:  Chief Complaint   Patient presents with   ? Problem Visit     osteoarthritis       HISTORY:      HPI: Marina is a 85 y.o. female who is an assisted-living resident at [facility].  Marina  has a past medical history of Anemia, Arthritis, Bladder infection (01/03/2015), Blood type AB-, Breast cancer (H), Bursitis, knee, Cancer (H) (2005), Colon cancer (H), Depression, Gout, History of transfusion, Hypertension, Insomnia, Macular degeneration, Osteopenia, Ovarian cyst, Rosacea, and Vitamin D deficiency.    Today Ms. Neves is being evaluated for chronic pain.  We discussed her x-ray results of bilateral shoulders and bilateral knees done three weeks ago that she was given the results of three weeks ago and does not recall the phone conversation.  She continues to work with [facility] PT for bilateral knee and shoulder pain described as aching, intermittent pain rated 5-6/10.  She is currently taking Tylenol arthritis three times a day and tramadol four times a day PRN for pain with mild pain relief.  She asked that she get another medication to alleviate her pain at this time.  We discussed steroid injection after completion of PT and she was agreeable to this.  She has not had any functional limitations with her osteoarthritic pain with independent ADLs at this time.  We discussed meeting with her cousin to fill out her POLST as she is having increased memory impairment with last SLUMS 13 in August 2019 and she needs more assistance remembering information from healthcare visits.  Plan to  meet for follow-up appointment on 10/31/19.  Her insurance has not approved her knee brace recommended by PT yet at this time.  The patient denied lightheadedness, dizziness, breathing difficulty, chest pain, palpitations, constipation, urinary symptoms, and numbness or tingling in extremities.     Past Medical History:   Diagnosis Date   ? Anemia    ? Arthritis    ? Bladder infection 01/03/2015    dx'd- on antibiotics   ? Blood type AB-     with Anti-C   ? Breast cancer (H)    ? Bursitis, knee    ? Cancer (H) 2005    left breast   ? Colon cancer (H)    ? Depression    ? Gout    ? History of transfusion    ? Hypertension    ? Insomnia    ? Macular degeneration    ? Osteopenia    ? Ovarian cyst    ? Rosacea    ? Vitamin D deficiency              Family History   Problem Relation Age of Onset   ? Squamous cell carcinoma Mother    ? Colon cancer Sister    ? Breast cancer Maternal Aunt    ? Bone cancer Maternal Aunt    ? Colon cancer Paternal Aunt    ? Breast cancer Cousin      Social History     Socioeconomic History   ? Marital status:      Spouse name: Not on file   ? Number of children: Not on file   ? Years of education: Not on file   ? Highest education level: Not on file   Occupational History   ? Not on file   Social Needs   ? Financial resource strain: Not on file   ? Food insecurity:     Worry: Not on file     Inability: Not on file   ? Transportation needs:     Medical: Not on file     Non-medical: Not on file   Tobacco Use   ? Smoking status: Never Smoker   ? Smokeless tobacco: Never Used   Substance and Sexual Activity   ? Alcohol use: No   ? Drug use: No   ? Sexual activity: Never   Lifestyle   ? Physical activity:     Days per week: Not on file     Minutes per session: Not on file   ? Stress: Not on file   Relationships   ? Social connections:     Talks on phone: Not on file     Gets together: Not on file     Attends Adventism service: Not on file     Active member of club or organization: Not on  file     Attends meetings of clubs or organizations: Not on file     Relationship status: Not on file   ? Intimate partner violence:     Fear of current or ex partner: Not on file     Emotionally abused: Not on file     Physically abused: Not on file     Forced sexual activity: Not on file   Other Topics Concern   ? Not on file   Social History Narrative   ? Not on file       REVIEW OF SYSTEM:  Pertinent items are noted in HPI.  A 12 point comprehensive review of systems was negative except as noted.    PHYSICAL EXAM:     General Appearance:    Alert, cooperative, no distress, appears stated age   Head:    Normocephalic, without obvious abnormality, atraumatic   Eyes:    PERRL, conjunctiva/corneas clear, both eyes; wears glasses   Ears:    Normal external ear canals, both ears; hearing impairment in bilateral ears with bilateral HAs   Nose:   Nares normal, septum midline, mucosa normal, no drainage    or sinus tenderness   Throat:   Lips, mucosa, and tongue normal; teeth and gums normal   Neck:   Supple, symmetrical, trachea midline, no adenopathy;     thyroid:  no enlargement/tenderness/nodules; no carotid    bruit or JVD   Back:     Symmetric, no curvature, ROM normal, no CVA tenderness   Lungs:     Clear to auscultation bilaterally, respirations unlabored   Chest Wall:    No tenderness or deformity    Heart:    Regular rate and rhythm, S1 and S2 normal, no murmur, rub   or gallop   Abdomen:     Soft, non-tender, bowel sounds active all four quadrants,     no masses, no organomegaly   Extremities:   Extremities normal, atraumatic, no cyanosis or edema   Pulses:   2+ and symmetric all extremities   Skin:   Skin color, texture, turgor normal, no rashes or lesions   Neurologic:   Oriented to person and situation; forgetful at times; exhibits tangetial thought processes; generalized weakness; unsteady gait; ambulatory with walker           LABS:   Lab Results   Component Value Date    WBC 7.8 05/21/2019    HGB 12.5  05/21/2019    HCT 39.0 05/21/2019     05/21/2019    CHOL 194 03/11/2019    TRIG 198 (H) 03/11/2019    HDL 52 03/11/2019    ALT 18 05/21/2019    AST 20 05/21/2019     09/17/2019    K 4.9 09/17/2019     09/17/2019    CREATININE 0.77 09/17/2019    BUN 14 09/17/2019    CO2 25 09/17/2019    TSH 1.21 02/13/2018    INR 0.94 05/21/2019        ASSESSMENT:      ICD-10-CM    1. Chronic pain syndrome G89.4    2. Chronic pain of right knee M25.561     G89.29    3. Primary osteoarthritis involving multiple joints M15.0        PLAN:      Chronic pain  -START diclofenac 1% gel apply 2 g to bilateral knees and bilateral shoulders three times a day   -Continue PT/OT with [facility] through 10/28/19  -Continue Tylenol 625 mg as prescribed  -Continue tramadol 50 mg four times a day PRN  -Encouraged patient to utilize integrative therapies such as distraction and deep breathing for pain management  -Notify provider if patient has new or worsening pain   -Follow-up appointment with PCP scheduled for 10/31/19 at 1000    Otherwise continue current care plan for all other chronic medical conditions, as they are stable. Encouraged patient to engage in healthy lifestyle behaviors such as engaging in social activities, exercising (PT/OT), eating well, and following care plan. Follow up for routine check-up, or sooner if needed. Will continue to monitor patient and work with nursing staff collaboratively to work toward positive patient outcomes.     Electronically signed by: Kiana Reyes CNP

## 2021-06-19 NOTE — LETTER
Letter by Kiana Reyes CNP at      Author: Kiana Reyes CNP Service: -- Author Type: --    Filed:  Encounter Date: 11/21/2019 Status: Signed         Patient: Marina Neves   MR Number: [id number]   YOB: 1934   Date of Visit: 11/21/2019     [facility]    Facility:   [facility] [[id number]]   Code Status: POLST AVAILABLE      CHIEF COMPLAINT/REASON FOR VISIT:  Chief Complaint   Patient presents with   ? Problem Visit     pain management       HISTORY:      HPI: Marina is a 85 y.o. female who is an assisted-living resident at [facility].  Marina  has a past medical history of Anemia, Arthritis, Bladder infection (01/03/2015), Blood type AB-, Breast cancer (H), Bursitis, knee, Cancer (H) (2005), Colon cancer (H), Depression, Gout, History of transfusion, Hypertension, Insomnia, Macular degeneration, Osteopenia, Ovarian cyst, Rosacea, and Vitamin D deficiency.    Today Ms. Neves is being evaluated for pain management.  Marina reported that her right knee pain has been worse over the past week with rating 7-8/10 described as aching intermittent pain.  She is currently taking Tylenol two times a day, ibuprofen 200 mg three times a day, and oxycodone 2.5 mg three times a day for moderate pain relief.  She never filled her Cymbalta as her niece said that she doesn't need it because she does not have depressive symptoms.  We discussed the purpose of duloxetine for chronic pain syndrome and she said that she would like to hold off on starting this at this visit.  She was agreeable to changing her oxycodone back to tramadol for pain as she felt like it was more useful.  She was also encouraged to use heat and cold therapy for pain management.  The patient denied lightheadedness, dizziness, breathing difficulty, chest pain, palpitations, constipation, urinary symptoms, and numbness or tingling in extremities.     Past Medical History:   Diagnosis Date   ?  Anemia    ? Arthritis    ? Bladder infection 01/03/2015    dx'd- on antibiotics   ? Blood type AB-     with Anti-C   ? Breast cancer (H)    ? Bursitis, knee    ? Cancer (H) 2005    left breast   ? Colon cancer (H)    ? Depression    ? Gout    ? History of transfusion    ? Hypertension    ? Insomnia    ? Macular degeneration    ? Osteopenia    ? Ovarian cyst    ? Rosacea    ? Vitamin D deficiency              Family History   Problem Relation Age of Onset   ? Squamous cell carcinoma Mother    ? Colon cancer Sister    ? Breast cancer Maternal Aunt    ? Bone cancer Maternal Aunt    ? Colon cancer Paternal Aunt    ? Breast cancer Cousin      Social History     Socioeconomic History   ? Marital status:      Spouse name: Not on file   ? Number of children: Not on file   ? Years of education: Not on file   ? Highest education level: Not on file   Occupational History   ? Not on file   Social Needs   ? Financial resource strain: Not on file   ? Food insecurity:     Worry: Not on file     Inability: Not on file   ? Transportation needs:     Medical: Not on file     Non-medical: Not on file   Tobacco Use   ? Smoking status: Never Smoker   ? Smokeless tobacco: Never Used   Substance and Sexual Activity   ? Alcohol use: No   ? Drug use: No   ? Sexual activity: Never   Lifestyle   ? Physical activity:     Days per week: Not on file     Minutes per session: Not on file   ? Stress: Not on file   Relationships   ? Social connections:     Talks on phone: Not on file     Gets together: Not on file     Attends Synagogue service: Not on file     Active member of club or organization: Not on file     Attends meetings of clubs or organizations: Not on file     Relationship status: Not on file   ? Intimate partner violence:     Fear of current or ex partner: Not on file     Emotionally abused: Not on file     Physically abused: Not on file     Forced sexual activity: Not on file   Other Topics Concern   ? Not on file   Social  History Narrative   ? Not on file       REVIEW OF SYSTEM:  Pertinent items are noted in HPI.  A 12 point comprehensive review of systems was negative except as noted.    PHYSICAL EXAM:     General Appearance:    Alert, cooperative, no distress, appears stated age   Head:    Normocephalic, without obvious abnormality, atraumatic   Eyes:    PERRL, conjunctiva/corneas clear, both eyes; wears glasses   Ears:    Normal external ear canals, both ears; hearing impairment in bilateral ears with bilateral Has; bilateral cerumen impaction   Nose:   Nares normal, septum midline, mucosa normal, no drainage    or sinus tenderness   Throat:   Lips, mucosa, and tongue normal; teeth and gums normal   Neck:   Supple, symmetrical, trachea midline, no adenopathy;     thyroid:  no enlargement/tenderness/nodules; no carotid    bruit or JVD   Back:     Symmetric, no curvature, ROM normal, no CVA tenderness   Lungs:     Clear to auscultation bilaterally, respirations unlabored   Chest Wall:    No tenderness or deformity    Heart:    Regular rate and rhythm, S1 and S2 normal, no murmur, rub   or gallop   Abdomen:     Soft, non-tender, bowel sounds active all four quadrants,     no masses, no organomegaly   Extremities:   Extremities normal, atraumatic, no cyanosis or edema; point tenderness over lower right patella   Pulses:   2+ and symmetric all extremities   Skin:   Skin color, texture, turgor normal, no rashes or lesions   Neurologic:   Oriented to person and situation; forgetful at times; exhibits tangetial thought processes; generalized weakness; ambulatory with walker           LABS:   Lab Results   Component Value Date    WBC 8.2 11/18/2019    HGB 11.7 (L) 11/18/2019    HCT 36.7 11/18/2019     11/18/2019    CHOL 194 03/11/2019    TRIG 198 (H) 03/11/2019    HDL 52 03/11/2019    ALT 17 11/18/2019    AST 17 11/18/2019     11/18/2019    K 4.6 11/18/2019     11/18/2019    CREATININE 0.79 11/18/2019    BUN 18  11/18/2019    CO2 23 11/18/2019    TSH 1.21 02/13/2018    INR 0.94 05/21/2019        ASSESSMENT:      ICD-10-CM    1. Chronic pain of right knee M25.561     G89.29    2. Chronic pain syndrome G89.4        PLAN:      Chronic pain  -Continue ibuprofen 200 mg three times a day  -DISCONTINUE oxycodone 2.5 mg three times a day for pain  -Continue Tylenol 625 mg as prescribed  -RESTART tramadol 100 mg three times a day   -Encouraged patient to utilize integrative therapies such as distraction and deep breathing for pain management  -Notify provider if patient has new or worsening pain   -Follow-up appointment with PCP on 12/2/19    Otherwise continue current care plan for all other chronic medical conditions, as they are stable. Encouraged patient to engage in healthy lifestyle behaviors such as engaging in social activities, exercising (PT/OT), eating well, and following care plan. Follow up for routine check-up, or sooner if needed. Will continue to monitor patient and work with nursing staff collaboratively to work toward positive patient outcomes.     Electronically signed by: Kiana Reyes, CNP

## 2021-06-19 NOTE — LETTER
Letter by Kiana Reyes CNP at      Author: Kiana Reyes CNP Service: -- Author Type: --    Filed:  Encounter Date: 9/27/2019 Status: Signed         Patient: Marina Neves   MR Number: [id number]   YOB: 1934   Date of Visit: 9/27/2019     [facility]    Facility:   [facility] [[id number]]   Code Status: POLST AVAILABLE      CHIEF COMPLAINT/REASON FOR VISIT:  Chief Complaint   Patient presents with   ? Problem Visit     arthritis       HISTORY:      HPI: Marina is a 85 y.o. female who is an assisted-living resident at [facility].  Marina  has a past medical history of Anemia, Arthritis, Bladder infection (01/03/2015), Blood type AB-, Breast cancer (H), Bursitis, knee, Cancer (H) (2005), Colon cancer (H), Depression, Gout, History of transfusion, Hypertension, Insomnia, Macular degeneration, Osteopenia, Ovarian cyst, Rosacea, and Vitamin D deficiency.    Today Ms. Neves is being evaluated for chronic pain.  She was started on low-dose prednisone two weeks ago and has not noticed any change in her right knee pain or bilateral shoulder pain.  Her main concern at this visit is increased right knee pain while working with physical therapy.  We discussed steroid injections to her knee and bilateral shoulders for chronic pain that she rated at 5-6/10 in her joints.  She has been taking Tylenol arthritis three times a day for pain and she tried a lidocaine pain patch on her right knee without pain relief.  She reported that she still has good range of motion in her bilateral shoulders but is limping due to her knee pain.  The patient denied lightheadedness, dizziness, breathing difficulty, chest pain, palpitations, constipation, urinary symptoms, and numbness or tingling in extremities.     Past Medical History:   Diagnosis Date   ? Anemia    ? Arthritis    ? Bladder infection 01/03/2015    dx'd- on antibiotics   ? Blood type AB-     with Anti-C   ?  Breast cancer (H)    ? Bursitis, knee    ? Cancer (H) 2005    left breast   ? Colon cancer (H)    ? Depression    ? Gout    ? History of transfusion    ? Hypertension    ? Insomnia    ? Macular degeneration    ? Osteopenia    ? Ovarian cyst    ? Rosacea    ? Vitamin D deficiency              Family History   Problem Relation Age of Onset   ? Squamous cell carcinoma Mother    ? Colon cancer Sister    ? Breast cancer Maternal Aunt    ? Bone cancer Maternal Aunt    ? Colon cancer Paternal Aunt    ? Breast cancer Cousin      Social History     Socioeconomic History   ? Marital status:      Spouse name: None   ? Number of children: None   ? Years of education: None   ? Highest education level: None   Occupational History   ? None   Social Needs   ? Financial resource strain: None   ? Food insecurity:     Worry: None     Inability: None   ? Transportation needs:     Medical: None     Non-medical: None   Tobacco Use   ? Smoking status: Never Smoker   ? Smokeless tobacco: Never Used   Substance and Sexual Activity   ? Alcohol use: No   ? Drug use: No   ? Sexual activity: Never   Lifestyle   ? Physical activity:     Days per week: None     Minutes per session: None   ? Stress: None   Relationships   ? Social connections:     Talks on phone: None     Gets together: None     Attends [employer] service: None     Active member of club or organization: None     Attends meetings of clubs or organizations: None     Relationship status: None   ? Intimate partner violence:     Fear of current or ex partner: None     Emotionally abused: None     Physically abused: None     Forced sexual activity: None   Other Topics Concern   ? None   Social History Narrative   ? None       REVIEW OF SYSTEM:  Pertinent items are noted in HPI.  A 12 point comprehensive review of systems was negative except as noted.    PHYSICAL EXAM:     General Appearance:    Alert, cooperative, no distress, appears stated age   Head:    Normocephalic, without  obvious abnormality, atraumatic   Eyes:    PERRL, conjunctiva/corneas clear, both eyes; wears glasses   Ears:    Normal external ear canals, both ears; hearing impairment in bilateral ears with bilateral HAs   Nose:   Nares normal, septum midline, mucosa normal, no drainage    or sinus tenderness   Throat:   Lips, mucosa, and tongue normal; teeth and gums normal   Neck:   Supple, symmetrical, trachea midline, no adenopathy;     thyroid:  no enlargement/tenderness/nodules; no carotid    bruit or JVD   Back:     Symmetric, no curvature, ROM normal, no CVA tenderness   Lungs:     Clear to auscultation bilaterally, respirations unlabored   Chest Wall:    No tenderness or deformity    Heart:    Regular rate and rhythm, S1 and S2 normal, no murmur, rub   or gallop   Abdomen:     Soft, non-tender, bowel sounds active all four quadrants,     no masses, no organomegaly   Extremities:   Extremities normal, atraumatic, no cyanosis or edema   Pulses:   2+ and symmetric all extremities   Skin:   Skin color, texture, turgor normal, no rashes or lesions   Neurologic:   Oriented to person and situation; forgetful at times; exhibits tangetial thought processes; generalized weakness; unsteady gait; ambulatory with walker           LABS:   Lab Results   Component Value Date    WBC 7.8 05/21/2019    HGB 12.5 05/21/2019    HCT 39.0 05/21/2019     05/21/2019    CHOL 194 03/11/2019    TRIG 198 (H) 03/11/2019    HDL 52 03/11/2019    ALT 18 05/21/2019    AST 20 05/21/2019     09/17/2019    K 4.9 09/17/2019     09/17/2019    CREATININE 0.77 09/17/2019    BUN 14 09/17/2019    CO2 25 09/17/2019    TSH 1.21 02/13/2018    INR 0.94 05/21/2019        ASSESSMENT:      ICD-10-CM    1. Chronic pain of right knee M25.561     G89.29    2. Chronic pain syndrome G89.4    3. Primary osteoarthritis involving multiple joints M15.0    4. Chronic pain of both shoulders M25.511     G89.29     M25.512        PLAN:      Chronic pain  -Check  x-ray of right knee and bilateral shoulders prior to cortisone injection  -Continue PT/OT with [facility]  -Continue Tylenol as prescribed  -Encouraged patient to utilize integrative therapies such as distraction and deep breathing for pain management  -Notify provider if patient has new or worsening pain     Otherwise continue current care plan for all other chronic medical conditions, as they are stable. Encouraged patient to engage in healthy lifestyle behaviors such as engaging in social activities, exercising (PT/OT), eating well, and following care plan. Follow up for routine check-up, or sooner if needed. Will continue to monitor patient and work with nursing staff collaboratively to work toward positive patient outcomes.     Electronically signed by: Kiana Reyes CNP

## 2021-06-19 NOTE — LETTER
Letter by Vin Guillen MD at      Author: Vin Guillen MD Service: -- Author Type: --    Filed:  Encounter Date: 10/29/2019 Status: Signed                   Office Hours: Monday - Friday 8:00 - 4:30PM    Marina Neves  [address]  [place] [address]           October 29, 2019      Dear Marina:    It looks as though you are due to see Dr. Guillen. If you would like to schedule this please call [phone]         Sincerely,        [facility]

## 2021-06-19 NOTE — LETTER
Letter by Kiana Reyes CNP at      Author: Kiana Reyes CNP Service: -- Author Type: --    Filed:  Encounter Date: 11/11/2019 Status: Signed         Patient: Marina Neves   MR Number: [id number]   YOB: 1934   Date of Visit: 11/11/2019     [facility]    Facility:   [facility] [[id number]]   Code Status: POLST AVAILABLE      CHIEF COMPLAINT/REASON FOR VISIT:  Chief Complaint   Patient presents with   ? Problem Visit     pain management       HISTORY:      HPI: Marina is a 85 y.o. female who is an assisted-living resident at [facility].  Marina  has a past medical history of Anemia, Arthritis, Bladder infection (01/03/2015), Blood type AB-, Breast cancer (H), Bursitis, knee, Cancer (H) (2005), Colon cancer (H), Depression, Gout, History of transfusion, Hypertension, Insomnia, Macular degeneration, Osteopenia, Ovarian cyst, Rosacea, and Vitamin D deficiency.    Today Ms. Neves is being evaluated for a review of multiple medical conditions.  Marina feels that the tramadol has not alleviated her pain at all and is upset that she has not had any pain relief with therapy and medication changes.  She described her pain as intermittent, aching pain rated 5-6/10 worse with ambulation.  She refused to buy knee brace as recommended by therapy.  She said that she is frustrated that she cannot have a cortisone injection in her knee.  We discussed changing her tramadol to oxycodone for pain management and she was agreeable to this plan with follow-up scheduled on Friday for further evaluation of chronic knee pain.  She had her ears irrigated today by nursing staff and she reported that her right ear pain has since resolved with continued moderate hearing impairment with hearing aids in place.  The patient denied lightheadedness, dizziness, breathing difficulty, chest pain, palpitations, constipation, urinary symptoms, and numbness or tingling in extremities.   She has started safety checks at her AL facility daily with her progressive cognitive impairment.    Past Medical History:   Diagnosis Date   ? Anemia    ? Arthritis    ? Bladder infection 01/03/2015    dx'd- on antibiotics   ? Blood type AB-     with Anti-C   ? Breast cancer (H)    ? Bursitis, knee    ? Cancer (H) 2005    left breast   ? Colon cancer (H)    ? Depression    ? Gout    ? History of transfusion    ? Hypertension    ? Insomnia    ? Macular degeneration    ? Osteopenia    ? Ovarian cyst    ? Rosacea    ? Vitamin D deficiency              Family History   Problem Relation Age of Onset   ? Squamous cell carcinoma Mother    ? Colon cancer Sister    ? Breast cancer Maternal Aunt    ? Bone cancer Maternal Aunt    ? Colon cancer Paternal Aunt    ? Breast cancer Cousin      Social History     Socioeconomic History   ? Marital status:      Spouse name: None   ? Number of children: None   ? Years of education: None   ? Highest education level: None   Occupational History   ? None   Social Needs   ? Financial resource strain: None   ? Food insecurity:     Worry: None     Inability: None   ? Transportation needs:     Medical: None     Non-medical: None   Tobacco Use   ? Smoking status: Never Smoker   ? Smokeless tobacco: Never Used   Substance and Sexual Activity   ? Alcohol use: No   ? Drug use: No   ? Sexual activity: Never   Lifestyle   ? Physical activity:     Days per week: None     Minutes per session: None   ? Stress: None   Relationships   ? Social connections:     Talks on phone: None     Gets together: None     Attends [employer] service: None     Active member of club or organization: None     Attends meetings of clubs or organizations: None     Relationship status: None   ? Intimate partner violence:     Fear of current or ex partner: None     Emotionally abused: None     Physically abused: None     Forced sexual activity: None   Other Topics Concern   ? None   Social History Narrative   ? None        REVIEW OF SYSTEM:  Pertinent items are noted in HPI.  A 12 point comprehensive review of systems was negative except as noted.    PHYSICAL EXAM:     General Appearance:    Alert, cooperative, no distress, appears stated age   Head:    Normocephalic, without obvious abnormality, atraumatic   Eyes:    PERRL, conjunctiva/corneas clear, both eyes; wears glasses   Ears:    Normal external ear canals, both ears; hearing impairment in bilateral ears with bilateral Has; bilateral cerumen impaction   Nose:   Nares normal, septum midline, mucosa normal, no drainage    or sinus tenderness   Throat:   Lips, mucosa, and tongue normal; teeth and gums normal   Neck:   Supple, symmetrical, trachea midline, no adenopathy;     thyroid:  no enlargement/tenderness/nodules; no carotid    bruit or JVD   Back:     Symmetric, no curvature, ROM normal, no CVA tenderness   Lungs:     Clear to auscultation bilaterally, respirations unlabored   Chest Wall:    No tenderness or deformity    Heart:    Regular rate and rhythm, S1 and S2 normal, no murmur, rub   or gallop   Abdomen:     Soft, non-tender, bowel sounds active all four quadrants,     no masses, no organomegaly   Extremities:   Extremities normal, atraumatic, no cyanosis or edema; point tenderness over lower patella   Pulses:   2+ and symmetric all extremities   Skin:   Skin color, texture, turgor normal, no rashes or lesions   Neurologic:   Oriented to person and situation; forgetful at times; exhibits tangetial thought processes; generalized weakness; unsteady gait; ambulatory with walker           LABS:   Lab Results   Component Value Date    WBC 7.8 05/21/2019    HGB 12.5 05/21/2019    HCT 39.0 05/21/2019     05/21/2019    CHOL 194 03/11/2019    TRIG 198 (H) 03/11/2019    HDL 52 03/11/2019    ALT 18 05/21/2019    AST 20 05/21/2019     09/17/2019    K 4.9 09/17/2019     09/17/2019    CREATININE 0.77 09/17/2019    BUN 14 09/17/2019    CO2 25 09/17/2019    TSH  1.21 02/13/2018    INR 0.94 05/21/2019        ASSESSMENT:      ICD-10-CM    1. Chronic pain of right knee M25.561     G89.29    2. Chronic pain syndrome G89.4        PLAN:      Chronic pain  -START oxycodone 2.5 mg three times a day for pain  -DISCONTINUE diclofenac 1% gel apply 2 g to bilateral knees and bilateral shoulders three times a day   -Continue Tylenol 625 mg as prescribed  -DISCONTINUE tramadol 100 mg three times a day PRN  -Encouraged patient to utilize integrative therapies such as distraction and deep breathing for pain management  -Notify provider if patient has new or worsening pain   -Follow-up appointment with PCP on 11/11/19    Cerumen impaction/Right ear pain  -Notify provider if patient has s/s of systemic infection including fever, chills, night sweats     Otherwise continue current care plan for all other chronic medical conditions, as they are stable. Encouraged patient to engage in healthy lifestyle behaviors such as engaging in social activities, exercising (PT/OT), eating well, and following care plan. Follow up for routine check-up, or sooner if needed. Will continue to monitor patient and work with nursing staff collaboratively to work toward positive patient outcomes.     Total floor/unit time was 60 minutes and 45 minutes was spent in counseling patient on pain management, hearing management, and follow-up plan and coordination of care with patient and family.      Electronically signed by: Kiana Reyes CNP

## 2021-06-20 NOTE — LETTER
Letter by Kiana Reyes CNP at      Author: Kiana Reyes CNP Service: -- Author Type: --    Filed:  Encounter Date: 1/28/2020 Status: (Other)         Patient: Marina Neves   MR Number: [id number]   YOB: 1934   Date of Visit: 1/28/2020     [facility]    Facility:   [facility] [[id number]]   Code Status: POLST AVAILABLE      CHIEF COMPLAINT/REASON FOR VISIT:  Chief Complaint   Patient presents with   ? Problem Visit     pain management       HISTORY:      HPI: Marina is a 85 y.o. female who is an assisted-living resident at [facility].  Marina  has a past medical history of Anemia, Arthritis, Bladder infection (01/03/2015), Blood type AB-, Breast cancer (H), Bursitis, knee, Cancer (H) (2005), Colon cancer (H), Depression, Gout, History of transfusion, Hypertension, Insomnia, Macular degeneration, Osteopenia, Ovarian cyst, Rosacea, and Vitamin D deficiency.    Today Ms. Neves is being evaluated for a review of multiple medical conditions per patient request.  Marina noted that she continues to have lightheadedness with movement that improved a little with decrease in her atenolol dose.  Her blood pressure has been SBP 120s over the past three months and she can tolerate further decrease in her antihypertensive medications at this time.  She said that her pain in her right knee has improved with PT/OT at the [facility].  She continues to take Tylenol PRN, Biofreeze PRN, duloxetine 20 mg at HS, and tramadol 100 mg three times a day.  She was agreeable to decreasing the tramadol with improvement in her pain to help alleviate lightheadedness as well.  She denied pain at this visit sitting in her recliner.  The patient denied changes in mood or appetite, sleep disturbances, breathing difficulty, chest pain, palpitations, constipation, urinary symptoms, numbness or tingling in extremities, and pain.  Nursing staff noted that the patient  is no longer receiving daily checks as she refuses to pay for it.  She told me that she does not feel that she needs checks at this time as her neighbors check in on her frequently.      Past Medical History:   Diagnosis Date   ? Anemia    ? Arthritis    ? Bladder infection 01/03/2015    dx'd- on antibiotics   ? Blood type AB-     with Anti-C   ? Breast cancer (H)    ? Bursitis, knee    ? Cancer (H) 2005    left breast   ? Colon cancer (H)    ? Depression    ? Gout    ? History of transfusion    ? Hypertension    ? Insomnia    ? Macular degeneration    ? Osteopenia    ? Ovarian cyst    ? Rosacea    ? Vitamin D deficiency              Family History   Problem Relation Age of Onset   ? Squamous cell carcinoma Mother    ? Colon cancer Sister    ? Breast cancer Maternal Aunt    ? Bone cancer Maternal Aunt    ? Colon cancer Paternal Aunt    ? Breast cancer Cousin      Social History     Socioeconomic History   ? Marital status:      Spouse name: None   ? Number of children: None   ? Years of education: None   ? Highest education level: None   Occupational History   ? None   Social Needs   ? Financial resource strain: None   ? Food insecurity:     Worry: None     Inability: None   ? Transportation needs:     Medical: None     Non-medical: None   Tobacco Use   ? Smoking status: Never Smoker   ? Smokeless tobacco: Never Used   Substance and Sexual Activity   ? Alcohol use: No   ? Drug use: No   ? Sexual activity: Never   Lifestyle   ? Physical activity:     Days per week: None     Minutes per session: None   ? Stress: None   Relationships   ? Social connections:     Talks on phone: None     Gets together: None     Attends Mu-ism service: None     Active member of club or organization: None     Attends meetings of clubs or organizations: None     Relationship status: None   ? Intimate partner violence:     Fear of current or ex partner: None     Emotionally abused: None     Physically abused: None     Forced sexual  activity: None   Other Topics Concern   ? None   Social History Narrative   ? None       REVIEW OF SYSTEM:  Pertinent items are noted in HPI.  A 12 point comprehensive review of systems was negative except as noted.    PHYSICAL EXAM:     General Appearance:    Alert, cooperative, no distress, appears stated age   Head:    Normocephalic, without obvious abnormality, atraumatic   Eyes:    PERRL, conjunctiva/corneas clear, both eyes; wears glasses   Ears:    Normal external ear canals, both ears; hearing impairment in bilateral ears with bilateral Has; bilateral cerumen impaction   Nose:   Nares normal, septum midline, mucosa normal, no drainage    or sinus tenderness   Throat:   Lips, mucosa, and tongue normal; teeth and gums normal   Neck:   Supple, symmetrical, trachea midline, no adenopathy;     thyroid:  no enlargement/tenderness/nodules; no carotid    bruit or JVD   Back:     Symmetric, no curvature, ROM normal, no CVA tenderness   Lungs:     Clear to auscultation bilaterally, respirations unlabored   Chest Wall:    No tenderness or deformity    Heart:    Regular rate and rhythm, S1 and S2 normal, no murmur, rub   or gallop   Abdomen:     Soft, non-tender, bowel sounds active all four quadrants,     no masses, no organomegaly   Extremities:   Extremities normal, atraumatic, no cyanosis or edema; point tenderness over lower right patella   Pulses:   2+ and symmetric all extremities   Skin:   Skin color, texture, turgor normal, no rashes or lesions   Neurologic:   Oriented to person and situation; forgetful at times; exhibits tangetial thought processes; generalized weakness; ambulatory with walker           LABS:   Lab Results   Component Value Date    WBC 8.2 11/18/2019    HGB 11.7 (L) 11/18/2019    HCT 36.7 11/18/2019     11/18/2019    CHOL 194 03/11/2019    TRIG 198 (H) 03/11/2019    HDL 52 03/11/2019    ALT 17 11/18/2019    AST 17 11/18/2019     11/18/2019    K 4.6 11/18/2019     11/18/2019     CREATININE 0.79 11/18/2019    BUN 18 11/18/2019    CO2 23 11/18/2019    TSH 1.21 02/13/2018    INR 0.94 05/21/2019        ASSESSMENT:      ICD-10-CM    1. Chronic pain of right knee M25.561     G89.29    2. Chronic pain syndrome G89.4    3. Essential hypertension I10    4. Lightheadedness R42        PLAN:      Chronic pain  -Continue PT/OT as recommended  -Continue duloxetine 20 mg daily at HS  -Continue Tylenol 625 mg as prescribed  -DECREASE tramadol from 100 mg to 50 mg three times a day   -Encouraged patient to utilize integrative therapies such as distraction and deep breathing for pain management  -Notify provider if patient has new or worsening pain     Hypertension/Lightheadedness  -DECREASE atenolol from 50 mg to 25 mg daily  -Continue losartan 75 mg daily  -Continue spironolactone 25 mg daily  -Encouraged cardiac diet, low sodium diet, exercise and stress reduction techniques.   -Emphasized importance of blood pressure control.   -Notify provider if pt's SBP<90 or >180 and DBP <50 or >100     Otherwise continue current care plan for all other chronic medical conditions, as they are stable. Encouraged patient to engage in healthy lifestyle behaviors such as engaging in social activities, exercising (PT/OT), eating well, and following care plan. Follow up for routine check-up, or sooner if needed. Will continue to monitor patient and work with nursing staff collaboratively to work toward positive patient outcomes.     Electronically signed by: Kiana Reyes, CNP

## 2021-06-20 NOTE — LETTER
Letter by Kiana Reyes CNP at      Author: Kiana Reyes CNP Service: -- Author Type: --    Filed:  Encounter Date: 12/16/2019 Status: Signed         Patient: Marina Neves   MR Number: [id number]   YOB: 1934   Date of Visit: 12/16/2019     [facility]    Facility:   [facility] [[id number]]   Code Status: POLST AVAILABLE      CHIEF COMPLAINT/REASON FOR VISIT:  Chief Complaint   Patient presents with   ? Problem Visit     pain management       HISTORY:      HPI: Marina is a 85 y.o. female who is an assisted-living resident at [facility].  Marina  has a past medical history of Anemia, Arthritis, Bladder infection (01/03/2015), Blood type AB-, Breast cancer (H), Bursitis, knee, Cancer (H) (2005), Colon cancer (H), Depression, Gout, History of transfusion, Hypertension, Insomnia, Macular degeneration, Osteopenia, Ovarian cyst, Rosacea, and Vitamin D deficiency.    Today Ms. Neves is being evaluated for pain management per patient request.  Marina continues to described her pain as aching, intermittent pain rated 4-7/10 today.  She is taking duloxetine, Tylenol, tramadol, and Biofreeze for pain management.  We discussed that the results of her right knee ultrasound as negative for any acute processes on Friday 12/16/19.  We discussed further evaluation of her right knee pain with MRI and she said that she would like to check that it would be covered by her insurance before it is ordered.  She denied any other concerns at this visit.  The patient denied lightheadedness, dizziness, breathing difficulty, chest pain, palpitations, constipation, urinary symptoms, and numbness or tingling in extremities.     Past Medical History:   Diagnosis Date   ? Anemia    ? Arthritis    ? Bladder infection 01/03/2015    dx'd- on antibiotics   ? Blood type AB-     with Anti-C   ? Breast cancer (H)    ? Bursitis, knee    ? Cancer (H) 2005    left breast   ? Colon cancer  (H)    ? Depression    ? Gout    ? History of transfusion    ? Hypertension    ? Insomnia    ? Macular degeneration    ? Osteopenia    ? Ovarian cyst    ? Rosacea    ? Vitamin D deficiency              Family History   Problem Relation Age of Onset   ? Squamous cell carcinoma Mother    ? Colon cancer Sister    ? Breast cancer Maternal Aunt    ? Bone cancer Maternal Aunt    ? Colon cancer Paternal Aunt    ? Breast cancer Cousin      Social History     Socioeconomic History   ? Marital status:      Spouse name: Not on file   ? Number of children: Not on file   ? Years of education: Not on file   ? Highest education level: Not on file   Occupational History   ? Not on file   Social Needs   ? Financial resource strain: Not on file   ? Food insecurity:     Worry: Not on file     Inability: Not on file   ? Transportation needs:     Medical: Not on file     Non-medical: Not on file   Tobacco Use   ? Smoking status: Never Smoker   ? Smokeless tobacco: Never Used   Substance and Sexual Activity   ? Alcohol use: No   ? Drug use: No   ? Sexual activity: Never   Lifestyle   ? Physical activity:     Days per week: Not on file     Minutes per session: Not on file   ? Stress: Not on file   Relationships   ? Social connections:     Talks on phone: Not on file     Gets together: Not on file     Attends Orthodox service: Not on file     Active member of club or organization: Not on file     Attends meetings of clubs or organizations: Not on file     Relationship status: Not on file   ? Intimate partner violence:     Fear of current or ex partner: Not on file     Emotionally abused: Not on file     Physically abused: Not on file     Forced sexual activity: Not on file   Other Topics Concern   ? Not on file   Social History Narrative   ? Not on file       REVIEW OF SYSTEM:  Pertinent items are noted in HPI.  A 12 point comprehensive review of systems was negative except as noted.    PHYSICAL EXAM:     General Appearance:     Alert, cooperative, no distress, appears stated age   Head:    Normocephalic, without obvious abnormality, atraumatic   Eyes:    PERRL, conjunctiva/corneas clear, both eyes; wears glasses   Ears:    Normal external ear canals, both ears; hearing impairment in bilateral ears with bilateral Has; bilateral cerumen impaction   Nose:   Nares normal, septum midline, mucosa normal, no drainage    or sinus tenderness   Throat:   Lips, mucosa, and tongue normal; teeth and gums normal   Neck:   Supple, symmetrical, trachea midline, no adenopathy;     thyroid:  no enlargement/tenderness/nodules; no carotid    bruit or JVD   Back:     Symmetric, no curvature, ROM normal, no CVA tenderness   Lungs:     Clear to auscultation bilaterally, respirations unlabored   Chest Wall:    No tenderness or deformity    Heart:    Regular rate and rhythm, S1 and S2 normal, no murmur, rub   or gallop   Abdomen:     Soft, non-tender, bowel sounds active all four quadrants,     no masses, no organomegaly   Extremities:   Extremities normal, atraumatic, no cyanosis or edema; point tenderness over lower right patella   Pulses:   2+ and symmetric all extremities   Skin:   Skin color, texture, turgor normal, no rashes or lesions   Neurologic:   Oriented to person and situation; forgetful at times; exhibits tangetial thought processes; generalized weakness; ambulatory with walker           LABS:   Lab Results   Component Value Date    WBC 8.2 11/18/2019    HGB 11.7 (L) 11/18/2019    HCT 36.7 11/18/2019     11/18/2019    CHOL 194 03/11/2019    TRIG 198 (H) 03/11/2019    HDL 52 03/11/2019    ALT 17 11/18/2019    AST 17 11/18/2019     11/18/2019    K 4.6 11/18/2019     11/18/2019    CREATININE 0.79 11/18/2019    BUN 18 11/18/2019    CO2 23 11/18/2019    TSH 1.21 02/13/2018    INR 0.94 05/21/2019        ASSESSMENT:      ICD-10-CM    1. Chronic pain of right knee M25.561     G89.29    2. Chronic pain syndrome G89.4        PLAN:       Chronic pain  -MRI of right knee per patient's insurance  -Continue duloxetine 20 mg daily at HS  -Continue Tylenol 625 mg as prescribed  -Continue tramadol 100 mg three times a day   -Encouraged patient to utilize integrative therapies such as distraction and deep breathing for pain management  -Notify provider if patient has new or worsening pain     Otherwise continue current care plan for all other chronic medical conditions, as they are stable. Encouraged patient to engage in healthy lifestyle behaviors such as engaging in social activities, exercising (PT/OT), eating well, and following care plan. Follow up for routine check-up, or sooner if needed. Will continue to monitor patient and work with nursing staff collaboratively to work toward positive patient outcomes.     Electronically signed by: Kiana Reyes CNP

## 2021-06-20 NOTE — LETTER
Letter by Kiana Reyes CNP at      Author: Kiana Reyes CNP Service: -- Author Type: --    Filed:  Encounter Date: 12/13/2019 Status: Signed         Patient: Marina Neves   MR Number: [id number]   YOB: 1934   Date of Visit: 12/13/2019     [facility]    Facility:   [facility] [[id number]]   Code Status: POLST AVAILABLE      CHIEF COMPLAINT/REASON FOR VISIT:  Chief Complaint   Patient presents with   ? Problem Visit     right knee pain       HISTORY:      HPI: Marina is a 85 y.o. female who is an assisted-living resident at [facility].  Marina  has a past medical history of Anemia, Arthritis, Bladder infection (01/03/2015), Blood type AB-, Breast cancer (H), Bursitis, knee, Cancer (H) (2005), Colon cancer (H), Depression, Gout, History of transfusion, Hypertension, Insomnia, Macular degeneration, Osteopenia, Ovarian cyst, Rosacea, and Vitamin D deficiency.    Today Ms. Neves is being evaluated for pain management per patient request.  Marina called the nursing service three times yesterday for help with medication management and to notify provider of continued right knee pain.  Marina noted that the quality, intensity, and location of her pain have not changed over the past week since our last visit.  She continues to described her pain as aching, intermittent right knee pain rated 5-7/10 exacerbated by ambulation.  She continues to take tramadol 100 mg three times a day, Tylenol arthritis, Biofreeze gel, and duloxetine for pain at this time.  She was started on the duloxetine for chronic pain management 10 days ago and does not feel that she has seen any benefit yet at this time.  We discussed giving the medication time to start working and she was agreeable to this.  We also discussed trying to obtain the brace that the PT recommended when she was doing therapy as she is saying that she is willing to try anything at this point to alleviate  her pain.  She continues to walk around the facility throughout the day and has not seen any functional limitations with her right knee pain at this time.  She feels that a reasonable pain goal would be 3/10 which she feels that she gets down to while she is resting in her recliner.  We discussed further evaluation of her right knee pain ultrasound vs MRI and she opted for ultrasound today as she does not want to leave her apartment for outside appointments at this time.  The patient denied changes in mood or appetite, sleep disturbances, lightheadedness, dizziness, breathing difficulty, chest pain, palpitations, constipation, urinary symptoms, and numbness or tingling in extremities.     Past Medical History:   Diagnosis Date   ? Anemia    ? Arthritis    ? Bladder infection 01/03/2015    dx'd- on antibiotics   ? Blood type AB-     with Anti-C   ? Breast cancer (H)    ? Bursitis, knee    ? Cancer (H) 2005    left breast   ? Colon cancer (H)    ? Depression    ? Gout    ? History of transfusion    ? Hypertension    ? Insomnia    ? Macular degeneration    ? Osteopenia    ? Ovarian cyst    ? Rosacea    ? Vitamin D deficiency              Family History   Problem Relation Age of Onset   ? Squamous cell carcinoma Mother    ? Colon cancer Sister    ? Breast cancer Maternal Aunt    ? Bone cancer Maternal Aunt    ? Colon cancer Paternal Aunt    ? Breast cancer Cousin      Social History     Socioeconomic History   ? Marital status:      Spouse name: None   ? Number of children: None   ? Years of education: None   ? Highest education level: None   Occupational History   ? None   Social Needs   ? Financial resource strain: None   ? Food insecurity:     Worry: None     Inability: None   ? Transportation needs:     Medical: None     Non-medical: None   Tobacco Use   ? Smoking status: Never Smoker   ? Smokeless tobacco: Never Used   Substance and Sexual Activity   ? Alcohol use: No   ? Drug use: No   ? Sexual activity:  Never   Lifestyle   ? Physical activity:     Days per week: None     Minutes per session: None   ? Stress: None   Relationships   ? Social connections:     Talks on phone: None     Gets together: None     Attends Muslim service: None     Active member of club or organization: None     Attends meetings of clubs or organizations: None     Relationship status: None   ? Intimate partner violence:     Fear of current or ex partner: None     Emotionally abused: None     Physically abused: None     Forced sexual activity: None   Other Topics Concern   ? None   Social History Narrative   ? None       REVIEW OF SYSTEM:  Pertinent items are noted in HPI.  A 12 point comprehensive review of systems was negative except as noted.    PHYSICAL EXAM:     General Appearance:    Alert, cooperative, no distress, appears stated age   Head:    Normocephalic, without obvious abnormality, atraumatic   Eyes:    PERRL, conjunctiva/corneas clear, both eyes; wears glasses   Ears:    Normal external ear canals, both ears; hearing impairment in bilateral ears with bilateral Has; bilateral cerumen impaction   Nose:   Nares normal, septum midline, mucosa normal, no drainage    or sinus tenderness   Throat:   Lips, mucosa, and tongue normal; teeth and gums normal   Neck:   Supple, symmetrical, trachea midline, no adenopathy;     thyroid:  no enlargement/tenderness/nodules; no carotid    bruit or JVD   Back:     Symmetric, no curvature, ROM normal, no CVA tenderness   Lungs:     Clear to auscultation bilaterally, respirations unlabored   Chest Wall:    No tenderness or deformity    Heart:    Regular rate and rhythm, S1 and S2 normal, no murmur, rub   or gallop   Abdomen:     Soft, non-tender, bowel sounds active all four quadrants,     no masses, no organomegaly   Extremities:   Extremities normal, atraumatic, no cyanosis or edema; point tenderness over lower right patella   Pulses:   2+ and symmetric all extremities   Skin:   Skin color,  texture, turgor normal, no rashes or lesions   Neurologic:   Oriented to person and situation; forgetful at times; exhibits tangetial thought processes; generalized weakness; ambulatory with walker           LABS:   Lab Results   Component Value Date    WBC 8.2 11/18/2019    HGB 11.7 (L) 11/18/2019    HCT 36.7 11/18/2019     11/18/2019    CHOL 194 03/11/2019    TRIG 198 (H) 03/11/2019    HDL 52 03/11/2019    ALT 17 11/18/2019    AST 17 11/18/2019     11/18/2019    K 4.6 11/18/2019     11/18/2019    CREATININE 0.79 11/18/2019    BUN 18 11/18/2019    CO2 23 11/18/2019    TSH 1.21 02/13/2018    INR 0.94 05/21/2019        ASSESSMENT:      ICD-10-CM    1. Chronic pain of right knee M25.561     G89.29    2. Chronic pain syndrome G89.4        PLAN:      Chronic pain  -Ultrasound of right knee to rule out acute processes ordered ASAP  -Continue duloxetine 20 mg daily at HS  -Continue Tylenol 625 mg as prescribed  -Continue tramadol 100 mg three times a day   -Encouraged patient to utilize integrative therapies such as distraction and deep breathing for pain management  -Notify provider if patient has new or worsening pain   -Follow-up appointment with PCP on 12/16/19    Otherwise continue current care plan for all other chronic medical conditions, as they are stable. Encouraged patient to engage in healthy lifestyle behaviors such as engaging in social activities, exercising (PT/OT), eating well, and following care plan. Follow up for routine check-up, or sooner if needed. Will continue to monitor patient and work with nursing staff collaboratively to work toward positive patient outcomes.     Electronically signed by: Kiana Reyes, CNP

## 2021-06-20 NOTE — LETTER
Letter by Kiana Reyes CNP at      Author: Kiana Reyes CNP Service: -- Author Type: --    Filed:  Encounter Date: 12/31/2019 Status: Signed         Patient: Marina Neves   MR Number: [id number]   YOB: 1934   Date of Visit: 12/31/2019     [facility]    Facility:   [facility] [[id number]]   Code Status: POLST AVAILABLE      CHIEF COMPLAINT/REASON FOR VISIT:  Chief Complaint   Patient presents with   ? Review Of Multiple Medical Conditions       HISTORY:      HPI: Marina is a 85 y.o. female who is an assisted-living resident at [facility].  Marina  has a past medical history of Anemia, Arthritis, Bladder infection (01/03/2015), Blood type AB-, Breast cancer (H), Bursitis, knee, Cancer (H) (2005), Colon cancer (H), Depression, Gout, History of transfusion, Hypertension, Insomnia, Macular degeneration, Osteopenia, Ovarian cyst, Rosacea, and Vitamin D deficiency.    Today Ms. Neves is being evaluated for a review of multiple medical conditions.  Marina reported that her left shoulder pain has resolved since her cortisone injection last week.  She reported that she continues to have aching, intermittent right knee pain rated 3-7/10 and has not made her orthopedic appointment yet at this time as she has been waiting to secure a ride.  We discussed her medications and she would like to discontinue anything that isn't necessary at this time with discontinuation of her calcium supplementation and multivitamin at this visit.  She has been tolerating decrease in her atenolol dose with decreased lightheadedness and SBP 110s and HR 60s.  The patient denied lightheadedness, dizziness, breathing difficulty, chest pain, palpitations, constipation, urinary symptoms, and numbness or tingling in extremities.     Past Medical History:   Diagnosis Date   ? Anemia    ? Arthritis    ? Bladder infection 01/03/2015    dx'd- on antibiotics   ? Blood type AB-     with  Anti-C   ? Breast cancer (H)    ? Bursitis, knee    ? Cancer (H) 2005    left breast   ? Colon cancer (H)    ? Depression    ? Gout    ? History of transfusion    ? Hypertension    ? Insomnia    ? Macular degeneration    ? Osteopenia    ? Ovarian cyst    ? Rosacea    ? Vitamin D deficiency              Family History   Problem Relation Age of Onset   ? Squamous cell carcinoma Mother    ? Colon cancer Sister    ? Breast cancer Maternal Aunt    ? Bone cancer Maternal Aunt    ? Colon cancer Paternal Aunt    ? Breast cancer Cousin      Social History     Socioeconomic History   ? Marital status:      Spouse name: Not on file   ? Number of children: Not on file   ? Years of education: Not on file   ? Highest education level: Not on file   Occupational History   ? Not on file   Social Needs   ? Financial resource strain: Not on file   ? Food insecurity:     Worry: Not on file     Inability: Not on file   ? Transportation needs:     Medical: Not on file     Non-medical: Not on file   Tobacco Use   ? Smoking status: Never Smoker   ? Smokeless tobacco: Never Used   Substance and Sexual Activity   ? Alcohol use: No   ? Drug use: No   ? Sexual activity: Never   Lifestyle   ? Physical activity:     Days per week: Not on file     Minutes per session: Not on file   ? Stress: Not on file   Relationships   ? Social connections:     Talks on phone: Not on file     Gets together: Not on file     Attends Mandaen service: Not on file     Active member of club or organization: Not on file     Attends meetings of clubs or organizations: Not on file     Relationship status: Not on file   ? Intimate partner violence:     Fear of current or ex partner: Not on file     Emotionally abused: Not on file     Physically abused: Not on file     Forced sexual activity: Not on file   Other Topics Concern   ? Not on file   Social History Narrative   ? Not on file       REVIEW OF SYSTEM:  Pertinent items are noted in HPI.  A 12 point  comprehensive review of systems was negative except as noted.    PHYSICAL EXAM:     General Appearance:    Alert, cooperative, no distress, appears stated age   Head:    Normocephalic, without obvious abnormality, atraumatic   Eyes:    PERRL, conjunctiva/corneas clear, both eyes; wears glasses   Ears:    Normal external ear canals, both ears; hearing impairment in bilateral ears with bilateral Has; bilateral cerumen impaction   Nose:   Nares normal, septum midline, mucosa normal, no drainage    or sinus tenderness   Throat:   Lips, mucosa, and tongue normal; teeth and gums normal   Neck:   Supple, symmetrical, trachea midline, no adenopathy;     thyroid:  no enlargement/tenderness/nodules; no carotid    bruit or JVD   Back:     Symmetric, no curvature, ROM normal, no CVA tenderness   Lungs:     Clear to auscultation bilaterally, respirations unlabored   Chest Wall:    No tenderness or deformity    Heart:    Regular rate and rhythm, S1 and S2 normal, no murmur, rub   or gallop   Abdomen:     Soft, non-tender, bowel sounds active all four quadrants,     no masses, no organomegaly   Extremities:   Extremities normal, atraumatic, no cyanosis or edema; point tenderness over lower right patella   Pulses:   2+ and symmetric all extremities   Skin:   Skin color, texture, turgor normal, no rashes or lesions   Neurologic:   Oriented to person and situation; forgetful at times; exhibits tangetial thought processes; generalized weakness; ambulatory with walker           LABS:   Lab Results   Component Value Date    WBC 8.2 11/18/2019    HGB 11.7 (L) 11/18/2019    HCT 36.7 11/18/2019     11/18/2019    CHOL 194 03/11/2019    TRIG 198 (H) 03/11/2019    HDL 52 03/11/2019    ALT 17 11/18/2019    AST 17 11/18/2019     11/18/2019    K 4.6 11/18/2019     11/18/2019    CREATININE 0.79 11/18/2019    BUN 18 11/18/2019    CO2 23 11/18/2019    TSH 1.21 02/13/2018    INR 0.94 05/21/2019        ASSESSMENT:      ICD-10-CM     1. Chronic pain of right knee M25.561     G89.29    2. Chronic pain syndrome G89.4    3. Essential hypertension I10    4. Chronic left shoulder pain M25.512     G89.29    5. Medication management Z79.899        PLAN:      Chronic pain  -Consult to orthopedic provider for right knee pain  -Continue duloxetine 20 mg daily at HS  -Continue Tylenol 625 mg as prescribed  -Continue tramadol 100 mg three times a day   -Encouraged patient to utilize integrative therapies such as distraction and deep breathing for pain management  -Notify provider if patient has new or worsening pain     Left shoulder pain- Pain resolved    Hypertension  -Continue atenolol 50 mg daily  -Continue losartan 75 mg daily  -Continue spironolactone 25 mg daily  -Encouraged cardiac diet, low sodium diet, exercise and stress reduction techniques.   -Emphasized importance of blood pressure control.   -Notify provider if pt's SBP<90 or >180 and DBP <50 or >100     Medication management  -Discontinue calcium supplement and multivitamin    Otherwise continue current care plan for all other chronic medical conditions, as they are stable. Encouraged patient to engage in healthy lifestyle behaviors such as engaging in social activities, exercising (PT/OT), eating well, and following care plan. Follow up for routine check-up, or sooner if needed. Will continue to monitor patient and work with nursing staff collaboratively to work toward positive patient outcomes.     Electronically signed by: Kiana Reyes CNP

## 2021-06-20 NOTE — LETTER
Letter by Kiana Reyes CNP at      Author: Kiana Reyes CNP Service: -- Author Type: --    Filed:  Encounter Date: 2/6/2020 Status: (Other)         Patient: Marina Neves   MR Number: [id number]   YOB: 1934   Date of Visit: 2/6/2020     [facility]    Facility:   [facility] [[id number]]   Code Status: POLST AVAILABLE      CHIEF COMPLAINT/REASON FOR VISIT:  Chief Complaint   Patient presents with   ? Review Of Multiple Medical Conditions       HISTORY:      HPI: Marina is a 85 y.o. female who is an assisted-living resident at [facility].  Marina  has a past medical history of Anemia, Arthritis, Bladder infection (01/03/2015), Blood type AB-, Breast cancer (H), Bursitis, knee, Cancer (H) (2005), Colon cancer (H), Depression, Gout, History of transfusion, Hypertension, Insomnia, Macular degeneration, Osteopenia, Ovarian cyst, Rosacea, and Vitamin D deficiency.    Today Ms. Neves is being evaluated for a review of multiple medical conditions per patient request.  Marina said that her pain has been uncontrolled over the past.  She reported that her right knee pain has been stable taking Tylenol, tramadol, and Biofreeze for pain management.  She has been working with PT and OT at the [facility] for her right knee pain.  She said that she has had acute left shoulder pain rated 5-10/10 aching pain with her ROM intact.  She asked if she could have another steroid injection with last one on 12/27/20 which would be too early at this point.  She denied any acute injuries to her left shoulder.  We discussed a short course of ibuprofen as this helped previously with acute pain and icing her shoulder three times a day and she was agreeable to this plan at this time.  She said that her lightheadedness has resolved since her last visit when her tramadol and atenolol were decreased last week.  Her blood pressure has been stable with SBP 120s with goal SBP <  150.  She also asked if she could take more of her Miralax daily for constipation and she was encouraged to take an extra dose of her daily Miralax if she has gone more than three days without a BM.  Marina also noted that she would like to re-start taking magnesium oxide 400 mg tabs that she has in her medicine cabinet for muscle spasm.  We discussed benefits versus risk of magnesium supplementation and she was agreeable to electrolyte laboratory monitoring next week.  Her follow-up with her PCP will be at 1100 on 2/13/20.  The patient denied lightheadedness, dizziness, breathing difficulty, chest pain, palpitations, urinary symptoms, and numbness or tingling in extremities.     Past Medical History:   Diagnosis Date   ? Anemia    ? Arthritis    ? Bladder infection 01/03/2015    dx'd- on antibiotics   ? Blood type AB-     with Anti-C   ? Breast cancer (H)    ? Bursitis, knee    ? Cancer (H) 2005    left breast   ? Colon cancer (H)    ? Depression    ? Gout    ? History of transfusion    ? Hypertension    ? Insomnia    ? Macular degeneration    ? Osteopenia    ? Ovarian cyst    ? Rosacea    ? Vitamin D deficiency              Family History   Problem Relation Age of Onset   ? Squamous cell carcinoma Mother    ? Colon cancer Sister    ? Breast cancer Maternal Aunt    ? Bone cancer Maternal Aunt    ? Colon cancer Paternal Aunt    ? Breast cancer Cousin      Social History     Socioeconomic History   ? Marital status:      Spouse name: None   ? Number of children: None   ? Years of education: None   ? Highest education level: None   Occupational History   ? None   Social Needs   ? Financial resource strain: None   ? Food insecurity:     Worry: None     Inability: None   ? Transportation needs:     Medical: None     Non-medical: None   Tobacco Use   ? Smoking status: Never Smoker   ? Smokeless tobacco: Never Used   Substance and Sexual Activity   ? Alcohol use: No   ? Drug use: No   ? Sexual activity: Never    Lifestyle   ? Physical activity:     Days per week: None     Minutes per session: None   ? Stress: None   Relationships   ? Social connections:     Talks on phone: None     Gets together: None     Attends Spiritism service: None     Active member of club or organization: None     Attends meetings of clubs or organizations: None     Relationship status: None   ? Intimate partner violence:     Fear of current or ex partner: None     Emotionally abused: None     Physically abused: None     Forced sexual activity: None   Other Topics Concern   ? None   Social History Narrative   ? None       REVIEW OF SYSTEM:  Pertinent items are noted in HPI.  A 12 point comprehensive review of systems was negative except as noted.    PHYSICAL EXAM:     General Appearance:    Alert, cooperative, no distress, appears stated age   Head:    Normocephalic, without obvious abnormality, atraumatic   Eyes:    PERRL, conjunctiva/corneas clear, both eyes; wears glasses   Ears:    Normal external ear canals, both ears; hearing impairment in bilateral ears with bilateral Has; bilateral cerumen impaction   Nose:   Nares normal, septum midline, mucosa normal, no drainage    or sinus tenderness   Throat:   Lips, mucosa, and tongue normal; teeth and gums normal   Neck:   Supple, symmetrical, trachea midline, no adenopathy;     thyroid:  no enlargement/tenderness/nodules; no carotid    bruit or JVD   Back:     Symmetric, no curvature, ROM normal, no CVA tenderness   Lungs:     Clear to auscultation bilaterally, respirations unlabored   Chest Wall:    No tenderness or deformity    Heart:    Regular rate and rhythm, S1 and S2 normal, no murmur, rub   or gallop   Abdomen:     Soft, non-tender, bowel sounds active all four quadrants,     no masses, no organomegaly   Extremities:   Extremities normal, atraumatic, no cyanosis or edema; point tenderness over lower right patella; point tenderness on left shoulder glenohumeral joint   Pulses:   2+ and  symmetric all extremities   Skin:   Skin color, texture, turgor normal, no rashes or lesions   Neurologic:   Oriented to person and situation; forgetful at times; exhibits tangetial thought processes; generalized weakness; ambulatory with walker           LABS:   Lab Results   Component Value Date    WBC 8.2 11/18/2019    HGB 11.7 (L) 11/18/2019    HCT 36.7 11/18/2019     11/18/2019    CHOL 194 03/11/2019    TRIG 198 (H) 03/11/2019    HDL 52 03/11/2019    ALT 17 11/18/2019    AST 17 11/18/2019     11/18/2019    K 4.6 11/18/2019     11/18/2019    CREATININE 0.79 11/18/2019    BUN 18 11/18/2019    CO2 23 11/18/2019    TSH 1.21 02/13/2018    INR 0.94 05/21/2019        ASSESSMENT:      ICD-10-CM    1. Chronic pain of right knee M25.561     G89.29    2. Chronic pain syndrome G89.4    3. Acute pain of left shoulder M25.512    4. Slow transit constipation K59.01    5. Muscle spasm M62.838        PLAN:      Chronic pain  -START ibuprofen 400 mg three times a day  -Continue PT/OT as recommended  -Continue duloxetine 20 mg daily at HS  -Continue Tylenol 625 mg as prescribed  -Continue tramadol from 50 mg three times a day   -Encouraged patient to ice left shoulder three times a day and PRN  -Encouraged patient to utilize integrative therapies such as distraction and deep breathing for pain management  -Notify provider if patient has new or worsening pain     Constipation  -Continue Miralax 17 g daily  -START Miralax 17 g daily PRN  -Continue senna-docusate as prescribed  -Encouraged patient to increase fiber in diet, eat a lot of fruits and vegetables, increase water intake  -Exercise as much as possible  -Notify provider if patient has loose stools or no BM for >3 days     Muscle spasm  -START magnesium oxide 400 mg daily  -Check Mg and BMP on 2/12/20    Otherwise continue current care plan for all other chronic medical conditions, as they are stable. Encouraged patient to engage in healthy lifestyle  behaviors such as engaging in social activities, exercising (PT/OT), eating well, and following care plan. Follow up for routine check-up, or sooner if needed. Will continue to monitor patient and work with nursing staff collaboratively to work toward positive patient outcomes.     Electronically signed by: Kiana Reyes CNP

## 2021-06-20 NOTE — PROGRESS NOTES
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 Breast cancer: stage I: recurrent disease. Prior left mastectomy. Did not tolerate tamoxifen. currently taking anastrozole. Tolerating well. Started at the beginning of 2015. She will continue. She will return in one year.     2. Prior hx of breast cancer on left side: treated with mastectomy and 5 years of aromatase inhibitor therapy on a clinical study.     3. Osteopenia: overdue for DEXA scan. She will get set up for that soon. Continue calcium and vitamin D.     ECOG Performance   ECOG Performance Status: 2     Distress Assessment  Distress Assessment Score: No distress    Pain  Currently in Pain: No/denies      Problem List    1. Aromatase inhibitor use  DXA Bone Density Scan    DXA Bone Density Scan    Comprehensive Metabolic Panel   2. Malignant neoplasm of upper-outer quadrant of right breast in female, estrogen receptor positive (H)  anastrozole (ARIMIDEX) 1 mg tablet    Comprehensive Metabolic Panel        ______________________________________________________________________________    History of Present Illness    Marina Neves is a very pleasant 84 y.o. old female with a history of breast cancer located on her right side measuring less than 1 cm in size presenting on a mammogram in December 2014.  Completely asymptomatic not associated with any nipple discharge and not associating any lymph node involvement.  She underwent mastectomy with sentinel lymph  node biopsy.  Mastectomy revealed a small amount of invasive ductal carcinoma and some DCIS.  Lymph nodes were negative.  ER positive NC positive HER-2/jesenia negative.  Subsequently that she was started on Tamoxifen in February 2015.She had lot of side effects reported. So she was switched back to anastrozole. Tolerating it well.     Comes in today for f/u.       Pain Status  Currently in Pain: No/denies    Review of Systems    Constitutional  Constitutional (WDL): Exceptions to WDL  Fatigue: Fatigue not relieved by rest - Limiting instrumental ADL  Neurosensory  Neurosensory (WDL): All neurosensory elements are within defined limits  Eye   Eye Disorder (WDL): All eye disorder elements are within defined limits  Ear  Ear Disorder (WDL): All ear disorder elements are within defined limits  Cardiovascular  Cardiovascular (WDL): Exceptions to WDL  Edema: Yes  Pulmonary  Respiratory (WDL): Within Defined Limits  Gastrointestinal  Gastrointestinal (WDL): Exceptions to WDL  Constipation: Persistent symptoms with regular use of laxatives or enemas, limiting instrumental ADL  Genitourinary  Genitourinary (WDL): All genitourinary elements are within defined limits (urgency)  Lymphatic  Lymph (WDL): All lymph disorder elements are within defined limits  Musculoskeletal and Connective Tissue  Musculoskeletal and Connetive Tissue Disorders (WDL): Exceptions to WDL  Arthralgia: Mild pain  Integumentary  Integumentary (WDL): All integumentary elements are within defined limits  Patient Coping  Patient Coping: Accepting  Distress Assessment  Distress Assessment Score: No distress  Accompanied by  Accompanied by: Family Member  Oral Chemo Adherence       Past History  Past Medical History:   Diagnosis Date     Anemia      Arthritis      Bladder infection 01/03/2015    dx'd- on antibiotics     Blood type AB-     with Anti-C     Breast cancer (H)      Bursitis, knee      Cancer (H) 2005    left breast     Colon cancer (H)       Depression      Gout      History of transfusion      Hypertension      Insomnia      Macular degeneration      Osteopenia      Ovarian cyst      Rosacea      Vitamin D deficiency        PHYSICAL EXAM:  /76  Pulse (!) 55  Temp 98.2  F (36.8  C) (Oral)   Wt 171 lb (77.6 kg)  SpO2 97%  BMI 30.29 kg/m2  GENERAL: no acute distress. Cooperative in conversation. Here with daughter  HEENT: pupils are equal, round and reactive. Oromucosa is clean and intact. No ulcerations or mucositis noted. No bleeding noted. Poor dentition  RESP: lungs are clear bilaterally per auscultation. Regular respiratory rate. No wheezes or rhonchi.  CV: Regular, rate and rhythm. No murmurs.  ABD: soft, nontender. Positive bowel sounds. No organomegaly.   MUSCULOSKELETAL: No lower extremity swelling.   NEURO: non focal. Alert and oriented x3.   PSYCH: within normal limits. No depression or anxiety.  SKIN: warm dry intact   LYMPH: no cervical, supraclavicular or axillary lymphadenopathy  BREAST: chest wall exam is WNL. Some excess skin on left side.         Lab Results    Recent Results (from the past 168 hour(s))   Comprehensive Metabolic Panel   Result Value Ref Range    Sodium 140 136 - 145 mmol/L    Potassium 4.3 3.5 - 5.0 mmol/L    Chloride 107 98 - 107 mmol/L    CO2 28 22 - 31 mmol/L    Anion Gap, Calculation 5 5 - 18 mmol/L    Glucose 133 (H) 70 - 125 mg/dL    BUN 13 8 - 28 mg/dL    Creatinine 0.76 0.60 - 1.10 mg/dL    GFR MDRD Af Amer >60 >60 mL/min/1.73m2    GFR MDRD Non Af Amer >60 >60 mL/min/1.73m2    Bilirubin, Total 0.4 0.0 - 1.0 mg/dL    Calcium 9.0 8.5 - 10.5 mg/dL    Protein, Total 7.1 6.0 - 8.0 g/dL    Albumin 3.6 3.5 - 5.0 g/dL    Alkaline Phosphatase 76 45 - 120 U/L    AST 14 0 - 40 U/L    ALT 17 0 - 45 U/L   HM1 (CBC with Diff)   Result Value Ref Range    WBC 6.9 4.0 - 11.0 thou/uL    RBC 3.71 (L) 3.80 - 5.40 mill/uL    Hemoglobin 11.8 (L) 12.0 - 16.0 g/dL    Hematocrit 35.9 35.0 - 47.0 %    MCV 97 80 - 100 fL    MCH  31.8 27.0 - 34.0 pg    MCHC 32.9 32.0 - 36.0 g/dL    RDW 14.1 11.0 - 14.5 %    Platelets 195 140 - 440 thou/uL    MPV 10.0 8.5 - 12.5 fL    Neutrophils % 38 (L) 50 - 70 %    Lymphocytes % 49 (H) 20 - 40 %    Monocytes % 11 (H) 2 - 10 %    Eosinophils % 2 0 - 6 %    Basophils % 1 0 - 2 %    Neutrophils Absolute 2.6 2.0 - 7.7 thou/uL    Lymphocytes Absolute 3.4 0.8 - 4.4 thou/uL    Monocytes Absolute 0.8 0.0 - 0.9 thou/uL    Eosinophils Absolute 0.1 0.0 - 0.4 thou/uL    Basophils Absolute 0.0 0.0 - 0.2 thou/uL       Imaging    No results found.      Signed by: Valerie Nevarez, CNP

## 2021-06-20 NOTE — LETTER
Letter by Kiana Reyes CNP at      Author: Kiana Reyes CNP Service: -- Author Type: --    Filed:  Encounter Date: 12/2/2019 Status: Signed         Patient: Marina Neves   MR Number: [id number]   YOB: 1934   Date of Visit: 12/2/2019     [facility]    Facility:   [facility] [[id number]]   Code Status: POLST AVAILABLE      CHIEF COMPLAINT/REASON FOR VISIT:  Chief Complaint   Patient presents with   ? Problem Visit     right knee pain       HISTORY:      HPI: Marina is a 85 y.o. female who is an assisted-living resident at [facility].  Marina  has a past medical history of Anemia, Arthritis, Bladder infection (01/03/2015), Blood type AB-, Breast cancer (H), Bursitis, knee, Cancer (H) (2005), Colon cancer (H), Depression, Gout, History of transfusion, Hypertension, Insomnia, Macular degeneration, Osteopenia, Ovarian cyst, Rosacea, and Vitamin D deficiency.    Today Ms. Neves is being evaluated for pain management per patient request.  Marina called nursing service multiple times since her previous visit and reported that she has uncontrolled pain and needed to be seen ASAP.  Marina described her pain as being the same as it has always been rated 4-7/10 with aching, intermittent right knee pain exacerbated by ambulating.  We discussed including rest periods when she is up ambulating around her AL facility to improve her pain tolerance.  We also discussed using adjust therapies such as heat and ice to help manage her pain when it is above her goal of 3/10.  She is currently taking Tylenol, ibuprofen, and tramadol for pain management at this time.  Marina reported that she did not have an appreciable difference in her pain with ibuprofen over the past week and we discussed discontinuation due to risk for adverse effects and she was agreeable to this plan.  We also discussed the possibility of Marina ordering her knee brace as recommended by  physical therapy and she continues to say that she would rather try other therapies at this time.  She was agreeable to starting duloxetine for chronic pain management at this time and said that she would have her niece pick it up from the pharmacy today to start this evening.  Marina has been having difficulty remembering her pain regimen with her losing her paper that her regimen is written down on calling nursing triage to help her recall her regimen multiple times since her last visit.  We discussed that she could go to an orthopedic provider or pain clinic for further evaluation of her pain with continued uncontrolled pain and she declined these recommendations at this time.  We discussed further imaging of her knee and she declined at this time because she does not want to go out for anything.  The patient denied lightheadedness, dizziness, breathing difficulty, chest pain, palpitations, constipation, urinary symptoms, and numbness or tingling in extremities.     Past Medical History:   Diagnosis Date   ? Anemia    ? Arthritis    ? Bladder infection 01/03/2015    dx'd- on antibiotics   ? Blood type AB-     with Anti-C   ? Breast cancer (H)    ? Bursitis, knee    ? Cancer (H) 2005    left breast   ? Colon cancer (H)    ? Depression    ? Gout    ? History of transfusion    ? Hypertension    ? Insomnia    ? Macular degeneration    ? Osteopenia    ? Ovarian cyst    ? Rosacea    ? Vitamin D deficiency              Family History   Problem Relation Age of Onset   ? Squamous cell carcinoma Mother    ? Colon cancer Sister    ? Breast cancer Maternal Aunt    ? Bone cancer Maternal Aunt    ? Colon cancer Paternal Aunt    ? Breast cancer Cousin      Social History     Socioeconomic History   ? Marital status:      Spouse name: Not on file   ? Number of children: Not on file   ? Years of education: Not on file   ? Highest education level: Not on file   Occupational History   ? Not on file   Social Needs   ?  Financial resource strain: Not on file   ? Food insecurity:     Worry: Not on file     Inability: Not on file   ? Transportation needs:     Medical: Not on file     Non-medical: Not on file   Tobacco Use   ? Smoking status: Never Smoker   ? Smokeless tobacco: Never Used   Substance and Sexual Activity   ? Alcohol use: No   ? Drug use: No   ? Sexual activity: Never   Lifestyle   ? Physical activity:     Days per week: Not on file     Minutes per session: Not on file   ? Stress: Not on file   Relationships   ? Social connections:     Talks on phone: Not on file     Gets together: Not on file     Attends Voodoo service: Not on file     Active member of club or organization: Not on file     Attends meetings of clubs or organizations: Not on file     Relationship status: Not on file   ? Intimate partner violence:     Fear of current or ex partner: Not on file     Emotionally abused: Not on file     Physically abused: Not on file     Forced sexual activity: Not on file   Other Topics Concern   ? Not on file   Social History Narrative   ? Not on file       REVIEW OF SYSTEM:  Pertinent items are noted in HPI.  A 12 point comprehensive review of systems was negative except as noted.    PHYSICAL EXAM:     General Appearance:    Alert, cooperative, no distress, appears stated age   Head:    Normocephalic, without obvious abnormality, atraumatic   Eyes:    PERRL, conjunctiva/corneas clear, both eyes; wears glasses   Ears:    Normal external ear canals, both ears; hearing impairment in bilateral ears with bilateral Has; bilateral cerumen impaction   Nose:   Nares normal, septum midline, mucosa normal, no drainage    or sinus tenderness   Throat:   Lips, mucosa, and tongue normal; teeth and gums normal   Neck:   Supple, symmetrical, trachea midline, no adenopathy;     thyroid:  no enlargement/tenderness/nodules; no carotid    bruit or JVD   Back:     Symmetric, no curvature, ROM normal, no CVA tenderness   Lungs:     Clear to  auscultation bilaterally, respirations unlabored   Chest Wall:    No tenderness or deformity    Heart:    Regular rate and rhythm, S1 and S2 normal, no murmur, rub   or gallop   Abdomen:     Soft, non-tender, bowel sounds active all four quadrants,     no masses, no organomegaly   Extremities:   Extremities normal, atraumatic, no cyanosis or edema; point tenderness over lower right patella   Pulses:   2+ and symmetric all extremities   Skin:   Skin color, texture, turgor normal, no rashes or lesions   Neurologic:   Oriented to person and situation; forgetful at times; exhibits tangetial thought processes; generalized weakness; ambulatory with walker           LABS:   Lab Results   Component Value Date    WBC 8.2 11/18/2019    HGB 11.7 (L) 11/18/2019    HCT 36.7 11/18/2019     11/18/2019    CHOL 194 03/11/2019    TRIG 198 (H) 03/11/2019    HDL 52 03/11/2019    ALT 17 11/18/2019    AST 17 11/18/2019     11/18/2019    K 4.6 11/18/2019     11/18/2019    CREATININE 0.79 11/18/2019    BUN 18 11/18/2019    CO2 23 11/18/2019    TSH 1.21 02/13/2018    INR 0.94 05/21/2019        ASSESSMENT:      ICD-10-CM    1. Chronic pain syndrome G89.4    2. Chronic pain of right knee M25.561     G89.29        PLAN:      Chronic pain  -START duloxetine 20 mg daily at HS  -DISCONTINUE ibuprofen 200 mg three times a day  -Continue Tylenol 625 mg as prescribed  -Continue tramadol 100 mg three times a day   -Encouraged patient to utilize integrative therapies such as distraction and deep breathing for pain management  -Notify provider if patient has new or worsening pain   -Follow-up appointment with PCP on 12/16/19    Otherwise continue current care plan for all other chronic medical conditions, as they are stable. Encouraged patient to engage in healthy lifestyle behaviors such as engaging in social activities, exercising (PT/OT), eating well, and following care plan. Follow up for routine check-up, or sooner if needed.  Will continue to monitor patient and work with nursing staff collaboratively to work toward positive patient outcomes.     Electronically signed by: Kiana Reyes CNP     Total floor/unit time was 55 minutes and 55 minutes was spent in counseling patient on pain management in coordination of care with patient and family.

## 2021-06-20 NOTE — LETTER
Letter by Kiana Reyes CNP at      Author: Kiana Reyes CNP Service: -- Author Type: --    Filed:  Encounter Date: 6/29/2020 Status: (Other)         Patient: Marina Neves   MR Number: [id number]   YOB: 1934   Date of Visit: 6/29/2020     [facility]    Facility:   [facility] [[id number]]   Code Status: POLST AVAILABLE      CHIEF COMPLAINT/REASON FOR VISIT:  Chief Complaint   Patient presents with   ? FVP Care Coordination - Regulatory       HISTORY:      HPI: Marina is a 86 y.o. female who is an assisted-living resident at [facility].  Marina  has a past medical history of Anemia, Arthritis, Bladder infection (01/03/2015), Blood type AB-, Breast cancer (H), Bursitis, knee, Cancer (H) (2005), Colon cancer (H), Depression, Gout, History of transfusion, Hypertension, Insomnia, Macular degeneration, Osteopenia, Ovarian cyst, Rosacea, and Vitamin D deficiency.    Today Ms. Neves is being evaluated for a review of multiple medical conditions.  Marina reported that everything is been stable for over the past 3 months.  She continues to have 5-6 out of 10 at multiple joint aching, intermittent pain.  She continues to take Tylenol as needed, ice packs, and tramadol 3 times daily for her chronic pain management.  She has an ice pack on her left shoulder at this visit and reported 8 out of 10 sharp pain.  She was agreeable to plan to follow-up with orthopedic provider who provided previous steroid injection in her apartment later this week.  She has not followed up with pain clinic at this time due to pandemic precautions.  She was agreeable to plan for pain consult to [facility] pain clinic with her cousin Yo scheduling the appointment and taking her.  Her blood pressure is low she has been monitoring it through her facility with systolic blood pressure 120s to 130s on her current antihypertensive regimen.  The patient denied changes in mood or  appetite, sleep disturbances, lightheadedness, dizziness, breathing difficulty, chest pain, palpitations, constipation, urinary symptoms, and numbness or tingling in extremities.    Past Medical History:   Diagnosis Date   ? Anemia    ? Arthritis    ? Bladder infection 01/03/2015    dx'd- on antibiotics   ? Blood type AB-     with Anti-C   ? Breast cancer (H)    ? Bursitis, knee    ? Cancer (H) 2005    left breast   ? Colon cancer (H)    ? Depression    ? Gout    ? History of transfusion    ? Hypertension    ? Insomnia    ? Macular degeneration    ? Osteopenia    ? Ovarian cyst    ? Rosacea    ? Vitamin D deficiency              Family History   Problem Relation Age of Onset   ? Squamous cell carcinoma Mother    ? Colon cancer Sister    ? Breast cancer Maternal Aunt    ? Bone cancer Maternal Aunt    ? Colon cancer Paternal Aunt    ? Breast cancer Cousin      Social History     Socioeconomic History   ? Marital status:      Spouse name: Not on file   ? Number of children: Not on file   ? Years of education: Not on file   ? Highest education level: Not on file   Occupational History   ? Not on file   Social Needs   ? Financial resource strain: Not on file   ? Food insecurity     Worry: Not on file     Inability: Not on file   ? Transportation needs     Medical: Not on file     Non-medical: Not on file   Tobacco Use   ? Smoking status: Never Smoker   ? Smokeless tobacco: Never Used   Substance and Sexual Activity   ? Alcohol use: No   ? Drug use: No   ? Sexual activity: Never   Lifestyle   ? Physical activity     Days per week: Not on file     Minutes per session: Not on file   ? Stress: Not on file   Relationships   ? Social connections     Talks on phone: Not on file     Gets together: Not on file     Attends Congregation service: Not on file     Active member of club or organization: Not on file     Attends meetings of clubs or organizations: Not on file     Relationship status: Not on file   ? Intimate partner  violence     Fear of current or ex partner: Not on file     Emotionally abused: Not on file     Physically abused: Not on file     Forced sexual activity: Not on file   Other Topics Concern   ? Not on file   Social History Narrative   ? Not on file       REVIEW OF SYSTEM:  Pertinent items are noted in HPI.  A 12 point comprehensive review of systems was negative except as noted.    PHYSICAL EXAM:     General Appearance:    Alert, cooperative, no distress, appears stated age   Head:    Normocephalic, without obvious abnormality, atraumatic   Eyes:    PERRL, conjunctiva/corneas clear, both eyes; wears glasses   Ears:    Normal external ear canals, both ears; hearing impairment in bilateral ears with bilateral Has; bilateral cerumen impaction   Nose:   Nares normal, septum midline, mucosa normal, no drainage    or sinus tenderness   Throat:   Lips, mucosa, and tongue normal; teeth and gums normal   Neck:   Supple, symmetrical, trachea midline, no adenopathy;     thyroid:  no enlargement/tenderness/nodules; no carotid    bruit or JVD   Back:     Symmetric, no curvature, ROM normal, no CVA tenderness   Lungs:     Clear to auscultation bilaterally, respirations unlabored   Chest Wall:    No tenderness or deformity    Heart:    Regular rate and rhythm, S1 and S2 normal, no murmur, rub   or gallop   Abdomen:     Soft, non-tender, bowel sounds active all four quadrants,     no masses, no organomegaly   Extremities:   Extremities normal, atraumatic, no cyanosis; moderate nonpitting BLE edema   Pulses:   2+ and symmetric all extremities   Skin:   Skin color, texture, turgor normal, no rashes or lesions   Neurologic:   Oriented to person and situation; forgetful at times; exhibits tangetial thought processes; generalized weakness; ambulatory with walker           LABS:   Lab Results   Component Value Date    WBC 9.9 02/12/2020    HGB 11.5 (L) 02/12/2020    HCT 37.2 02/12/2020     02/12/2020    CHOL 194 03/11/2019    TRIG  198 (H) 03/11/2019    HDL 52 03/11/2019    ALT 14 02/12/2020    AST 16 02/12/2020     (L) 02/12/2020    K 4.5 02/12/2020     02/12/2020    CREATININE 0.74 02/12/2020    BUN 13 02/12/2020    CO2 24 02/12/2020    TSH 1.21 02/13/2018    INR 0.94 05/21/2019      Case Management:  I have reviewed the Assisted Living care plan, current immunizations and preventive care/cancer screening.. Future cancer screening is not clinically indicated secondary to age/goals of care.   Patient's desire to return to the community is not assessible due to cognitive impairment.    Advance Directive Discussion:    I reviewed the current advanced directives as reflected in [facility] and the facility chart. I did not due to cognitive impairment review the advance directives with the resident.     Team Discussion:  I communicated with the appropriate disciplines involved with the Plan of Care:   Nursing      Patient Goal:  Patient's goal is pain control and comfort and family's/responsible party's goal for the patient is comfort.    Information reviewed:  Medications, vital signs, orders, and nursing notes.     ASSESSMENT:      ICD-10-CM    1. Essential hypertension  I10    2. Chronic pain syndrome  G89.4    3. Chronic left shoulder pain  M25.512     G89.29        PLAN:      Left shoulder pain  -Consult placed to  Geriatrics orthopedic provider  -Consider steroid injection for pain management    Chronic pain syndrome  -Consult placed to [facility] pain clinic for chronic pain syndrome  -Continue Tylenol 625 mg as prescribed  -Continue tramadol from 50 mg three times a day   -Encouraged patient to ice left shoulder three times a day and PRN  -Encouraged patient to utilize integrative therapies such as distraction and deep breathing for pain management  -Notify provider if patient has new or worsening pain     Hypertension   -Continue spironolactone as prescribed  -Continue losartan as prescribed  -Encouraged cardiac diet, low sodium  diet, exercise and stress reduction techniques.   -Emphasized importance of blood pressure control.   -Notify provider if pt's SBP<90 or >180 and DBP <50 or >100     Otherwise continue current care plan for all other chronic medical conditions, as they are stable. Encouraged patient to engage in healthy lifestyle behaviors such as engaging in social activities, exercising (PT/OT), eating well, and following care plan. Follow up for routine check-up, or sooner if needed. Will continue to monitor patient and work with nursing staff collaboratively to work toward positive patient outcomes.     Electronically signed by: Kiana Reyes, CNP

## 2021-06-20 NOTE — LETTER
Letter by Kiana Reyes CNP at      Author: Kiana Reyes CNP Service: -- Author Type: --    Filed:  Encounter Date: 12/27/2019 Status: Signed         Patient: Marina Neves   MR Number: [id number]   YOB: 1934   Date of Visit: 12/27/2019     [facility]    Facility:   [facility] [[id number]]   Code Status: POLST AVAILABLE      CHIEF COMPLAINT/REASON FOR VISIT:  Chief Complaint   Patient presents with   ? FVP Care Coordination - Regulatory       HISTORY:      HPI: Marina is a 85 y.o. female who is an assisted-living resident at [facility].  Marina  has a past medical history of Anemia, Arthritis, Bladder infection (01/03/2015), Blood type AB-, Breast cancer (H), Bursitis, knee, Cancer (H) (2005), Colon cancer (H), Depression, Gout, History of transfusion, Hypertension, Insomnia, Macular degeneration, Osteopenia, Ovarian cyst, Rosacea, and Vitamin D deficiency.    Today Ms. Neves is being evaluated for a review of multiple medical conditions.  Marina called nurse triage about left shoulder pain that she would like evaluated.  Orthopedic PA is available to see patient in her apartment later this morning for joint injection that she had previously declined in left shoulder for pain related to osteoarthritis.  She reported that she is not able to move her left shoulder without pain describing her pain as aching, intermittent pain rated 7/10 at this time.  She said that she continues to have right knee pain described as aching, intermittent rated 4-8/10 with movement.  She is agreeable to further evaluation at [facility] for continued right knee pain at this visit.  Marina also noted that she has felt woozy when she changes positions in the morning.  Her SBP has been 100-130s over the past few months at her appointments with low heart rate 50-70s taking losartan, spironolactone, and atenolol for antihypertensive management at this time.  We  discussed decreasing her atenolol as likely culprit for her lightheadedness and she was agreeable to this plan.  She asked if there were any other medications that could be decreased at this visit and she was agreeable to discontinuation of aspirin for primary CVD prevention at this time.  The patient denied sleep disturbances, changes in mood or appetite, dizziness, breathing difficulty, chest pain, palpitations, constipation, urinary symptoms, and numbness or tingling in extremities.     Past Medical History:   Diagnosis Date   ? Anemia    ? Arthritis    ? Bladder infection 01/03/2015    dx'd- on antibiotics   ? Blood type AB-     with Anti-C   ? Breast cancer (H)    ? Bursitis, knee    ? Cancer (H) 2005    left breast   ? Colon cancer (H)    ? Depression    ? Gout    ? History of transfusion    ? Hypertension    ? Insomnia    ? Macular degeneration    ? Osteopenia    ? Ovarian cyst    ? Rosacea    ? Vitamin D deficiency              Family History   Problem Relation Age of Onset   ? Squamous cell carcinoma Mother    ? Colon cancer Sister    ? Breast cancer Maternal Aunt    ? Bone cancer Maternal Aunt    ? Colon cancer Paternal Aunt    ? Breast cancer Cousin      Social History     Socioeconomic History   ? Marital status:      Spouse name: None   ? Number of children: None   ? Years of education: None   ? Highest education level: None   Occupational History   ? None   Social Needs   ? Financial resource strain: None   ? Food insecurity:     Worry: None     Inability: None   ? Transportation needs:     Medical: None     Non-medical: None   Tobacco Use   ? Smoking status: Never Smoker   ? Smokeless tobacco: Never Used   Substance and Sexual Activity   ? Alcohol use: No   ? Drug use: No   ? Sexual activity: Never   Lifestyle   ? Physical activity:     Days per week: None     Minutes per session: None   ? Stress: None   Relationships   ? Social connections:     Talks on phone: None     Gets together: None      Attends [employer] service: None     Active member of club or organization: None     Attends meetings of clubs or organizations: None     Relationship status: None   ? Intimate partner violence:     Fear of current or ex partner: None     Emotionally abused: None     Physically abused: None     Forced sexual activity: None   Other Topics Concern   ? None   Social History Narrative   ? None       REVIEW OF SYSTEM:  Pertinent items are noted in HPI.  A 12 point comprehensive review of systems was negative except as noted.    PHYSICAL EXAM:     General Appearance:    Alert, cooperative, no distress, appears stated age   Head:    Normocephalic, without obvious abnormality, atraumatic   Eyes:    PERRL, conjunctiva/corneas clear, both eyes; wears glasses   Ears:    Normal external ear canals, both ears; hearing impairment in bilateral ears with bilateral Has; bilateral cerumen impaction   Nose:   Nares normal, septum midline, mucosa normal, no drainage    or sinus tenderness   Throat:   Lips, mucosa, and tongue normal; teeth and gums normal   Neck:   Supple, symmetrical, trachea midline, no adenopathy;     thyroid:  no enlargement/tenderness/nodules; no carotid    bruit or JVD   Back:     Symmetric, no curvature, ROM normal, no CVA tenderness   Lungs:     Clear to auscultation bilaterally, respirations unlabored   Chest Wall:    No tenderness or deformity    Heart:    Regular rate and rhythm, S1 and S2 normal, no murmur, rub   or gallop   Abdomen:     Soft, non-tender, bowel sounds active all four quadrants,     no masses, no organomegaly   Extremities:   Extremities normal, atraumatic, no cyanosis or edema; point tenderness over lower right patella   Pulses:   2+ and symmetric all extremities   Skin:   Skin color, texture, turgor normal, no rashes or lesions   Neurologic:   Oriented to person and situation; forgetful at times; exhibits tangetial thought processes; generalized weakness; ambulatory with walker            LABS:   Lab Results   Component Value Date    WBC 8.2 11/18/2019    HGB 11.7 (L) 11/18/2019    HCT 36.7 11/18/2019     11/18/2019    CHOL 194 03/11/2019    TRIG 198 (H) 03/11/2019    HDL 52 03/11/2019    ALT 17 11/18/2019    AST 17 11/18/2019     11/18/2019    K 4.6 11/18/2019     11/18/2019    CREATININE 0.79 11/18/2019    BUN 18 11/18/2019    CO2 23 11/18/2019    TSH 1.21 02/13/2018    INR 0.94 05/21/2019        ASSESSMENT:      ICD-10-CM    1. Chronic left shoulder pain M25.512     G89.29    2. Chronic pain of right knee M25.561     G89.29    3. Chronic pain syndrome G89.4    4. Essential hypertension I10    5. Medication management Z79.899        PLAN:      Chronic pain  -Consult to orthopedic provider for right knee pain  -Continue duloxetine 20 mg daily at HS  -Continue Tylenol 625 mg as prescribed  -Continue tramadol 100 mg three times a day   -Encouraged patient to utilize integrative therapies such as distraction and deep breathing for pain management  -Notify provider if patient has new or worsening pain     Left shoulder pain  -cortisone injection per orthopedic PA today for pain  -Follow-up appointment on 12/31/19     Hypertension  -DECREASE atenolol from 100 mg to 50 mg daily  -Continue losartan 75 mg daily  -Continue spironolactone 25 mg daily  -Encouraged cardiac diet, low sodium diet, exercise and stress reduction techniques.   -Emphasized importance of blood pressure control.   -Notify provider if pt's SBP<90 or >180 and DBP <50 or >100     Medication management  -Discontinue low-dose aspirin    Otherwise continue current care plan for all other chronic medical conditions, as they are stable. Encouraged patient to engage in healthy lifestyle behaviors such as engaging in social activities, exercising (PT/OT), eating well, and following care plan. Follow up for routine check-up, or sooner if needed. Will continue to monitor patient and work with nursing staff collaboratively to  work toward positive patient outcomes.     Electronically signed by: Kiana Reyes, CNP

## 2021-06-20 NOTE — LETTER
Letter by Kiana Reyes CNP at      Author: Kiana Reyes CNP Service: -- Author Type: --    Filed:  Encounter Date: 2/13/2020 Status: (Other)         Patient: Marina Neves   MR Number: [id number]   YOB: 1934   Date of Visit: 2/13/2020     [facility]    Facility:   [facility] [[id number]]   Code Status: POLST AVAILABLE      CHIEF COMPLAINT/REASON FOR VISIT:  Chief Complaint   Patient presents with   ? Problem Visit     pain management       HISTORY:      HPI: Marina is a 85 y.o. female who is an assisted-living resident at [facility].  Marina  has a past medical history of Anemia, Arthritis, Bladder infection (01/03/2015), Blood type AB-, Breast cancer (H), Bursitis, knee, Cancer (H) (2005), Colon cancer (H), Depression, Gout, History of transfusion, Hypertension, Insomnia, Macular degeneration, Osteopenia, Ovarian cyst, Rosacea, and Vitamin D deficiency.    Today Ms. Neves is being evaluated for a review of multiple medical conditions per patient request.  Marina's concern at this visit is her continued chronic right knee pain described as aching intermittent rated 0/10 at rest and 5/10 with movement.  We discussed her pain regimen and she said that her left shoulder pain resolved with ibuprofen therapy.  She has also been utilizing Tylenol, tramadol, and Biofreeze for chronic pain management.   Marina denied lightheadedness, dizziness, breathing difficulty, chest pain, palpitations, constipation, urinary symptoms, and numbness or tingling in extremities.  She had been taking duloxetine for pain management but had the pill bottle hidden under her TV stand and does not remember when she stopped taking it.  She has 19 pills missing out of 90 af this time.  We discussed discontinuation of this medication as she is unclear whether she has been taking it or not.  She reported that she only takes the medications that are in her medicine basket in  her medicine cabinet.  We discussed having nursing staff assist her with setting up her medications again at this visit as she is having difficulty remembering which medications she is taking and has been misplacing medication bottles and she said that she would not like to pay for this service at her facility.  She was agreeable to talking to her niece, Roseanna, to see if she would be able to help her manage her medications weekly.  Roseanna was updated and was agreeable to setting up Marina's medications in a pill box for her.  We also discussed helping Marina make medical decisions as her next of kin as her cognitive impairment has progressed with increased forgetfulness.  Roseanan reported that she would start to look into getting financial and healthcare POA for when Marina's cognition deteriorates further.    Past Medical History:   Diagnosis Date   ? Anemia    ? Arthritis    ? Bladder infection 01/03/2015    dx'd- on antibiotics   ? Blood type AB-     with Anti-C   ? Breast cancer (H)    ? Bursitis, knee    ? Cancer (H) 2005    left breast   ? Colon cancer (H)    ? Depression    ? Gout    ? History of transfusion    ? Hypertension    ? Insomnia    ? Macular degeneration    ? Osteopenia    ? Ovarian cyst    ? Rosacea    ? Vitamin D deficiency              Family History   Problem Relation Age of Onset   ? Squamous cell carcinoma Mother    ? Colon cancer Sister    ? Breast cancer Maternal Aunt    ? Bone cancer Maternal Aunt    ? Colon cancer Paternal Aunt    ? Breast cancer Cousin      Social History     Socioeconomic History   ? Marital status:      Spouse name: None   ? Number of children: None   ? Years of education: None   ? Highest education level: None   Occupational History   ? None   Social Needs   ? Financial resource strain: None   ? Food insecurity:     Worry: None     Inability: None   ? Transportation needs:     Medical: None     Non-medical: None   Tobacco Use   ? Smoking status: Never Smoker   ?  Smokeless tobacco: Never Used   Substance and Sexual Activity   ? Alcohol use: No   ? Drug use: No   ? Sexual activity: Never   Lifestyle   ? Physical activity:     Days per week: None     Minutes per session: None   ? Stress: None   Relationships   ? Social connections:     Talks on phone: None     Gets together: None     Attends [employer] service: None     Active member of club or organization: None     Attends meetings of clubs or organizations: None     Relationship status: None   ? Intimate partner violence:     Fear of current or ex partner: None     Emotionally abused: None     Physically abused: None     Forced sexual activity: None   Other Topics Concern   ? None   Social History Narrative   ? None       REVIEW OF SYSTEM:  Pertinent items are noted in HPI.  A 12 point comprehensive review of systems was negative except as noted.    PHYSICAL EXAM:     General Appearance:    Alert, cooperative, no distress, appears stated age   Head:    Normocephalic, without obvious abnormality, atraumatic   Eyes:    PERRL, conjunctiva/corneas clear, both eyes; wears glasses   Ears:    Normal external ear canals, both ears; hearing impairment in bilateral ears with bilateral Has; bilateral cerumen impaction   Nose:   Nares normal, septum midline, mucosa normal, no drainage    or sinus tenderness   Throat:   Lips, mucosa, and tongue normal; teeth and gums normal   Neck:   Supple, symmetrical, trachea midline, no adenopathy;     thyroid:  no enlargement/tenderness/nodules; no carotid    bruit or JVD   Back:     Symmetric, no curvature, ROM normal, no CVA tenderness   Lungs:     Clear to auscultation bilaterally, respirations unlabored   Chest Wall:    No tenderness or deformity    Heart:    Regular rate and rhythm, S1 and S2 normal, no murmur, rub   or gallop   Abdomen:     Soft, non-tender, bowel sounds active all four quadrants,     no masses, no organomegaly   Extremities:   Extremities normal, atraumatic, no cyanosis or  edema; point tenderness over lower right patella   Pulses:   2+ and symmetric all extremities   Skin:   Skin color, texture, turgor normal, no rashes or lesions   Neurologic:   Oriented to person and situation; forgetful at times; exhibits tangetial thought processes; generalized weakness; ambulatory with walker           LABS:   Lab Results   Component Value Date    WBC 9.9 02/12/2020    HGB 11.5 (L) 02/12/2020    HCT 37.2 02/12/2020     02/12/2020    CHOL 194 03/11/2019    TRIG 198 (H) 03/11/2019    HDL 52 03/11/2019    ALT 14 02/12/2020    AST 16 02/12/2020     (L) 02/12/2020    K 4.5 02/12/2020     02/12/2020    CREATININE 0.74 02/12/2020    BUN 13 02/12/2020    CO2 24 02/12/2020    TSH 1.21 02/13/2018    INR 0.94 05/21/2019        ASSESSMENT:      ICD-10-CM    1. Chronic pain of right knee M25.561     G89.29    2. Chronic pain syndrome G89.4    3. Essential hypertension I10    4. Cognitive impairment R41.89        PLAN:      Chronic pain  -DISCONTINUE ibuprofen 400 mg three times a day  -Continue PT/OT as recommended  -DISCONTINUE duloxetine 20 mg daily at HS  -Continue Tylenol 625 mg as prescribed  -Continue tramadol from 50 mg three times a day   -Encouraged patient to ice left shoulder three times a day and PRN  -Encouraged patient to utilize integrative therapies such as distraction and deep breathing for pain management  -Notify provider if patient has new or worsening pain     Hypertension   -DISCONTINUE atenolol  -Continue spironolactone as prescribed  -Continue losartan as prescribed  -Encouraged cardiac diet, low sodium diet, exercise and stress reduction techniques.   -Emphasized importance of blood pressure control.   -Notify provider if pt's SBP<90 or >180 and DBP <50 or >100     Cognitive impairment  -Pt's niece to set up her pills for her weekly    Otherwise continue current care plan for all other chronic medical conditions, as they are stable. Encouraged patient to engage in  healthy lifestyle behaviors such as engaging in social activities, exercising (PT/OT), eating well, and following care plan. Follow up for routine check-up, or sooner if needed. Will continue to monitor patient and work with nursing staff collaboratively to work toward positive patient outcomes.     Electronically signed by: Kiana Reyes CNP

## 2021-06-20 NOTE — LETTER
Letter by Kiana Reyes CNP at      Author: Kiana Reyes CNP Service: -- Author Type: --    Filed:  Encounter Date: 2/20/2020 Status: (Other)         Patient: Marina Neves   MR Number: [id number]   YOB: 1934   Date of Visit: 2/20/2020     [facility]    Facility:   [facility] [[id number]]   Code Status: POLST AVAILABLE      CHIEF COMPLAINT/REASON FOR VISIT:  Chief Complaint   Patient presents with   ? Review Of Multiple Medical Conditions       HISTORY:      HPI: Marina is a 85 y.o. female who is an assisted-living resident at [facility].  Marina  has a past medical history of Anemia, Arthritis, Bladder infection (01/03/2015), Blood type AB-, Breast cancer (H), Bursitis, knee, Cancer (H) (2005), Colon cancer (H), Depression, Gout, History of transfusion, Hypertension, Insomnia, Macular degeneration, Osteopenia, Ovarian cyst, Rosacea, and Vitamin D deficiency.    Today Ms. Neves is being evaluated for a review of multiple medical conditions per patient request.  Marina's niece, Roseanna, left a list of concerns for her aunt to address at this visit.  We discussed that the patient was titrated off her atenolol due to the patient reporting lightheadedness with standing up in the morning at her 12/27/19 visit and decreased heart rate 50-60s over the past few months.  We also discussed discontinuation of her calcium supplement and multivitamin to decrease risk for constipation that she often discusses and risk for polypharmacy effects with > 7 medications at this time.  Marina noted that she did not recall ever having lightheadedness or why her medications have been adjusted.  She said that she has lost the papers with her written instructions from her visits.  Her niece also noted that she has increased bilateral ankle edema taking spironolactone for chronic diuresis.  We discussed options to treat her increased edema including having nurses help her  with knee-high compression stockings, ordering compression stockings for her to put on herself, and increasing her spironolactone.  Marina said that she does not want to pay for nursing services.  She does not feel that she can put on compression stockings on her own due to arthritis in her hands.  She does not want her spironolactone increased due to increased risk for dry eye at this time.  She was encouraged to elevate her feet when she is in her apartment as much as possible for treatment of edema.  Her blood pressure has been stable with discontinuation of atenolol with SBP 120s over the past month.  Marina was agreeable to further evaluation of her cognition with OT at [facility] to try to remain in her AL apartment as long as possible without utilizing nursing services.  Her niece, Roseanna, will be setting up her medications per her medication list which was reviewed at this visit.  Marina continues to have pain in her right knee but denied pain at this time upon palpation sitting in her recliner.  She also denied pain in her left shoulder and has been reporting continued left shoulder pain to her niece prompting request to pain clinic for chronic pain management.  Marina was agreeable to following with pain clinic for pain management with the help of her niece.  The patient denied changes in mood or appetite, sleep disturbances, lightheadedness, dizziness, breathing difficulty, chest pain, palpitations, urinary symptoms, and numbness or tingling in extremities.     Past Medical History:   Diagnosis Date   ? Anemia    ? Arthritis    ? Bladder infection 01/03/2015    dx'd- on antibiotics   ? Blood type AB-     with Anti-C   ? Breast cancer (H)    ? Bursitis, knee    ? Cancer (H) 2005    left breast   ? Colon cancer (H)    ? Depression    ? Gout    ? History of transfusion    ? Hypertension    ? Insomnia    ? Macular degeneration    ? Osteopenia    ? Ovarian cyst    ? Rosacea    ? Vitamin D  deficiency              Family History   Problem Relation Age of Onset   ? Squamous cell carcinoma Mother    ? Colon cancer Sister    ? Breast cancer Maternal Aunt    ? Bone cancer Maternal Aunt    ? Colon cancer Paternal Aunt    ? Breast cancer Cousin      Social History     Socioeconomic History   ? Marital status:      Spouse name: None   ? Number of children: None   ? Years of education: None   ? Highest education level: None   Occupational History   ? None   Social Needs   ? Financial resource strain: None   ? Food insecurity:     Worry: None     Inability: None   ? Transportation needs:     Medical: None     Non-medical: None   Tobacco Use   ? Smoking status: Never Smoker   ? Smokeless tobacco: Never Used   Substance and Sexual Activity   ? Alcohol use: No   ? Drug use: No   ? Sexual activity: Never   Lifestyle   ? Physical activity:     Days per week: None     Minutes per session: None   ? Stress: None   Relationships   ? Social connections:     Talks on phone: None     Gets together: None     Attends Roman [employer] service: None     Active member of club or organization: None     Attends meetings of clubs or organizations: None     Relationship status: None   ? Intimate partner violence:     Fear of current or ex partner: None     Emotionally abused: None     Physically abused: None     Forced sexual activity: None   Other Topics Concern   ? None   Social History Narrative   ? None       REVIEW OF SYSTEM:  Pertinent items are noted in HPI.  A 12 point comprehensive review of systems was negative except as noted.    PHYSICAL EXAM:     General Appearance:    Alert, cooperative, no distress, appears stated age   Head:    Normocephalic, without obvious abnormality, atraumatic   Eyes:    PERRL, conjunctiva/corneas clear, both eyes; wears glasses   Ears:    Normal external ear canals, both ears; hearing impairment in bilateral ears with bilateral Has; bilateral cerumen impaction   Nose:   Nares normal, septum  midline, mucosa normal, no drainage    or sinus tenderness   Throat:   Lips, mucosa, and tongue normal; teeth and gums normal   Neck:   Supple, symmetrical, trachea midline, no adenopathy;     thyroid:  no enlargement/tenderness/nodules; no carotid    bruit or JVD   Back:     Symmetric, no curvature, ROM normal, no CVA tenderness   Lungs:     Clear to auscultation bilaterally, respirations unlabored   Chest Wall:    No tenderness or deformity    Heart:    Regular rate and rhythm, S1 and S2 normal, no murmur, rub   or gallop   Abdomen:     Soft, non-tender, bowel sounds active all four quadrants,     no masses, no organomegaly   Extremities:   Extremities normal, atraumatic, no cyanosis; moderate nonpitting BLE edema   Pulses:   2+ and symmetric all extremities   Skin:   Skin color, texture, turgor normal, no rashes or lesions   Neurologic:   Oriented to person and situation; forgetful at times; exhibits tangetial thought processes; generalized weakness; ambulatory with walker           LABS:   Lab Results   Component Value Date    WBC 9.9 02/12/2020    HGB 11.5 (L) 02/12/2020    HCT 37.2 02/12/2020     02/12/2020    CHOL 194 03/11/2019    TRIG 198 (H) 03/11/2019    HDL 52 03/11/2019    ALT 14 02/12/2020    AST 16 02/12/2020     (L) 02/12/2020    K 4.5 02/12/2020     02/12/2020    CREATININE 0.74 02/12/2020    BUN 13 02/12/2020    CO2 24 02/12/2020    TSH 1.21 02/13/2018    INR 0.94 05/21/2019        ASSESSMENT:      ICD-10-CM    1. Chronic pain of right knee M25.561     G89.29    2. Chronic pain syndrome G89.4    3. Essential hypertension I10    4. Cognitive impairment R41.89        PLAN:      Chronic pain  -Continue PT/OT as recommended  -Continue Tylenol 625 mg as prescribed  -Continue tramadol from 50 mg three times a day   -Encouraged patient to ice left shoulder three times a day and PRN  -Encouraged patient to utilize integrative therapies such as distraction and deep breathing for pain  management  -Notify provider if patient has new or worsening pain   -Pt's niece to help her set-up pain clinic appointment    Hypertension   -Continue spironolactone as prescribed  -Continue losartan as prescribed  -Encouraged cardiac diet, low sodium diet, exercise and stress reduction techniques.   -Emphasized importance of blood pressure control.   -Notify provider if pt's SBP<90 or >180 and DBP <50 or >100     Cognitive impairment  -Consult placed to [facility] OT for cognitive impairment  -Pt's niece to set up her pills for her weekly    Otherwise continue current care plan for all other chronic medical conditions, as they are stable. Encouraged patient to engage in healthy lifestyle behaviors such as engaging in social activities, exercising (PT/OT), eating well, and following care plan. Follow up for routine check-up, or sooner if needed. Will continue to monitor patient and work with nursing staff collaboratively to work toward positive patient outcomes.     Electronically signed by: Kiana Reyes, CNP

## 2021-06-21 NOTE — LETTER
Letter by Johnson, Michael Duane, CNP at      Author: Johnson, Michael Duane, CNP Service: -- Author Type: --    Filed:  Encounter Date: 5/25/2021 Status: (Other)         Patient: Marina Neves   MR Number: [id number]   YOB: 1934   Date of Visit: 5/25/2021     [facility]    Facility:   [facility] [[id number]]   Code Status: POLST AVAILABLE      CHIEF COMPLAINT/REASON FOR VISIT:  Chief Complaint   Patient presents with   ? FVP Care Coordination - Regulatory       HISTORY:      HPI: Marina is a 87 y.o. female who is being seen secondary to quarterly visit secondary to discuss chronic medical conditions as well as medication management.  She is happy to have the conversation today.  She does complain of her chronic pain.  In the past has been to the pain clinic.  She currently is being treated for pain with Tylenol scheduled and tramadol scheduled.  We again talked about her chronic pain and her pain does revolve around her shoulders and in particular her right knee.  She is still able to get from a seated position to a standing position and use her four-wheel walker to move about and to ambulate.  I did observe her do some ambulation today as well as sitting and standing.  We talked about her pain management at this point does not want to go back to the pain clinic.  Many months ago I did inject her shoulder secondary to arthritis and impingement which was effective.  We also talked about her complaints of foot pain and in looking at her and with her it does appear to have a neuropathy component so at this point we will add Lyrica 25 mg twice daily to see if that will help we can certainly increment the dose in the near future as well.  She is also due for a BMP her last was in November and see results below.  She is on spironolactone for diuretic and for blood pressure.  They check her blood pressures once a month.  She does have an automatic cuff but not able to use it.   Listening to her heart is regular no significant edema.  Her appetite is good.  She is also hard of hearing and does wear glasses.  I did go through the opportunity to go through each one of her medications.    Past Medical History:   Diagnosis Date   ? Age-related physical debility    ? Anemia    ? Arthritis    ? Bladder infection 01/03/2015    dx'd- on antibiotics   ? Blood type AB-     with Anti-C   ? Breast cancer (H)    ? Bursitis, knee    ? Cancer (H) 2005    left breast   ? Colon cancer (H)    ? Depression    ? Gout    ? History of transfusion    ? Hypertension    ? Insomnia    ? Macular degeneration    ? Osteoarthritis of multiple joints    ? Osteopenia    ? Ovarian cyst    ? Rosacea    ? Vitamin D deficiency              Family History   Problem Relation Age of Onset   ? Squamous cell carcinoma Mother    ? Colon cancer Sister    ? Breast cancer Maternal Aunt    ? Bone cancer Maternal Aunt    ? Colon cancer Paternal Aunt    ? Breast cancer Cousin      Social History     Socioeconomic History   ? Marital status:      Spouse name: Not on file   ? Number of children: Not on file   ? Years of education: Not on file   ? Highest education level: Not on file   Occupational History   ? Not on file   Social Needs   ? Financial resource strain: Not on file   ? Food insecurity     Worry: Not on file     Inability: Not on file   ? Transportation needs     Medical: Not on file     Non-medical: Not on file   Tobacco Use   ? Smoking status: Never Smoker   ? Smokeless tobacco: Never Used   Substance and Sexual Activity   ? Alcohol use: No   ? Drug use: No   ? Sexual activity: Never   Lifestyle   ? Physical activity     Days per week: Not on file     Minutes per session: Not on file   ? Stress: Not on file   Relationships   ? Social connections     Talks on phone: Not on file     Gets together: Not on file     Attends Mandaen service: Not on file     Active member of club or organization: Not on file     Attends  meetings of clubs or organizations: Not on file     Relationship status: Not on file   ? Intimate partner violence     Fear of current or ex partner: Not on file     Emotionally abused: Not on file     Physically abused: Not on file     Forced sexual activity: Not on file   Other Topics Concern   ? Not on file   Social History Narrative   ? Not on file         Review of Systems  Currently denies any new symptoms of fever chills fatigue cough or cold or flulike symptoms nausea vomiting diarrhea dysuria headaches rashes stiff neck swollen glands.    Current Outpatient Medications   Medication Sig   ? pregabalin (LYRICA) 25 MG capsule Take 25 mg by mouth 2 (two) times a day.   ? acetaminophen (TYLENOL) 650 MG CR tablet Take 650 mg by mouth 2 (two) times a day as needed for pain.   ? CHOLECALCIFEROL 25 mcg (1,000 unit) tablet Take 1 tablet by mouth 2 (two) times a day.   ? docoshexanoic acid-eicosapent 500 mg (FISH OIL) 500-100 mg cap capsule Take 1,400 mg by mouth daily.    ? docusate sodium (COLACE) 100 MG capsule Take 200 mg by mouth 2 (two) times a day.    ? ibuprofen (ADVIL,MOTRIN) 200 MG tablet Take 200 mg by mouth every 6 (six) hours as needed for pain.   ? lifitegrast (XIIDRA) 5 % Dpet Apply to eye 2 (two) times a day.   ? losartan (COZAAR) 25 MG tablet 50 mg every morning.    ? magnesium oxide (MAG-OX) 400 mg (241.3 mg magnesium) tablet Take 400 mg by mouth daily.   ? polyvinyl alcohol (LIQUIFILM TEARS) 1.4 % ophthalmic solution Administer 1-2 drops to both eyes 4 (four) times a day as needed for dry eyes.   ? spironolactone (ALDACTONE) 25 MG tablet Take 1 tablet (25 mg total) by mouth daily.   ? traMADoL (ULTRAM) 50 mg tablet Take 1 tablet (50 mg total) by mouth 3 (three) times a day.   ? VIT C/LUDY AC/LUT/COPPER/ZNOX (PRESERVISION LUTEIN ORAL) Take 1 tablet by mouth 2 (two) times a day.       There were no vitals filed for this visit.  Her heart rate is 78 with respirations of 18.  Physical Exam  Pleasant  female.  No acute distress.  Head is normocephalic.  Hearing aids.  Glasses.  Neck is supple without adenopathy.  Lung sounds are clear throughout.  Cardiovascular has an S1-S2 regular rate and rhythm.  No lower extremity edema.  Gastrointestinal nontender nondistended to musculoskeletal history of osteoarthritis to her major joints including a right knee replacement, chronic shoulder degenerative joint disease. Psychiatric: Pleasant affect.     LABS:   Lab Results   Component Value Date    WBC 9.9 02/12/2020    HGB 11.5 (L) 02/12/2020    HCT 37.2 02/12/2020     (H) 02/12/2020     02/12/2020     Results for orders placed or performed in visit on 10/21/20   Basic Metabolic Panel   Result Value Ref Range    Sodium 138 136 - 145 mmol/L    Potassium 5.1 (H) 3.5 - 5.0 mmol/L    Chloride 103 98 - 107 mmol/L    CO2 26 22 - 31 mmol/L    Anion Gap, Calculation 9 5 - 18 mmol/L    Glucose 92 70 - 125 mg/dL    Calcium 9.0 8.5 - 10.5 mg/dL    BUN 13 8 - 28 mg/dL    Creatinine 0.69 0.60 - 1.10 mg/dL    GFR MDRD Af Amer >60 >60 mL/min/1.73m2    GFR MDRD Non Af Amer >60 >60 mL/min/1.73m2         Case Management:  I have reviewed the Assisted Living care plan, current immunizations and preventive care/cancer screening.. Future cancer screening is not clinically indicated secondary to age/goals of care.   Patient's desire to return to the community is present, but is not able due to care needs .    Information reviewed:  Medications, vital signs, orders, and nursing notes.    ASSESSMENT:      ICD-10-CM    1. Chronic pain syndrome  G89.4    2. Chronic pain of right knee  M25.561     G89.29    3. Essential hypertension  I10    4. Age-related physical debility  R54        PLAN:    There were no other staff available to communicate with today.  In the meantime we will get a BMP for tomorrow since the last was in October and she is on spironolactone plus we will be adding in Lyrica 25 mg twice daily.  We did talk about the  medication itself as well as any potential side effects as well as the untoward reactions and therapeutic effects.  The hope is that she will have some improvement to her pain and neuropathy symptoms.  In the meantime we did talk about the automatic blood pressure cuff.  She also keep me updated for any other problems or concerns along the way.  She does see podiatry tomorrow.  She did not have any other questions.      Electronically signed by: Michael Duane Johnson, CNP

## 2021-06-21 NOTE — LETTER
Letter by Johnson, Michael Duane, CNP at      Author: Johnson, Michael Duane, CNP Service: -- Author Type: --    Filed:  Encounter Date: 12/31/2020 Status: (Other)         Patient: Marina Neves   MR Number: [id number]   YOB: 1934   Date of Visit: 12/31/2020     [facility]    Facility:   [facility] [[id number]]   Code Status: UNKNOWN      CHIEF COMPLAINT/REASON FOR VISIT:  Chief Complaint   Patient presents with   ? Problem Visit     rt shoulder pain,       HISTORY:      HPI: Marina is a 86 y.o. female who I was asked to see secondary to persistent right shoulder discomfort which is chronic with a history of a shoulder replacement but also recently went to the emergency department on December 23, 2020 secondary to shoulder pain and also discussed again a cortisone injection into the right shoulder.  When she was in the emergency department x-rays of bilateral shoulders were obtained.  The left shoulder has arthritic changes the right does show intact hardware from previous shoulder replacement.  She does have chronic decreased range of motion and in particular the right shoulder does have an impingement which she states does impede her activities of daily living in looking at her and putting her through her range of motion is quite limited.  Also again talked about the risks and benefits of a steroid injection using Kenalog and Xylocaine into the shoulder joint.  She states she has had them before with variable results and I agreed with that and I confirmed that the results are variable but at any rate she would like to pursue the injection and in general hope that it is helpful so she could decrease her pain as well as improve her quality of life and independence.  While in the apartment she does use her 4 wheeled walker for mobility.    Past Medical History:   Diagnosis Date   ? Age-related physical debility    ? Anemia    ? Arthritis    ? Bladder infection 01/03/2015     dx'd- on antibiotics   ? Blood type AB-     with Anti-C   ? Breast cancer (H)    ? Bursitis, knee    ? Cancer (H) 2005    left breast   ? Colon cancer (H)    ? Depression    ? Gout    ? History of transfusion    ? Hypertension    ? Insomnia    ? Macular degeneration    ? Osteoarthritis of multiple joints    ? Osteopenia    ? Ovarian cyst    ? Rosacea    ? Vitamin D deficiency              Family History   Problem Relation Age of Onset   ? Squamous cell carcinoma Mother    ? Colon cancer Sister    ? Breast cancer Maternal Aunt    ? Bone cancer Maternal Aunt    ? Colon cancer Paternal Aunt    ? Breast cancer Cousin      Social History     Socioeconomic History   ? Marital status:      Spouse name: Not on file   ? Number of children: Not on file   ? Years of education: Not on file   ? Highest education level: Not on file   Occupational History   ? Not on file   Social Needs   ? Financial resource strain: Not on file   ? Food insecurity     Worry: Not on file     Inability: Not on file   ? Transportation needs     Medical: Not on file     Non-medical: Not on file   Tobacco Use   ? Smoking status: Never Smoker   ? Smokeless tobacco: Never Used   Substance and Sexual Activity   ? Alcohol use: No   ? Drug use: No   ? Sexual activity: Never   Lifestyle   ? Physical activity     Days per week: Not on file     Minutes per session: Not on file   ? Stress: Not on file   Relationships   ? Social connections     Talks on phone: Not on file     Gets together: Not on file     Attends Oriental orthodox service: Not on file     Active member of club or organization: Not on file     Attends meetings of clubs or organizations: Not on file     Relationship status: Not on file   ? Intimate partner violence     Fear of current or ex partner: Not on file     Emotionally abused: Not on file     Physically abused: Not on file     Forced sexual activity: Not on file   Other Topics Concern   ? Not on file   Social History Narrative   ? Not  on file         Review of Systems  She currently denies any new symptoms of fever chills fatigue cough or cold flulike symptoms nausea vomiting diarrhea dysuria headache or stiff neck.    Current Outpatient Medications   Medication Sig   ? acetaminophen (TYLENOL) 650 MG CR tablet Take 650 mg by mouth 2 (two) times a day as needed for pain.   ? CHOLECALCIFEROL 25 mcg (1,000 unit) tablet Take 1 tablet by mouth 2 (two) times a day.   ? docoshexanoic acid-eicosapent 500 mg (FISH OIL) 500-100 mg cap capsule Take 1,400 mg by mouth daily.    ? docusate sodium (COLACE) 100 MG capsule Take 200 mg by mouth 2 (two) times a day.    ? ibuprofen (ADVIL,MOTRIN) 200 MG tablet Take 200 mg by mouth every 6 (six) hours as needed for pain.   ? lifitegrast (XIIDRA) 5 % Dpet Apply to eye 2 (two) times a day.   ? losartan (COZAAR) 25 MG tablet 50 mg every morning.    ? magnesium oxide (MAG-OX) 400 mg (241.3 mg magnesium) tablet Take 400 mg by mouth daily.   ? polyvinyl alcohol (LIQUIFILM TEARS) 1.4 % ophthalmic solution Administer 1-2 drops to both eyes 4 (four) times a day as needed for dry eyes.   ? spironolactone (ALDACTONE) 25 MG tablet Take 1 tablet (25 mg total) by mouth daily.   ? traMADoL (ULTRAM) 50 mg tablet Take 1 tablet (50 mg total) by mouth 3 (three) times a day.   ? VIT C/LUDY AC/LUT/COPPER/ZNOX (PRESERVISION LUTEIN ORAL) Take 1 tablet by mouth 2 (two) times a day.       There were no vitals filed for this visit.  Heart rate 78 respirations 16  Physical Exam  Lung sounds are clear.  Cardiovascular is normal without murmurs.  S1-S2.  Left shoulder does have arthritis she states it is sore but in going through the internal and external rotation abduction abduction she does not have a lot of extra limitation although she does have some arthritic changes in the right she has significant decreased range of motion with internal and external rotation abduction abduction she also has tenderness with the range of motion.  Good CMS  to her hands.  Once again did address the variability of the injection she did give her consent so the right posterior shoulder was prepped and then injected with a total of 40 mg of Kenalog mixed with 2% Xylocaine.  She was injected in the posterior fashion and actually a tolerated extremely well.  The area was prepped once again cleansed.  She is aware of the aftereffects of the injection as well as how to take care of this in her apartment with ice packs if she would need some otherwise she can use some Tylenol.  The nursing staff in the apartment as well as the charge nurse are also aware of doing the injection today.  She did not have any issues after some time from after a conversation and no side effects of the medication or the injection.  LABS:   Lab Results   Component Value Date    WBC 9.9 02/12/2020    HGB 11.5 (L) 02/12/2020    HCT 37.2 02/12/2020     (H) 02/12/2020     02/12/2020         ASSESSMENT:      ICD-10-CM    1. Chronic right shoulder pain  M25.511     G89.29    2. Impingement syndrome of right shoulder  M75.41    3. Primary osteoarthritis involving multiple joints  M89.49    4. Decreased range of motion of right shoulder  M25.611        PLAN:    Once again she is aware of the injection itself that the results could be variable.  She actually is asking as well for injections of her left shoulder and bilateral knees which we did address that as well and certainly the concerns regarding her chronic arthritic conditions and that the injections will not cure the problem that this point the right shoulder may improve and that she may have increased mobility with ADLs in her apartment but will not be permanent.  She did not have any other questions.    Electronically signed by: Michael Duane Johnson, RYLIE

## 2021-06-21 NOTE — LETTER
Letter by Johnson, Michael Duane, CNP at      Author: Johnson, Michael Duane, CNP Service: -- Author Type: --    Filed:  Encounter Date: 2020 Status: (Other)         Patient: Marina Neves   MR Number: [id number]   YOB: 1934   Date of Visit: 2020     [facility]    Facility:   [facility] [[id number]]   Code Status: UNKNOWN      CHIEF COMPLAINT/REASON FOR VISIT:  Chief Complaint   Patient presents with   ? Problem Visit     asked to see, pain,        HISTORY:      HPI: Marina is a 86 y.o. female who I was asked to see secondary to history of chronic pain including the left shoulder and osteoarthritis of her bilateral knees.  She even had one total knee replacement.  The pain clinic did call me and they were wondering if we could try the Butrans 5 mcg patch weekly for her chronic pain.  I did write for that prescription but unfortunately is not covered through her insurance and I did not find out about that right away so therefore we will have to have another conversation in the future regarding pain management control.  During her visit today I did talk about her current pain medications also did again talk about cortisone injections.  She is able to move about her apartment with her walker.  Vital signs are not regularly performed in the apartment.  She does get her medications from the staff on a weekly basis.  We also had [place]   and his love of pheasant hunting and [place] in [place].  She also did enjoy fishing and does enjoy smoke fish.  She does have eye issues macular degeneration and does have an ophthalmology visit today and also hard of hearing and does wear hearing aids.  I did have a chance to talk about pain management with her as well as her current medications as well as recommendation from the pain clinic.  Her appetite is good.  Sleeping well at  night.    Past Medical History:   Diagnosis Date   ? Age-related physical debility    ? Anemia    ? Arthritis    ? Bladder infection 01/03/2015    dx'd- on antibiotics   ? Blood type AB-     with Anti-C   ? Breast cancer (H)    ? Bursitis, knee    ? Cancer (H) 2005    left breast   ? Colon cancer (H)    ? Depression    ? Gout    ? History of transfusion    ? Hypertension    ? Insomnia    ? Macular degeneration    ? Osteoarthritis of multiple joints    ? Osteopenia    ? Ovarian cyst    ? Rosacea    ? Vitamin D deficiency              Family History   Problem Relation Age of Onset   ? Squamous cell carcinoma Mother    ? Colon cancer Sister    ? Breast cancer Maternal Aunt    ? Bone cancer Maternal Aunt    ? Colon cancer Paternal Aunt    ? Breast cancer Cousin      Social History     Socioeconomic History   ? Marital status:      Spouse name: Not on file   ? Number of children: Not on file   ? Years of education: Not on file   ? Highest education level: Not on file   Occupational History   ? Not on file   Social Needs   ? Financial resource strain: Not on file   ? Food insecurity     Worry: Not on file     Inability: Not on file   ? Transportation needs     Medical: Not on file     Non-medical: Not on file   Tobacco Use   ? Smoking status: Never Smoker   ? Smokeless tobacco: Never Used   Substance and Sexual Activity   ? Alcohol use: No   ? Drug use: No   ? Sexual activity: Never   Lifestyle   ? Physical activity     Days per week: Not on file     Minutes per session: Not on file   ? Stress: Not on file   Relationships   ? Social connections     Talks on phone: Not on file     Gets together: Not on file     Attends Orthodoxy service: Not on file     Active member of club or organization: Not on file     Attends meetings of clubs or organizations: Not on file     Relationship status: Not on file   ? Intimate partner violence     Fear of current or ex partner: Not on file     Emotionally abused: Not on file      Physically abused: Not on file     Forced sexual activity: Not on file   Other Topics Concern   ? Not on file   Social History Narrative   ? Not on file         Review of Systems  Currently denies any new symptoms of fever chills fatigue cough or cold or flulike symptoms nausea vomiting diarrhea dysuria headaches rashes stiff neck swollen glands.      Current Outpatient Medications:   ?  acetaminophen (TYLENOL) 650 MG CR tablet, Take 650 mg by mouth 2 (two) times a day as needed for pain., Disp: , Rfl:   ?  CHOLECALCIFEROL 25 mcg (1,000 unit) tablet, Take 1 tablet by mouth 2 (two) times a day., Disp: , Rfl:   ?  docoshexanoic acid-eicosapent 500 mg (FISH OIL) 500-100 mg cap capsule, Take 1,400 mg by mouth daily. , Disp: , Rfl:   ?  docusate sodium (COLACE) 100 MG capsule, Take 100 mg by mouth 2 (two) times a day as needed for constipation., Disp: , Rfl:   ?  ibuprofen (ADVIL,MOTRIN) 200 MG tablet, Take 200 mg by mouth every 6 (six) hours as needed for pain., Disp: , Rfl:   ?  lifitegrast (XIIDRA) 5 % Dpet, Apply to eye 2 (two) times a day., Disp: , Rfl:   ?  losartan (COZAAR) 25 MG tablet, 50 mg every morning. , Disp: , Rfl:   ?  magnesium oxide (MAG-OX) 400 mg (241.3 mg magnesium) tablet, Take 400 mg by mouth daily., Disp: , Rfl:   ?  polyvinyl alcohol (LIQUIFILM TEARS) 1.4 % ophthalmic solution, Administer 1-2 drops to both eyes 4 (four) times a day as needed for dry eyes., Disp: 15 mL, Rfl: 0  ?  spironolactone (ALDACTONE) 25 MG tablet, Take 1 tablet (25 mg total) by mouth daily., Disp: 90 tablet, Rfl: 2  ?  traMADoL (ULTRAM) 50 mg tablet, Take 1 tablet (50 mg total) by mouth 3 (three) times a day., Disp: 90 tablet, Rfl: 2  ?  VIT C/LUDY AC/LUT/COPPER/ZNOX (PRESERVISION LUTEIN ORAL), Take 1 tablet by mouth 2 (two) times a day., Disp: , Rfl:     Vitals:    12/08/20 1336   Pulse: 78   Resp: 20       Physical Exam  No acute distress.  Head is normocephalic.  Hearing aids.  Neck is supple without adenopathy.  Lung  sounds are clear throughout.  Cardiovascular has an S1-S2 regular rate and rhythm.  No lower extremity edema.  Gastrointestinal nontender nondistended to musculoskeletal history of osteoarthritis to her major joints including a knee replacement.  Psychiatric: Pleasant affect.  LABS:   Lab Results   Component Value Date    WBC 9.9 02/12/2020    HGB 11.5 (L) 02/12/2020    HCT 37.2 02/12/2020     (H) 02/12/2020     02/12/2020     Results for orders placed or performed in visit on 10/21/20   Basic Metabolic Panel   Result Value Ref Range    Sodium 138 136 - 145 mmol/L    Potassium 5.1 (H) 3.5 - 5.0 mmol/L    Chloride 103 98 - 107 mmol/L    CO2 26 22 - 31 mmol/L    Anion Gap, Calculation 9 5 - 18 mmol/L    Glucose 92 70 - 125 mg/dL    Calcium 9.0 8.5 - 10.5 mg/dL    BUN 13 8 - 28 mg/dL    Creatinine 0.69 0.60 - 1.10 mg/dL    GFR MDRD Af Amer >60 >60 mL/min/1.73m2    GFR MDRD Non Af Amer >60 >60 mL/min/1.73m2           ASSESSMENT:      ICD-10-CM    1. Primary osteoarthritis involving multiple joints  M89.49    2. Essential hypertension  I10    3. Chronic pain of right knee  M25.561     G89.29    4. Chronic pain syndrome  G89.4        PLAN:    I did discuss her pain medication as well as her history of chronic pain and the use of ibuprofen and tramadol.  At the time I was not aware that she could not get Butrans because of insurance reasons so we will have to come up with another plan in the near future.  Otherwise she does have an ophthalmology visit today.  She did not have any other questions.      Electronically signed by: Michael Duane Johnson, CNP

## 2021-06-21 NOTE — LETTER
Letter by Johnson, Michael Duane, CNP at      Author: Johnson, Michael Duane, CNP Service: -- Author Type: --    Filed:  Encounter Date: 2/16/2021 Status: (Other)         Patient: Marina Neves   MR Number: [id number]   YOB: 1934   Date of Visit: 2/16/2021     [facility]    Facility:   [facility] [[id number]]   Code Status: UNKNOWN      CHIEF COMPLAINT/REASON FOR VISIT:  Chief Complaint   Patient presents with   ? Problem Visit     med mgmt, asked to see.        HISTORY:      HPI: Marina is a 86 y.o. female who I was asked to follow-up and revisit with secondary to a history of chronic pain or history of chronic pain syndrome along with her current medications as well as discussion of her right shoulder and right knee.  Regarding her right shoulder I did inject her right shoulder with cortisone back in December 2020 and she did get a good month plus of relief with the injection but she states that now starting to bother her once again.  She also has a chronic right knee which many years ago she did have a total knee arthroplasty.  She does try to ambulate with her four-wheel walker.  Along with the pain she does take Ultram 50 mg 3 times a day and also does have ibuprofen as needed as well as Tylenol but I did talk to her about scheduling an extra strength Tylenol 3 times a day with her tramadol to see if that will help her out with her pain.  I also talked about chronic osteoarthritic pain that she might get some success release to consult with a rheumatologist or perhaps she can go revisit the pain clinic.  Various pain patches are not covered which we have tried in the past.  Also she has used various over-the-counter creams and gels including IcyHot Aspercreme and Biofreeze.  They are temporary in their usefulness.  I also the chance to go over her other medications for hypertension as well as talk about nutrition and also talk about depression and anxiety secondary  to having chronic pain and she is aware of Cymbalta.  We also talked about perhaps talking with a counselor.  I also then eventually called her cousin Danika who asked me to call her after the visit for follow-up.    Past Medical History:   Diagnosis Date   ? Age-related physical debility    ? Anemia    ? Arthritis    ? Bladder infection 01/03/2015    dx'd- on antibiotics   ? Blood type AB-     with Anti-C   ? Breast cancer (H)    ? Bursitis, knee    ? Cancer (H) 2005    left breast   ? Colon cancer (H)    ? Depression    ? Gout    ? History of transfusion    ? Hypertension    ? Insomnia    ? Macular degeneration    ? Osteoarthritis of multiple joints    ? Osteopenia    ? Ovarian cyst    ? Rosacea    ? Vitamin D deficiency              Family History   Problem Relation Age of Onset   ? Squamous cell carcinoma Mother    ? Colon cancer Sister    ? Breast cancer Maternal Aunt    ? Bone cancer Maternal Aunt    ? Colon cancer Paternal Aunt    ? Breast cancer Cousin      Social History     Socioeconomic History   ? Marital status:      Spouse name: Not on file   ? Number of children: Not on file   ? Years of education: Not on file   ? Highest education level: Not on file   Occupational History   ? Not on file   Social Needs   ? Financial resource strain: Not on file   ? Food insecurity     Worry: Not on file     Inability: Not on file   ? Transportation needs     Medical: Not on file     Non-medical: Not on file   Tobacco Use   ? Smoking status: Never Smoker   ? Smokeless tobacco: Never Used   Substance and Sexual Activity   ? Alcohol use: No   ? Drug use: No   ? Sexual activity: Never   Lifestyle   ? Physical activity     Days per week: Not on file     Minutes per session: Not on file   ? Stress: Not on file   Relationships   ? Social connections     Talks on phone: Not on file     Gets together: Not on file     Attends Zoroastrianism service: Not on file     Active member of club or organization: Not on file      Attends meetings of clubs or organizations: Not on file     Relationship status: Not on file   ? Intimate partner violence     Fear of current or ex partner: Not on file     Emotionally abused: Not on file     Physically abused: Not on file     Forced sexual activity: Not on file   Other Topics Concern   ? Not on file   Social History Narrative   ? Not on file         Review of Systems  Currently denies any new symptoms of fever chills fatigue cough or cold or flulike symptoms nausea vomiting diarrhea dysuria headaches rashes stiff neck swollen glands.    Current Outpatient Medications   Medication Sig   ? acetaminophen (TYLENOL) 650 MG CR tablet Take 650 mg by mouth 2 (two) times a day as needed for pain.   ? CHOLECALCIFEROL 25 mcg (1,000 unit) tablet Take 1 tablet by mouth 2 (two) times a day.   ? docoshexanoic acid-eicosapent 500 mg (FISH OIL) 500-100 mg cap capsule Take 1,400 mg by mouth daily.    ? docusate sodium (COLACE) 100 MG capsule Take 200 mg by mouth 2 (two) times a day.    ? ibuprofen (ADVIL,MOTRIN) 200 MG tablet Take 200 mg by mouth every 6 (six) hours as needed for pain.   ? lifitegrast (XIIDRA) 5 % Dpet Apply to eye 2 (two) times a day.   ? losartan (COZAAR) 25 MG tablet 50 mg every morning.    ? magnesium oxide (MAG-OX) 400 mg (241.3 mg magnesium) tablet Take 400 mg by mouth daily.   ? polyvinyl alcohol (LIQUIFILM TEARS) 1.4 % ophthalmic solution Administer 1-2 drops to both eyes 4 (four) times a day as needed for dry eyes.   ? spironolactone (ALDACTONE) 25 MG tablet Take 1 tablet (25 mg total) by mouth daily.   ? traMADoL (ULTRAM) 50 mg tablet Take 1 tablet (50 mg total) by mouth 3 (three) times a day.   ? VIT C/LUDY AC/LUT/COPPER/ZNOX (PRESERVISION LUTEIN ORAL) Take 1 tablet by mouth 2 (two) times a day.       There were no vitals filed for this visit.  Heart rate 82 respirations 18  Physical Exam  No acute distress.  Head is normocephalic.  Hearing aids.  Neck is supple without adenopathy.  Lung  sounds are clear throughout.  Cardiovascular has an S1-S2 regular rate and rhythm.  No lower extremity edema.  Gastrointestinal nontender nondistended to musculoskeletal history of osteoarthritis to her major joints including a right knee replacement, chronic shoulder degenerative joint disease. Psychiatric: Pleasant affect.    LABS:   Lab Results   Component Value Date    WBC 9.9 02/12/2020    HGB 11.5 (L) 02/12/2020    HCT 37.2 02/12/2020     (H) 02/12/2020     02/12/2020     Results for orders placed or performed in visit on 10/21/20   Basic Metabolic Panel   Result Value Ref Range    Sodium 138 136 - 145 mmol/L    Potassium 5.1 (H) 3.5 - 5.0 mmol/L    Chloride 103 98 - 107 mmol/L    CO2 26 22 - 31 mmol/L    Anion Gap, Calculation 9 5 - 18 mmol/L    Glucose 92 70 - 125 mg/dL    Calcium 9.0 8.5 - 10.5 mg/dL    BUN 13 8 - 28 mg/dL    Creatinine 0.69 0.60 - 1.10 mg/dL    GFR MDRD Af Amer >60 >60 mL/min/1.73m2    GFR MDRD Non Af Amer >60 >60 mL/min/1.73m2           ASSESSMENT:      ICD-10-CM    1. Osteoarthritis, unspecified osteoarthritis type, unspecified site  M19.90    2. Chronic pain of right knee  M25.561     G89.29    3. Chronic right shoulder pain  M25.511     G89.29    4. Chronic pain syndrome  G89.4        PLAN:    I did again discuss with her the injections and how they were can hold her temporary also she can see a rheumatologist she can also go to a pain clinic she can also try Cymbalta or we have been talked about perhaps going to long-term care if her need and goals have changed significantly and unable to completely care for herself.  She will think about some of these options and let me know her answer.    For documentation purposes total visit 45 minutes which were 50% initially was spent with the patient going over her care, medications, chronic pain, and nutrition various over-the-counter creams and gels, antidepressants, other specialists and the remainder the time was spent talking  with her cousin sandra..    Electronically signed by: Michael Duane Johnson, RYLIE

## 2021-06-28 ENCOUNTER — COMMUNICATION - HEALTHEAST (OUTPATIENT)
Dept: GERIATRICS | Facility: CLINIC | Age: 86
End: 2021-06-28

## 2021-06-28 DIAGNOSIS — G89.4 CHRONIC PAIN SYNDROME: ICD-10-CM

## 2021-06-29 NOTE — PROGRESS NOTES
"Progress Notes by Megha Mckeon AuD at 11/2/2020  1:20 PM     Author: Megha Mckeon AuD Service: -- Author Type: Audiologist    Filed: 11/2/2020  4:57 PM Encounter Date: 11/2/2020 Status: Signed    : Megha Mckeon AuD (Audiologist)       Audiology Report    Referring Provider:  Dr. Nicole    Summary: Audiology visit completed. Marina is accompanied by her daughter-in-law at the visit today. Please see audiogram below or in \"media\" tab for case history and results.      Transducer: Circumaural headphones    Reliability was good  and there was good  SRT to PTA agreement.       Plan:  The patient is returned to ENT for follow up.  She should return for retesting with ENT recommendation. A cochlear implant evaluation should be considered if Marina is interested.     Marci Lund, [facility]  [place] Licensed Audiologist #[id number]                  "

## 2021-06-29 NOTE — PROGRESS NOTES
Progress Notes by Louann Silva MD at 10/21/2020 11:10 AM     Author: Louann Silva MD Service: -- Author Type: Physician    Filed: 10/21/2020 12:35 PM Encounter Date: 10/21/2020 Status: Signed    : Louann Silva MD (Physician)         Thank you, Dr. Reyes, for asking the [facility] team to see Ms. Marina Neves to evaluate heart disease.      Assessment/Recommendations   Assessment/Plan:    Xena is doing well with no recurrent angina and her BP is controlled. At this point I recommend that she follow up with her PCP.  We will be here if she has any recurrent cardiac issues.       History of Present Illness/Subjective    Ms. Marina Neves is a 86 y.o. female with hypertension who I met for complaints of chest pain with elevated BNP. Echo and stress test were grossly normal with mild LVH and LAE with aortic valve sclerosis.  CT chest incidentally notes coronary calcification (2/2017).     Marina Neves returns for f/u today. She is doing well. Marina lives at [facility] and does most of her own cleaning and cooking on her own. There has been no chest pain or dyspnea. She denies dizziness, falls or syncope and notes no palpitations.        Physical Examination Review of Systems   Vitals:    10/21/20 1113   BP: 122/80   Pulse: 92   Resp: 18     Body mass index is 29.41 kg/m .  Wt Readings from Last 3 Encounters:   10/21/20 166 lb (75.3 kg)   10/19/20 166 lb (75.3 kg)   07/15/20 166 lb 1.6 oz (75.3 kg)     [unfilled]  General Appearance:   no distress, normal body habitus   ENT/Mouth: membranes moist, no oral lesions or bleeding gums.      EYES:  no scleral icterus, normal conjunctivae   Neck: no carotid bruits or thyromegaly   Chest/Lungs:   lungs are clear to auscultation, no rales or wheezing, no sternal scar, equal chest wall expansion    Cardiovascular:   Regular. Normal first and second heart sounds with no murmurs, rubs, or gallops. The right radial, dorsalis pedis and  posterior tibial pulses are intact.  The left radial, dorsalis pedis and posterior tibial pulses are intact. Jugular venous pressure flat, no edema bilaterally    Abdomen:  no organomegaly, masses, bruits, or tenderness; bowel sounds are present   Extremities: no cyanosis or clubbing   Skin: no xanthelasma, warm.    Neurologic: normal  bilateral, no tremors     Psychiatric: alert and oriented x3, calm     General: WNL  Eyes: Visual Distubance  Ears/Nose/Throat: Hearing Loss  Lungs: WNL  Heart: WNL  Stomach: WNL  Bladder: WNL  Muscle/Joints: WNL  Skin: WNL  Nervous System: WNL  Mental Health: WNL     Blood: WNL     Medical History  Surgical History Family History Social History   Past Medical History:   Diagnosis Date   ? Age-related physical debility    ? Anemia    ? Arthritis    ? Bladder infection 01/03/2015    dx'd- on antibiotics   ? Blood type AB-     with Anti-C   ? Breast cancer (H)    ? Bursitis, knee    ? Cancer (H) 2005    left breast   ? Colon cancer (H)    ? Depression    ? Gout    ? History of transfusion    ? Hypertension    ? Insomnia    ? Macular degeneration    ? Osteoarthritis of multiple joints    ? Osteopenia    ? Ovarian cyst    ? Rosacea    ? Vitamin D deficiency     Past Surgical History:   Procedure Laterality Date   ? BACK SURGERY Bilateral     L3-L5, L5-S1 decompression lami   ? BACK SURGERY      posterior spinal reconstruction   ? BREAST SURGERY      left mastectomy   ? CATARACT EXTRACTION Bilateral    ? COLON SURGERY     ? DILATION AND CURETTAGE OF UTERUS      onsil   ? EYE SURGERY     ? GASTRECTOMY     ? left breast biopsy      needle core   ? left knee arthroscopy     ? MASTECTOMY, RADICAL Left    ? NEUROMA SURGERY Bilateral     feet   ? ND LAP,FULGURATE/EXCISE LESIONS N/A 3/29/2017    Procedure: LAPAROSCOPIC BILATERAL SALPING OOPHORECTOMY PELVIC WASHINGS;  Surgeon: Eduardo Smart MD;  Location: [facility];  Service: Gynecology   ? ND MASTECTOMY, SIMPLE, COMPLETE Right  1/7/2015    Procedure: RIGHT BREAST MASTECTOMY;  Surgeon: Meliton Bazan MD;  Location: [facility];  Service: General   ? NJ PART REMOVAL COLON W ANASTOMOSIS N/A 6/16/2014    Procedure: COLECTOMY;  Surgeon: Meliton Bazan MD;  Location: [facility] Main OR;  Service: General   ? REPLACEMENT TOTAL KNEE Right    ? right shoulder replacement     ? TONSILLECTOMY AND ADENOIDECTOMY      Family History   Problem Relation Age of Onset   ? Squamous cell carcinoma Mother    ? Colon cancer Sister    ? Breast cancer Maternal Aunt    ? Bone cancer Maternal Aunt    ? Colon cancer Paternal Aunt    ? Breast cancer Cousin     Social History     Socioeconomic History   ? Marital status:      Spouse name: Not on file   ? Number of children: Not on file   ? Years of education: Not on file   ? Highest education level: Not on file   Occupational History   ? Not on file   Social Needs   ? Financial resource strain: Not on file   ? Food insecurity     Worry: Not on file     Inability: Not on file   ? Transportation needs     Medical: Not on file     Non-medical: Not on file   Tobacco Use   ? Smoking status: Never Smoker   ? Smokeless tobacco: Never Used   Substance and Sexual Activity   ? Alcohol use: No   ? Drug use: No   ? Sexual activity: Never   Lifestyle   ? Physical activity     Days per week: Not on file     Minutes per session: Not on file   ? Stress: Not on file   Relationships   ? Social connections     Talks on phone: Not on file     Gets together: Not on file     Attends Restorationism service: Not on file     Active member of club or organization: Not on file     Attends meetings of clubs or organizations: Not on file     Relationship status: Not on file   ? Intimate partner violence     Fear of current or ex partner: Not on file     Emotionally abused: Not on file     Physically abused: Not on file     Forced sexual activity: Not on file   Other Topics Concern   ? Not on file   Social History Narrative   ? Not  on file          Medications  Allergies   Current Outpatient Medications   Medication Sig Dispense Refill   ? acetaminophen (TYLENOL) 650 MG CR tablet Take 650 mg by mouth 2 (two) times a day as needed for pain.     ? CHOLECALCIFEROL 25 mcg (1,000 unit) tablet Take 1 tablet by mouth 2 (two) times a day.     ? docoshexanoic acid-eicosapent 500 mg (FISH OIL) 500-100 mg cap capsule Take 1,400 mg by mouth daily.      ? docusate sodium (COLACE) 100 MG capsule Take 100 mg by mouth 2 (two) times a day as needed for constipation.     ? lifitegrast (XIIDRA) 5 % Dpet Apply to eye 2 (two) times a day.     ? losartan (COZAAR) 25 MG tablet 50 mg every morning.      ? magnesium oxide (MAG-OX) 400 mg (241.3 mg magnesium) tablet Take 400 mg by mouth daily.     ? polyvinyl alcohol (LIQUIFILM TEARS) 1.4 % ophthalmic solution Administer 1-2 drops to both eyes 4 (four) times a day as needed for dry eyes. 15 mL 0   ? spironolactone (ALDACTONE) 25 MG tablet Take 1 tablet (25 mg total) by mouth daily. 90 tablet 0   ? traMADoL (ULTRAM) 50 mg tablet Take 1 tablet (50 mg total) by mouth 3 (three) times a day. 90 tablet 2   ? VIT C/LUDY AC/LUT/COPPER/ZNOX (PRESERVISION LUTEIN ORAL) Take 1 tablet by mouth 2 (two) times a day.       No current facility-administered medications for this visit.     Allergies   Allergen Reactions   ? Blood-Group Specific Substance Other (See Comments)     Anti-D and Anti-C present. A delay in compatible RBC's may occur.   ? Celebrex [Celecoxib] Unknown     Avoids due to sulfa allergy   ? Lisinopril Cough   ? Oxybutynin Other (See Comments)   ? Tetanus Vaccines And Toxoid Other (See Comments)     High fever (temp 104), arm swelling, fever blisters   ? Tolterodine Other (See Comments)   ? Piroxicam Rash   ? Sulfa (Sulfonamide Antibiotics) Rash         Lab Results    Chemistry/lipid CBC Cardiac Enzymes/BNP/TSH/INR   Lab Results   Component Value Date    CHOL 194 03/11/2019    HDL 52 03/11/2019    LDLCALC 102  03/11/2019    TRIG 198 (H) 03/11/2019    CREATININE 0.74 02/12/2020    BUN 13 02/12/2020    K 4.5 02/12/2020     (L) 02/12/2020     02/12/2020    CO2 24 02/12/2020    Lab Results   Component Value Date    WBC 9.9 02/12/2020    HGB 11.5 (L) 02/12/2020    HCT 37.2 02/12/2020     (H) 02/12/2020     02/12/2020    Lab Results   Component Value Date    TROPONINI <0.01 05/21/2019     (H) 05/21/2019    TSH 1.21 02/13/2018    INR 0.94 05/21/2019

## 2021-07-03 NOTE — ADDENDUM NOTE
Addendum Note by Mildred Greco CMA at 10/19/2020  9:00 AM     Author: Mildred Greco CMA Service: -- Author Type: Certified Medical Assistant    Filed: 11/2/2020  9:13 AM Date of Service: 10/19/2020  9:00 AM Status: Signed    : Mildred Greco CMA (Certified Medical Assistant)    Encounter addended by: Mildred Greco CMA on: 11/2/2020  9:13 AM      Actions taken: Charge Capture section accepted

## 2021-07-03 NOTE — ADDENDUM NOTE
Addendum Note by Kiana Reyes CNP at 9/30/2019  3:13 PM     Author: Kiana Reyes CNP Service: -- Author Type: Nurse Practitioner    Filed: 9/30/2019  3:13 PM Encounter Date: 9/30/2019 Status: Signed    : Kiana Reyes CNP (Nurse Practitioner)    Addended by: KIANA REYES on: 9/30/2019 03:13 PM        Modules accepted: Orders

## 2021-07-03 NOTE — ADDENDUM NOTE
Addendum Note by Kiana Reyes CNP at 9/30/2019  3:12 PM     Author: Kiana Reyes CNP Service: -- Author Type: Nurse Practitioner    Filed: 9/30/2019  3:12 PM Encounter Date: 9/30/2019 Status: Signed    : Kiana Reyes CNP (Nurse Practitioner)    Addended by: KIANA REYES on: 9/30/2019 03:12 PM        Modules accepted: Orders

## 2021-07-03 NOTE — ADDENDUM NOTE
Addendum Note by Kiana Reyes CNP at 9/30/2019  3:21 PM     Author: Kiana Reyes CNP Service: -- Author Type: Nurse Practitioner    Filed: 9/30/2019  3:21 PM Encounter Date: 9/30/2019 Status: Signed    : Kiana Reyes CNP (Nurse Practitioner)    Addended by: KIANA REYES on: 9/30/2019 03:21 PM        Modules accepted: Orders

## 2021-07-19 ENCOUNTER — TELEPHONE (OUTPATIENT)
Dept: GERIATRICS | Facility: CLINIC | Age: 86
End: 2021-07-19

## 2021-07-19 NOTE — TELEPHONE ENCOUNTER
[facility] Nurse Triage Telephone Note    Provider: MARY Arreola  Facility: [facility]  Facility Type:  AL    Caller: patient's sister-in-law  Call Back Number: [phone](AL nurse)    Allergies:    Allergies   Allergen Reactions     Celebrex [Celecoxib]      Sulfa Drugs      Tetanus Toxoid Blisters        Reason for call: Patient's sister-in-law stated that patient is having itching on her right lower back.  She said that patient said it was from her medications and was going to stop taking her meds.  The sister-in-law told patient to not stop taking her meds as there has been no new meds.  Writer had the AL nurse assess and it was discovered that patient has some redness and irritation from scratching to the right lower back along the waistline going towards her buttocks.  Patient has been applying her own lotion to the area, without relief.      Verbal Order/Direction given by Provider: Hydrocortisone 1% cream---apply a small amount to the lower back rash BID x 5 days.      Provider giving Order:  MARY Arreola    Verbal Order given to: left message for Elizabeth Deal RN

## 2021-07-26 DIAGNOSIS — G89.4 CHRONIC PAIN SYNDROME: Primary | ICD-10-CM

## 2021-07-26 RX ORDER — TRAMADOL HYDROCHLORIDE 50 MG/1
50 TABLET ORAL 3 TIMES DAILY
Qty: 90 TABLET | Refills: 0 | Status: SHIPPED | OUTPATIENT
Start: 2021-07-26 | End: 2021-08-25

## 2021-07-26 RX ORDER — TRAMADOL HYDROCHLORIDE 50 MG/1
50 TABLET ORAL 3 TIMES DAILY
COMMUNITY
Start: 2021-07-26 | End: 2021-07-26

## 2021-08-03 ENCOUNTER — TRANSFERRED RECORDS (OUTPATIENT)
Dept: HEALTH INFORMATION MANAGEMENT | Facility: CLINIC | Age: 86
End: 2021-08-03

## 2021-08-13 ENCOUNTER — LAB REQUISITION (OUTPATIENT)
Dept: LAB | Facility: CLINIC | Age: 86
End: 2021-08-13

## 2021-08-13 DIAGNOSIS — I10 ESSENTIAL (PRIMARY) HYPERTENSION: ICD-10-CM

## 2021-08-13 LAB
ANION GAP SERPL CALCULATED.3IONS-SCNC: 10 MMOL/L (ref 5–18)
BUN SERPL-MCNC: 14 MG/DL (ref 8–28)
CALCIUM SERPL-MCNC: 9.3 MG/DL (ref 8.5–10.5)
CHLORIDE BLD-SCNC: 100 MMOL/L (ref 98–107)
CO2 SERPL-SCNC: 26 MMOL/L (ref 22–31)
CREAT SERPL-MCNC: 0.79 MG/DL (ref 0.6–1.1)
GFR SERPL CREATININE-BSD FRML MDRD: 68 ML/MIN/1.73M2
GLUCOSE BLD-MCNC: 157 MG/DL (ref 70–125)
POTASSIUM BLD-SCNC: 4.2 MMOL/L (ref 3.5–5)
SODIUM SERPL-SCNC: 136 MMOL/L (ref 136–145)

## 2021-08-13 PROCEDURE — 36415 COLL VENOUS BLD VENIPUNCTURE: CPT | Performed by: FAMILY MEDICINE

## 2021-08-13 PROCEDURE — 80048 BASIC METABOLIC PNL TOTAL CA: CPT | Performed by: FAMILY MEDICINE

## 2021-08-19 ENCOUNTER — TELEPHONE (OUTPATIENT)
Dept: ONCOLOGY | Facility: HOSPITAL | Age: 86
End: 2021-08-19

## 2021-08-25 DIAGNOSIS — G89.4 CHRONIC PAIN SYNDROME: ICD-10-CM

## 2021-08-25 RX ORDER — TRAMADOL HYDROCHLORIDE 50 MG/1
50 TABLET ORAL 3 TIMES DAILY
Qty: 90 TABLET | Refills: 0 | Status: SHIPPED | OUTPATIENT
Start: 2021-08-25 | End: 2021-09-23

## 2021-09-22 ENCOUNTER — ONCOLOGY VISIT (OUTPATIENT)
Dept: ONCOLOGY | Facility: HOSPITAL | Age: 86
End: 2021-09-22
Attending: INTERNAL MEDICINE
Payer: COMMERCIAL

## 2021-09-22 VITALS
SYSTOLIC BLOOD PRESSURE: 151 MMHG | RESPIRATION RATE: 12 BRPM | BODY MASS INDEX: 31.83 KG/M2 | DIASTOLIC BLOOD PRESSURE: 83 MMHG | WEIGHT: 163 LBS | TEMPERATURE: 98.1 F | OXYGEN SATURATION: 98 % | HEART RATE: 103 BPM

## 2021-09-22 DIAGNOSIS — G89.4 CHRONIC PAIN SYNDROME: ICD-10-CM

## 2021-09-22 DIAGNOSIS — C50.411 MALIGNANT NEOPLASM OF UPPER-OUTER QUADRANT OF RIGHT BREAST IN FEMALE, ESTROGEN RECEPTOR POSITIVE (H): Primary | ICD-10-CM

## 2021-09-22 DIAGNOSIS — Z17.0 MALIGNANT NEOPLASM OF UPPER-OUTER QUADRANT OF RIGHT BREAST IN FEMALE, ESTROGEN RECEPTOR POSITIVE (H): Primary | ICD-10-CM

## 2021-09-22 PROCEDURE — 99213 OFFICE O/P EST LOW 20 MIN: CPT | Performed by: INTERNAL MEDICINE

## 2021-09-22 PROCEDURE — G0463 HOSPITAL OUTPT CLINIC VISIT: HCPCS

## 2021-09-22 ASSESSMENT — PAIN SCALES - GENERAL: PAINLEVEL: EXTREME PAIN (8)

## 2021-09-22 NOTE — PROGRESS NOTES
Oncology Rooming Note    September 22, 2021 3:59 PM   Marina Neves is a 87 year old female who presents for:    Chief Complaint   Patient presents with     Oncology Clinic Visit     Malignant neoplasm of upper-outer quadrant of right breast in female, estrogen receptor positive (H)     Initial Vitals: BP (!) 151/83 (BP Location: Right arm, Patient Position: Sitting, Cuff Size: Adult Regular)   Pulse 103   Temp 98.1  F (36.7  C) (Oral)   Resp 12   Wt 73.9 kg (163 lb)   SpO2 98%   BMI 31.83 kg/m   Estimated body mass index is 31.83 kg/m  as calculated from the following:    Height as of 12/23/20: 1.524 m (5').    Weight as of this encounter: 73.9 kg (163 lb). Body surface area is 1.77 meters squared.  Extreme Pain (8) Comment: Data Unavailable   No LMP recorded.  Allergies reviewed: Yes  Medications reviewed: Yes    Medications: Medication refills not needed today.  Pharmacy name entered into [facility]:    [id number] - [place], [place] - [address] [id number] - [place], [place] - [address]     Clinical concerns:  Malignant neoplasm of upper-outer quadrant of right breast in female, estrogen receptor positive (H)      Eboine Villatoro, [facility]
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
## 2021-09-23 ENCOUNTER — ASSISTED LIVING VISIT (OUTPATIENT)
Dept: GERIATRICS | Facility: CLINIC | Age: 86
End: 2021-09-23
Payer: COMMERCIAL

## 2021-09-23 DIAGNOSIS — G89.4 CHRONIC PAIN DISORDER: Primary | ICD-10-CM

## 2021-09-23 DIAGNOSIS — C50.411 MALIGNANT NEOPLASM OF UPPER-OUTER QUADRANT OF RIGHT FEMALE BREAST, UNSPECIFIED ESTROGEN RECEPTOR STATUS (H): ICD-10-CM

## 2021-09-23 DIAGNOSIS — M15.0 PRIMARY OSTEOARTHRITIS INVOLVING MULTIPLE JOINTS: ICD-10-CM

## 2021-09-23 DIAGNOSIS — I10 HYPERTENSION, UNSPECIFIED TYPE: ICD-10-CM

## 2021-09-23 PROBLEM — I25.10 CORONARY ARTERY CALCIFICATION SEEN ON CAT SCAN: Status: ACTIVE | Noted: 2020-10-21

## 2021-09-23 PROBLEM — M70.51 PATELLAR BURSITIS, RIGHT: Status: ACTIVE | Noted: 2019-09-30

## 2021-09-23 PROBLEM — G89.29 CHRONIC PAIN OF RIGHT KNEE: Status: ACTIVE | Noted: 2019-09-30

## 2021-09-23 PROBLEM — M25.561 CHRONIC PAIN OF RIGHT KNEE: Status: ACTIVE | Noted: 2019-09-30

## 2021-09-23 RX ORDER — TRAMADOL HYDROCHLORIDE 50 MG/1
50 TABLET ORAL 3 TIMES DAILY
Qty: 90 TABLET | Refills: 0 | Status: SHIPPED | OUTPATIENT
Start: 2021-09-23 | End: 2021-10-20

## 2021-09-23 NOTE — LETTER
9/23/2021        [facility]  [address]  [place] [address]          [facility]    Facility:   [facility] ([place]) [[id number]]   Code Status: DNR      CHIEF COMPLAINT/REASON FOR VISIT:  Chief Complaint   Patient presents with     FVP Care Coordination - Regulatory       HISTORY:      HPI: Marina is a 87 year old female who is being seen secondary to quarterly revisit secondary to discuss her chronic medical conditions as well as medication management.  She recently did see oncology secondary to her history of breast cancer.  Follow-up in a year.  She does have a history of chronic pain including osteoarthrosis of her major joints and does take Tylenol and tramadol.  Lyrica was recently added this spring and she does feel it to be effective.  She feels it to be still a nuisance problem but not worsening and still able to participate in group activities as well as ambulate with her walker from her room down to the dining room facility.  She also does have hearing aids and she states she needs to get them checked.  She states her blood pressures to have been just fine.  Denies any cardiovascular complaints.  Her appetite is good.    Past Medical History:   Diagnosis Date     Age-related physical debility      Anemia      Arthritis      Bladder infection 01/03/2015    dx'd- on antibiotics     Blood type AB-     with Anti-C     Breast cancer (H)      Bursitis, knee      Cancer (H)     breast      Cancer (H) 2005    left breast     Colon cancer (H)      Depression      Gout      History of transfusion      Hypertension      Hypertension      Insomnia      Macular degeneration      Osteoarthritis of multiple joints      Osteopenia      Ovarian cyst      Rosacea      Vitamin D deficiency             Family History   Problem Relation Age of Onset     Squamous cell carcinoma Mother      Colon Cancer Sister      Breast Cancer Maternal Aunt      Bone Cancer  Maternal Aunt      Colon Cancer Paternal Aunt      Breast Cancer Cousin       Social History     Socioeconomic History     Marital status:      Spouse name: Not on file     Number of children: Not on file     Years of education: Not on file     Highest education level: Not on file   Occupational History     Not on file   Tobacco Use     Smoking status: Never Smoker     Smokeless tobacco: Never Used   Substance and Sexual Activity     Alcohol use: No     Drug use: No     Sexual activity: Never   Other Topics Concern     Parent/sibling w/ CABG, MI or angioplasty before 65F 55M? Not Asked   Social History Narrative     Not on file     Social Determinants of Health     Financial Resource Strain:      Difficulty of Paying Living Expenses:    Food Insecurity:      Worried About Running Out of Food in the Last Year:      Ran Out of Food in the Last Year:    Transportation Needs:      Lack of Transportation (Medical):      Lack of Transportation (Non-Medical):    Physical Activity:      Days of Exercise per Week:      Minutes of Exercise per Session:    Stress:      Feeling of Stress :    Social Connections:      Frequency of Communication with Friends and Family:      Frequency of Social Gatherings with Friends and Family:      Attends [employer] Services:      Active Member of Clubs or Organizations:      Attends Club or Organization Meetings:      Marital Status:    Intimate Partner Violence:      Fear of Current or Ex-Partner:      Emotionally Abused:      Physically Abused:      Sexually Abused:         REVIEW OF SYSTEM:Currently denies any new symptoms of fever chills fatigue cough or cold or flulike symptoms nausea vomiting diarrhea dysuria headaches rashes stiff neck swollen glands.    PHYSICAL EXAM: Pleasant female.  No acute distress.  Head is normocephalic.  Hearing aids.  Glasses.  Neck is supple without adenopathy.  Lung sounds are clear throughout.  Cardiovascular has an S1-S2 regular rate and rhythm.   No lower extremity edema.  Gastrointestinal nontender nondistended to musculoskeletal history of osteoarthritis to her major joints including a right knee replacement, chronic shoulder degenerative joint disease. Psychiatric: Pleasant affect.     There were no vitals taken for this visit.  Unable to obtain the vital signs due to no computer on the premises and no nursing staff to address however the apical pulse today 76 with respirations of 18  LABS:   Last Comprehensive Metabolic Panel:  Sodium   Date Value Ref Range Status   08/13/2021 136 136 - 145 mmol/L Final   09/15/2006 134 133 - 144 mmol/L Final     Comment:     Delta Verified     Potassium   Date Value Ref Range Status   08/13/2021 4.2 3.5 - 5.0 mmol/L Final   09/15/2006 4.1 3.4 - 5.3 mmol/L Final     Chloride   Date Value Ref Range Status   08/13/2021 100 98 - 107 mmol/L Final   09/15/2006 99 94 - 109 mmol/L Final     Carbon Dioxide   Date Value Ref Range Status   09/15/2006 28 20 - 32 mmol/L Final     Carbon Dioxide (CO2)   Date Value Ref Range Status   08/13/2021 26 22 - 31 mmol/L Final     Anion Gap   Date Value Ref Range Status   08/13/2021 10 5 - 18 mmol/L Final   09/15/2006 7 6 - 17 mmol/L Final     Glucose   Date Value Ref Range Status   08/13/2021 157 (H) 70 - 125 mg/dL Final   09/14/2006 110 60 - 110 mg/dL Final     Urea Nitrogen   Date Value Ref Range Status   08/13/2021 14 8 - 28 mg/dL Final   09/14/2006 24 7 - 30 mg/dL Final     Creatinine   Date Value Ref Range Status   08/13/2021 0.79 0.60 - 1.10 mg/dL Final   09/14/2006 1.08 0.60 - 1.30 mg/dL Final     GFR Estimate   Date Value Ref Range Status   08/13/2021 68 >60 mL/min/1.73m2 Final     Comment:     As of July 11, 2021, eGFR is calculated by the CKD-EPI creatinine equation, without race adjustment. eGFR can be influenced by muscle mass, exercise, and diet. The reported eGFR is an estimation only and is only applicable if the renal function is stable.   05/26/2021 >60 >60 mL/min/1.73m2  Final   09/14/2006 53 (L) >60 mL/min/1.7m2 Final     Calcium   Date Value Ref Range Status   08/13/2021 9.3 8.5 - 10.5 mg/dL Final   09/14/2006 9.2 8.5 - 10.4 mg/dL Final     CBC RESULTS: Recent Labs   Lab Test 02/12/20  1034   WBC 9.9   RBC 3.58*   HGB 11.5*   HCT 37.2   *   MCH 32.1   MCHC 30.9*   RDW 12.8        Liver Function Studies - Recent Labs   Lab Test 02/12/20  1034   PROTTOTAL 7.1   ALBUMIN 3.5   BILITOTAL 0.4   ALKPHOS 68   AST 16   ALT 14         ASSESSMENT:    (G89.4) Chronic pain disorder  (primary encounter diagnosis)  Comment: Stable  Plan: Continue with Lyrica, tramadol and Tylenol    (M89.49) Primary osteoarthritis involving multiple joints  Comment: Stable  Plan: Continue with current medications as well as encouragement of daily activities    (C50.411) Malignant neoplasm of upper-outer quadrant of right female breast, unspecified estrogen receptor status (H)  Comment: Stable  Plan: Recently had a visit with oncology    (I10) Hypertension, unspecified type  Comment: Stable  Plan: Continue to manage and follow      Case Management:  I have reviewed the Assisted Living care plan, current immunizations and preventive care/cancer screening.. Future cancer screening is not clinically indicated secondary to age/goals of care.   Patient's desire to return to the community is present, but is not able due to care needs .    Information reviewed:  Medications, vital signs, orders, and nursing notes.  PLAN:    No current medication changes with this visit.  She seems to be doing pretty well.  She feels that her pain is always going to be present but it is tolerable and that she is able to participate in the group activities as well as ambulate up and down the hallways and go to the dining area.  She will follow up with ear nose and throat or audiology regarding her hearing aids.    Electronically signed by: Bry Jurado NP            Sincerely,        Bry Jurado NP

## 2021-09-23 NOTE — PROGRESS NOTES
[facility]IATRIC [facility]    Facility:   [facility] ([place]) [[id number]]   Code Status: DNR      CHIEF COMPLAINT/REASON FOR VISIT:  Chief Complaint   Patient presents with     FVP Care Coordination - Regulatory       HISTORY:      HPI: Marina is a 87 year old female who is being seen secondary to quarterly revisit secondary to discuss her chronic medical conditions as well as medication management.  She recently did see oncology secondary to her history of breast cancer.  Follow-up in a year.  She does have a history of chronic pain including osteoarthrosis of her major joints and does take Tylenol and tramadol.  Lyrica was recently added this spring and she does feel it to be effective.  She feels it to be still a nuisance problem but not worsening and still able to participate in group activities as well as ambulate with her walker from her room down to the dining room facility.  She also does have hearing aids and she states she needs to get them checked.  She states her blood pressures to have been just fine.  Denies any cardiovascular complaints.  Her appetite is good.    Past Medical History:   Diagnosis Date     Age-related physical debility      Anemia      Arthritis      Bladder infection 01/03/2015    dx'd- on antibiotics     Blood type AB-     with Anti-C     Breast cancer (H)      Bursitis, knee      Cancer (H)     breast      Cancer (H) 2005    left breast     Colon cancer (H)      Depression      Gout      History of transfusion      Hypertension      Hypertension      Insomnia      Macular degeneration      Osteoarthritis of multiple joints      Osteopenia      Ovarian cyst      Rosacea      Vitamin D deficiency             Family History   Problem Relation Age of Onset     Squamous cell carcinoma Mother      Colon Cancer Sister      Breast Cancer Maternal Aunt      Bone Cancer Maternal Aunt      Colon Cancer Paternal Aunt      Breast Cancer Cousin       Social History     Socioeconomic History      Marital status:      Spouse name: Not on file     Number of children: Not on file     Years of education: Not on file     Highest education level: Not on file   Occupational History     Not on file   Tobacco Use     Smoking status: Never Smoker     Smokeless tobacco: Never Used   Substance and Sexual Activity     Alcohol use: No     Drug use: No     Sexual activity: Never   Other Topics Concern     Parent/sibling w/ CABG, MI or angioplasty before 65F 55M? Not Asked   Social History Narrative     Not on file     Social Determinants of Health     Financial Resource Strain:      Difficulty of Paying Living Expenses:    Food Insecurity:      Worried About Running Out of Food in the Last Year:      Ran Out of Food in the Last Year:    Transportation Needs:      Lack of Transportation (Medical):      Lack of Transportation (Non-Medical):    Physical Activity:      Days of Exercise per Week:      Minutes of Exercise per Session:    Stress:      Feeling of Stress :    Social Connections:      Frequency of Communication with Friends and Family:      Frequency of Social Gatherings with Friends and Family:      Attends Yazidi Services:      Active Member of Clubs or Organizations:      Attends Club or Organization Meetings:      Marital Status:    Intimate Partner Violence:      Fear of Current or Ex-Partner:      Emotionally Abused:      Physically Abused:      Sexually Abused:         REVIEW OF SYSTEM:Currently denies any new symptoms of fever chills fatigue cough or cold or flulike symptoms nausea vomiting diarrhea dysuria headaches rashes stiff neck swollen glands.    PHYSICAL EXAM: Pleasant female.  No acute distress.  Head is normocephalic.  Hearing aids.  Glasses.  Neck is supple without adenopathy.  Lung sounds are clear throughout.  Cardiovascular has an S1-S2 regular rate and rhythm.  No lower extremity edema.  Gastrointestinal nontender nondistended to musculoskeletal history of osteoarthritis to her major  joints including a right knee replacement, chronic shoulder degenerative joint disease. Psychiatric: Pleasant affect.     There were no vitals taken for this visit.  Unable to obtain the vital signs due to no computer on the premises and no nursing staff to address however the apical pulse today 76 with respirations of 18  LABS:   Last Comprehensive Metabolic Panel:  Sodium   Date Value Ref Range Status   08/13/2021 136 136 - 145 mmol/L Final   09/15/2006 134 133 - 144 mmol/L Final     Comment:     Delta Verified     Potassium   Date Value Ref Range Status   08/13/2021 4.2 3.5 - 5.0 mmol/L Final   09/15/2006 4.1 3.4 - 5.3 mmol/L Final     Chloride   Date Value Ref Range Status   08/13/2021 100 98 - 107 mmol/L Final   09/15/2006 99 94 - 109 mmol/L Final     Carbon Dioxide   Date Value Ref Range Status   09/15/2006 28 20 - 32 mmol/L Final     Carbon Dioxide (CO2)   Date Value Ref Range Status   08/13/2021 26 22 - 31 mmol/L Final     Anion Gap   Date Value Ref Range Status   08/13/2021 10 5 - 18 mmol/L Final   09/15/2006 7 6 - 17 mmol/L Final     Glucose   Date Value Ref Range Status   08/13/2021 157 (H) 70 - 125 mg/dL Final   09/14/2006 110 60 - 110 mg/dL Final     Urea Nitrogen   Date Value Ref Range Status   08/13/2021 14 8 - 28 mg/dL Final   09/14/2006 24 7 - 30 mg/dL Final     Creatinine   Date Value Ref Range Status   08/13/2021 0.79 0.60 - 1.10 mg/dL Final   09/14/2006 1.08 0.60 - 1.30 mg/dL Final     GFR Estimate   Date Value Ref Range Status   08/13/2021 68 >60 mL/min/1.73m2 Final     Comment:     As of July 11, 2021, eGFR is calculated by the CKD-EPI creatinine equation, without race adjustment. eGFR can be influenced by muscle mass, exercise, and diet. The reported eGFR is an estimation only and is only applicable if the renal function is stable.   05/26/2021 >60 >60 mL/min/1.73m2 Final   09/14/2006 53 (L) >60 mL/min/1.7m2 Final     Calcium   Date Value Ref Range Status   08/13/2021 9.3 8.5 - 10.5 mg/dL  Final   09/14/2006 9.2 8.5 - 10.4 mg/dL Final     CBC RESULTS: Recent Labs   Lab Test 02/12/20  1034   WBC 9.9   RBC 3.58*   HGB 11.5*   HCT 37.2   *   MCH 32.1   MCHC 30.9*   RDW 12.8        Liver Function Studies - Recent Labs   Lab Test 02/12/20  1034   PROTTOTAL 7.1   ALBUMIN 3.5   BILITOTAL 0.4   ALKPHOS 68   AST 16   ALT 14         ASSESSMENT:    (G89.4) Chronic pain disorder  (primary encounter diagnosis)  Comment: Stable  Plan: Continue with Lyrica, tramadol and Tylenol    (M89.49) Primary osteoarthritis involving multiple joints  Comment: Stable  Plan: Continue with current medications as well as encouragement of daily activities    (C50.411) Malignant neoplasm of upper-outer quadrant of right female breast, unspecified estrogen receptor status (H)  Comment: Stable  Plan: Recently had a visit with oncology    (I10) Hypertension, unspecified type  Comment: Stable  Plan: Continue to manage and follow      Case Management:  I have reviewed the Assisted Living care plan, current immunizations and preventive care/cancer screening.. Future cancer screening is not clinically indicated secondary to age/goals of care.   Patient's desire to return to the community is present, but is not able due to care needs .    Information reviewed:  Medications, vital signs, orders, and nursing notes.  PLAN:    No current medication changes with this visit.  She seems to be doing pretty well.  She feels that her pain is always going to be present but it is tolerable and that she is able to participate in the group activities as well as ambulate up and down the hallways and go to the dining area.  She will follow up with ear nose and throat or audiology regarding her hearing aids.    Electronically signed by: Bry Jurado NP

## 2021-09-28 ENCOUNTER — ASSISTED LIVING VISIT (OUTPATIENT)
Dept: GERIATRICS | Facility: CLINIC | Age: 86
End: 2021-09-28
Payer: COMMERCIAL

## 2021-09-28 DIAGNOSIS — M15.0 PRIMARY OSTEOARTHRITIS INVOLVING MULTIPLE JOINTS: ICD-10-CM

## 2021-09-28 DIAGNOSIS — H91.93 BILATERAL HEARING LOSS, UNSPECIFIED HEARING LOSS TYPE: ICD-10-CM

## 2021-09-28 DIAGNOSIS — H92.01 OTALGIA, RIGHT: Primary | ICD-10-CM

## 2021-09-28 DIAGNOSIS — I10 HYPERTENSION, UNSPECIFIED TYPE: ICD-10-CM

## 2021-09-28 NOTE — LETTER
9/28/2021        [facility]: [facility]  [address]  [place] [address]        [facility] GERIATRIC [facility]    Facility:   [facility] ([place] [[id number]]   Code Status: DNR      CHIEF COMPLAINT/REASON FOR VISIT:  Chief Complaint   Patient presents with     RECHECK     asked to see, otalgia, pain       HISTORY:      HPI: Marina is a 87 year old female Who I was asked to see secondary to right ear discomfort.  She does wear hearing aids bilaterally secondary to being hard of hearing and she states that most recently the right ear in particular has been more sore than the left side.  Denies any drainage.  Denies any warmth feeling to the right side of her head.  Denies any facial pain.  Denies colds or flus.  She does have a history of chronic pain to which she is on Tylenol and tramadol.  She does move about the facility in her walker.  Her appetite is good.  Denies any swallowing problems.  Sleeping well at night.    Past Medical History:   Diagnosis Date     Age-related physical debility      Anemia      Arthritis      Bladder infection 01/03/2015    dx'd- on antibiotics     Blood type AB-     with Anti-C     Breast cancer (H)      Bursitis, knee      Cancer (H)     breast      Cancer (H) 2005    left breast     Colon cancer (H)      Depression      Gout      History of transfusion      Hypertension      Hypertension      Insomnia      Macular degeneration      Osteoarthritis of multiple joints      Osteopenia      Ovarian cyst      Rosacea      Vitamin D deficiency             Family History   Problem Relation Age of Onset     Squamous cell carcinoma Mother      Colon Cancer Sister      Breast Cancer Maternal Aunt      Bone Cancer Maternal Aunt      Colon Cancer Paternal Aunt      Breast Cancer Cousin       Social History     Socioeconomic History     Marital status:      Spouse name: Not on file     Number of children: Not on file     Years of education: Not on file      Highest education level: Not on file   Occupational History     Not on file   Tobacco Use     Smoking status: Never Smoker     Smokeless tobacco: Never Used   Substance and Sexual Activity     Alcohol use: No     Drug use: No     Sexual activity: Never   Other Topics Concern     Parent/sibling w/ CABG, MI or angioplasty before 65F 55M? Not Asked   Social History Narrative     Not on file     Social Determinants of Health     Financial Resource Strain:      Difficulty of Paying Living Expenses:    Food Insecurity:      Worried About Running Out of Food in the Last Year:      Ran Out of Food in the Last Year:    Transportation Needs:      Lack of Transportation (Medical):      Lack of Transportation (Non-Medical):    Physical Activity:      Days of Exercise per Week:      Minutes of Exercise per Session:    Stress:      Feeling of Stress :    Social Connections:      Frequency of Communication with Friends and Family:      Frequency of Social Gatherings with Friends and Family:      Attends [employer] Services:      Active Member of Clubs or Organizations:      Attends Club or Organization Meetings:      Marital Status:    Intimate Partner Violence:      Fear of Current or Ex-Partner:      Emotionally Abused:      Physically Abused:      Sexually Abused:         REVIEW OF SYSTEM:  She currently denies any new symptoms of colds flus chills fever allergies sore throat postnasal drip wheezing chest pain dizziness vertigo nausea vomiting diarrhea dysuria frequency urgency.  She is complaining particularly of right ear discomfort.    PHYSICAL EXAM:   Pleasant female.  No acute distress.  Head is normocephalic.  Hearing aids.  Glasses.  Neck is supple without adenopathy.  Lung sounds are clear throughout.  Cardiovascular has an S1-S2 regular rate and rhythm.  No lower extremity edema.  Gastrointestinal nontender nondistended to musculoskeletal history of osteoarthritis to her major joints including a right knee  replacement, chronic shoulder degenerative joint disease. Psychiatric: Pleasant affect.     Negative pain or discomfort to palpation of her Tragus or helix.  Her TM is in good shape with light reflex and landmarks and without redness or inflammation.  The canal itself does not have any inflammationOr redness.    Current Outpatient Medications:      aspirin 81 MG tablet, Take by mouth daily, Disp: , Rfl:      ATENOLOL PO, Take 100 mg by mouth , Disp: , Rfl:      calcium carbonate-vitamin D 600-400 MG-UNIT CHEW, , Disp: , Rfl:      glucosamine-chondroitin 500-400 MG CAPS, Take 1 capsule by mouth daily, Disp: , Rfl:      HYDROCHLOROTHIAZIDE PO, Take 25 mg by mouth daily , Disp: , Rfl:      Multiple Vitamins-Minerals (PRESERVISION AREDS) CAPS, , Disp: , Rfl:      multivitamin, therapeutic with minerals (MULTI-VITAMIN) TABS, Take 1 tablet by mouth daily, Disp: , Rfl:      traMADol (ULTRAM) 50 MG tablet, Take 1 tablet (50 mg) by mouth 3 times daily., Disp: 90 tablet, Rfl: 0     VITAMIN D, CHOLECALCIFEROL, PO, Take by mouth daily, Disp: , Rfl:        There were no vitals taken for this visit.  Apical heart rate 76 with respirations of 18  LABS:   Last Comprehensive Metabolic Panel:  Sodium   Date Value Ref Range Status   08/13/2021 136 136 - 145 mmol/L Final   09/15/2006 134 133 - 144 mmol/L Final     Comment:     Delta Verified     Potassium   Date Value Ref Range Status   08/13/2021 4.2 3.5 - 5.0 mmol/L Final   09/15/2006 4.1 3.4 - 5.3 mmol/L Final     Chloride   Date Value Ref Range Status   08/13/2021 100 98 - 107 mmol/L Final   09/15/2006 99 94 - 109 mmol/L Final     Carbon Dioxide   Date Value Ref Range Status   09/15/2006 28 20 - 32 mmol/L Final     Carbon Dioxide (CO2)   Date Value Ref Range Status   08/13/2021 26 22 - 31 mmol/L Final     Anion Gap   Date Value Ref Range Status   08/13/2021 10 5 - 18 mmol/L Final   09/15/2006 7 6 - 17 mmol/L Final     Glucose   Date Value Ref Range Status   08/13/2021 157 (H) 70 -  125 mg/dL Final   09/14/2006 110 60 - 110 mg/dL Final     Urea Nitrogen   Date Value Ref Range Status   08/13/2021 14 8 - 28 mg/dL Final   09/14/2006 24 7 - 30 mg/dL Final     Creatinine   Date Value Ref Range Status   08/13/2021 0.79 0.60 - 1.10 mg/dL Final   09/14/2006 1.08 0.60 - 1.30 mg/dL Final     GFR Estimate   Date Value Ref Range Status   08/13/2021 68 >60 mL/min/1.73m2 Final     Comment:     As of July 11, 2021, eGFR is calculated by the CKD-EPI creatinine equation, without race adjustment. eGFR can be influenced by muscle mass, exercise, and diet. The reported eGFR is an estimation only and is only applicable if the renal function is stable.   05/26/2021 >60 >60 mL/min/1.73m2 Final   09/14/2006 53 (L) >60 mL/min/1.7m2 Final     Calcium   Date Value Ref Range Status   08/13/2021 9.3 8.5 - 10.5 mg/dL Final   09/14/2006 9.2 8.5 - 10.4 mg/dL Final           ASSESSMENT:    Encounter Diagnoses   Name Primary?     Otalgia, right Yes     Hypertension, unspecified type      Primary osteoarthritis involving multiple joints      Bilateral hearing loss, unspecified hearing loss type        PLAN:    Pleasant female.  No acute distress.  Head is normocephalic.  Hearing aids.  Glasses.  Neck is supple without adenopathy.  Lung sounds are clear throughout.  Cardiovascular has an S1-S2 regular rate and rhythm.  No lower extremity edema.  Gastrointestinal nontender nondistended to musculoskeletal history of osteoarthritis to her major joints including a right knee replacement, chronic shoulder degenerative joint disease. Psychiatric: Pleasant affect.           Electronically signed by: Bry Jurado NP          Sincerely,        Bry Jurado NP

## 2021-09-29 PROBLEM — H91.93 BILATERAL HEARING LOSS, UNSPECIFIED HEARING LOSS TYPE: Status: ACTIVE | Noted: 2021-09-29

## 2021-09-29 NOTE — PROGRESS NOTES
[facility] GERIATRIC SERVICES    Facility:   [facility]) [[id number]]   Code Status: DNR      CHIEF COMPLAINT/REASON FOR VISIT:  Chief Complaint   Patient presents with     RECHECK     asked to see, otalgia, pain       HISTORY:      HPI: Marina is a 87 year old female Who I was asked to see secondary to right ear discomfort.  She does wear hearing aids bilaterally secondary to being hard of hearing and she states that most recently the right ear in particular has been more sore than the left side.  Denies any drainage.  Denies any warmth feeling to the right side of her head.  Denies any facial pain.  Denies colds or flus.  She does have a history of chronic pain to which she is on Tylenol and tramadol.  She does move about the facility in her walker.  Her appetite is good.  Denies any swallowing problems.  Sleeping well at night.    Past Medical History:   Diagnosis Date     Age-related physical debility      Anemia      Arthritis      Bladder infection 01/03/2015    dx'd- on antibiotics     Blood type AB-     with Anti-C     Breast cancer (H)      Bursitis, knee      Cancer (H)     breast      Cancer (H) 2005    left breast     Colon cancer (H)      Depression      Gout      History of transfusion      Hypertension      Hypertension      Insomnia      Macular degeneration      Osteoarthritis of multiple joints      Osteopenia      Ovarian cyst      Rosacea      Vitamin D deficiency             Family History   Problem Relation Age of Onset     Squamous cell carcinoma Mother      Colon Cancer Sister      Breast Cancer Maternal Aunt      Bone Cancer Maternal Aunt      Colon Cancer Paternal Aunt      Breast Cancer Cousin       Social History     Socioeconomic History     Marital status:      Spouse name: Not on file     Number of children: Not on file     Years of education: Not on file     Highest education level: Not on file   Occupational History     Not on file   Tobacco Use     Smoking status: Never  Smoker     Smokeless tobacco: Never Used   Substance and Sexual Activity     Alcohol use: No     Drug use: No     Sexual activity: Never   Other Topics Concern     Parent/sibling w/ CABG, MI or angioplasty before 65F 55M? Not Asked   Social History Narrative     Not on file     Social Determinants of Health     Financial Resource Strain:      Difficulty of Paying Living Expenses:    Food Insecurity:      Worried About Running Out of Food in the Last Year:      Ran Out of Food in the Last Year:    Transportation Needs:      Lack of Transportation (Medical):      Lack of Transportation (Non-Medical):    Physical Activity:      Days of Exercise per Week:      Minutes of Exercise per Session:    Stress:      Feeling of Stress :    Social Connections:      Frequency of Communication with Friends and Family:      Frequency of Social Gatherings with Friends and Family:      Attends [employer] Services:      Active Member of Clubs or Organizations:      Attends Club or Organization Meetings:      Marital Status:    Intimate Partner Violence:      Fear of Current or Ex-Partner:      Emotionally Abused:      Physically Abused:      Sexually Abused:         REVIEW OF SYSTEM:  She currently denies any new symptoms of colds flus chills fever allergies sore throat postnasal drip wheezing chest pain dizziness vertigo nausea vomiting diarrhea dysuria frequency urgency.  She is complaining particularly of right ear discomfort.    PHYSICAL EXAM:   Pleasant female.  No acute distress.  Head is normocephalic.  Hearing aids.  Glasses.  Neck is supple without adenopathy.  Lung sounds are clear throughout.  Cardiovascular has an S1-S2 regular rate and rhythm.  No lower extremity edema.  Gastrointestinal nontender nondistended to musculoskeletal history of osteoarthritis to her major joints including a right knee replacement, chronic shoulder degenerative joint disease. Psychiatric: Pleasant affect.     Negative pain or discomfort to  palpation of her Tragus or helix.  Her TM is in good shape with light reflex and landmarks and without redness or inflammation.  The canal itself does not have any inflammationOr redness.    Current Outpatient Medications:      aspirin 81 MG tablet, Take by mouth daily, Disp: , Rfl:      ATENOLOL PO, Take 100 mg by mouth , Disp: , Rfl:      calcium carbonate-vitamin D 600-400 MG-UNIT CHEW, , Disp: , Rfl:      glucosamine-chondroitin 500-400 MG CAPS, Take 1 capsule by mouth daily, Disp: , Rfl:      HYDROCHLOROTHIAZIDE PO, Take 25 mg by mouth daily , Disp: , Rfl:      Multiple Vitamins-Minerals (PRESERVISION AREDS) CAPS, , Disp: , Rfl:      multivitamin, therapeutic with minerals (MULTI-VITAMIN) TABS, Take 1 tablet by mouth daily, Disp: , Rfl:      traMADol (ULTRAM) 50 MG tablet, Take 1 tablet (50 mg) by mouth 3 times daily., Disp: 90 tablet, Rfl: 0     VITAMIN D, CHOLECALCIFEROL, PO, Take by mouth daily, Disp: , Rfl:        There were no vitals taken for this visit.  Apical heart rate 76 with respirations of 18  LABS:   Last Comprehensive Metabolic Panel:  Sodium   Date Value Ref Range Status   08/13/2021 136 136 - 145 mmol/L Final   09/15/2006 134 133 - 144 mmol/L Final     Comment:     Delta Verified     Potassium   Date Value Ref Range Status   08/13/2021 4.2 3.5 - 5.0 mmol/L Final   09/15/2006 4.1 3.4 - 5.3 mmol/L Final     Chloride   Date Value Ref Range Status   08/13/2021 100 98 - 107 mmol/L Final   09/15/2006 99 94 - 109 mmol/L Final     Carbon Dioxide   Date Value Ref Range Status   09/15/2006 28 20 - 32 mmol/L Final     Carbon Dioxide (CO2)   Date Value Ref Range Status   08/13/2021 26 22 - 31 mmol/L Final     Anion Gap   Date Value Ref Range Status   08/13/2021 10 5 - 18 mmol/L Final   09/15/2006 7 6 - 17 mmol/L Final     Glucose   Date Value Ref Range Status   08/13/2021 157 (H) 70 - 125 mg/dL Final   09/14/2006 110 60 - 110 mg/dL Final     Urea Nitrogen   Date Value Ref Range Status   08/13/2021 14 8 -  28 mg/dL Final   09/14/2006 24 7 - 30 mg/dL Final     Creatinine   Date Value Ref Range Status   08/13/2021 0.79 0.60 - 1.10 mg/dL Final   09/14/2006 1.08 0.60 - 1.30 mg/dL Final     GFR Estimate   Date Value Ref Range Status   08/13/2021 68 >60 mL/min/1.73m2 Final     Comment:     As of July 11, 2021, eGFR is calculated by the CKD-EPI creatinine equation, without race adjustment. eGFR can be influenced by muscle mass, exercise, and diet. The reported eGFR is an estimation only and is only applicable if the renal function is stable.   05/26/2021 >60 >60 mL/min/1.73m2 Final   09/14/2006 53 (L) >60 mL/min/1.7m2 Final     Calcium   Date Value Ref Range Status   08/13/2021 9.3 8.5 - 10.5 mg/dL Final   09/14/2006 9.2 8.5 - 10.4 mg/dL Final           ASSESSMENT:    Encounter Diagnoses   Name Primary?     Otalgia, right Yes     Hypertension, unspecified type      Primary osteoarthritis involving multiple joints      Bilateral hearing loss, unspecified hearing loss type        PLAN:    Pleasant female.  No acute distress.  Head is normocephalic.  Hearing aids.  Glasses.  Neck is supple without adenopathy.  Lung sounds are clear throughout.  Cardiovascular has an S1-S2 regular rate and rhythm.  No lower extremity edema.  Gastrointestinal nontender nondistended to musculoskeletal history of osteoarthritis to her major joints including a right knee replacement, chronic shoulder degenerative joint disease. Psychiatric: Pleasant affect.           Electronically signed by: Bry Jurado NP

## 2021-10-20 DIAGNOSIS — G89.4 CHRONIC PAIN SYNDROME: ICD-10-CM

## 2021-10-20 RX ORDER — TRAMADOL HYDROCHLORIDE 50 MG/1
50 TABLET ORAL 3 TIMES DAILY
Qty: 90 TABLET | Refills: 0 | Status: SHIPPED | OUTPATIENT
Start: 2021-10-20 | End: 2021-11-16

## 2021-11-15 DIAGNOSIS — G89.4 CHRONIC PAIN SYNDROME: ICD-10-CM

## 2021-11-16 RX ORDER — TRAMADOL HYDROCHLORIDE 50 MG/1
50 TABLET ORAL 3 TIMES DAILY
Qty: 90 TABLET | Refills: 0 | Status: ON HOLD | OUTPATIENT
Start: 2021-11-16 | End: 2021-12-14

## 2021-12-11 ENCOUNTER — APPOINTMENT (OUTPATIENT)
Dept: GENERAL RADIOLOGY | Facility: CLINIC | Age: 86
DRG: 247 | End: 2021-12-11
Attending: EMERGENCY MEDICINE
Payer: COMMERCIAL

## 2021-12-11 ENCOUNTER — APPOINTMENT (OUTPATIENT)
Dept: CARDIOLOGY | Facility: CLINIC | Age: 86
DRG: 247 | End: 2021-12-11
Attending: STUDENT IN AN ORGANIZED HEALTH CARE EDUCATION/TRAINING PROGRAM
Payer: COMMERCIAL

## 2021-12-11 ENCOUNTER — HOSPITAL ENCOUNTER (INPATIENT)
Facility: CLINIC | Age: 86
LOS: 3 days | Discharge: NURSING, REHAB OR LONG-TERM CARE | DRG: 247 | End: 2021-12-14
Attending: EMERGENCY MEDICINE | Admitting: INTERNAL MEDICINE
Payer: COMMERCIAL

## 2021-12-11 DIAGNOSIS — I50.20 HEART FAILURE WITH REDUCED EJECTION FRACTION, NYHA CLASS II (H): ICD-10-CM

## 2021-12-11 DIAGNOSIS — G89.4 CHRONIC PAIN SYNDROME: ICD-10-CM

## 2021-12-11 DIAGNOSIS — I25.10 CORONARY ARTERY DISEASE INVOLVING NATIVE CORONARY ARTERY OF NATIVE HEART WITHOUT ANGINA PECTORIS: ICD-10-CM

## 2021-12-11 DIAGNOSIS — Z95.5 STATUS POST INSERTION OF DRUG-ELUTING STENT INTO LEFT ANTERIOR DESCENDING ARTERY: Primary | ICD-10-CM

## 2021-12-11 DIAGNOSIS — I21.4 NSTEMI (NON-ST ELEVATED MYOCARDIAL INFARCTION) (H): ICD-10-CM

## 2021-12-11 LAB
ANION GAP SERPL CALCULATED.3IONS-SCNC: 7 MMOL/L (ref 3–14)
ATRIAL RATE - MUSE: 83 BPM
BASOPHILS # BLD AUTO: 0.1 10E3/UL (ref 0–0.2)
BASOPHILS NFR BLD AUTO: 1 %
BI-PLANE LVEF ECHO: NORMAL
BUN SERPL-MCNC: 18 MG/DL (ref 7–30)
CALCIUM SERPL-MCNC: 8.4 MG/DL (ref 8.5–10.1)
CHLORIDE BLD-SCNC: 105 MMOL/L (ref 94–109)
CO2 SERPL-SCNC: 25 MMOL/L (ref 20–32)
CREAT SERPL-MCNC: 0.76 MG/DL (ref 0.52–1.04)
DIASTOLIC BLOOD PRESSURE - MUSE: NORMAL MMHG
EOSINOPHIL # BLD AUTO: 0.3 10E3/UL (ref 0–0.7)
EOSINOPHIL NFR BLD AUTO: 3 %
ERYTHROCYTE [DISTWIDTH] IN BLOOD BY AUTOMATED COUNT: 13.8 % (ref 10–15)
ERYTHROCYTE [DISTWIDTH] IN BLOOD BY AUTOMATED COUNT: 14 % (ref 10–15)
GFR SERPL CREATININE-BSD FRML MDRD: 71 ML/MIN/1.73M2
GLUCOSE BLD-MCNC: 129 MG/DL (ref 70–99)
HCT VFR BLD AUTO: 38.2 % (ref 35–47)
HCT VFR BLD AUTO: 39.6 % (ref 35–47)
HGB BLD-MCNC: 12.3 G/DL (ref 11.7–15.7)
HGB BLD-MCNC: 12.9 G/DL (ref 11.7–15.7)
HOLD SPECIMEN: NORMAL
IMM GRANULOCYTES # BLD: 0 10E3/UL
IMM GRANULOCYTES NFR BLD: 0 %
INTERPRETATION ECG - MUSE: NORMAL
LVEF ECHO: NORMAL
LYMPHOCYTES # BLD AUTO: 4.3 10E3/UL (ref 0.8–5.3)
LYMPHOCYTES NFR BLD AUTO: 48 %
MAGNESIUM SERPL-MCNC: 2.5 MG/DL (ref 1.6–2.3)
MCH RBC QN AUTO: 30.7 PG (ref 26.5–33)
MCH RBC QN AUTO: 31.1 PG (ref 26.5–33)
MCHC RBC AUTO-ENTMCNC: 32.2 G/DL (ref 31.5–36.5)
MCHC RBC AUTO-ENTMCNC: 32.6 G/DL (ref 31.5–36.5)
MCV RBC AUTO: 94 FL (ref 78–100)
MCV RBC AUTO: 97 FL (ref 78–100)
MONOCYTES # BLD AUTO: 1.1 10E3/UL (ref 0–1.3)
MONOCYTES NFR BLD AUTO: 13 %
NEUTROPHILS # BLD AUTO: 3.2 10E3/UL (ref 1.6–8.3)
NEUTROPHILS NFR BLD AUTO: 35 %
NRBC # BLD AUTO: 0 10E3/UL
NRBC BLD AUTO-RTO: 0 /100
NT-PROBNP SERPL-MCNC: 1757 PG/ML (ref 0–1800)
P AXIS - MUSE: 13 DEGREES
PLATELET # BLD AUTO: 220 10E3/UL (ref 150–450)
PLATELET # BLD AUTO: 220 10E3/UL (ref 150–450)
POTASSIUM BLD-SCNC: 4.4 MMOL/L (ref 3.4–5.3)
PR INTERVAL - MUSE: 138 MS
QRS DURATION - MUSE: 76 MS
QT - MUSE: 358 MS
QTC - MUSE: 420 MS
R AXIS - MUSE: 6 DEGREES
RBC # BLD AUTO: 3.96 10E6/UL (ref 3.8–5.2)
RBC # BLD AUTO: 4.2 10E6/UL (ref 3.8–5.2)
SARS-COV-2 RNA RESP QL NAA+PROBE: NEGATIVE
SODIUM SERPL-SCNC: 137 MMOL/L (ref 133–144)
SYSTOLIC BLOOD PRESSURE - MUSE: NORMAL MMHG
T AXIS - MUSE: 13 DEGREES
TROPONIN I SERPL HS-MCNC: 460 NG/L
TROPONIN I SERPL HS-MCNC: 838 NG/L
TROPONIN I SERPL HS-MCNC: 841 NG/L
TROPONIN I SERPL HS-MCNC: 860 NG/L
UFH PPP CHRO-ACNC: 0.32 IU/ML
UFH PPP CHRO-ACNC: <0.1 IU/ML
VENTRICULAR RATE- MUSE: 83 BPM
WBC # BLD AUTO: 8.3 10E3/UL (ref 4–11)
WBC # BLD AUTO: 9 10E3/UL (ref 4–11)

## 2021-12-11 PROCEDURE — 99223 1ST HOSP IP/OBS HIGH 75: CPT | Mod: 25 | Performed by: INTERNAL MEDICINE

## 2021-12-11 PROCEDURE — 258N000003 HC RX IP 258 OP 636: Performed by: EMERGENCY MEDICINE

## 2021-12-11 PROCEDURE — 96376 TX/PRO/DX INJ SAME DRUG ADON: CPT

## 2021-12-11 PROCEDURE — 99285 EMERGENCY DEPT VISIT HI MDM: CPT | Mod: 25

## 2021-12-11 PROCEDURE — 83880 ASSAY OF NATRIURETIC PEPTIDE: CPT | Performed by: EMERGENCY MEDICINE

## 2021-12-11 PROCEDURE — 96366 THER/PROPH/DIAG IV INF ADDON: CPT

## 2021-12-11 PROCEDURE — 96365 THER/PROPH/DIAG IV INF INIT: CPT

## 2021-12-11 PROCEDURE — 71046 X-RAY EXAM CHEST 2 VIEWS: CPT

## 2021-12-11 PROCEDURE — 250N000011 HC RX IP 250 OP 636: Performed by: EMERGENCY MEDICINE

## 2021-12-11 PROCEDURE — 83735 ASSAY OF MAGNESIUM: CPT | Performed by: STUDENT IN AN ORGANIZED HEALTH CARE EDUCATION/TRAINING PROGRAM

## 2021-12-11 PROCEDURE — 36415 COLL VENOUS BLD VENIPUNCTURE: CPT | Performed by: STUDENT IN AN ORGANIZED HEALTH CARE EDUCATION/TRAINING PROGRAM

## 2021-12-11 PROCEDURE — 255N000002 HC RX 255 OP 636: Performed by: INTERNAL MEDICINE

## 2021-12-11 PROCEDURE — 85025 COMPLETE CBC W/AUTO DIFF WBC: CPT | Performed by: EMERGENCY MEDICINE

## 2021-12-11 PROCEDURE — 85027 COMPLETE CBC AUTOMATED: CPT | Performed by: EMERGENCY MEDICINE

## 2021-12-11 PROCEDURE — C9803 HOPD COVID-19 SPEC COLLECT: HCPCS

## 2021-12-11 PROCEDURE — 85520 HEPARIN ASSAY: CPT | Performed by: INTERNAL MEDICINE

## 2021-12-11 PROCEDURE — 84295 ASSAY OF SERUM SODIUM: CPT | Performed by: EMERGENCY MEDICINE

## 2021-12-11 PROCEDURE — U0005 INFEC AGEN DETEC AMPLI PROBE: HCPCS | Performed by: EMERGENCY MEDICINE

## 2021-12-11 PROCEDURE — 93306 TTE W/DOPPLER COMPLETE: CPT | Mod: 26 | Performed by: INTERNAL MEDICINE

## 2021-12-11 PROCEDURE — 93005 ELECTROCARDIOGRAM TRACING: CPT

## 2021-12-11 PROCEDURE — 999N000208 ECHOCARDIOGRAM COMPLETE

## 2021-12-11 PROCEDURE — 84484 ASSAY OF TROPONIN QUANT: CPT | Performed by: STUDENT IN AN ORGANIZED HEALTH CARE EDUCATION/TRAINING PROGRAM

## 2021-12-11 PROCEDURE — 36415 COLL VENOUS BLD VENIPUNCTURE: CPT | Performed by: INTERNAL MEDICINE

## 2021-12-11 PROCEDURE — 36415 COLL VENOUS BLD VENIPUNCTURE: CPT | Performed by: EMERGENCY MEDICINE

## 2021-12-11 PROCEDURE — 214N000001 HC R&B CCU UMMC

## 2021-12-11 PROCEDURE — C8929 TTE W OR WO FOL WCON,DOPPLER: HCPCS

## 2021-12-11 PROCEDURE — 84484 ASSAY OF TROPONIN QUANT: CPT | Performed by: EMERGENCY MEDICINE

## 2021-12-11 PROCEDURE — 71046 X-RAY EXAM CHEST 2 VIEWS: CPT | Mod: 26 | Performed by: RADIOLOGY

## 2021-12-11 RX ORDER — AMOXICILLIN 250 MG
2 CAPSULE ORAL 2 TIMES DAILY PRN
Status: DISCONTINUED | OUTPATIENT
Start: 2021-12-11 | End: 2021-12-14 | Stop reason: HOSPADM

## 2021-12-11 RX ORDER — ATORVASTATIN CALCIUM 40 MG/1
40 TABLET, FILM COATED ORAL EVERY EVENING
Status: DISCONTINUED | OUTPATIENT
Start: 2021-12-11 | End: 2021-12-14 | Stop reason: HOSPADM

## 2021-12-11 RX ORDER — HEPARIN SODIUM 10000 [USP'U]/100ML
0-5000 INJECTION, SOLUTION INTRAVENOUS CONTINUOUS
Status: DISCONTINUED | OUTPATIENT
Start: 2021-12-11 | End: 2021-12-12

## 2021-12-11 RX ORDER — AMOXICILLIN 250 MG
1 CAPSULE ORAL 2 TIMES DAILY PRN
Status: DISCONTINUED | OUTPATIENT
Start: 2021-12-11 | End: 2021-12-14 | Stop reason: HOSPADM

## 2021-12-11 RX ORDER — ACETAMINOPHEN 650 MG/1
650 SUPPOSITORY RECTAL EVERY 4 HOURS PRN
Status: DISCONTINUED | OUTPATIENT
Start: 2021-12-11 | End: 2021-12-14 | Stop reason: HOSPADM

## 2021-12-11 RX ORDER — HYDROCHLOROTHIAZIDE 25 MG/1
25 TABLET ORAL DAILY
Status: DISCONTINUED | OUTPATIENT
Start: 2021-12-11 | End: 2021-12-12

## 2021-12-11 RX ORDER — ACETAMINOPHEN 325 MG/1
650 TABLET ORAL EVERY 4 HOURS PRN
Status: DISCONTINUED | OUTPATIENT
Start: 2021-12-11 | End: 2021-12-14 | Stop reason: HOSPADM

## 2021-12-11 RX ORDER — ATENOLOL 50 MG/1
100 TABLET ORAL DAILY
Status: DISCONTINUED | OUTPATIENT
Start: 2021-12-11 | End: 2021-12-12

## 2021-12-11 RX ORDER — NITROGLYCERIN 0.4 MG/1
0.4 TABLET SUBLINGUAL EVERY 5 MIN PRN
Status: DISCONTINUED | OUTPATIENT
Start: 2021-12-11 | End: 2021-12-14 | Stop reason: HOSPADM

## 2021-12-11 RX ORDER — ASPIRIN 81 MG/1
81 TABLET, CHEWABLE ORAL DAILY
Status: DISCONTINUED | OUTPATIENT
Start: 2021-12-12 | End: 2021-12-14 | Stop reason: HOSPADM

## 2021-12-11 RX ORDER — LIDOCAINE 40 MG/G
CREAM TOPICAL
Status: DISCONTINUED | OUTPATIENT
Start: 2021-12-11 | End: 2021-12-14 | Stop reason: HOSPADM

## 2021-12-11 RX ADMIN — HUMAN ALBUMIN MICROSPHERES AND PERFLUTREN 6 ML: 10; .22 INJECTION, SOLUTION INTRAVENOUS at 10:55

## 2021-12-11 RX ADMIN — Medication 900 UNITS/HR: at 04:36

## 2021-12-11 RX ADMIN — Medication 1200 UNITS/HR: at 16:38

## 2021-12-11 ASSESSMENT — ACTIVITIES OF DAILY LIVING (ADL)
ADLS_ACUITY_SCORE: 12
ADLS_ACUITY_SCORE: 13
ADLS_ACUITY_SCORE: 12
ADLS_ACUITY_SCORE: 13
ADLS_ACUITY_SCORE: 12

## 2021-12-11 ASSESSMENT — MIFFLIN-ST. JEOR: SCORE: 1143.49

## 2021-12-11 NOTE — Clinical Note
The first balloon was inserted into the left anterior descending.Max pressure = 12 arnoldo. Total duration = 10 seconds.     Max pressure = 12 arnoldo. Total duration = 10 seconds.    Balloon reinflated a second time: Max pressure = 12 arnoldo. Total duration = 10 seconds.

## 2021-12-11 NOTE — ED PROVIDER NOTES
ED Provider Note  [facility]      History     Chief Complaint   Patient presents with     Chest Pain     HPI  Marina Neves is a 87 year old female who presents from home complaining of chest pain.  I do not see any history of previous documented coronary disease.  She has an EKG from December 2020 so a year ago that was normal.  She is very hard of hearing and a poor historian apparently she had some pain yesterday and again tonight which prompted her to come in.  She was given aspirin by the paramedics and she is currently pain-free.  First troponin is positive she will be heparinized and admitted for NSTEMI.    Past Medical History  Past Medical History:   Diagnosis Date     Age-related physical debility      Anemia      Arthritis      Bladder infection 01/03/2015    dx'd- on antibiotics     Blood type AB-     with Anti-C     Breast cancer (H)      Bursitis, knee      Cancer (H)     breast      Cancer (H) 2005    left breast     Colon cancer (H)      Depression      Gout      History of transfusion      Hypertension      Hypertension      Insomnia      Macular degeneration      Osteoarthritis of multiple joints      Osteopenia      Ovarian cyst      Rosacea      Vitamin D deficiency      Past Surgical History:   Procedure Laterality Date     ABDOMEN SURGERY      colon resection for ca 2015     BACK SURGERY Bilateral     L3-L5, L5-S1 decompression lami     BACK SURGERY      posterior spinal reconstruction     BREAST SURGERY       BREAST SURGERY      left mastectomy     C PART REMOVAL COLON W ANASTOMOSIS N/A 6/16/2014    Procedure: COLECTOMY;  Surgeon: Meliton Bazan MD;  Location: [facility];  Service: General     CATARACT EXTRACTION Bilateral      COLON SURGERY       COLONOSCOPY       COLONOSCOPY N/A 8/4/2015    Procedure: COLONOSCOPY;  Surgeon: Yoseph Ferraro MD;  Location:  GI     DILATION AND CURETTAGE      onsil     EYE SURGERY      cataract bilateral     EYE SURGERY        GASTRECTOMY       MASTECTOMY, RADICAL Left      NEUROMA SURGERY Bilateral     feet     ORTHOPEDIC SURGERY      shoulder right, knee left     ORTHOPEDIC SURGERY      lami     OTHER SURGICAL HISTORY      left knee arthroscopy     OTHER SURGICAL HISTORY      right shoulder replacement     OTHER SURGICAL HISTORY      left breast biopsyneedle core     NM LAP,FULGURATE/EXCISE LESIONS N/A 3/29/2017    Procedure: LAPAROSCOPIC BILATERAL SALPING OOPHORECTOMY PELVIC WASHINGS;  Surgeon: Eduardo Smart MD;  Location: [facility];  Service: Gynecology     NM MASTECTOMY, SIMPLE, COMPLETE Right 1/7/2015    Procedure: RIGHT BREAST MASTECTOMY;  Surgeon: Meliton Bazan MD;  Location: [facility];  Service: General     REPLACEMENT TOTAL KNEE Right      TONSILLECTOMY & ADENOIDECTOMY       aspirin 81 MG tablet  ATENOLOL PO  calcium carbonate-vitamin D 600-400 MG-UNIT CHEW  glucosamine-chondroitin 500-400 MG CAPS  HYDROCHLOROTHIAZIDE PO  Multiple Vitamins-Minerals (PRESERVISION AREDS) CAPS  multivitamin, therapeutic with minerals (MULTI-VITAMIN) TABS  traMADol (ULTRAM) 50 MG tablet  VITAMIN D, CHOLECALCIFEROL, PO      Allergies   Allergen Reactions     Blood-Group Specific Substance Other (See Comments)     Anti-D and Anti-C present. A delay in compatible RBC's may occur.     Celecoxib Unknown     Avoids due to sulfa allergy     Lisinopril Cough     Oxybutynin Other (See Comments)     Sulfa Drugs      Tetanus Toxoid Blisters     Tolterodine Other (See Comments)     Piroxicam Rash     Family History  Family History   Problem Relation Age of Onset     Squamous cell carcinoma Mother      Colon Cancer Sister      Breast Cancer Maternal Aunt      Bone Cancer Maternal Aunt      Colon Cancer Paternal Aunt      Breast Cancer Cousin      Social History   Social History     Tobacco Use     Smoking status: Never Smoker     Smokeless tobacco: Never Used   Substance Use Topics     Alcohol use: No     Drug use: No      Past  "medical history, past surgical history, medications, allergies, family history, and social history were reviewed with the patient. No additional pertinent items.       Review of Systems  A complete review of systems was performed with pertinent positives and negatives noted in the HPI, and all other systems negative.    Physical Exam   BP: (!) 148/100  Pulse: 88  Temp: 97.9  F (36.6  C)  Resp: 16  Height: 160 cm (5' 3\")  Weight: 73.9 kg (163 lb)  SpO2: 97 %  Physical Exam  Vitals and nursing note reviewed.   Constitutional:       General: She is not in acute distress.     Comments: Alert in no distress, very hard of hearing   HENT:      Head: Normocephalic and atraumatic.      Mouth/Throat:      Mouth: Mucous membranes are moist.   Eyes:      General: No scleral icterus.     Conjunctiva/sclera: Conjunctivae normal.      Pupils: Pupils are equal, round, and reactive to light.   Cardiovascular:      Rate and Rhythm: Normal rate and regular rhythm.      Heart sounds: Normal heart sounds.   Pulmonary:      Effort: Pulmonary effort is normal. No respiratory distress.      Breath sounds: Normal breath sounds. No wheezing.   Abdominal:      General: Abdomen is flat.      Palpations: Abdomen is soft.   Musculoskeletal:      Cervical back: Neck supple.      Right lower leg: No tenderness. No edema.      Left lower leg: No tenderness. No edema.   Skin:     General: Skin is warm and dry.      Coloration: Skin is pale.   Neurological:      General: No focal deficit present.      Mental Status: She is alert and oriented to person, place, and time.      Cranial Nerves: No cranial nerve deficit.   Psychiatric:         Mood and Affect: Mood normal.         Behavior: Behavior normal.         ED Course      Procedures             EKG Interpretation:      Interpreted by Lucas Wilkins MD  Time reviewed: 0315  Symptoms at time of EKG: chest pain   Rhythm: normal sinus   Rate: normal  Axis: normal  Ectopy: none  Conduction: " normal  ST Segments/ T Waves: No ST-T wave changes  Q Waves: none  Comparison to prior: More upsloping lateral ST segments compared to EKG from 12 months ago    Clinical Impression: Lateral upsloping ST segments not STEMI criteria                    Results for orders placed or performed during the hospital encounter of 12/11/21   Basic metabolic panel     Status: Abnormal   Result Value Ref Range    Sodium 137 133 - 144 mmol/L    Potassium 4.4 3.4 - 5.3 mmol/L    Chloride 105 94 - 109 mmol/L    Carbon Dioxide (CO2) 25 20 - 32 mmol/L    Anion Gap 7 3 - 14 mmol/L    Urea Nitrogen 18 7 - 30 mg/dL    Creatinine 0.76 0.52 - 1.04 mg/dL    Calcium 8.4 (L) 8.5 - 10.1 mg/dL    Glucose 129 (H) 70 - 99 mg/dL    GFR Estimate 71 >60 mL/min/1.73m2   Troponin I     Status: Abnormal   Result Value Ref Range    Troponin I High Sensitivity 460 (HH) <54 ng/L   CBC with platelets and differential     Status: None   Result Value Ref Range    WBC Count 9.0 4.0 - 11.0 10e3/uL    RBC Count 3.96 3.80 - 5.20 10e6/uL    Hemoglobin 12.3 11.7 - 15.7 g/dL    Hematocrit 38.2 35.0 - 47.0 %    MCV 97 78 - 100 fL    MCH 31.1 26.5 - 33.0 pg    MCHC 32.2 31.5 - 36.5 g/dL    RDW 13.8 10.0 - 15.0 %    Platelet Count 220 150 - 450 10e3/uL    % Neutrophils 35 %    % Lymphocytes 48 %    % Monocytes 13 %    % Eosinophils 3 %    % Basophils 1 %    % Immature Granulocytes 0 %    NRBCs per 100 WBC 0 <1 /100    Absolute Neutrophils 3.2 1.6 - 8.3 10e3/uL    Absolute Lymphocytes 4.3 0.8 - 5.3 10e3/uL    Absolute Monocytes 1.1 0.0 - 1.3 10e3/uL    Absolute Eosinophils 0.3 0.0 - 0.7 10e3/uL    Absolute Basophils 0.1 0.0 - 0.2 10e3/uL    Absolute Immature Granulocytes 0.0 <=0.4 10e3/uL    Absolute NRBCs 0.0 10e3/uL   EKG 12-lead     Status: None (Preliminary result)   Result Value Ref Range    Systolic Blood Pressure  mmHg    Diastolic Blood Pressure  mmHg    Ventricular Rate 83 BPM    Atrial Rate 83 BPM    PA Interval 138 ms    QRS Duration 76 ms     ms     QTc 420 ms    P Axis 13 degrees    R AXIS 6 degrees    T Axis 13 degrees    Interpretation ECG       Sinus rhythm with Premature supraventricular complexes  Otherwise normal ECG     CBC with platelets differential     Status: None    Narrative    The following orders were created for panel order CBC with platelets differential.  Procedure                               Abnormality         Status                     ---------                               -----------         ------                     CBC with platelets and d...[[id number]]                      Final result                 Please view results for these tests on the individual orders.   [place] Draw     Status: None (In process)    Narrative    The following orders were created for panel order [place] Draw.  Procedure                               Abnormality         Status                     ---------                               -----------         ------                     Extra Blue Top Tube[[id number]]                              In process                 Extra Red Top Tube[[id number]]                               In process                   Please view results for these tests on the individual orders.     Medications   heparin loading dose for LOW INTENSITY TREATMENT * Give BEFORE starting heparin infusion (has no administration in time range)   heparin infusion 25,000 units in D5W 250 mL ANTICOAGULANT (has no administration in time range)      Discussed with cards attending who agrees to admit will contact cardiology fellow  Assessments & Plan (with Medical Decision Making)   87-year-old female who lives independently and is somewhat poor historian because of difficulty hearing.  She comes in for evaluation of chest pain that started yesterday but occurred again tonight at rest.  She does not have a history of ischemic coronary disease.  She points to the left chest area as the location she has difficulty describing the nature of it other  than pain.  She is currently pain-free her EKG shows subtle lateral ST elevations and she has a positive troponin.  I do not think she meets criteria for a STEMI Cath Lab activation given her lack of symptoms and the slight changes.  She was given aspirin by the paramedics and I will heparinize her her chest x-ray is pending I discussed the case with both the cardiology attending and the fellow.    I have reviewed the nursing notes. I have reviewed the findings, diagnosis, plan and need for follow up with the patient.    New Prescriptions    No medications on file       Final diagnoses:   NSTEMI (non-ST elevated myocardial infarction) (H)       --  Lucas Wilkins MD  [facility] EMERGENCY DEPARTMENT  12/11/2021     Lucas Wilkins MD  12/11/21 0425

## 2021-12-11 NOTE — Clinical Note
Stent deployed in the left anterior descending. Max pressure = 12 arnoldo. Total duration = 10 seconds.

## 2021-12-11 NOTE — Clinical Note
The first balloon was inserted into the left anterior descending.Max pressure = 10 arnoldo. Total duration = 10 seconds.     2.5x15.

## 2021-12-11 NOTE — ED NOTES
Bed: ED28  Expected date:   Expected time:   Means of arrival:   Comments:  EMS   [facility] Hematology and Oncology Progress Note    Patient: Sarah Gilmore  MRN: [id number]  Date of Service: 04/15/2021        Reason for Visit:    Scheduled follow up    Assessment and Plan:    Cancer Staging    1. Infiltrating ductal carcinoma of right female breast (H)    Pathologic Stage Unknown (pTX, pN1a(sn), cM0)    G1    ER+ 90%    MD+ 80%    HER2-    52 y.o.     LMP at age 50 (2018, March) after hysterectomy.  Serology confirms menopause in 2020, April.    07/30/2019 - (R) breast mastectomy and sentinel lymph node biopsy and (L) mastectomy:    Pathology:    (R) breast - Invasive ductal carcinoma [pTx pN1a M0], G1.  6 axillary sentinel lymph nodes examined, 1 positive for macro metastases and one positive for isolated tumor cells.  Multifocal DCIS, G2.      (L) breast - DCIS approximately 2 mm, G1.    09/05/19 - removal of (R) implant r/t infection.    12/06/2019 - completed 5040 cGy/28 fx (right breast, chest wall and regional lymph nodes) followed by 1000 cGy/5 fx boost to the primary tumor bed.     2019, December - initiated adjuvant endocrine therapy with tamoxifen.  Stopped after 2 months due to pain in both shins.      2020, April - restarted tamoxifen.  Stopped again after a month.    2020, May - switched to adjuvant endocrine therapy with an aromatase inhibitor (AI), specifically anastrozole.     12/07/21 US (L) breast - no imaging correlate to reported change in sensation involving the left lower inner quadrant, for which management should be based clinically. Postsurgical changes of left mastectomy with implant reconstruction.  Probable folliculitis    02/25/21 - (R) breast fat grafting.    04/15/21:    No labs.    Sarah presents alone, she looks and feels quite good other than her chronic achiness and heaviness in her legs.  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  She notes no concerning intolerance with adjuvant anastrozole therapy.  She has noted some lateral (R) arm, breast and chest wall edema  from time to time.  Chronic (B) LE lipedema - wear3s compression stockings.  Her appetite is good - weight quite stable the past 6 months.     Clinically PETRA.     Tolerating adjuvant endocrine therapy with anastrozole without concerns:    Continue adjuvant AI therapy with anastrozole, anticipating a minimum of 5-years of therapy, potentially 10-years if she tolerates well and no major side effects.    (R) lymphedema:    Minimal.    Will refer to PT/OT.    6-month follow up.  No labs.        2. Bone health:    12/24/19 DXA scan:    L1-L4 spine T-score [-1.4].    (L) femoral T-score [-1.5].  (R) femoral T-score [-1.5].    Trabecular bone score indicates moderate trabecular bone architecture.    LOW BONE DENSITY (OSTEOPENIA) and LOW fracture risk, adjusted for the TBS, with major osteoporotic fracture risk 7.6% and hip fracture risk 0.4%.     Continue 8634-3970 mg/day calcium + 3091-5843 international unit(s)/day vitamin D supplements.     Continue balance and weight bearing exercises.    Prior history of osteoporotic fracture.    2021 f/u DXA scan - ordered.     ECOG Performance:     ECOG Performance Status: 0     Distress Assessment:    Distress Assessment Score: No distress    Pain:    Currently in Pain: No/denies        Total Time 33 minutes     Simon Mora, RODRIGO   ______________________________________________________________________________    Interim History:    Oliver presents alone.  Her joint pain is minimal other than the chronic achiness and heaviness she notes in her legs.  She feels she needs no additional interventions.  She notes no concerning intolerance with adjuvant anastrozole therapy.  She has noted some lateral (R) arm, breast and chest wall edema from time to time.  Chronic (B) LE lipedema - wears compression stockings.  Her appetite is good - weight quite stable the past 6 months.  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 She denies cough, fever, chills, unusual headaches, visual or mentation  "disturbance, bowel or bladder issues, rash.       Pain Status:    Currently in Pain: No/denies    Review of Systems:    Constitutional  Constitutional (WDL): All constitutional elements are within defined limits  Neurosensory  Neurosensory (WDL): Exceptions to WDL  Peripheral Sensory Neuropathy: Concerns(legs and feet)  Eye   Eye Disorder (WDL): All eye disorder elements are within defined limits  Ear  Ear Disorder (WDL): All ear disorder elements are within defined limits  Cardiovascular  Cardiovascular (WDL): Exceptions to WDL  Edema: Concerns(lower legs)  Pulmonary  Respiratory (WDL): Within Defined Limits  Gastrointestinal  Gastrointestinal (WDL): All gastrointestinal elements are within defined limits  Genitourinary  Genitourinary (WDL): All genitourinary elements are within defined limits  Lymphatic  Lymph (WDL): Exceptions to WDL  Lymphedema: Concerns(R arm-full compression)  Musculoskeletal and Connective Tissue  Musculoskeletal and Connetive Tissue Disorders (WDL): All Musculoskeletal and Connetive Tissue Disorder elements are within defined limits  Integumentary  Integumentary (WDL): All integumentary elements are within defined limits  Patient Coping  Patient Coping: Accepting;Open/discussion  Accompanied by  Accompanied by: Alone  Oral Chemo Adherence       Past History:    Past Medical History:   Diagnosis Date     Anemia      DCIS (ductal carcinoma in situ) 2019    bilateral     Dilated aortic root (H)      History of anesthesia complications     slow to wake up     Osteopenia      Osteoporosis      PONV (postoperative nausea and vomiting)      Seizures (H)     h/o seizure disorder in childhood, last seizure activity  \"4 yrs ago\"     Suspected sleep apnea     sleep study set up in Aug 2019       Physical Exam:    Recent Vitals 4/15/2021   Height 5' 10\"   Weight 272 lbs 5 oz   BSA (m2) 2.47 m2   /70   Pulse 83   Temp 98.3   Temp src 1   SpO2 97   Some recent data might be hidden     General: "  Alert.  Pleasant.  No acute distress  HEENT:  Anicteric sclera.  EOMI.  DAYSI.  No mucosal lesions.  Lymphatics:  No abnormally enlarged lymph nodes palpated.  Chest:   Normal chest wall and respirations. Lungs cear to auscultation.  Breasts: (R) c/w mastectomy with implant removed.  Minimal lateral lymphedema.  No concerning skin changes.  Negative axilla.    (L) c/w mastectomy with implant.  No lymphedema appreciated.  No concerning skin changes.  Negative axilla.    Sarah declined offer for female  during exam.  Heart:   S1 S2 heard.  RRR heard.  No murmur heard.     Abdomen:  Soft.  Not tender.  Not distended.  No masses, organomegaly or guarding noted.  Extremities:  (B) LE lipedema.   Skin:   No rash noted.  CNS:   Non focal.    Lab Results:    No results found for this or any previous visit (from the past 168 hour(s)).    Imaging:    No results found.

## 2021-12-11 NOTE — ED NOTES
Called cards and asked about diet order. They will call back once it is known if the patient will be taken to cath lab today, approx 1230.

## 2021-12-11 NOTE — H&P
I have seen and examined the patient with the CSI team. I agree with the assessment and plan of the note above.I have reviewed pertinent labs.     Everett Castellon MD  Interventional Cardiology  Pager: [phone]  [facility]  History and Physical - Cardiology Night Service   Date of Admission: 12/11/2021      Assessment and Plan:   Marina Neves is an 87F with PMH most notable for breast and colon cancers, HTN, no known cardiac hx, who presented with chest pain and was admitted for NSTEMI.    # NSTEMI  Presented with chest pain. EKG without concerning ST changes. Initial trop elevated at 460. No known cardiac hx.   - trend troponins  - add-on BNP  - tele monitoring  - EKG if chest pain recurs  - continue heparin gtt  - s/p ASA 325mg, continue PTA ASA 81mg daily thereafter  - start atorvastatin 40mg daily  - continue PTA BB (atenolol 100mg daily)  - consider ACEi initiation in AM  - NPO for possible cath    # HTN  - continue PTA hydrochlorothiazide 25mg daily  - continue PTA atenolol as above    Diet: NPO  Fluids: none currently  DVT prophylaxis: on heparin gtt  Code status: full code  Disposition: admit to cards service     Patient was discussed with cardiology fellow. Will be formally staffed in AM.     Haleigh Medina MD  Internal Medicine, PGY-3       Troponin continues in the up-trend and echocardiogram showing LVEF of 33% with hypokinesis to akinesis of the mid anterior, anteroseptal, inferoseptal and apical segments which suggests LAD infarct/ischemia. Patient currently chest pain free and hemodynamically stable. Was already loaded with aspirin. Will continue heparin drip and proceed with coronary angiogram 12/12/21 in the AM.  Troponin to be trended.     Patient discussed with Dr. Castellon.    Wilbur Dailey, [facility]  Cardiology Fellow     HPI:   Marina Neves is an 87F with PMH notable for HTN, breast cancer s/p mastectomy, colon cancer s/p partial resection, gout, OA, who presents  to ED with chest pain. Patient is poor historian but currently states she first noticed pain late last night, waking her up from sleep somewhere roughly around 10pm. She states that she did not have any similar pain prior to this, although did relate to ED provider a different account where she had episode of pain earlier in day as well. She describes the pain as a dull pain located in the anterior central/left chest, persistent up until some point during her EMS transport in. Given ASA 325mg en route. Remains pain-free at this time. States she thinks she may have felt a little SOB when she was experiencing the pain but otherwise no other symptoms. No diaphoresis. No lightheadedness/dizziness. No n/v.    No known hx of CAD or other cardiac hx.     Patient is poor historian. Hx somewhat limited as a result.    ED: VSS. Initial troponin 460. Loaded with heparin and then started on heparin gtt.     Review of Systems:   10-point ROS was reviewed and was negative other than noted in HPI.     Past Medical History:     Past Medical History:   Diagnosis Date    Age-related physical debility     Anemia     Arthritis     Bladder infection 01/03/2015    dx'd- on antibiotics    Blood type AB-     with Anti-C    Breast cancer (H)     Bursitis, knee     Cancer (H)     breast     Cancer (H) 2005    left breast    Colon cancer (H)     Depression     Gout     History of transfusion     Hypertension     Hypertension     Insomnia     Macular degeneration     Osteoarthritis of multiple joints     Osteopenia     Ovarian cyst     Rosacea     Vitamin D deficiency       Past Surgical History:     Past Surgical History:   Procedure Laterality Date    ABDOMEN SURGERY      colon resection for ca 2015    BACK SURGERY Bilateral     L3-L5, L5-S1 decompression lami    BACK SURGERY      posterior spinal reconstruction    BREAST SURGERY      BREAST SURGERY      left mastectomy    C PART REMOVAL COLON W ANASTOMOSIS N/A 6/16/2014    Procedure:  "COLECTOMY;  Surgeon: Meliton Bazan MD;  Location: [facility];  Service: General    CATARACT EXTRACTION Bilateral     COLON SURGERY      COLONOSCOPY      COLONOSCOPY N/A 8/4/2015    Procedure: COLONOSCOPY;  Surgeon: Yoseph Ferraro MD;  Location:  GI    DILATION AND CURETTAGE      onsil    EYE SURGERY      cataract bilateral    EYE SURGERY      GASTRECTOMY      MASTECTOMY, RADICAL Left     NEUROMA SURGERY Bilateral     feet    ORTHOPEDIC SURGERY      shoulder right, knee left    ORTHOPEDIC SURGERY      lami    OTHER SURGICAL HISTORY      left knee arthroscopy    OTHER SURGICAL HISTORY      right shoulder replacement    OTHER SURGICAL HISTORY      left breast biopsyneedle core    WV LAP,FULGURATE/EXCISE LESIONS N/A 3/29/2017    Procedure: LAPAROSCOPIC BILATERAL SALPING OOPHORECTOMY PELVIC WASHINGS;  Surgeon: Eduardo Smart MD;  Location: [facility];  Service: Gynecology    WV MASTECTOMY, SIMPLE, COMPLETE Right 1/7/2015    Procedure: RIGHT BREAST MASTECTOMY;  Surgeon: Meliton Bazan MD;  Location: [facility];  Service: General    REPLACEMENT TOTAL KNEE Right     TONSILLECTOMY & ADENOIDECTOMY        Family History:   Family history reviewed. No notable findings relevant to current clinical encounter.     Social History:     Social History     Tobacco Use    Smoking status: Never Smoker    Smokeless tobacco: Never Used   Substance Use Topics    Alcohol use: No    Drug use: No      Allergies:   Please see allergy list which was reviewed this admission.     Physical Exam:   VS: Blood pressure (!) 149/96, pulse 83, temperature 98.3  F (36.8  C), temperature source Oral, resp. rate 13, height 1.6 m (5' 3\"), weight 73.9 kg (163 lb), SpO2 96 %.  Gen: awake and alert, conversant, NAD  CV: RRR  Resp: breathing appears nonlabored, CTAB  Abd: soft, ND, NT  Ext: no significant peripheral edema, WWP  Skin: no rash or lesions noted  Neuro: awake and alert, obvious memory deficit but answers basic " questions appropriately, moving all extremities     Data:   All relevant laboratory and imaging data reviewed.     CBC:  Recent Labs   Lab 12/11/21  0321   WBC 9.0   RBC 3.96   HGB 12.3   HCT 38.2   MCV 97   MCH 31.1   MCHC 32.2   RDW 13.8        BMP:  Recent Labs   Lab 12/11/21  0321      POTASSIUM 4.4   CHLORIDE 105   MARY 8.4*   CO2 25   BUN 18   CR 0.76   *     Trop: 460    TTE (6/5/19):  1. Normal left ventricular size and systolic performance with a visually estimated ejection fraction of 55-60%.   2. There is mild concentric increase in left ventricular wall thickness.   3. There is mild aortic valve sclerosis without significant stenosis.   4. Normal right ventricular size and systolic performance.   5. There is mild left atrial enlargement.

## 2021-12-11 NOTE — Clinical Note
The first balloon was inserted into the left anterior descending.Max pressure = 10 arnoldo. Total duration = 10 seconds.     2.0x12.

## 2021-12-11 NOTE — Clinical Note
The first balloon was inserted into the left anterior descending and left anterior descending diagonal.Max pressure = 10 arnoldo. Total duration = 10 seconds.

## 2021-12-12 LAB
ACT BLD: 237 SECONDS (ref 74–150)
ANION GAP SERPL CALCULATED.3IONS-SCNC: 9 MMOL/L (ref 3–14)
BUN SERPL-MCNC: 17 MG/DL (ref 7–30)
CALCIUM SERPL-MCNC: 8.5 MG/DL (ref 8.5–10.1)
CHLORIDE BLD-SCNC: 108 MMOL/L (ref 94–109)
CO2 SERPL-SCNC: 22 MMOL/L (ref 20–32)
CREAT SERPL-MCNC: 0.65 MG/DL (ref 0.52–1.04)
GFR SERPL CREATININE-BSD FRML MDRD: 80 ML/MIN/1.73M2
GLUCOSE BLD-MCNC: 117 MG/DL (ref 70–99)
HOLD SPECIMEN: NORMAL
HOLD SPECIMEN: NORMAL
MAGNESIUM SERPL-MCNC: 2.1 MG/DL (ref 1.6–2.3)
POTASSIUM BLD-SCNC: 3.9 MMOL/L (ref 3.4–5.3)
SODIUM SERPL-SCNC: 139 MMOL/L (ref 133–144)
TROPONIN I SERPL HS-MCNC: 896 NG/L
UFH PPP CHRO-ACNC: 0.21 IU/ML

## 2021-12-12 PROCEDURE — C1769 GUIDE WIRE: HCPCS | Performed by: INTERNAL MEDICINE

## 2021-12-12 PROCEDURE — C1894 INTRO/SHEATH, NON-LASER: HCPCS | Performed by: INTERNAL MEDICINE

## 2021-12-12 PROCEDURE — 93005 ELECTROCARDIOGRAM TRACING: CPT

## 2021-12-12 PROCEDURE — 99152 MOD SED SAME PHYS/QHP 5/>YRS: CPT | Performed by: INTERNAL MEDICINE

## 2021-12-12 PROCEDURE — 250N000013 HC RX MED GY IP 250 OP 250 PS 637: Performed by: INTERNAL MEDICINE

## 2021-12-12 PROCEDURE — 027034Z DILATION OF CORONARY ARTERY, ONE ARTERY WITH DRUG-ELUTING INTRALUMINAL DEVICE, PERCUTANEOUS APPROACH: ICD-10-PCS | Performed by: INTERNAL MEDICINE

## 2021-12-12 PROCEDURE — 80048 BASIC METABOLIC PNL TOTAL CA: CPT | Performed by: STUDENT IN AN ORGANIZED HEALTH CARE EDUCATION/TRAINING PROGRAM

## 2021-12-12 PROCEDURE — 214N000001 HC R&B CCU UMMC

## 2021-12-12 PROCEDURE — 92920 PRQ TRLUML C ANGIOP 1ART&/BR: CPT | Performed by: INTERNAL MEDICINE

## 2021-12-12 PROCEDURE — 36415 COLL VENOUS BLD VENIPUNCTURE: CPT | Performed by: STUDENT IN AN ORGANIZED HEALTH CARE EDUCATION/TRAINING PROGRAM

## 2021-12-12 PROCEDURE — 93010 ELECTROCARDIOGRAM REPORT: CPT | Performed by: INTERNAL MEDICINE

## 2021-12-12 PROCEDURE — 250N000013 HC RX MED GY IP 250 OP 250 PS 637: Performed by: STUDENT IN AN ORGANIZED HEALTH CARE EDUCATION/TRAINING PROGRAM

## 2021-12-12 PROCEDURE — 99233 SBSQ HOSP IP/OBS HIGH 50: CPT | Mod: 25 | Performed by: INTERNAL MEDICINE

## 2021-12-12 PROCEDURE — 250N000011 HC RX IP 250 OP 636: Performed by: INTERNAL MEDICINE

## 2021-12-12 PROCEDURE — 250N000009 HC RX 250: Performed by: INTERNAL MEDICINE

## 2021-12-12 PROCEDURE — 84484 ASSAY OF TROPONIN QUANT: CPT | Performed by: STUDENT IN AN ORGANIZED HEALTH CARE EDUCATION/TRAINING PROGRAM

## 2021-12-12 PROCEDURE — C1887 CATHETER, GUIDING: HCPCS | Performed by: INTERNAL MEDICINE

## 2021-12-12 PROCEDURE — 85347 COAGULATION TIME ACTIVATED: CPT

## 2021-12-12 PROCEDURE — 250N000011 HC RX IP 250 OP 636: Performed by: STUDENT IN AN ORGANIZED HEALTH CARE EDUCATION/TRAINING PROGRAM

## 2021-12-12 PROCEDURE — 85520 HEPARIN ASSAY: CPT | Performed by: INTERNAL MEDICINE

## 2021-12-12 PROCEDURE — C1874 STENT, COATED/COV W/DEL SYS: HCPCS | Performed by: INTERNAL MEDICINE

## 2021-12-12 PROCEDURE — B2111ZZ FLUOROSCOPY OF MULTIPLE CORONARY ARTERIES USING LOW OSMOLAR CONTRAST: ICD-10-PCS | Performed by: INTERNAL MEDICINE

## 2021-12-12 PROCEDURE — C1725 CATH, TRANSLUMIN NON-LASER: HCPCS | Performed by: INTERNAL MEDICINE

## 2021-12-12 PROCEDURE — 99153 MOD SED SAME PHYS/QHP EA: CPT | Performed by: INTERNAL MEDICINE

## 2021-12-12 PROCEDURE — 258N000003 HC RX IP 258 OP 636: Performed by: STUDENT IN AN ORGANIZED HEALTH CARE EDUCATION/TRAINING PROGRAM

## 2021-12-12 PROCEDURE — 36415 COLL VENOUS BLD VENIPUNCTURE: CPT | Performed by: INTERNAL MEDICINE

## 2021-12-12 PROCEDURE — C9600 PERC DRUG-EL COR STENT SING: HCPCS | Performed by: INTERNAL MEDICINE

## 2021-12-12 PROCEDURE — 83735 ASSAY OF MAGNESIUM: CPT | Performed by: STUDENT IN AN ORGANIZED HEALTH CARE EDUCATION/TRAINING PROGRAM

## 2021-12-12 PROCEDURE — 93454 CORONARY ARTERY ANGIO S&I: CPT | Performed by: INTERNAL MEDICINE

## 2021-12-12 PROCEDURE — 272N000001 HC OR GENERAL SUPPLY STERILE: Performed by: INTERNAL MEDICINE

## 2021-12-12 DEVICE — STENT CORONARY DES SYNERGY XD MR US 2.50X20MM H7493941820250: Type: IMPLANTABLE DEVICE | Status: FUNCTIONAL

## 2021-12-12 RX ORDER — HYDRALAZINE HYDROCHLORIDE 20 MG/ML
10 INJECTION INTRAMUSCULAR; INTRAVENOUS EVERY 4 HOURS PRN
Status: DISCONTINUED | OUTPATIENT
Start: 2021-12-12 | End: 2021-12-14 | Stop reason: HOSPADM

## 2021-12-12 RX ORDER — IOPAMIDOL 755 MG/ML
INJECTION, SOLUTION INTRAVASCULAR
Status: DISCONTINUED | OUTPATIENT
Start: 2021-12-12 | End: 2021-12-12 | Stop reason: HOSPADM

## 2021-12-12 RX ORDER — NITROGLYCERIN 0.4 MG/1
0.4 TABLET SUBLINGUAL EVERY 5 MIN PRN
Status: DISCONTINUED | OUTPATIENT
Start: 2021-12-12 | End: 2021-12-14 | Stop reason: HOSPADM

## 2021-12-12 RX ORDER — NALOXONE HYDROCHLORIDE 0.4 MG/ML
0.4 INJECTION, SOLUTION INTRAMUSCULAR; INTRAVENOUS; SUBCUTANEOUS
Status: ACTIVE | OUTPATIENT
Start: 2021-12-12 | End: 2021-12-12

## 2021-12-12 RX ORDER — PREDNISOLONE ACETATE 10 MG/ML
1 SUSPENSION/ DROPS OPHTHALMIC 2 TIMES DAILY
COMMUNITY

## 2021-12-12 RX ORDER — FENTANYL CITRATE 50 UG/ML
INJECTION, SOLUTION INTRAMUSCULAR; INTRAVENOUS
Status: DISCONTINUED | OUTPATIENT
Start: 2021-12-12 | End: 2021-12-12 | Stop reason: HOSPADM

## 2021-12-12 RX ORDER — LISINOPRIL 2.5 MG/1
2.5 TABLET ORAL DAILY
Status: DISCONTINUED | OUTPATIENT
Start: 2021-12-12 | End: 2021-12-13

## 2021-12-12 RX ORDER — OXYCODONE HYDROCHLORIDE 10 MG/1
10 TABLET ORAL EVERY 4 HOURS PRN
Status: DISCONTINUED | OUTPATIENT
Start: 2021-12-12 | End: 2021-12-14 | Stop reason: HOSPADM

## 2021-12-12 RX ORDER — SPIRONOLACTONE 25 MG/1
25 TABLET ORAL DAILY
Status: ON HOLD | COMMUNITY
End: 2021-12-14

## 2021-12-12 RX ORDER — OXYCODONE HYDROCHLORIDE 5 MG/1
5 TABLET ORAL EVERY 4 HOURS PRN
Status: DISCONTINUED | OUTPATIENT
Start: 2021-12-12 | End: 2021-12-14 | Stop reason: HOSPADM

## 2021-12-12 RX ORDER — NALOXONE HYDROCHLORIDE 0.4 MG/ML
0.2 INJECTION, SOLUTION INTRAMUSCULAR; INTRAVENOUS; SUBCUTANEOUS
Status: ACTIVE | OUTPATIENT
Start: 2021-12-12 | End: 2021-12-12

## 2021-12-12 RX ORDER — METOPROLOL TARTRATE 1 MG/ML
5 INJECTION, SOLUTION INTRAVENOUS
Status: DISCONTINUED | OUTPATIENT
Start: 2021-12-12 | End: 2021-12-14 | Stop reason: HOSPADM

## 2021-12-12 RX ORDER — NICARDIPINE HYDROCHLORIDE 2.5 MG/ML
INJECTION INTRAVENOUS
Status: DISCONTINUED | OUTPATIENT
Start: 2021-12-12 | End: 2021-12-12 | Stop reason: HOSPADM

## 2021-12-12 RX ORDER — LOSARTAN POTASSIUM 25 MG/1
75 TABLET ORAL DAILY
Status: ON HOLD | COMMUNITY
End: 2021-12-14

## 2021-12-12 RX ORDER — HEPARIN SODIUM 1000 [USP'U]/ML
INJECTION, SOLUTION INTRAVENOUS; SUBCUTANEOUS
Status: DISCONTINUED | OUTPATIENT
Start: 2021-12-12 | End: 2021-12-12 | Stop reason: HOSPADM

## 2021-12-12 RX ORDER — ONDANSETRON 2 MG/ML
4 INJECTION INTRAMUSCULAR; INTRAVENOUS EVERY 6 HOURS PRN
Status: DISCONTINUED | OUTPATIENT
Start: 2021-12-12 | End: 2021-12-14 | Stop reason: HOSPADM

## 2021-12-12 RX ORDER — ASPIRIN 81 MG/1
81 TABLET ORAL DAILY
Status: DISCONTINUED | OUTPATIENT
Start: 2021-12-13 | End: 2021-12-12

## 2021-12-12 RX ORDER — SODIUM CHLORIDE 9 MG/ML
INJECTION, SOLUTION INTRAVENOUS CONTINUOUS
Status: ACTIVE | OUTPATIENT
Start: 2021-12-12 | End: 2021-12-12

## 2021-12-12 RX ORDER — FENTANYL CITRATE-0.9 % NACL/PF 10 MCG/ML
PLASTIC BAG, INJECTION (ML) INTRAVENOUS
Status: DISCONTINUED | OUTPATIENT
Start: 2021-12-12 | End: 2021-12-12 | Stop reason: HOSPADM

## 2021-12-12 RX ORDER — ASPIRIN 81 MG/1
81 TABLET, CHEWABLE ORAL ONCE
Status: COMPLETED | OUTPATIENT
Start: 2021-12-12 | End: 2021-12-12

## 2021-12-12 RX ORDER — OLOPATADINE HYDROCHLORIDE 1 MG/ML
1 SOLUTION/ DROPS OPHTHALMIC 2 TIMES DAILY
COMMUNITY

## 2021-12-12 RX ORDER — NITROGLYCERIN 5 MG/ML
VIAL (ML) INTRAVENOUS
Status: DISCONTINUED | OUTPATIENT
Start: 2021-12-12 | End: 2021-12-12 | Stop reason: HOSPADM

## 2021-12-12 RX ORDER — CARVEDILOL 3.12 MG/1
3.12 TABLET ORAL 2 TIMES DAILY WITH MEALS
Status: DISCONTINUED | OUTPATIENT
Start: 2021-12-12 | End: 2021-12-13

## 2021-12-12 RX ORDER — FENTANYL CITRATE 50 UG/ML
25 INJECTION, SOLUTION INTRAMUSCULAR; INTRAVENOUS
Status: DISCONTINUED | OUTPATIENT
Start: 2021-12-12 | End: 2021-12-13

## 2021-12-12 RX ORDER — FLUMAZENIL 0.1 MG/ML
0.2 INJECTION, SOLUTION INTRAVENOUS
Status: ACTIVE | OUTPATIENT
Start: 2021-12-12 | End: 2021-12-12

## 2021-12-12 RX ORDER — ATROPINE SULFATE 0.1 MG/ML
0.5 INJECTION INTRAVENOUS
Status: ACTIVE | OUTPATIENT
Start: 2021-12-12 | End: 2021-12-12

## 2021-12-12 RX ORDER — METOPROLOL SUCCINATE 25 MG/1
25 TABLET, EXTENDED RELEASE ORAL DAILY
Status: DISCONTINUED | OUTPATIENT
Start: 2021-12-12 | End: 2021-12-12

## 2021-12-12 RX ORDER — ONDANSETRON 4 MG/1
4 TABLET, ORALLY DISINTEGRATING ORAL EVERY 6 HOURS PRN
Status: DISCONTINUED | OUTPATIENT
Start: 2021-12-12 | End: 2021-12-14 | Stop reason: HOSPADM

## 2021-12-12 RX ADMIN — TICAGRELOR 90 MG: 90 TABLET ORAL at 20:32

## 2021-12-12 RX ADMIN — SODIUM CHLORIDE: 9 INJECTION, SOLUTION INTRAVENOUS at 15:52

## 2021-12-12 RX ADMIN — CARVEDILOL 3.12 MG: 3.12 TABLET, FILM COATED ORAL at 17:39

## 2021-12-12 RX ADMIN — ASPIRIN 81 MG CHEWABLE TABLET 81 MG: 81 TABLET CHEWABLE at 14:30

## 2021-12-12 RX ADMIN — ASPIRIN 81 MG CHEWABLE TABLET 81 MG: 81 TABLET CHEWABLE at 08:25

## 2021-12-12 RX ADMIN — LISINOPRIL 2.5 MG: 2.5 TABLET ORAL at 17:38

## 2021-12-12 RX ADMIN — ONDANSETRON 4 MG: 2 INJECTION INTRAMUSCULAR; INTRAVENOUS at 15:53

## 2021-12-12 RX ADMIN — ATORVASTATIN CALCIUM 40 MG: 40 TABLET, FILM COATED ORAL at 20:32

## 2021-12-12 RX ADMIN — ACETAMINOPHEN 650 MG: 325 TABLET, FILM COATED ORAL at 17:38

## 2021-12-12 ASSESSMENT — ACTIVITIES OF DAILY LIVING (ADL)
ADLS_ACUITY_SCORE: 17
ADLS_ACUITY_SCORE: 15
ADLS_ACUITY_SCORE: 13
ADLS_ACUITY_SCORE: 17
ADLS_ACUITY_SCORE: 15
ADLS_ACUITY_SCORE: 15
ADLS_ACUITY_SCORE: 17
ADLS_ACUITY_SCORE: 17
ADLS_ACUITY_SCORE: 13
ADLS_ACUITY_SCORE: 13
ADLS_ACUITY_SCORE: 15
ADLS_ACUITY_SCORE: 15
ADLS_ACUITY_SCORE: 17
ADLS_ACUITY_SCORE: 17
ADLS_ACUITY_SCORE: 15
ADLS_ACUITY_SCORE: 17
ADLS_ACUITY_SCORE: 13
ADLS_ACUITY_SCORE: 17

## 2021-12-12 ASSESSMENT — MIFFLIN-ST. JEOR: SCORE: 1154.13

## 2021-12-12 NOTE — PLAN OF CARE
Focus:  Admission  Diagnosis: USA  Admitted from: [facility]   Via: stretcher   Accompanied by:transport.  Belongings: Placed on windowseal.   Admission paperwork: complete.   Teaching: Call don't fall, use of console, meal times, visiting hours, orientation to unit, when to call for the RN (angina/sob/dizzyness, etc.), and stressed the importance of safety. Bed alarm on. Pt states no chest pain  Access: PIV   Telemetry: Placed on pt   Ht./Wt.: complete per yesterday

## 2021-12-12 NOTE — PLAN OF CARE
"Code status: Full     Team: cards 2  Changed: discontinued heparin    Neuro: Ao*2-3, restless, demanding to walk   Cardiac/Tele:  SR  Respiratory: Room air, diminished lung sounds in all bases   GI/: urinating via bedside commode  Diet/Appetite: cardiac diet  Skin: Bruised arm, red blanchable coccyx, R radial site with TR band intact with 14cc, release air at 1600  LDAs: 2 PiV  Activity: assist of one, wanting to get up and walk often  Pain: chest pain, per cath lab report \"normal to feel chest pain after\"    Adriana Keith, RN  Time cared for 0700 - 1530      "

## 2021-12-12 NOTE — PROGRESS NOTES
"                              Cardiology Progress Note  Marina Neves MRN: [id number]  Age: 87 year old, : 4/3/1934  Date: 2021             Subjective     No significant overnight events. No nausea, vomiting, chest pain, shortness of breath, syncope, fever, chills.           Objective     Vital signs:  Temp: 98  F (36.7  C) Temp src: Oral BP: 129/86 Pulse: 96   Resp: 22 SpO2: 97 % O2 Device: None (Room air)   Height: 160 cm (5' 3\") Weight: 75 kg (165 lb 5.5 oz)  Estimated body mass index is 29.29 kg/m  as calculated from the following:    Height as of this encounter: 1.6 m (5' 3\").    Weight as of this encounter: 75 kg (165 lb 5.5 oz).        Date 21 0700 - 21 0659   Shift 8288-3789 3264-4078 1837-5800 24 Hour Total   INTAKE   P.O. 120   120   Shift Total(mL/kg) 120(1.6)   120(1.6)   OUTPUT   Urine 200   200   Shift Total(mL/kg) 200(2.67)   200(2.67)   Weight (kg) 75 75 75 75       Gen: awake and alert, conversant, NAD  CV: RRR  Resp: breathing appears nonlabored, CTAB  Abd: soft, ND, NT  Ext: no significant peripheral edema, WWP  Skin: no rash or lesions noted  Neuro: awake and alert, obvious memory deficit but answers basic questions appropriately, moving all extremities            Data:       Results for orders placed or performed during the hospital encounter of 21 (from the past 24 hour(s))   Troponin I   Result Value Ref Range    Troponin I High Sensitivity 860 (HH) <54 ng/L   Troponin I   Result Value Ref Range    Troponin I High Sensitivity 838 (HH) <54 ng/L   Heparin Unfractionated Anti Xa Level   Result Value Ref Range    Anti Xa Unfractionated Heparin 0.32 For Reference Range, See Comment IU/mL    Narrative    Therapeutic Range: UFH: 0.25-0.50 IU/mL for low intensity dosing,  0.30-0.70 IU/mL for high intensity dosing DVT and PE.  This test is not validated for other direct factor X inhibitors (e.g. rivaroxaban, apixaban, edoxaban, betrixaban, fondaparinux) and should not be " used for monitoring of other medications.   Troponin I   Result Value Ref Range    Troponin I High Sensitivity 896 (HH) <54 ng/L   Heparin Unfractionated Anti Xa Level   Result Value Ref Range    Anti Xa Unfractionated Heparin 0.21 For Reference Range, See Comment IU/mL    Narrative    Therapeutic Range: UFH: 0.25-0.50 IU/mL for low intensity dosing,  0.30-0.70 IU/mL for high intensity dosing DVT and PE.  This test is not validated for other direct factor X inhibitors (e.g. rivaroxaban, apixaban, edoxaban, betrixaban, fondaparinux) and should not be used for monitoring of other medications.   Extra Tube    Narrative    The following orders were created for panel order Extra Tube.  Procedure                               Abnormality         Status                     ---------                               -----------         ------                     Extra Green Top (Lithium...[[id number]]                      Final result               Extra Purple Top Tube[[id number]]                            Final result                 Please view results for these tests on the individual orders.   Extra Green Top (Lithium Heparin) Tube   Result Value Ref Range    Hold Specimen JIC    Extra Purple Top Tube   Result Value Ref Range    Hold Specimen JIC    Basic metabolic panel   Result Value Ref Range    Sodium 139 133 - 144 mmol/L    Potassium 3.9 3.4 - 5.3 mmol/L    Chloride 108 94 - 109 mmol/L    Carbon Dioxide (CO2) 22 20 - 32 mmol/L    Anion Gap 9 3 - 14 mmol/L    Urea Nitrogen 17 7 - 30 mg/dL    Creatinine 0.65 0.52 - 1.04 mg/dL    Calcium 8.5 8.5 - 10.1 mg/dL    Glucose 117 (H) 70 - 99 mg/dL    GFR Estimate 80 >60 mL/min/1.73m2   Magnesium   Result Value Ref Range    Magnesium 2.1 1.6 - 2.3 mg/dL   Activated clotting time celite, POCT   Result Value Ref Range    Activated Clotting Time (Celite) POCT 237 (H) 74 - 150 seconds   Cardiac Catheterization    Narrative      99% lesion in the prox-mid LAD s/p PCI using a 2.5*20  mm Synergy PAT.   Acute closure of diseased, small D1.    90% lesion in the mid RCA - staged PCI down the road. 80% lesion of the   R-PDA.    Non obstructive disease in the LCX.              Medications     Current Facility-Administered Medications   Medication    0.9% sodium chloride BOLUS    acetaminophen (TYLENOL) Suppository 650 mg    acetaminophen (TYLENOL) tablet 650 mg    aspirin EC tablet 81 mg    atenolol (TENORMIN) tablet 100 mg    atorvastatin (LIPITOR) tablet 40 mg    atropine injection 0.5 mg    Continuing beta blocker from home medication list OR beta blocker order already placed during this visit    Continuing statin from home medication list OR statin order already placed during this visit    fentaNYL (PF) (SUBLIMAZE) injection 25 mcg    flumazenil (ROMAZICON) injection 0.2 mg    HOLD: Metformin and metformin containing medications on day of procedure and 48 hours after IV contrast given for patients with acute kidney injury or severe chronic kidney disease (stage IV or stage V; i.e., eGFR less than 30)    hydrALAZINE (APRESOLINE) injection 10 mg    hydrochlorothiazide (HYDRODIURIL) tablet 25 mg    lidocaine (LMX4) cream    lidocaine 1 % 0.1-1 mL    medication instruction    metoprolol (LOPRESSOR) injection 5 mg    midazolam (VERSED) injection 0.5 mg    naloxone (NARCAN) injection 0.2 mg    Or    naloxone (NARCAN) injection 0.4 mg    Or    naloxone (NARCAN) injection 0.2 mg    Or    naloxone (NARCAN) injection 0.4 mg    nitroGLYcerin (NITROSTAT) sublingual tablet 0.4 mg    nitroGLYcerin (NITROSTAT) sublingual tablet 0.4 mg    ondansetron (ZOFRAN-ODT) ODT tab 4 mg    Or    ondansetron (ZOFRAN) injection 4 mg    oxyCODONE (ROXICODONE) tablet 5 mg    Or    oxyCODONE IR (ROXICODONE) tablet 10 mg    Percutaneous Coronary Intervention orders placed (this is information for BPA alerting)    senna-docusate (SENOKOT-S/PERICOLACE) 8.6-50 MG per tablet 1 tablet    Or    senna-docusate (SENOKOT-S/PERICOLACE)  8.6-50 MG per tablet 2 tablet    sodium chloride (PF) 0.9% PF flush 3 mL    sodium chloride (PF) 0.9% PF flush 3 mL    sodium chloride 0.9% infusion    ticagrelor (BRILINTA) tablet 90 mg             Assessment and Plan:     Marina Neves is an 87F with PMH most notable for breast and colon cancers, HTN, no known cardiac hx, who presented with chest pain and was admitted for NSTEMI.    #NSTEMI s/p PCI PAT proximal to mid LAD  #HLD  Patient came in with chest pain, elevated troponin, EKG changes suggesting ACS and echocardiogram showed wall motion abnormalities in the mid LAD territory. She had coronary angiogram that showed 99% proximal to mid LAD occlusion that this was stented. There was a small diagonal that was lost during the procedure. She also has 90% stenosis of the RCA and 80% stenosis of the rPDA that will need staged PCI after discharge.   - Aspirin 81mg PO every day  - Ticagrelor 90mg PO BID  - Carvedilol succinate 3.125mg PO BID  - Lisinopril 2.5mg PO every day  - Atorvastatin 40mg PO every day  - Staged PCI of RCA planned for later    #New diagnosis of ischemic cardiomyopathy, LVEF 33%  No evidence of decompensation. Does have ongoing ischemic burden from other highly diseased vessels.   - GDMT as above will uptitrate as allowed  - Will add spironolactone as she tolerates above  - Staged PCI of the RCA as it might improve function    #HTN  - Lisinopril as above    Patient evaluated and discussed with Dr. Castellon.    Wilbur Dailey MD PhD  Cardiology Fellow            I have seen and examined the patient with the CSI team. I agree with the assessment and plan of the note above.I have reviewed pertinent labs.     Everett Castellon MD  Interventional Cardiology  Pager: [phone]

## 2021-12-12 NOTE — PROGRESS NOTES
SPIRITUAL HEALTH SERVICES  [facility]C   PRE-SURGERY VISIT    Had pre-surgery visit with pt.  Provided spiritual support, prayer.     Josh Solomon  [employer]   Pager [phone]

## 2021-12-12 NOTE — PLAN OF CARE
Patient AOX3, NSR on tele. Troponin X 3 elevated. Patient is on heparin drip. No sign of bleeding. Patient is continent to BM and Urine. Generalized weakness, X1 assist to bedside commode. Otherwise stable on room air. Bed alarm is on and call light at reach

## 2021-12-12 NOTE — PHARMACY-ADMISSION MEDICATION HISTORY
Admission Medication History Completed by Pharmacy    See [facility] Admission Navigator for allergy information, preferred outpatient pharmacy, prior to admission medications and immunization status.     Medication History Sources:     Spoke with patient via telephone    Spoke with patient's nieceRoseanna via telephone who helps patient with medications/vitamins by setting them up    Reviewed Surescripts dispense history    Spoke with [facility], [place] via telephone to verify dispense history    Changes made to PTA medication list (reason):    Added:   o Losartan  o spironolactione  o Calcium + D  o Olopatadine eye gtt  o Prednisolone eye gtt  o Fish oil  o Magnesium    Deleted:   o Aspirin (per patient's niece she has not bought this for patient in a long time and verified note from 2019 that recommended stopping)  o Multivitamin with minerals (patient takes Preservision)  o Glucosamine-Chondrontin    Changed: None    Additional Information:    Patient knew she took 2 medications for blood pressure, but was not familiar with the names. Her niece, Roseanna was able to verify these medications as well as provide a list of supplements/vitamins the patient takes.     Her niece sets up her medications in a container that patient then takes independently.      Prior to Admission medications    Medication Sig Last Dose Taking? Auth Provider   calcium carbonate-vitamin D (OSCAL W/D) 500-200 MG-UNIT tablet Take 1 tablet by mouth daily Past Week at Unknown time Yes Unknown, Entered By History   losartan (COZAAR) 25 MG tablet Take 75 mg by mouth daily 12/10/2021 at Unknown time Yes Unknown, Entered By History   magnesium oxide (MAG-OX) 400 (241.3 Mg) MG tablet Take 400 mg by mouth daily Past Week at Unknown time Yes Unknown, Entered By History   olopatadine (PATANOL) 0.1 % ophthalmic solution Place 1 drop into both eyes 2 times daily 12/3/2021 Yes Unknown, Entered By History   Omega-3 Fatty Acids (FISH OIL OMEGA-3 PO) Take 1  capsule by mouth daily Past Week at Unknown time Yes Unknown, Entered By History   prednisoLONE acetate (PRED FORTE) 1 % ophthalmic suspension Place 1 drop into both eyes 2 times daily  12/3/2021 Yes Unknown, Entered By History   spironolactone (ALDACTONE) 25 MG tablet Take 25 mg by mouth daily 12/3/2021 Yes Unknown, Entered By History   traMADol (ULTRAM) 50 MG tablet Take 1 tablet (50 mg) by mouth 3 times daily. 12/3/2021 Yes Bry Jurado NP   VITAMIN D, CHOLECALCIFEROL, PO Take 4,000 Units by mouth daily  12/3/2021 Yes Reported, Patient   Multiple Vitamins-Minerals (PRESERVISION AREDS) CAPS Take 1 capsule by mouth 2 times daily  12/10/2021  Reported, Patient       Date completed: 12/12/21    Medication history completed by: Michelle Soliman, Pharm.D., [facility]  Pager [phone]

## 2021-12-13 ENCOUNTER — APPOINTMENT (OUTPATIENT)
Dept: PHYSICAL THERAPY | Facility: CLINIC | Age: 86
DRG: 247 | End: 2021-12-13
Attending: INTERNAL MEDICINE
Payer: COMMERCIAL

## 2021-12-13 ENCOUNTER — APPOINTMENT (OUTPATIENT)
Dept: OCCUPATIONAL THERAPY | Facility: CLINIC | Age: 86
DRG: 247 | End: 2021-12-13
Attending: STUDENT IN AN ORGANIZED HEALTH CARE EDUCATION/TRAINING PROGRAM
Payer: COMMERCIAL

## 2021-12-13 LAB
ANION GAP SERPL CALCULATED.3IONS-SCNC: 10 MMOL/L (ref 3–14)
ATRIAL RATE - MUSE: 104 BPM
BUN SERPL-MCNC: 16 MG/DL (ref 7–30)
CALCIUM SERPL-MCNC: 8.6 MG/DL (ref 8.5–10.1)
CHLORIDE BLD-SCNC: 106 MMOL/L (ref 94–109)
CO2 SERPL-SCNC: 20 MMOL/L (ref 20–32)
CREAT SERPL-MCNC: 0.64 MG/DL (ref 0.52–1.04)
DIASTOLIC BLOOD PRESSURE - MUSE: NORMAL MMHG
GFR SERPL CREATININE-BSD FRML MDRD: 80 ML/MIN/1.73M2
GLUCOSE BLD-MCNC: 148 MG/DL (ref 70–99)
HOLD SPECIMEN: NORMAL
INTERPRETATION ECG - MUSE: NORMAL
P AXIS - MUSE: 62 DEGREES
POTASSIUM BLD-SCNC: 4 MMOL/L (ref 3.4–5.3)
PR INTERVAL - MUSE: 148 MS
QRS DURATION - MUSE: 80 MS
QT - MUSE: 320 MS
QTC - MUSE: 420 MS
R AXIS - MUSE: 35 DEGREES
SODIUM SERPL-SCNC: 136 MMOL/L (ref 133–144)
SYSTOLIC BLOOD PRESSURE - MUSE: NORMAL MMHG
T AXIS - MUSE: 4 DEGREES
VENTRICULAR RATE- MUSE: 104 BPM

## 2021-12-13 PROCEDURE — 250N000009 HC RX 250: Performed by: NURSE PRACTITIONER

## 2021-12-13 PROCEDURE — 250N000013 HC RX MED GY IP 250 OP 250 PS 637: Performed by: STUDENT IN AN ORGANIZED HEALTH CARE EDUCATION/TRAINING PROGRAM

## 2021-12-13 PROCEDURE — 250N000013 HC RX MED GY IP 250 OP 250 PS 637: Performed by: NURSE PRACTITIONER

## 2021-12-13 PROCEDURE — 99232 SBSQ HOSP IP/OBS MODERATE 35: CPT | Performed by: NURSE PRACTITIONER

## 2021-12-13 PROCEDURE — 214N000001 HC R&B CCU UMMC

## 2021-12-13 PROCEDURE — 97530 THERAPEUTIC ACTIVITIES: CPT | Mod: GP

## 2021-12-13 PROCEDURE — 97165 OT EVAL LOW COMPLEX 30 MIN: CPT | Mod: GO

## 2021-12-13 PROCEDURE — 250N000011 HC RX IP 250 OP 636: Performed by: STUDENT IN AN ORGANIZED HEALTH CARE EDUCATION/TRAINING PROGRAM

## 2021-12-13 PROCEDURE — 36415 COLL VENOUS BLD VENIPUNCTURE: CPT | Performed by: STUDENT IN AN ORGANIZED HEALTH CARE EDUCATION/TRAINING PROGRAM

## 2021-12-13 PROCEDURE — 97535 SELF CARE MNGMENT TRAINING: CPT | Mod: GO

## 2021-12-13 PROCEDURE — 97116 GAIT TRAINING THERAPY: CPT | Mod: GP

## 2021-12-13 PROCEDURE — 97161 PT EVAL LOW COMPLEX 20 MIN: CPT | Mod: GP

## 2021-12-13 PROCEDURE — 80048 BASIC METABOLIC PNL TOTAL CA: CPT | Performed by: STUDENT IN AN ORGANIZED HEALTH CARE EDUCATION/TRAINING PROGRAM

## 2021-12-13 RX ORDER — TRAMADOL HYDROCHLORIDE 50 MG/1
50 TABLET ORAL 3 TIMES DAILY
Status: DISCONTINUED | OUTPATIENT
Start: 2021-12-13 | End: 2021-12-14 | Stop reason: HOSPADM

## 2021-12-13 RX ORDER — OLANZAPINE 2.5 MG/1
2.5 TABLET, FILM COATED ORAL AT BEDTIME
Status: DISCONTINUED | OUTPATIENT
Start: 2021-12-13 | End: 2021-12-14 | Stop reason: HOSPADM

## 2021-12-13 RX ORDER — PREDNISOLONE ACETATE 10 MG/ML
1 SUSPENSION/ DROPS OPHTHALMIC 2 TIMES DAILY
Status: DISCONTINUED | OUTPATIENT
Start: 2021-12-13 | End: 2021-12-14 | Stop reason: HOSPADM

## 2021-12-13 RX ORDER — NALOXONE HYDROCHLORIDE 0.4 MG/ML
0.2 INJECTION, SOLUTION INTRAMUSCULAR; INTRAVENOUS; SUBCUTANEOUS
Status: DISCONTINUED | OUTPATIENT
Start: 2021-12-13 | End: 2021-12-14 | Stop reason: HOSPADM

## 2021-12-13 RX ORDER — LOSARTAN POTASSIUM 25 MG/1
25 TABLET ORAL DAILY
Status: DISCONTINUED | OUTPATIENT
Start: 2021-12-13 | End: 2021-12-14 | Stop reason: HOSPADM

## 2021-12-13 RX ORDER — NALOXONE HYDROCHLORIDE 0.4 MG/ML
0.4 INJECTION, SOLUTION INTRAMUSCULAR; INTRAVENOUS; SUBCUTANEOUS
Status: DISCONTINUED | OUTPATIENT
Start: 2021-12-13 | End: 2021-12-14 | Stop reason: HOSPADM

## 2021-12-13 RX ORDER — CARVEDILOL 6.25 MG/1
6.25 TABLET ORAL 2 TIMES DAILY WITH MEALS
Status: DISCONTINUED | OUTPATIENT
Start: 2021-12-13 | End: 2021-12-14 | Stop reason: HOSPADM

## 2021-12-13 RX ADMIN — HYDRALAZINE HYDROCHLORIDE 10 MG: 20 INJECTION INTRAMUSCULAR; INTRAVENOUS at 00:03

## 2021-12-13 RX ADMIN — TRAMADOL HYDROCHLORIDE 50 MG: 50 TABLET, FILM COATED ORAL at 10:35

## 2021-12-13 RX ADMIN — OXYCODONE HYDROCHLORIDE 5 MG: 5 TABLET ORAL at 02:30

## 2021-12-13 RX ADMIN — DOCUSATE SODIUM 50 MG AND SENNOSIDES 8.6 MG 2 TABLET: 8.6; 5 TABLET, FILM COATED ORAL at 20:42

## 2021-12-13 RX ADMIN — ATORVASTATIN CALCIUM 40 MG: 40 TABLET, FILM COATED ORAL at 20:43

## 2021-12-13 RX ADMIN — PREDNISOLONE ACETATE 1 DROP: 10 SUSPENSION/ DROPS OPHTHALMIC at 20:42

## 2021-12-13 RX ADMIN — TICAGRELOR 90 MG: 90 TABLET ORAL at 08:55

## 2021-12-13 RX ADMIN — ONDANSETRON 4 MG: 2 INJECTION INTRAMUSCULAR; INTRAVENOUS at 00:34

## 2021-12-13 RX ADMIN — TRAMADOL HYDROCHLORIDE 50 MG: 50 TABLET, FILM COATED ORAL at 20:43

## 2021-12-13 RX ADMIN — ASPIRIN 81 MG CHEWABLE TABLET 81 MG: 81 TABLET CHEWABLE at 08:54

## 2021-12-13 RX ADMIN — CARVEDILOL 3.12 MG: 3.12 TABLET, FILM COATED ORAL at 08:55

## 2021-12-13 RX ADMIN — CARVEDILOL 6.25 MG: 6.25 TABLET, FILM COATED ORAL at 20:43

## 2021-12-13 RX ADMIN — TICAGRELOR 90 MG: 90 TABLET ORAL at 20:43

## 2021-12-13 RX ADMIN — PREDNISOLONE ACETATE 1 DROP: 10 SUSPENSION/ DROPS OPHTHALMIC at 10:33

## 2021-12-13 RX ADMIN — OLANZAPINE 2.5 MG: 2.5 TABLET, FILM COATED ORAL at 04:54

## 2021-12-13 RX ADMIN — DOCUSATE SODIUM 50 MG AND SENNOSIDES 8.6 MG 2 TABLET: 8.6; 5 TABLET, FILM COATED ORAL at 08:54

## 2021-12-13 RX ADMIN — TRAMADOL HYDROCHLORIDE 50 MG: 50 TABLET, FILM COATED ORAL at 15:10

## 2021-12-13 RX ADMIN — OLANZAPINE 2.5 MG: 2.5 TABLET, FILM COATED ORAL at 20:43

## 2021-12-13 RX ADMIN — LOSARTAN POTASSIUM 25 MG: 25 TABLET, FILM COATED ORAL at 08:54

## 2021-12-13 ASSESSMENT — ACTIVITIES OF DAILY LIVING (ADL)
ADLS_ACUITY_SCORE: 18
ADLS_ACUITY_SCORE: 17
DEPENDENT_IADLS:: CLEANING;SHOPPING
ADLS_ACUITY_SCORE: 17
ADLS_ACUITY_SCORE: 19
ADLS_ACUITY_SCORE: 19
ADLS_ACUITY_SCORE: 18
ADLS_ACUITY_SCORE: 17
ADLS_ACUITY_SCORE: 18
ADLS_ACUITY_SCORE: 17
ADLS_ACUITY_SCORE: 17
ADLS_ACUITY_SCORE: 18
ADLS_ACUITY_SCORE: 17
ADLS_ACUITY_SCORE: 17
ADLS_ACUITY_SCORE: 18
ADLS_ACUITY_SCORE: 17
ADLS_ACUITY_SCORE: 18
ADLS_ACUITY_SCORE: 17

## 2021-12-13 NOTE — PROGRESS NOTES
12/13/21 1500   Quick Adds   Type of Visit Initial Occupational Therapy Evaluation       Present no   Language English   Living Environment   People in home alone   Current Living Arrangements apartment   Home Accessibility wheelchair accessible   Transportation Anticipated family or friend will provide   Living Environment Comments Pt reports living in an apartment at an IND living facility with elevator access, reports walk-in shower with grab bars and shower stool and toilet with grab bars.    Self-Care   Usual Activity Tolerance moderate   Current Activity Tolerance fair   Regular Exercise No   Equipment Currently Used at Home walker, rolling   Activity/Exercise/Self-Care Comment Pt reports ADL IND at baseline, completes FB bathing ~3x/week, able to dress self at baseline, utilizes 4WW for functional mobility in the hallways.   Instrumental Activities of Daily Living (IADL)   IADL Comments Pt reports receives assist for most IADL tasks from niece. Pt's niece provides frozen meals each week, completes med mgmt, and laundry tasks, and pt does not drive. Per pt report, she enjoys getting her hair done 1x/week which she walks to utilizing 4WW.    Disability/Function   Hearing Difficulty or Deaf yes   Patient's preferred means of communication verbal;written communication   Describe hearing loss bilateral hearing loss   Use of hearing assistive devices bilateral hearing aids   The following aids were provided; pocket talker   Hearing Management has hearing aids   Wear Glasses or Blind yes   Vision Management glasses   Concentrating, Remembering or Making Decisions Difficulty no   Difficulty Communicating no   Difficulty Eating/Swallowing no   Walking or Climbing Stairs Difficulty yes   Walking or Climbing Stairs ambulation difficulty, requires equipment   Mobility Management 4WW   Dressing/Bathing Difficulty yes   Dressing/Bathing bathing difficulty, requires equipment   Dressing/Bathing  Management utilizes shower stool and grab bars   Toileting issues no   Doing Errands Independently Difficulty (such as shopping) yes   Errands Management Niece assists with all errands   Fall history within last six months yes   Number of times patient has fallen within last six months 1  (During OT session, pt reported no falls, will monitor cog)   Change in Functional Status Since Onset of Current Illness/Injury yes   General Information   Onset of Illness/Injury or Date of Surgery 12/11/21   Referring Physician Bry Bee MD   Patient/Family Therapy Goal Statement (OT) Return home   Additional Occupational Profile Info/Pertinent History of Current Problem Per chart, Marina Neves is an 87F with PMH most notable for breast and colon cancers, HTN, no known cardiac hx, who presented with chest pain and was admitted for NSTEMI.   Existing Precautions/Restrictions fall;cardiac   Limitations/Impairments safety/cognitive   Left Upper Extremity (Weight-bearing Status) full weight-bearing (FWB)   Right Upper Extremity (Weight-bearing Status) full weight-bearing (FWB)   Left Lower Extremity (Weight-bearing Status) full weight-bearing (FWB)   Right Lower Extremity (Weight-bearing Status) full weight-bearing (FWB)   General Observations and Info Activity: Up Ad [place]   Cognitive Status Examination   Orientation Status orientation to person, place and time   Affect/Mental Status (Cognitive) WFL   Safety Deficit minimal deficit;insight into deficits/self-awareness   Cognitive Status Comments Functional cognition appears intact during simple ADL tasks, very [place]; Pt displays decreased safety awareness upon stand>sit on seated surfaces    Visual Perception   Visual Impairment/Limitations WFL;corrective lenses for reading   Impact of Vision Impairment on Function (Vision) Denies acute vision changes   Sensory   Sensory Quick Adds No deficits were identified   Pain Assessment   Patient Currently in Pain Yes, see Vital Sign  flowsheet   Integumentary/Edema   Integumentary/Edema no deficits were identifed   Posture   Posture forward head position;protracted shoulders   Posture Comments while standing + FWW   Range of Motion Comprehensive   General Range of Motion bilateral upper extremity ROM WFL   Comment, General Range of Motion B UE WFL during ADL tasks and functional transfers; R UE slightly limited secondary to arthritis in shoulder joint   Strength Comprehensive (MMT)   General Manual Muscle Testing (MMT) Assessment no strength deficits identified   Comment, General Manual Muscle Testing (MMT) Assessment B UE strength WFL during ADL tasks and functional transfers; formal MMT not assessed secondary to increased pain due to arthritis   Muscle Tone Assessment   Muscle Tone Quick Adds No deficits were identified   Coordination   Upper Extremity Coordination No deficits were identified   Bed Mobility   Comment (Bed Mobility) NT: pt start/end session in bedside chair this date   Transfers   Transfers sit-stand transfer   Sit-Stand Transfer   Sit-Stand [place] (Transfers) contact guard;moderate assist (50% patient effort);verbal cues   Assistive Device (Sit-Stand Transfers) walker, front-wheeled   Sit/Stand Transfer Comments CGA sit>stand + FWW; mod A and frequent cues for eccentric control from stand>sit in chair   Balance   Balance Assessment standing balance: dynamic   Standing Balance: Dynamic fair balance   Balance Comments CGA + FWW short-distance ambulation, pt displays shuffled gait    Activities of Daily Living   BADL Assessment lower body dressing   Lower Body Dressing Assessment   [place] Level (Lower Body Dressing) minimum assist (75% patient effort);set up   Position (Lower Body Dressing) supported standing   Comment (Lower Body Dressing) min A to adjust bilateral socks seated in bedside chair, pt attempts to utilize figure-4 LB dressing technique however displays difficulty secondary to bilateral knee arthritis    Clinical Impression   Criteria for Skilled Therapeutic Interventions Met (OT) yes;meets criteria;skilled treatment is necessary   OT Diagnosis Decreased ADL IND, functional mobility, and activity tolerance   OT Problem List-Impairments impacting ADL problems related to;activity tolerance impaired;balance;cognition;mobility;range of motion (ROM);strength   Assessment of Occupational Performance 3-5 Performance Deficits   Identified Performance Deficits FB dressing, FB bathing, standing g/h tasks, functional transfers/mobility, toileting, and overall endurance   Planned Therapy Interventions (OT) ADL retraining;IADL retraining;balance training;cognition;ROM;strengthening;transfer training;home program guidelines;progressive activity/exercise   Clinical Decision Making Complexity (OT) low complexity   Therapy Frequency (OT) 5x/week   Predicted Duration of Therapy 1-2 weeks   Anticipated Equipment Needs Upon Discharge (OT)   (TBD)   Risk & Benefits of therapy have been explained evaluation/treatment results reviewed;care plan/treatment goals reviewed;participants included;patient   Comment-Clinical Impression Pt presents below baseline function for ADL tasks, functional mobility, and overall activity tolerance this date; Pt very [place] at baseline and displays slight decreased safety awareness during functional transfers    OT Discharge Planning    OT Discharge Recommendation (DC Rec) Transitional Care Facility;Home with assist;home with home care occupational therapy   OT Rationale for DC Rec Pt presents below baseline function for ADL tasks, functional mobility, and overall activity tolerance this date; Pt very [place] at baseline and displays slight decreased safety awareness during functional transfers requiring mod A to slowly control posture upon sitting in bedside chair. Pt reports ADL IND at baseline and niece assists with majority of IADL tasks.  Recommending TCU stay to progress ADL IND/safety, functional mobility,  and overall activity tolerance prior to returning home. Pt lives alone and presents as falls risk this date; If pt were to return home, recommend 24/7 assist with ADL tasks, home OT/PT, continued assist from niece for all IADL tasks including med mgmt, laundry, cooking, transportation.    OT Brief overview of current status  [place], CGA sit>stand, mod A stand>sit in chair, SBA and set-up for g/h tasks   Total Evaluation Time (Minutes)   Total Evaluation Time (Minutes) 6

## 2021-12-13 NOTE — PLAN OF CARE
Neuro: Alert., confused, memory deficit, bed alarm utilized, ordered chair alarm,  hearing deficit with (B) hearing aids in place, now using amplifier, glasses @ home, has difficulty reading w/o them.  Cardiac: Sinus Dysrhythmia, prolonged QT. VS WDL, map @ noon 66, 68. K+ 4.0, Cr 0.64   Respiratory: Sating mid 90's on RA.  GI/: Adequate urine output. Last BM on Noc shift-hard, given Senokot 2 tabs and prune juice.  Diet/appetite: Tolerating Cardiac diet. Eating well.  Activity:  Assist of 1-2, up to chair, BSC and to doorway with PT, walker, gait belt.  Pain: obtained order for home Tramadol dosing for (R) knee arthritis to patient's satisfaction.  Skin: No new deficits noted.  LDA's: PIV x2, Tele  Refer to flow sheets for full assessments, VS, labs etc.  Seen by SW, planning for short-term Rehab stay.   Plan: Continue with POC. Notify primary team with changes.

## 2021-12-13 NOTE — PROGRESS NOTES
CLINICAL NUTRITION SERVICES    Reason for Assessment:  Heart-healthy nutrition education, received consult.    Diet History:  Pt reports no history of receiving heart-healthy nutrition education in the past. Per pt (12/13), she has watched her sodium intake for years as her grandfather and then mother had to limit their sodium intake. She does not add salt to foods and makes sure that her family members read labels to limit sodium intake of foods they buy for her in the grocery store. Uses low-fat cream and generally eats healthy. She explained that prior to three years ago she tended a large vegetable garden for many years. Pt agreeable to nutrition education.    Nutrition Diagnosis:  Food- and nutrition-related knowledge deficit r/t no formal heart-healthy diet previously and now s/p PCI AEB pt report of no previous formal heart-healthy nutrition education.     Nutrition Prescription/Recs:  Continue heart-healthy diet.      Interventions:  Nutrition Education: Provided verbal instruction on a heart-healthy diet. Focused on limiting sodium intake (encouraged her to continue to limit sodium), choosing low-fat dairy, trimming visible fat on meats, and rec overall healthy eating. Provided the following handouts in her room: Heart-Healthy Eating Nutrition Therapy.     Goals:    Pt will list at least two interventions to make current meal plan more heart-healthy.     Follow-up:   Patient to ask any further nutrition-related questions before discharge. In addition, pt may request outpatient RD appointment.     Bárbara Kemp, MS, RD, LD, [facility]   6C Pgr: [phone]

## 2021-12-13 NOTE — PLAN OF CARE
Pt Nausea and wretching, Meds given without much relief. Pt inconsolable  with general malaise most of shift. Contacted Cards X2 at [phone] and [phone] to discuss issues. Orders input  Cardio: SR to S-tach, SBP> 150, meds given with effect. R wrist intact from procedure  GI/: up to bedside commode X2 with hard formed BM, would benefit from bowel regime.

## 2021-12-13 NOTE — PROVIDER NOTIFICATION
D:pt assist of one to commode, unsteady gait with ambulation  I:transfer belt used,bed alarm, pace activities. Notified cross cover about pt's independence of going home  A:PT consult ordered  P:per Primary

## 2021-12-13 NOTE — PROVIDER NOTIFICATION
D:pt has side effect of cough with Lisinopril. It has been her allergy from 2017  I:notified Cross cover and Pharmacy  A:Primary will need to address active med in am  P:per Primary

## 2021-12-13 NOTE — PROGRESS NOTES
12/13/21 1200   Quick Adds   Type of Visit Initial PT Evaluation   Living Environment   People in home alone   Current Living Arrangements apartment   Home Accessibility wheelchair accessible   Transportation Anticipated family or friend will provide   Living Environment Comments Patient lives alone in apartment with elevator access and no stairs to enter. Pt reports niece helps with groceries and cleaning 1x/week and neighbors help prn.   Self-Care   Usual Activity Tolerance moderate   Current Activity Tolerance fair   Regular Exercise No   Equipment Currently Used at Home walker, rolling  (4WW)   Activity/Exercise/Self-Care Comment Patient is IND at baseline for all mobility with 4WW. Pt reports she requires assist for driving, errands, and cleaning but is IND for all other ADLs/IADLs.    Disability/Function   Hearing Difficulty or Deaf yes   Patient's preferred means of communication verbal;written communication   Describe hearing loss bilateral hearing loss   Use of hearing assistive devices bilateral hearing aids   Were auxiliary aids offered? yes   The following aids were provided; pocket talker   Hearing Management has hearing aids   Wear Glasses or Blind yes   Vision Management Glasses   Concentrating, Remembering or Making Decisions Difficulty no   Difficulty Communicating no   Difficulty Eating/Swallowing no   Walking or Climbing Stairs Difficulty yes   Walking or Climbing Stairs ambulation difficulty, requires equipment   Mobility Management 4WW   Doing Errands Independently Difficulty (such as shopping) yes   Errands Management Niece assists with all errands   Fall history within last six months yes   Number of times patient has fallen within last six months 1  (Patient fell in apt, able to stand IND)   General Information   Onset of Illness/Injury or Date of Surgery 12/11/21   Referring Physician Bry Bee MD   Pertinent History of Current Problem (include personal factors and/or comorbidities  that impact the POC) Marina Neves is an 87F with PMH most notable for breast and colon cancers, HTN, no known cardiac hx, who presented with chest pain and was admitted for NSTEMI.   Existing Precautions/Restrictions fall   Heart Disease Risk Factors Medical history;Age;High blood pressure;Lack of physical activity   Cognition   Orientation Status (Cognition) oriented x 4   Affect/Mental Status (Cognition) WFL   Follows Commands (Cognition) WFL   Pain Assessment   Patient Currently in Pain Yes, see Vital Sign flowsheet  (Pt with pain in L knee and bilateral ankles 2/2 arthritis)   Integumentary/Edema   Integumentary/Edema no deficits were identifed   Posture    Posture Forward head position;Protracted shoulders;Kyphosis   Range of Motion (ROM)   ROM Comment BLEs WFL   Strength   Strength Comments Generalized weakness and deconditioning; hip flexors 3+/5 B; knee extensors 4/5 on R, 3+/5 on L; ankle dorsiflexors 4/5   Bed Mobility   Comment (Bed Mobility) Unable to assess - pt greeted in chair   Transfers   Transfer Safety Comments Sit<>stands [place]   Gait/Stairs (Locomotion)   Comment (Gait/Stairs) Ambulation with 4WW, CGA, and w/c follow   Balance   Balance Comments Static sitting and standing SBA, Dynamic balance impaired - requiring walker   Sensory Examination   Sensory Perception patient reports no sensory changes   Clinical Impression   Criteria for Skilled Therapeutic Intervention yes, treatment indicated   PT Diagnosis (PT) Impaired functional mobility   Influenced by the following impairments strength, balance, activity tolerance   Functional limitations due to impairments bed mobility, transfers, gait, functional endurance   Clinical Presentation Stable/Uncomplicated   Clinical Presentation Rationale PMH, clinical judgement   Clinical Decision Making (Complexity) low complexity   Therapy Frequency (PT) 5x/week   Predicted Duration of Therapy Intervention (days/wks) 2 weeks   Planned Therapy Interventions  (PT) balance training;bed mobility training;gait training;patient/family education;strengthening;transfer training;progressive activity/exercise   Risk & Benefits of therapy have been explained evaluation/treatment results reviewed;care plan/treatment goals reviewed;risks/benefits reviewed;participants voiced agreement with care plan;participants included;patient   PT Discharge Planning    PT Discharge Recommendation (DC Rec) Transitional Care Facility;home with assist;home with home care physical therapy   PT Rationale for DC Rec Patient below baseline mobility and would benefit from continued skilled PT to progress strength, balance, and functional endurance. Patient is not appropriate to discharge home, as patient lives alone in apartment with prn support from friends and family. If pt were to discharge home today, pt would require 24 hr assist, WC, and HH therapies   Total Evaluation Time   Total Evaluation Time (Minutes) 10

## 2021-12-13 NOTE — CONSULTS
Care Management Initial Consult    General Information  Assessment completed with: Patient, Family- niece Roseanna    Type of CM/SW Visit: Initial Assessment  Primary Care Provider verified and updated as needed: No   Readmission within the last 30 days: no previous admission in last 30 days   Reason for Consult: discharge planning  Advance Care Planning: Advance Care Planning Reviewed: none on file          Communication Assessment  Patient's communication style: spoken language (English or Bilingual)    Hearing Difficulty or Deaf: yes   Wear Glasses or Blind: yes    Cognitive  Cognitive/Neuro/Behavioral: .WDL except        Orientation: disoriented to,time  Mood/Behavior: anxious,restless  Best Language: 0 - No aphasia  Speech: clear,logical,spontaneous    Living Environment:   People in home: alone     Current living Arrangements: apartment      Able to return to prior arrangements: TCU recommended       Family/Social Support:  Care provided by: self  Provides care for: no one  Support system: Other (specify)- niece and cousin        Description of Support System: Supportive,Involved    Support Assessment: Adequate social supports    Current Resources:   Patient receiving home care services: No  Community Resources: None  Equipment currently used at home: walker, rolling (4WW)  Supplies currently used at home:  not discussed    Employment/Financial:  Employment Status:  not discussed     Financial Concerns: No concerns identified     Functional Status:  Prior to admission patient needed assistance:   Dependent ADLs: Ambulation-walker  Dependent IADLs: Cleaning,Shopping    Values/Beliefs:  Spiritual, Cultural Beliefs, [employer] Practices, Values that affect care:  not discussed               Additional Information:  Pt is an 87-year-old female admitted to [facility] 12/11/21 for chest and NSTEMI. TCU is recommended at discharge. Pt is medically stable for discharge.    Met with pt at bedside to discuss discharge plan. [facility]  [facility] Pt extremely hard of hearing. Used pocket talker but pt had trouble understanding through pocket talker. Also attempted writing notes to patient. Pt seemed to be confused, initially stating she was going to rehab but then when TCU was explained stated she thought she was going home. Pt requested SW call her niece Roseanna and stated she will go wherever we coordinate together.    Spoke with Roseanna (ph [phone]). Per Roseanna pt has been to [facility] TCU in the past and this is where she would like pt to go. Pt's insurance is listed at \"[facility]\" and when looking up this network, the only TCU in-network [place]. Discussed this with Roseanna. Roseanna states \"that won't work.\" Roseanna does not know why pt would have [employer] policy. Discussed that SW will send referral to [facility] to determine if they can accept this policy.    Faxed referral and copy of insurance card to [facility]. They state they will verify pt's insurance and have bed tomorrow if they can accept pt's insurance.    Referral status:  1) [facility] (ph [phone], fax [phone])- accepted for 12/14 pending insurance verification    SIDDHARTHA Magana, LICSW  6C   [facility]  Pager [phone]  Phone [phone]              "

## 2021-12-13 NOTE — PLAN OF CARE
D:R wrist CDI/ soft /flat. +radial pulse and CMS intact. No s/s of hematoma /hemmorhage /ecchymosis. Will continue to monitor.

## 2021-12-14 VITALS
RESPIRATION RATE: 18 BRPM | WEIGHT: 165.34 LBS | SYSTOLIC BLOOD PRESSURE: 114 MMHG | TEMPERATURE: 97.5 F | DIASTOLIC BLOOD PRESSURE: 58 MMHG | HEART RATE: 66 BPM | OXYGEN SATURATION: 97 % | HEIGHT: 63 IN | BODY MASS INDEX: 29.3 KG/M2

## 2021-12-14 PROCEDURE — 250N000013 HC RX MED GY IP 250 OP 250 PS 637: Performed by: STUDENT IN AN ORGANIZED HEALTH CARE EDUCATION/TRAINING PROGRAM

## 2021-12-14 PROCEDURE — 250N000013 HC RX MED GY IP 250 OP 250 PS 637: Performed by: NURSE PRACTITIONER

## 2021-12-14 PROCEDURE — 99238 HOSP IP/OBS DSCHRG MGMT 30/<: CPT | Performed by: NURSE PRACTITIONER

## 2021-12-14 RX ORDER — TRAMADOL HYDROCHLORIDE 50 MG/1
50 TABLET ORAL 3 TIMES DAILY
Qty: 90 TABLET | Refills: 0 | Status: ON HOLD | OUTPATIENT
Start: 2021-12-14 | End: 2022-06-08

## 2021-12-14 RX ORDER — LOSARTAN POTASSIUM 25 MG/1
25 TABLET ORAL DAILY
DISCHARGE
Start: 2021-12-15 | End: 2023-10-04

## 2021-12-14 RX ORDER — FUROSEMIDE 20 MG
20 TABLET ORAL DAILY
Status: DISCONTINUED | OUTPATIENT
Start: 2021-12-14 | End: 2021-12-14 | Stop reason: HOSPADM

## 2021-12-14 RX ORDER — TRAMADOL HYDROCHLORIDE 50 MG/1
50 TABLET ORAL 3 TIMES DAILY
Qty: 90 TABLET | Refills: 0 | Status: SHIPPED | OUTPATIENT
Start: 2021-12-14 | End: 2021-12-14

## 2021-12-14 RX ORDER — ATORVASTATIN CALCIUM 40 MG/1
40 TABLET, FILM COATED ORAL EVERY EVENING
DISCHARGE
Start: 2021-12-14 | End: 2022-02-01

## 2021-12-14 RX ORDER — NITROGLYCERIN 0.4 MG/1
TABLET SUBLINGUAL
Status: ON HOLD | DISCHARGE
Start: 2021-12-14 | End: 2022-06-08

## 2021-12-14 RX ORDER — FUROSEMIDE 20 MG
20 TABLET ORAL DAILY
DISCHARGE
Start: 2021-12-15 | End: 2022-02-01

## 2021-12-14 RX ORDER — CARVEDILOL 6.25 MG/1
6.25 TABLET ORAL 2 TIMES DAILY WITH MEALS
DISCHARGE
Start: 2021-12-14 | End: 2022-02-07

## 2021-12-14 RX ADMIN — FUROSEMIDE 20 MG: 20 TABLET ORAL at 09:18

## 2021-12-14 RX ADMIN — LOSARTAN POTASSIUM 25 MG: 25 TABLET, FILM COATED ORAL at 09:17

## 2021-12-14 RX ADMIN — CARVEDILOL 6.25 MG: 6.25 TABLET, FILM COATED ORAL at 09:17

## 2021-12-14 RX ADMIN — TRAMADOL HYDROCHLORIDE 50 MG: 50 TABLET, FILM COATED ORAL at 09:17

## 2021-12-14 RX ADMIN — ASPIRIN 81 MG CHEWABLE TABLET 81 MG: 81 TABLET CHEWABLE at 09:17

## 2021-12-14 RX ADMIN — TICAGRELOR 90 MG: 90 TABLET ORAL at 09:18

## 2021-12-14 RX ADMIN — PREDNISOLONE ACETATE 1 DROP: 10 SUSPENSION/ DROPS OPHTHALMIC at 09:18

## 2021-12-14 ASSESSMENT — ACTIVITIES OF DAILY LIVING (ADL)
ADLS_ACUITY_SCORE: 20
ADLS_ACUITY_SCORE: 20
ADLS_ACUITY_SCORE: 18
ADLS_ACUITY_SCORE: 20
ADLS_ACUITY_SCORE: 18
ADLS_ACUITY_SCORE: 18
ADLS_ACUITY_SCORE: 20
ADLS_ACUITY_SCORE: 20
ADLS_ACUITY_SCORE: 18
ADLS_ACUITY_SCORE: 20
ADLS_ACUITY_SCORE: 18

## 2021-12-14 NOTE — PLAN OF CARE
D:assist of one with unsteady gait c/o right knee pain  I:started scheduled pain med, pt's walker was brought in this  Evening shift that helped with stability.  A:pt ambulated to doorway fatigue and pain right knee  P:per Primary

## 2021-12-14 NOTE — PLAN OF CARE
"/59 (BP Location: Left arm)   Pulse 60   Temp 98.1  F (36.7  C) (Oral)   Resp 18   Ht 1.6 m (5' 3\")   Wt 75 kg (165 lb 5.5 oz)   SpO2 95%   BMI 29.29 kg/m  RA.  Shift: 3811-4746  Neuro: Drowsy 0x3but confused to time  Cardiac: Had 20 beats of Afib at 230 am per monitor tech and no symptoms ,print out in chart and MD notified.  Respiratory: Lungs clear  GI/: denies nausea   Diet/Appetite: Low sat fat diet,last stool 12/13  Activity: Up to commode with assist of 2  Pain: No pain until up and c/o pain in R knee.refused anything.  Skin: Bandaid R wrist D&I.PIV saline locked.  Other: [place] ,has hearing aides at bedside.  Plan:  Rehab placement today or tomorrow, when available.  "

## 2021-12-14 NOTE — PROGRESS NOTES
Care Management Discharge Note    Discharge Date: 12/14/2021  Expected Time of Departure: 2:15pm via w/c van    Discharge Disposition: Transitional Care  [facility]  [address] [place], [place] [address] [phone]  Fax [phone]    Discharge Services: None    Discharge DME: None    Discharge Transportation: [place] (ph [phone])    Private pay costs discussed: transportation costs    PAS Confirmation Code: [id number]  Patient/family educated on Medicare website which has current facility and service quality ratings:  N/A, pt already had facility preference chosen    Education Provided on the Discharge Plan:  yes  Persons Notified of Discharge Plans: Kaur- veronica at [facility] (ph [phone]), patient, RN, primary team CSI, pt's mark Condon (ph [phone])  Patient/Family in Agreement with the Plan: yes    Handoff Referral Completed: Yes    Additional Information:  Pt discharging today to [facility] at 2:15pm via w/c van.    Staff instructions:  Please print a packet and send with pt.  RN please call report to [phone].  SW to fax discharge orders.    SIDDHARTHA Magana, [facility]  6C   [facility]  Pager [phone]  Phone [phone]

## 2021-12-14 NOTE — PLAN OF CARE
Physical Therapy Discharge Summary    Reason for therapy discharge:    Discharged to transitional care facility.    Progress towards therapy goal(s). See goals on Care Plan in [facility] electronic health record for goal details.  Goals not met.  Barriers to achieving goals:   discharge from facility.    Therapy recommendation(s):    Continued therapy is recommended.  Rationale/Recommendations:  increase safety and independence with functional mobility.

## 2021-12-14 NOTE — PLAN OF CARE
DISCHARGE                         12/14/2021  2:16 PM  ----------------------------------------------------------------------------  Discharged to: TCU  Via: Automobile  Accompanied by: Family  Discharge Instructions: diet, activity, medications, follow up appointments, when to call the MD, aftercare instructions, and what to watchout for (i.e. s/s of infection, increasing SOB, palpitations, chest pain,)  Prescriptions: medication list reviewed & sent with pt. Along with script   Follow Up Appointments: arranged; information given  Belongings: All sent with pt  IV: out  Telemetry: off  Pt exhibits understanding of above discharge instructions; all questions answered.    Discharge Paperwork: Signed, copied, and sent home with patient.   ..Cristiane Partida RN

## 2021-12-14 NOTE — DISCHARGE SUMMARY
[address] [address]  p: [phone]    Discharge Summary: Cardiology Service    Marina Neves MRN# [id number]   YOB: 1934 Age: 87 year old       Admission Date: 12/11/2021  Discharge Date: 12/14/21    Discharge Diagnoses:  1. NSTEMI, type I   2. CAD s/p PCI   3. HLD  4. HTN  5. HFrEF 2/2 ICM     Brief HPI:  Marina Neves is an 87F with PMH notable for HTN, breast cancer s/p mastectomy, colon cancer s/p partial resection, gout, OA, who presents to ED with chest pain. Patient is poor historian but currently states she first noticed pain late last night, waking her up from sleep somewhere roughly around 10pm. She states that she did not have any similar pain prior to this, although did relate to ED provider a different account where she had episode of pain earlier in day as well. She describes the pain as a dull pain located in the anterior central/left chest, persistent up until some point during her EMS transport in. Given ASA 325mg en route. Remains pain-free at this time. States she thinks she may have felt a little SOB when she was experiencing the pain but otherwise no other symptoms. No diaphoresis. No lightheadedness/dizziness. No n/v.     Hospital Course by Diagnosis:  # NSTEMI, type I MI    # CAD s/p PCI to prox-mid LAD  # HLD  # HTN  Patient came in with chest pain, elevated troponin, EKG changes suggesting ACS and echocardiogram showed wall motion abnormalities in the mid LAD territory. LVEF 33%. She had coronary angiogram that showed 99% proximal to mid LAD occlusion that this was treated with PAT x 1. There was a small diagonal that was lost during the procedure. She also has 90% stenosis of the RCA and 80% stenosis of the rPDA that will need staged PCI after discharge.   - DAPT: continue aspirin 81mg and ticagrelor 90 mg BID   - BB: carvedilol to 6.25 mg BID  - ACE: losartan 25 mg daily [PTA losartan 75 mg]  - Statin:  atorvastatin 40mg daily   - Outpatient cardiac rehab   - Follow-up with cardiology DEBRA in 7-10 days   - Plan to return for staged intervention of RCA in about 4 weeks      #HFrEF 2/2 ischemic cardiomyopathy, LVEF 33%  No evidence of decompensation. Does have ongoing ischemic burden from other highly diseased vessels. Appears euvolemic on exam.   - Volume status: appears euvolemic on exam, continue lasix 20 mg daily   - BB: carvedilol, as above  - ARB: losartan, as above   - Pending BP can resume spironolactone, will defer to outpatient setting    - Repeat echo in 3 months   - Plan for staged PCI of the RCA as it might improve function    Pertinent Procedures:  1. Coronary angiogram with percutaneous intervention     Medication Changes:  See below     Discharge medications:   Current Discharge Medication List      START taking these medications    Details   aspirin (ASA) 81 MG EC tablet Take 1 tablet (81 mg) by mouth daily Start tomorrow.  Qty: 30 tablet, Refills: 3    Associated Diagnoses: Status post insertion of drug-eluting stent into left anterior descending artery      atorvastatin (LIPITOR) 40 MG tablet Take 1 tablet (40 mg) by mouth every evening    Associated Diagnoses: Coronary artery disease involving native coronary artery of native heart without angina pectoris      carvedilol (COREG) 6.25 MG tablet Take 1 tablet (6.25 mg) by mouth 2 times daily (with meals)    Associated Diagnoses: Coronary artery disease involving native coronary artery of native heart without angina pectoris      furosemide (LASIX) 20 MG tablet Take 1 tablet (20 mg) by mouth daily    Associated Diagnoses: Heart failure with reduced ejection fraction, NYHA class II (H)      nitroGLYcerin (NITROSTAT) 0.4 MG sublingual tablet For chest pain place 1 tablet under the tongue every 5 minutes for 3 doses. If symptoms persist 5 minutes after 1st dose call 911.    Associated Diagnoses: Coronary artery disease involving native coronary artery of  native heart without angina pectoris      ticagrelor (BRILINTA) 90 MG tablet Take 1 tablet (90 mg) by mouth 2 times daily Dose to start tomorrow morning.  Qty: 180 tablet, Refills: 3    Associated Diagnoses: Status post insertion of drug-eluting stent into left anterior descending artery         CONTINUE these medications which have CHANGED    Details   losartan (COZAAR) 25 MG tablet Take 1 tablet (25 mg) by mouth daily    Associated Diagnoses: Coronary artery disease involving native coronary artery of native heart without angina pectoris         CONTINUE these medications which have NOT CHANGED    Details   calcium carbonate-vitamin D (OSCAL W/D) 500-200 MG-UNIT tablet Take 1 tablet by mouth daily      magnesium oxide (MAG-OX) 400 (241.3 Mg) MG tablet Take 400 mg by mouth daily      olopatadine (PATANOL) 0.1 % ophthalmic solution Place 1 drop into both eyes 2 times daily      Omega-3 Fatty Acids (FISH OIL OMEGA-3 PO) Take 1 capsule by mouth daily      prednisoLONE acetate (PRED FORTE) 1 % ophthalmic suspension Place 1 drop into both eyes 2 times daily       traMADol (ULTRAM) 50 MG tablet Take 1 tablet (50 mg) by mouth 3 times daily.  Qty: 90 tablet, Refills: 0    Associated Diagnoses: Chronic pain syndrome      VITAMIN D, CHOLECALCIFEROL, PO Take 4,000 Units by mouth daily       Multiple Vitamins-Minerals (PRESERVISION AREDS) CAPS Take 1 capsule by mouth 2 times daily          STOP taking these medications       spironolactone (ALDACTONE) 25 MG tablet Comments:   Reason for Stopping:               Follow-up:  - Cardiology DEBRA in 7-10 days   - PCP in 1 week     Labs or imaging requiring follow-up after discharge:  CBC/BMP in 1 week   Repeat echo in 3 months     Code status:  Full     Condition on discharge  Temp:  [97.5  F (36.4  C)-98.1  F (36.7  C)] 97.5  F (36.4  C)  Pulse:  [60-94] 66  Resp:  [18-20] 18  BP: ()/(55-67) 114/58  SpO2:  [95 %-97 %] 97 %  General: Alert, interactive, NAD  Eyes: sclera  anicteric, EOMI  Neck: JVP 0, carotid 2+ bilaterally  Cardiovascular: regular rate and rhythm, normal S1 and S2, no murmurs, gallops, or rubs  Resp: clear to auscultation bilaterally, no rales, wheezes, or rhonchi  GI: Soft, nontender, nondistended. +BS.  No HSM or masses, no rebound or guarding.  Extremities: 1+ LE edema, no cyanosis or clubbing, dorsalis pedis and posterior tibialis pulses 2+ bilaterally  Skin: Warm and dry, no jaundice or rash  Neuro: CN 2-12 intact, moves all extremities equally  Psych: Alert & oriented x 3    Imaging with results:  Echocardiogram: 12/11/21  Interpretation Summary  Left ventricular function is moderately reduced. Biplane LVEF is 33%.  Severe hypokinesis to akinesis of the mid anterior, anteroseptal,  inferoseptal, inferior segments and all distal segments consistent with LAD  ischemia/infarction.  Global right ventricular function is normal.  No hemodynamically significant valve abnormalities.  The inferior vena cava is normal.  There is no prior study for direct comparison.    Coronary Angiogram: 12/12/21  Conclusion      99% lesion in the prox-mid LAD s/p PCI using a 2.5*20 mm Synergy PAT. Acute closure of diseased, small D1.    90% lesion in the mid RCA - staged PCI down the road. 80% lesion of the R-PDA.    Non obstructive disease in the LCX.           Plan      Follow bedrest per protocol    Continued medical management and lifestyle modifications for cardiovascular risk factor optimizations.    Arterial sheath removed from radial artery with TR band placement.    Cardiac rehabilitation.    Return to the primary inpatient team for further evaluation and managmenet        Continuation of dual antiplatelet therapy for 12 months   Post antiplatelet therapy of    Aspirin; give 81 mg qd .     Ticagrelor; give 180 mg now and 90 mg BID.      Continue high dose statin therapy  -Staged PCI of the RCA     Recommendations    General Recommendations:  - Patient given specific  instructions regarding care of arteriotomy site, activity restrictions, signs and symptoms of cardiac or vascular complications and to seek immediate medical evaluation should they occur.   - Arterial sheath removed from radial artery with TR Band placement.   - Recommend cardiac rehabilitation.     Medications:  - Continue dual antiplatelet therapy for 12 month(s).   - Continue high dose statin therapy indefinitely.   - Risk factor management for atherosclerosis.     -Staged PCI of the RCA           Patient Care Team:  Bry Jurado, NP as PCP - [facility]  Vin Guillen MD, MD as MD (Hematology & Oncology)  [facility] as Nursing Home Facility  Leonel Lyn MD ([facility])  Annette Celestin as Case Management Specialist (Primary Care - [facility])  Wenceslao Nicole MD as Assigned Surgical Provider  Louann Silva MD as Assigned Heart and Vascular Provider  Vin Guillen MD, MD as Assigned Cancer Care Provider  Haleigh Edge LSW as  (Primary Care - [facility])  Louann Elder as Case Management Specialist (Primary Care - [facility])  Valarie Dickson MD as MD (Internal Medicine)  Allan Hernandez DNP, APRN, CNP  [facility] Cardiology  [phone]

## 2021-12-14 NOTE — PLAN OF CARE
Occupational Therapy Discharge Summary    Reason for therapy discharge:    Discharged to transitional care facility.    Progress towards therapy goal(s). See goals on Care Plan in [facility] electronic health record for goal details.  Goals partially met.  Barriers to achieving goals:   discharge from facility.    Therapy recommendation(s):    Continued therapy is recommended.  Rationale/Recommendations:  Recommend continued skilled therapy at TCU to progress ADL IND/safety, functional mobility, and overall activity tolerance prior to return home.

## 2021-12-15 ENCOUNTER — LAB REQUISITION (OUTPATIENT)
Dept: LAB | Facility: CLINIC | Age: 86
End: 2021-12-15
Payer: COMMERCIAL

## 2021-12-15 ENCOUNTER — TELEPHONE (OUTPATIENT)
Dept: CARDIOLOGY | Facility: CLINIC | Age: 86
End: 2021-12-15

## 2021-12-15 VITALS
DIASTOLIC BLOOD PRESSURE: 70 MMHG | BODY MASS INDEX: 28.87 KG/M2 | OXYGEN SATURATION: 97 % | SYSTOLIC BLOOD PRESSURE: 128 MMHG | HEART RATE: 65 BPM | RESPIRATION RATE: 18 BRPM | TEMPERATURE: 98.7 F | WEIGHT: 163 LBS

## 2021-12-15 DIAGNOSIS — I25.10 ATHEROSCLEROTIC HEART DISEASE OF NATIVE CORONARY ARTERY WITHOUT ANGINA PECTORIS: ICD-10-CM

## 2021-12-15 PROBLEM — M21.532: Status: ACTIVE | Noted: 2021-12-15

## 2021-12-15 PROBLEM — Z79.82 LONG TERM (CURRENT) USE OF ASPIRIN: Status: ACTIVE | Noted: 2021-12-15

## 2021-12-15 PROBLEM — Z96.651 HISTORY OF TOTAL RIGHT KNEE REPLACEMENT: Status: ACTIVE | Noted: 2021-12-15

## 2021-12-15 PROBLEM — E66.09 OBESITY DUE TO EXCESS CALORIES: Status: ACTIVE | Noted: 2021-12-15

## 2021-12-15 PROBLEM — G89.29 CHRONIC PAIN: Status: ACTIVE | Noted: 2019-09-23

## 2021-12-15 PROBLEM — R26.9 ABNORMAL GAIT: Status: ACTIVE | Noted: 2021-12-15

## 2021-12-15 PROBLEM — H04.129 TEAR FILM INSUFFICIENCY: Status: ACTIVE | Noted: 2021-12-15

## 2021-12-15 PROBLEM — M19.91 PRIMARY OSTEOARTHRITIS: Status: ACTIVE | Noted: 2021-12-15

## 2021-12-15 PROBLEM — M20.10 ACQUIRED HALLUX VALGUS: Status: ACTIVE | Noted: 2021-12-15

## 2021-12-15 PROBLEM — M81.0 OSTEOPOROSIS: Status: ACTIVE | Noted: 2021-12-15

## 2021-12-15 PROBLEM — H91.90 HEARING LOSS: Status: ACTIVE | Noted: 2021-12-15

## 2021-12-16 ENCOUNTER — TRANSITIONAL CARE UNIT VISIT (OUTPATIENT)
Dept: GERIATRICS | Facility: CLINIC | Age: 86
End: 2021-12-16
Payer: COMMERCIAL

## 2021-12-16 DIAGNOSIS — G89.29 OTHER CHRONIC PAIN: ICD-10-CM

## 2021-12-16 DIAGNOSIS — I10 ESSENTIAL HYPERTENSION: ICD-10-CM

## 2021-12-16 DIAGNOSIS — I21.4 NSTEMI (NON-ST ELEVATED MYOCARDIAL INFARCTION) (H): Primary | ICD-10-CM

## 2021-12-16 DIAGNOSIS — H91.90 HEARING LOSS, UNSPECIFIED HEARING LOSS TYPE, UNSPECIFIED LATERALITY: ICD-10-CM

## 2021-12-16 LAB
ANION GAP SERPL CALCULATED.3IONS-SCNC: 9 MMOL/L (ref 5–18)
BUN SERPL-MCNC: 35 MG/DL (ref 8–28)
CALCIUM SERPL-MCNC: 8.7 MG/DL (ref 8.5–10.5)
CHLORIDE BLD-SCNC: 105 MMOL/L (ref 98–107)
CO2 SERPL-SCNC: 21 MMOL/L (ref 22–31)
CREAT SERPL-MCNC: 0.85 MG/DL (ref 0.6–1.1)
ERYTHROCYTE [DISTWIDTH] IN BLOOD BY AUTOMATED COUNT: 13.8 % (ref 10–15)
GFR SERPL CREATININE-BSD FRML MDRD: 62 ML/MIN/1.73M2
GLUCOSE BLD-MCNC: 103 MG/DL (ref 70–125)
HCT VFR BLD AUTO: 33.1 % (ref 35–47)
HGB BLD-MCNC: 10.9 G/DL (ref 11.7–15.7)
MCH RBC QN AUTO: 30.1 PG (ref 26.5–33)
MCHC RBC AUTO-ENTMCNC: 32.9 G/DL (ref 31.5–36.5)
MCV RBC AUTO: 91 FL (ref 78–100)
PLATELET # BLD AUTO: 190 10E3/UL (ref 150–450)
POTASSIUM BLD-SCNC: 4.5 MMOL/L (ref 3.5–5)
RBC # BLD AUTO: 3.62 10E6/UL (ref 3.8–5.2)
SODIUM SERPL-SCNC: 135 MMOL/L (ref 136–145)
WBC # BLD AUTO: 9.1 10E3/UL (ref 4–11)

## 2021-12-16 PROCEDURE — 80048 BASIC METABOLIC PNL TOTAL CA: CPT | Mod: ORL | Performed by: INTERNAL MEDICINE

## 2021-12-16 PROCEDURE — 85027 COMPLETE CBC AUTOMATED: CPT | Mod: ORL | Performed by: INTERNAL MEDICINE

## 2021-12-16 PROCEDURE — 36415 COLL VENOUS BLD VENIPUNCTURE: CPT | Performed by: INTERNAL MEDICINE

## 2021-12-16 PROCEDURE — 99305 1ST NF CARE MODERATE MDM 35: CPT | Performed by: FAMILY MEDICINE

## 2021-12-16 PROCEDURE — P9603 ONE-WAY ALLOW PRORATED MILES: HCPCS | Performed by: INTERNAL MEDICINE

## 2021-12-16 NOTE — LETTER
12/16/2021        RE: Marina ADAME Edinson  [address]  [place] [address]        [facility] GERIATRIC SERVICES    Facility:   [facility] ([facility]) [[id number]]   Code Status: FULL CODE      HPI:   Marina is a 87 year old female was was hospitalized December 11, 2021 through December 14, 2021 secondary to waking up from sleep about 10 PM with chest pain.  She did call the EMS and they transported her to the hospital.  Her work-up did include an echocardiogram December 11 with an ejection fraction of 33% and that is in comparison to a TTE of June 2019 with an ejection fraction of 55/60%.  She did have a coronary angiogram that showed 99% proximal to mid LAD occlusion and this was treated with PAT x1.  She also has 90% stenosis of the RCA and 80% stenosis of the RPDA that will need to be staged PCI after discharge.  The plan was dual antiplatelet therapy for 12 months which includes ticagrelor 90 mg twice daily along with aspirin 81 mg daily.  Her other updated medications will be Coreg 6.25 mg twice daily, losartan 25 mg and a statin or a atorvastatin 40 mg daily.  Encourage was cardiac outpatient rehab in the meantime she will go to transitional care.  Her laboratory studies on December 11 did show white cells of 9.0, hemoglobin 12.3, platelets 220, sodium 137, potassium 4.4, BUN 18, creatinine 0.76.  She is now in the transitional care unit to which we had the opportunity to talk about the care in the transitional care unit including the rehabilitation component.  She is legally blind as well as hard of hearing.  In talking with her and also concerned about her cognition.  She does live next-door in the apartments to which I have seen her over there a number of other times for other issues so therefore we will get cognitive testing on her as well.  The concern is wondering if she is a former self independently at the apartment or will she need long-term care.  Currently her systolic blood pressures  are ranging  and her weight is at 163 pounds.  She does have a CBC and BMP already scheduled for today.  She was placed on atorvastatin 40 mg and she is yet to have a lipid panel she would like to delay that since she does not want to have another blood stick in the next day or 2 so we will look at that again in the future.  I also tried to call her niece Anh but there was no answer.  She states her appetite to be fine.  She denies any current aches or pains although has a history of chronic knee osteoarthrosis and does take tramadol 3 times daily.  Past Medical History:  Past Medical History:   Diagnosis Date     Age-related physical debility      Anemia      Arthritis      Bladder infection 01/03/2015    dx'd- on antibiotics     Blood type AB-     with Anti-C     Breast cancer (H)      Bursitis, knee      Cancer (H)     breast      Cancer (H) 2005    left breast     Colon cancer (H)      Depression      Gout      History of transfusion      Hypertension      Hypertension      Insomnia      Macular degeneration      Osteoarthritis of multiple joints      Osteopenia      Ovarian cyst      Rosacea      Vitamin D deficiency            Surgical History:  Past Surgical History:   Procedure Laterality Date     ABDOMEN SURGERY      colon resection for ca 2015     BACK SURGERY Bilateral     L3-L5, L5-S1 decompression lami     BACK SURGERY      posterior spinal reconstruction     BREAST SURGERY       BREAST SURGERY      left mastectomy     C PART REMOVAL COLON W ANASTOMOSIS N/A 6/16/2014    Procedure: COLECTOMY;  Surgeon: Meliton Bazan MD;  Location: [facility];  Service: General     CATARACT EXTRACTION Bilateral      COLON SURGERY       COLONOSCOPY       COLONOSCOPY N/A 8/4/2015    Procedure: COLONOSCOPY;  Surgeon: Yoseph Ferraro MD;  Location:  GI     DILATION AND CURETTAGE      onsil     EYE SURGERY      cataract bilateral     EYE SURGERY       GASTRECTOMY       MASTECTOMY, RADICAL Left       NEUROMA SURGERY Bilateral     feet     ORTHOPEDIC SURGERY      shoulder right, knee left     ORTHOPEDIC SURGERY      lami     OTHER SURGICAL HISTORY      left knee arthroscopy     OTHER SURGICAL HISTORY      right shoulder replacement     OTHER SURGICAL HISTORY      left breast biopsyneedle core     HI LAP,FULGURATE/EXCISE LESIONS N/A 3/29/2017    Procedure: LAPAROSCOPIC BILATERAL SALPING OOPHORECTOMY PELVIC WASHINGS;  Surgeon: Eduardo Smart MD;  Location: [facility];  Service: Gynecology     HI MASTECTOMY, SIMPLE, COMPLETE Right 1/7/2015    Procedure: RIGHT BREAST MASTECTOMY;  Surgeon: Meliton Bazan MD;  Location: [facility];  Service: General     REPLACEMENT TOTAL KNEE Right      TONSILLECTOMY & ADENOIDECTOMY         Family History:   Family History   Problem Relation Age of Onset     Squamous cell carcinoma Mother      Colon Cancer Sister      Breast Cancer Maternal Aunt      Bone Cancer Maternal Aunt      Colon Cancer Paternal Aunt      Breast Cancer Cousin        Social History:    Social History     Socioeconomic History     Marital status:      Spouse name: Not on file     Number of children: Not on file     Years of education: Not on file     Highest education level: Not on file   Occupational History     Not on file   Tobacco Use     Smoking status: Never Smoker     Smokeless tobacco: Never Used   Substance and Sexual Activity     Alcohol use: No     Drug use: No     Sexual activity: Never   Other Topics Concern     Parent/sibling w/ CABG, MI or angioplasty before 65F 55M? Not Asked   Social History Narrative     Not on file     Social Determinants of Health     Financial Resource Strain: Not on file   Food Insecurity: Not on file   Transportation Needs: Not on file   Physical Activity: Not on file   Stress: Not on file   Social Connections: Not on file   Intimate Partner Violence: Not on file   Housing Stability: Not on file        REVIEW OF SYSTEM:  She currently denies any  new symptoms of fever chills fatigue sore throat postnasal drip wheezing chest pain dizziness vertigo nausea vomiting diarrhea dysuria frequency urgency.  She does have a history of hypertension, chronic pain syndrome, ASCVD, systolic heart failure, ischemic cardiomyopathy, cataracts, hard of hearing, history of breast cancer status post mastectomy, colon cancer status post partial resection.    PHYSICAL EXAM:   Pleasant female in no acute distress.  Neck is supple without adenopathy.  Conjunctiva is pink and sclerae clear.  Lung sounds are clear throughout.  Cardiovascular S1-S2 regular rate and rhythm.  No lower extremity edema however does have chronic edema.  Gastrointestinal soft nontender with positive bowel sounds.  Musculoskeletal history of chronic pain and knee osteoarthritis.  Psychiatric: Pleasant affect.    Vitals:    12/15/21 1717   BP: 128/70   Pulse: 65   Resp: 18   Temp: 98.7  F (37.1  C)   SpO2: 97%   Weight: 73.9 kg (163 lb)         Medication List:  Current Outpatient Medications   Medication Sig     aspirin (ASA) 81 MG EC tablet Take 1 tablet (81 mg) by mouth daily Start tomorrow.     atorvastatin (LIPITOR) 40 MG tablet Take 1 tablet (40 mg) by mouth every evening     calcium carbonate-vitamin D (OSCAL W/D) 500-200 MG-UNIT tablet Take 1 tablet by mouth daily     carvedilol (COREG) 6.25 MG tablet Take 1 tablet (6.25 mg) by mouth 2 times daily (with meals)     furosemide (LASIX) 20 MG tablet Take 1 tablet (20 mg) by mouth daily     losartan (COZAAR) 25 MG tablet Take 1 tablet (25 mg) by mouth daily     magnesium oxide (MAG-OX) 400 (241.3 Mg) MG tablet Take 400 mg by mouth daily     Multiple Vitamins-Minerals (PRESERVISION AREDS) CAPS Take 1 capsule by mouth 2 times daily      nitroGLYcerin (NITROSTAT) 0.4 MG sublingual tablet For chest pain place 1 tablet under the tongue every 5 minutes for 3 doses. If symptoms persist 5 minutes after 1st dose call 911.     olopatadine (PATANOL) 0.1 % ophthalmic  solution Place 1 drop into both eyes 2 times daily     Omega-3 Fatty Acids (FISH OIL OMEGA-3 PO) Take 1 capsule by mouth daily     prednisoLONE acetate (PRED FORTE) 1 % ophthalmic suspension Place 1 drop into both eyes 2 times daily      ticagrelor (BRILINTA) 90 MG tablet Take 1 tablet (90 mg) by mouth 2 times daily Dose to start tomorrow morning.     traMADol (ULTRAM) 50 MG tablet Take 1 tablet (50 mg) by mouth 3 times daily     VITAMIN D, CHOLECALCIFEROL, PO Take 4,000 Units by mouth daily      No current facility-administered medications for this visit.        Labs:  Last Comprehensive Metabolic Panel:  Sodium   Date Value Ref Range Status   12/13/2021 136 133 - 144 mmol/L Final   09/15/2006 134 133 - 144 mmol/L Final     Comment:     Delta Verified     Potassium   Date Value Ref Range Status   12/13/2021 4.0 3.4 - 5.3 mmol/L Final   09/15/2006 4.1 3.4 - 5.3 mmol/L Final     Chloride   Date Value Ref Range Status   12/13/2021 106 94 - 109 mmol/L Final   09/15/2006 99 94 - 109 mmol/L Final     Carbon Dioxide   Date Value Ref Range Status   09/15/2006 28 20 - 32 mmol/L Final     Carbon Dioxide (CO2)   Date Value Ref Range Status   12/13/2021 20 20 - 32 mmol/L Final     Anion Gap   Date Value Ref Range Status   12/13/2021 10 3 - 14 mmol/L Final   09/15/2006 7 6 - 17 mmol/L Final     Glucose   Date Value Ref Range Status   12/13/2021 148 (H) 70 - 99 mg/dL Final   09/14/2006 110 60 - 110 mg/dL Final     Urea Nitrogen   Date Value Ref Range Status   12/13/2021 16 7 - 30 mg/dL Final   09/14/2006 24 7 - 30 mg/dL Final     Creatinine   Date Value Ref Range Status   12/13/2021 0.64 0.52 - 1.04 mg/dL Final   09/14/2006 1.08 0.60 - 1.30 mg/dL Final     GFR Estimate   Date Value Ref Range Status   12/13/2021 80 >60 mL/min/1.73m2 Final     Comment:     As of July 11, 2021, eGFR is calculated by the CKD-EPI creatinine equation, without race adjustment. eGFR can be influenced by muscle mass, exercise, and diet. The reported eGFR  is an estimation only and is only applicable if the renal function is stable.   05/26/2021 >60 >60 mL/min/1.73m2 Final   09/14/2006 53 (L) >60 mL/min/1.7m2 Final     Calcium   Date Value Ref Range Status   12/13/2021 8.6 8.5 - 10.1 mg/dL Final   09/14/2006 9.2 8.5 - 10.4 mg/dL Final     Last Comprehensive Metabolic Panel:  Sodium   Date Value Ref Range Status   12/13/2021 136 133 - 144 mmol/L Final   09/15/2006 134 133 - 144 mmol/L Final     Comment:     Delta Verified     Potassium   Date Value Ref Range Status   12/13/2021 4.0 3.4 - 5.3 mmol/L Final   09/15/2006 4.1 3.4 - 5.3 mmol/L Final     Chloride   Date Value Ref Range Status   12/13/2021 106 94 - 109 mmol/L Final   09/15/2006 99 94 - 109 mmol/L Final     Carbon Dioxide   Date Value Ref Range Status   09/15/2006 28 20 - 32 mmol/L Final     Carbon Dioxide (CO2)   Date Value Ref Range Status   12/13/2021 20 20 - 32 mmol/L Final     Anion Gap   Date Value Ref Range Status   12/13/2021 10 3 - 14 mmol/L Final   09/15/2006 7 6 - 17 mmol/L Final     Glucose   Date Value Ref Range Status   12/13/2021 148 (H) 70 - 99 mg/dL Final   09/14/2006 110 60 - 110 mg/dL Final     Urea Nitrogen   Date Value Ref Range Status   12/13/2021 16 7 - 30 mg/dL Final   09/14/2006 24 7 - 30 mg/dL Final     Creatinine   Date Value Ref Range Status   12/13/2021 0.64 0.52 - 1.04 mg/dL Final   09/14/2006 1.08 0.60 - 1.30 mg/dL Final     GFR Estimate   Date Value Ref Range Status   12/13/2021 80 >60 mL/min/1.73m2 Final     Comment:     As of July 11, 2021, eGFR is calculated by the CKD-EPI creatinine equation, without race adjustment. eGFR can be influenced by muscle mass, exercise, and diet. The reported eGFR is an estimation only and is only applicable if the renal function is stable.   05/26/2021 >60 >60 mL/min/1.73m2 Final   09/14/2006 53 (L) >60 mL/min/1.7m2 Final     Calcium   Date Value Ref Range Status   12/13/2021 8.6 8.5 - 10.1 mg/dL Final   09/14/2006 9.2 8.5 - 10.4 mg/dL Final      Bilirubin Total   Date Value Ref Range Status   02/12/2020 0.4 0.0 - 1.0 mg/dL Final     Alkaline Phosphatase   Date Value Ref Range Status   02/12/2020 68 45 - 120 U/L Final     ALT   Date Value Ref Range Status   02/12/2020 14 0 - 45 U/L Final     AST   Date Value Ref Range Status   02/12/2020 16 0 - 40 U/L Final             CBC RESULTS: Recent Labs   Lab Test 12/16/21  0556   WBC 9.1   RBC 3.62*   HGB 10.9*   HCT 33.1*   MCV 91   MCH 30.1   MCHC 32.9   RDW 13.8        Recent Labs   Lab Test 10/21/20  1206 03/11/19  1241   CHOL 191 194   HDL 46* 52    102   TRIG 166* 198*         Assessment:   (I21.4) NSTEMI (non-ST elevated myocardial infarction) (H)  (primary encounter diagnosis)  Comment: Type I MI with a history of CAD status post PCI to proximal-mid LAD  Plan: Continue to manage and follow    (I10) Essential hypertension  Comment: Continue to manage and follow  Plan: Currently stable    (G89.29) Other chronic pain  Comment: Is on tramadol  Plan: Would like to stay with the tramadol    (H91.90) Hearing loss, unspecified hearing loss type, unspecified laterality  Comment: Chronic  Plan: Encouraged hearing aids or a talk box      PLAN:    Daily weights, she does have a CBC and BMP scheduled for today.  Also get cognitive testing.  Encourage nutrition as well as the rehabilitation component.  She agrees with the current plan of care.    For documentation purposes total visit 35 minutes which over 50% was spent with the patient going over her chronic medical conditions, recent hospitalization, work-up in the hospital, future work-up as well due to the 90% stenosis of the RCA and 80% stenosis of the RPDA, current medications, nutrition and the rehabilitation component      Electronically signed by: Bry Jurado NP          Sincerely,        Bry Jurado NP

## 2021-12-16 NOTE — PROGRESS NOTES
[facility] GERIATRIC SERVICES    Facility:   [facility] ([facility]) [[id number]]   Code Status: FULL CODE      HPI:   Marina is a 87 year old female was was hospitalized December 11, 2021 through December 14, 2021 secondary to waking up from sleep about 10 PM with chest pain.  She did call the EMS and they transported her to the hospital.  Her work-up did include an echocardiogram December 11 with an ejection fraction of 33% and that is in comparison to a TTE of June 2019 with an ejection fraction of 55/60%.  She did have a coronary angiogram that showed 99% proximal to mid LAD occlusion and this was treated with PAT x1.  She also has 90% stenosis of the RCA and 80% stenosis of the RPDA that will need to be staged PCI after discharge.  The plan was dual antiplatelet therapy for 12 months which includes ticagrelor 90 mg twice daily along with aspirin 81 mg daily.  Her other updated medications will be Coreg 6.25 mg twice daily, losartan 25 mg and a statin or a atorvastatin 40 mg daily.  [facility] was cardiac outpatient rehab in the meantime she will go to transitional care.  Her laboratory studies on December 11 did show white cells of 9.0, hemoglobin 12.3, platelets 220, sodium 137, potassium 4.4, BUN 18, creatinine 0.76.  She is now in the transitional care unit to which we had the opportunity to talk about the care in the transitional care unit including the rehabilitation component.  She is legally blind as well as hard of hearing.  In talking with her and also concerned about her cognition.  She does live next-door in the apartments to which I have seen her over there a number of other times for other issues so therefore we will get cognitive testing on her as well.  The concern is wondering if she is a former self independently at the apartment or will she need long-term care.  Currently her systolic blood pressures are ranging  and her weight is at 163 pounds.  She does have a CBC and BMP already  scheduled for today.  She was placed on atorvastatin 40 mg and she is yet to have a lipid panel she would like to delay that since she does not want to have another blood stick in the next day or 2 so we will look at that again in the future.  I also tried to call her niece Anh but there was no answer.  She states her appetite to be fine.  She denies any current aches or pains although has a history of chronic knee osteoarthrosis and does take tramadol 3 times daily.  Past Medical History:  Past Medical History:   Diagnosis Date     Age-related physical debility      Anemia      Arthritis      Bladder infection 01/03/2015    dx'd- on antibiotics     Blood type AB-     with Anti-C     Breast cancer (H)      Bursitis, knee      Cancer (H)     breast      Cancer (H) 2005    left breast     Colon cancer (H)      Depression      Gout      History of transfusion      Hypertension      Hypertension      Insomnia      Macular degeneration      Osteoarthritis of multiple joints      Osteopenia      Ovarian cyst      Rosacea      Vitamin D deficiency            Surgical History:  Past Surgical History:   Procedure Laterality Date     ABDOMEN SURGERY      colon resection for ca 2015     BACK SURGERY Bilateral     L3-L5, L5-S1 decompression lami     BACK SURGERY      posterior spinal reconstruction     BREAST SURGERY       BREAST SURGERY      left mastectomy     C PART REMOVAL COLON W ANASTOMOSIS N/A 6/16/2014    Procedure: COLECTOMY;  Surgeon: Meliton Bazan MD;  Location: [facility];  Service: General     CATARACT EXTRACTION Bilateral      COLON SURGERY       COLONOSCOPY       COLONOSCOPY N/A 8/4/2015    Procedure: COLONOSCOPY;  Surgeon: Yoseph Ferraro MD;  Location:  GI     DILATION AND CURETTAGE      onsil     EYE SURGERY      cataract bilateral     EYE SURGERY       GASTRECTOMY       MASTECTOMY, RADICAL Left      NEUROMA SURGERY Bilateral     feet     ORTHOPEDIC SURGERY      shoulder right, knee left      ORTHOPEDIC SURGERY      lami     OTHER SURGICAL HISTORY      left knee arthroscopy     OTHER SURGICAL HISTORY      right shoulder replacement     OTHER SURGICAL HISTORY      left breast biopsyneedle core     VT LAP,FULGURATE/EXCISE LESIONS N/A 3/29/2017    Procedure: LAPAROSCOPIC BILATERAL SALPING OOPHORECTOMY PELVIC WASHINGS;  Surgeon: Eduardo Smart MD;  Location: [facility];  Service: Gynecology     VT MASTECTOMY, SIMPLE, COMPLETE Right 1/7/2015    Procedure: RIGHT BREAST MASTECTOMY;  Surgeon: Meliton Bazan MD;  Location: [facility];  Service: General     REPLACEMENT TOTAL KNEE Right      TONSILLECTOMY & ADENOIDECTOMY         Family History:   Family History   Problem Relation Age of Onset     Squamous cell carcinoma Mother      Colon Cancer Sister      Breast Cancer Maternal Aunt      Bone Cancer Maternal Aunt      Colon Cancer Paternal Aunt      Breast Cancer Cousin        Social History:    Social History     Socioeconomic History     Marital status:      Spouse name: Not on file     Number of children: Not on file     Years of education: Not on file     Highest education level: Not on file   Occupational History     Not on file   Tobacco Use     Smoking status: Never Smoker     Smokeless tobacco: Never Used   Substance and Sexual Activity     Alcohol use: No     Drug use: No     Sexual activity: Never   Other Topics Concern     Parent/sibling w/ CABG, MI or angioplasty before 65F 55M? Not Asked   Social History Narrative     Not on file     Social Determinants of Health     Financial Resource Strain: Not on file   Food Insecurity: Not on file   Transportation Needs: Not on file   Physical Activity: Not on file   Stress: Not on file   Social Connections: Not on file   Intimate Partner Violence: Not on file   Housing Stability: Not on file        REVIEW OF SYSTEM:  She currently denies any new symptoms of fever chills fatigue sore throat postnasal drip wheezing chest pain dizziness  vertigo nausea vomiting diarrhea dysuria frequency urgency.  She does have a history of hypertension, chronic pain syndrome, ASCVD, systolic heart failure, ischemic cardiomyopathy, cataracts, hard of hearing, history of breast cancer status post mastectomy, colon cancer status post partial resection.    PHYSICAL EXAM:   Pleasant female in no acute distress.  Neck is supple without adenopathy.  Conjunctiva is pink and sclerae clear.  Lung sounds are clear throughout.  Cardiovascular S1-S2 regular rate and rhythm.  No lower extremity edema however does have chronic edema.  Gastrointestinal soft nontender with positive bowel sounds.  Musculoskeletal history of chronic pain and knee osteoarthritis.  Psychiatric: Pleasant affect.    Vitals:    12/15/21 1717   BP: 128/70   Pulse: 65   Resp: 18   Temp: 98.7  F (37.1  C)   SpO2: 97%   Weight: 73.9 kg (163 lb)         Medication List:  Current Outpatient Medications   Medication Sig     aspirin (ASA) 81 MG EC tablet Take 1 tablet (81 mg) by mouth daily Start tomorrow.     atorvastatin (LIPITOR) 40 MG tablet Take 1 tablet (40 mg) by mouth every evening     calcium carbonate-vitamin D (OSCAL W/D) 500-200 MG-UNIT tablet Take 1 tablet by mouth daily     carvedilol (COREG) 6.25 MG tablet Take 1 tablet (6.25 mg) by mouth 2 times daily (with meals)     furosemide (LASIX) 20 MG tablet Take 1 tablet (20 mg) by mouth daily     losartan (COZAAR) 25 MG tablet Take 1 tablet (25 mg) by mouth daily     magnesium oxide (MAG-OX) 400 (241.3 Mg) MG tablet Take 400 mg by mouth daily     Multiple Vitamins-Minerals (PRESERVISION AREDS) CAPS Take 1 capsule by mouth 2 times daily      nitroGLYcerin (NITROSTAT) 0.4 MG sublingual tablet For chest pain place 1 tablet under the tongue every 5 minutes for 3 doses. If symptoms persist 5 minutes after 1st dose call 911.     olopatadine (PATANOL) 0.1 % ophthalmic solution Place 1 drop into both eyes 2 times daily     Omega-3 Fatty Acids (FISH OIL OMEGA-3  PO) Take 1 capsule by mouth daily     prednisoLONE acetate (PRED FORTE) 1 % ophthalmic suspension Place 1 drop into both eyes 2 times daily      ticagrelor (BRILINTA) 90 MG tablet Take 1 tablet (90 mg) by mouth 2 times daily Dose to start tomorrow morning.     traMADol (ULTRAM) 50 MG tablet Take 1 tablet (50 mg) by mouth 3 times daily     VITAMIN D, CHOLECALCIFEROL, PO Take 4,000 Units by mouth daily      No current facility-administered medications for this visit.        Labs:  Last Comprehensive Metabolic Panel:  Sodium   Date Value Ref Range Status   12/13/2021 136 133 - 144 mmol/L Final   09/15/2006 134 133 - 144 mmol/L Final     Comment:     Delta Verified     Potassium   Date Value Ref Range Status   12/13/2021 4.0 3.4 - 5.3 mmol/L Final   09/15/2006 4.1 3.4 - 5.3 mmol/L Final     Chloride   Date Value Ref Range Status   12/13/2021 106 94 - 109 mmol/L Final   09/15/2006 99 94 - 109 mmol/L Final     Carbon Dioxide   Date Value Ref Range Status   09/15/2006 28 20 - 32 mmol/L Final     Carbon Dioxide (CO2)   Date Value Ref Range Status   12/13/2021 20 20 - 32 mmol/L Final     Anion Gap   Date Value Ref Range Status   12/13/2021 10 3 - 14 mmol/L Final   09/15/2006 7 6 - 17 mmol/L Final     Glucose   Date Value Ref Range Status   12/13/2021 148 (H) 70 - 99 mg/dL Final   09/14/2006 110 60 - 110 mg/dL Final     Urea Nitrogen   Date Value Ref Range Status   12/13/2021 16 7 - 30 mg/dL Final   09/14/2006 24 7 - 30 mg/dL Final     Creatinine   Date Value Ref Range Status   12/13/2021 0.64 0.52 - 1.04 mg/dL Final   09/14/2006 1.08 0.60 - 1.30 mg/dL Final     GFR Estimate   Date Value Ref Range Status   12/13/2021 80 >60 mL/min/1.73m2 Final     Comment:     As of July 11, 2021, eGFR is calculated by the CKD-EPI creatinine equation, without race adjustment. eGFR can be influenced by muscle mass, exercise, and diet. The reported eGFR is an estimation only and is only applicable if the renal function is stable.   05/26/2021  >60 >60 mL/min/1.73m2 Final   09/14/2006 53 (L) >60 mL/min/1.7m2 Final     Calcium   Date Value Ref Range Status   12/13/2021 8.6 8.5 - 10.1 mg/dL Final   09/14/2006 9.2 8.5 - 10.4 mg/dL Final     Last Comprehensive Metabolic Panel:  Sodium   Date Value Ref Range Status   12/13/2021 136 133 - 144 mmol/L Final   09/15/2006 134 133 - 144 mmol/L Final     Comment:     Delta Verified     Potassium   Date Value Ref Range Status   12/13/2021 4.0 3.4 - 5.3 mmol/L Final   09/15/2006 4.1 3.4 - 5.3 mmol/L Final     Chloride   Date Value Ref Range Status   12/13/2021 106 94 - 109 mmol/L Final   09/15/2006 99 94 - 109 mmol/L Final     Carbon Dioxide   Date Value Ref Range Status   09/15/2006 28 20 - 32 mmol/L Final     Carbon Dioxide (CO2)   Date Value Ref Range Status   12/13/2021 20 20 - 32 mmol/L Final     Anion Gap   Date Value Ref Range Status   12/13/2021 10 3 - 14 mmol/L Final   09/15/2006 7 6 - 17 mmol/L Final     Glucose   Date Value Ref Range Status   12/13/2021 148 (H) 70 - 99 mg/dL Final   09/14/2006 110 60 - 110 mg/dL Final     Urea Nitrogen   Date Value Ref Range Status   12/13/2021 16 7 - 30 mg/dL Final   09/14/2006 24 7 - 30 mg/dL Final     Creatinine   Date Value Ref Range Status   12/13/2021 0.64 0.52 - 1.04 mg/dL Final   09/14/2006 1.08 0.60 - 1.30 mg/dL Final     GFR Estimate   Date Value Ref Range Status   12/13/2021 80 >60 mL/min/1.73m2 Final     Comment:     As of July 11, 2021, eGFR is calculated by the CKD-EPI creatinine equation, without race adjustment. eGFR can be influenced by muscle mass, exercise, and diet. The reported eGFR is an estimation only and is only applicable if the renal function is stable.   05/26/2021 >60 >60 mL/min/1.73m2 Final   09/14/2006 53 (L) >60 mL/min/1.7m2 Final     Calcium   Date Value Ref Range Status   12/13/2021 8.6 8.5 - 10.1 mg/dL Final   09/14/2006 9.2 8.5 - 10.4 mg/dL Final     Bilirubin Total   Date Value Ref Range Status   02/12/2020 0.4 0.0 - 1.0 mg/dL Final      Alkaline Phosphatase   Date Value Ref Range Status   02/12/2020 68 45 - 120 U/L Final     ALT   Date Value Ref Range Status   02/12/2020 14 0 - 45 U/L Final     AST   Date Value Ref Range Status   02/12/2020 16 0 - 40 U/L Final             CBC RESULTS: Recent Labs   Lab Test 12/16/21  0556   WBC 9.1   RBC 3.62*   HGB 10.9*   HCT 33.1*   MCV 91   MCH 30.1   MCHC 32.9   RDW 13.8        Recent Labs   Lab Test 10/21/20  1206 03/11/19  1241   CHOL 191 194   HDL 46* 52    102   TRIG 166* 198*         Assessment:   (I21.4) NSTEMI (non-ST elevated myocardial infarction) (H)  (primary encounter diagnosis)  Comment: Type I MI with a history of CAD status post PCI to proximal-mid LAD  Plan: Continue to manage and follow    (I10) Essential hypertension  Comment: Continue to manage and follow  Plan: Currently stable    (G89.29) Other chronic pain  Comment: Is on tramadol  Plan: Would like to stay with the tramadol    (H91.90) Hearing loss, unspecified hearing loss type, unspecified laterality  Comment: Chronic  Plan: Encouraged hearing aids or a talk box      PLAN:    Daily weights, she does have a CBC and BMP scheduled for today.  Also get cognitive testing.  Encourage nutrition as well as the rehabilitation component.  She agrees with the current plan of care.    For documentation purposes total visit 35 minutes which over 50% was spent with the patient going over her chronic medical conditions, recent hospitalization, work-up in the hospital, future work-up as well due to the 90% stenosis of the RCA and 80% stenosis of the RPDA, current medications, nutrition and the rehabilitation component      Electronically signed by: Bry Jurado NP

## 2021-12-20 VITALS
BODY MASS INDEX: 29.94 KG/M2 | SYSTOLIC BLOOD PRESSURE: 124 MMHG | WEIGHT: 169 LBS | DIASTOLIC BLOOD PRESSURE: 75 MMHG | TEMPERATURE: 97.5 F | OXYGEN SATURATION: 99 % | RESPIRATION RATE: 22 BRPM | HEART RATE: 72 BPM

## 2021-12-21 ENCOUNTER — LAB REQUISITION (OUTPATIENT)
Dept: LAB | Facility: CLINIC | Age: 86
End: 2021-12-21
Payer: COMMERCIAL

## 2021-12-21 ENCOUNTER — TRANSITIONAL CARE UNIT VISIT (OUTPATIENT)
Dept: GERIATRICS | Facility: CLINIC | Age: 86
End: 2021-12-21
Payer: COMMERCIAL

## 2021-12-21 DIAGNOSIS — I10 ESSENTIAL (PRIMARY) HYPERTENSION: ICD-10-CM

## 2021-12-21 DIAGNOSIS — R26.81 GAIT INSTABILITY: Primary | ICD-10-CM

## 2021-12-21 DIAGNOSIS — E78.2 MIXED HYPERLIPIDEMIA: ICD-10-CM

## 2021-12-21 DIAGNOSIS — I10 ESSENTIAL HYPERTENSION: ICD-10-CM

## 2021-12-21 DIAGNOSIS — Z51.81 ENCOUNTER FOR THERAPEUTIC DRUG LEVEL MONITORING: ICD-10-CM

## 2021-12-21 DIAGNOSIS — E78.5 HYPERLIPIDEMIA, UNSPECIFIED: ICD-10-CM

## 2021-12-21 DIAGNOSIS — M51.379 DEGENERATION OF LUMBOSACRAL INTERVERTEBRAL DISC: ICD-10-CM

## 2021-12-21 PROCEDURE — 99309 SBSQ NF CARE MODERATE MDM 30: CPT | Performed by: FAMILY MEDICINE

## 2021-12-21 NOTE — LETTER
12/21/2021        RE: Marina ADAME Edinson  [address]  [place] [address]        [facility] GERIATRIC SERVICES    Facility:   [facility] ([facility]) [[id number]]   Code Status: FULL CODE      CHIEF COMPLAINT/REASON FOR VISIT:  Chief Complaint   Patient presents with     RECHECK     cholest, rehaba, legs       HISTORY:      HPI: Marina is a 87 year old female who I had the pleasure of revisiting with once again today not only secondary to her hospitalization December 11 through December 14, 2021 secondary to chest pain and was found to have a 99% proximal to mid LAD occlusion and was treated with PAT x1 however she still does have 90% stenosis of the RCA and 80% stenosis of the R PDA as well as discussion of her medications, rehabilitation and blood pressures.  Regarding rehabilitation she does need assistance with ADLs.  Her cognitive testing did come out at 15 per therapy.  Her systolic blood pressures ranging 120-140 her weight is stable at 169 and in comparison to December 19 also 169 pounds.  No lower extremity edema.  Is on furosemide 20 mg.  She is on atorvastatin 40 mg and she has not yet had a lipid panel so we will get that tomorrow along with a CMP.  For chronic pain including back pain and knee pain she is on tramadol 50 mg 3 times daily.  She does seem to be in good spirits overall.  Does have a good sense of humor.  Is hard of hearing.  With staff is able to use her walker up and down the hallway.  She states her appetite to be good and her pain is also well managed.    Past Medical History:   Diagnosis Date     Age-related physical debility      Anemia      Arthritis      Bladder infection 01/03/2015    dx'd- on antibiotics     Blood type AB-     with Anti-C     Breast cancer (H)      Bursitis, knee      Cancer (H)     breast      Cancer (H) 2005    left breast     Colon cancer (H)      Depression      Gout      History of transfusion      Hypertension      Hypertension      Insomnia       Macular degeneration      Osteoarthritis of multiple joints      Osteopenia      Ovarian cyst      Rosacea      Vitamin D deficiency             Family History   Problem Relation Age of Onset     Squamous cell carcinoma Mother      Colon Cancer Sister      Breast Cancer Maternal Aunt      Bone Cancer Maternal Aunt      Colon Cancer Paternal Aunt      Breast Cancer Cousin       Social History     Socioeconomic History     Marital status:      Spouse name: Not on file     Number of children: Not on file     Years of education: Not on file     Highest education level: Not on file   Occupational History     Not on file   Tobacco Use     Smoking status: Never Smoker     Smokeless tobacco: Never Used   Substance and Sexual Activity     Alcohol use: No     Drug use: No     Sexual activity: Never   Other Topics Concern     Parent/sibling w/ CABG, MI or angioplasty before 65F 55M? Not Asked   Social History Narrative     Not on file     Social Determinants of Health     Financial Resource Strain: Not on file   Food Insecurity: Not on file   Transportation Needs: Not on file   Physical Activity: Not on file   Stress: Not on file   Social Connections: Not on file   Intimate Partner Violence: Not on file   Housing Stability: Not on file        REVIEW OF SYSTEM:  She currently denies any new symptoms of fever chills fatigue sore throat postnasal drip wheezing chest pain dizziness vertigo nausea vomiting diarrhea dysuria frequency urgency.  She does have a history of hypertension, chronic pain syndrome, ASCVD, systolic heart failure, ischemic cardiomyopathy, cataracts, hard of hearing, history of breast cancer status post mastectomy, colon cancer status post partial resection    PHYSICAL EXAM:   Pleasant female in no acute distress.  Neck is supple without adenopathy.  Conjunctiva is pink and sclerae clear.  Lung sounds are clear throughout.  Cardiovascular S1-S2 regular rate and rhythm.  No lower extremity edema  however does have chronic edema.  Gastrointestinal soft nontender with positive bowel sounds.  Musculoskeletal history of chronic pain and knee osteoarthritis.  Psychiatric: Pleasant affect.       Current Outpatient Medications:      aspirin (ASA) 81 MG EC tablet, Take 1 tablet (81 mg) by mouth daily Start tomorrow., Disp: 30 tablet, Rfl: 3     atorvastatin (LIPITOR) 40 MG tablet, Take 1 tablet (40 mg) by mouth every evening, Disp: , Rfl:      calcium carbonate-vitamin D (OSCAL W/D) 500-200 MG-UNIT tablet, Take 1 tablet by mouth daily, Disp: , Rfl:      carvedilol (COREG) 6.25 MG tablet, Take 1 tablet (6.25 mg) by mouth 2 times daily (with meals), Disp: , Rfl:      furosemide (LASIX) 20 MG tablet, Take 1 tablet (20 mg) by mouth daily, Disp: , Rfl:      losartan (COZAAR) 25 MG tablet, Take 1 tablet (25 mg) by mouth daily, Disp: , Rfl:      magnesium oxide (MAG-OX) 400 (241.3 Mg) MG tablet, Take 400 mg by mouth daily, Disp: , Rfl:      Multiple Vitamins-Minerals (PRESERVISION AREDS) CAPS, Take 1 capsule by mouth 2 times daily , Disp: , Rfl:      nitroGLYcerin (NITROSTAT) 0.4 MG sublingual tablet, For chest pain place 1 tablet under the tongue every 5 minutes for 3 doses. If symptoms persist 5 minutes after 1st dose call 911., Disp: , Rfl:      olopatadine (PATANOL) 0.1 % ophthalmic solution, Place 1 drop into both eyes 2 times daily, Disp: , Rfl:      Omega-3 Fatty Acids (FISH OIL OMEGA-3 PO), Take 1 capsule by mouth daily, Disp: , Rfl:      prednisoLONE acetate (PRED FORTE) 1 % ophthalmic suspension, Place 1 drop into both eyes 2 times daily , Disp: , Rfl:      ticagrelor (BRILINTA) 90 MG tablet, Take 1 tablet (90 mg) by mouth 2 times daily Dose to start tomorrow morning., Disp: 180 tablet, Rfl: 3     traMADol (ULTRAM) 50 MG tablet, Take 1 tablet (50 mg) by mouth 3 times daily, Disp: 90 tablet, Rfl: 0     VITAMIN D, CHOLECALCIFEROL, PO, Take 4,000 Units by mouth daily , Disp: , Rfl:        /75   Pulse 72    Temp 97.5  F (36.4  C)   Resp 22   Wt 76.7 kg (169 lb)   SpO2 99%   BMI 29.94 kg/m      LABS:   Last Comprehensive Metabolic Panel:  Sodium   Date Value Ref Range Status   12/16/2021 135 (L) 136 - 145 mmol/L Final   09/15/2006 134 133 - 144 mmol/L Final     Comment:     Delta Verified     Potassium   Date Value Ref Range Status   12/16/2021 4.5 3.5 - 5.0 mmol/L Final   09/15/2006 4.1 3.4 - 5.3 mmol/L Final     Chloride   Date Value Ref Range Status   12/16/2021 105 98 - 107 mmol/L Final   09/15/2006 99 94 - 109 mmol/L Final     Carbon Dioxide   Date Value Ref Range Status   09/15/2006 28 20 - 32 mmol/L Final     Carbon Dioxide (CO2)   Date Value Ref Range Status   12/16/2021 21 (L) 22 - 31 mmol/L Final     Anion Gap   Date Value Ref Range Status   12/16/2021 9 5 - 18 mmol/L Final   09/15/2006 7 6 - 17 mmol/L Final     Glucose   Date Value Ref Range Status   12/16/2021 103 70 - 125 mg/dL Final   09/14/2006 110 60 - 110 mg/dL Final     Urea Nitrogen   Date Value Ref Range Status   12/16/2021 35 (H) 8 - 28 mg/dL Final   09/14/2006 24 7 - 30 mg/dL Final     Creatinine   Date Value Ref Range Status   12/16/2021 0.85 0.60 - 1.10 mg/dL Final   09/14/2006 1.08 0.60 - 1.30 mg/dL Final     GFR Estimate   Date Value Ref Range Status   12/16/2021 62 >60 mL/min/1.73m2 Final     Comment:     As of July 11, 2021, eGFR is calculated by the CKD-EPI creatinine equation, without race adjustment. eGFR can be influenced by muscle mass, exercise, and diet. The reported eGFR is an estimation only and is only applicable if the renal function is stable.   05/26/2021 >60 >60 mL/min/1.73m2 Final   09/14/2006 53 (L) >60 mL/min/1.7m2 Final     Calcium   Date Value Ref Range Status   12/16/2021 8.7 8.5 - 10.5 mg/dL Final   09/14/2006 9.2 8.5 - 10.4 mg/dL Final     Recent Labs   Lab Test 10/21/20  1206 03/11/19  1241   CHOL 191 194   HDL 46* 52    102   TRIG 166* 198*           ASSESSMENT:    Encounter Diagnoses   Name Primary?      Gait instability Yes     Mixed hyperlipidemia      Essential hypertension      Degeneration of lumbosacral intervertebral disc        PLAN:    Obtaining a CMP and lipid panel tomorrow.  In the meantime continue to work with the therapy component.  Blood pressures and weight are good.  We did talk about her hospitalization as well as future intervention with cardiology in January.  She is pleased so far with the information as well as her care and did not have any other questions.        Electronically signed by: Bry Jurado NP          Sincerely,        Bry Jurado NP

## 2021-12-21 NOTE — PROGRESS NOTES
[facility] GERIATRIC SERVICES    Facility:   [facility] ([facility]) [[id number]]   Code Status: FULL CODE      CHIEF COMPLAINT/REASON FOR VISIT:  Chief Complaint   Patient presents with     RECHECK     cholestfarhat legs       HISTORY:      HPI: Marina is a 87 year old female who I had the pleasure of revisiting with once again today not only secondary to her hospitalization December 11 through December 14, 2021 secondary to chest pain and was found to have a 99% proximal to mid LAD occlusion and was treated with PAT x1 however she still does have 90% stenosis of the RCA and 80% stenosis of the R PDA as well as discussion of her medications, rehabilitation and blood pressures.  Regarding rehabilitation she does need assistance with ADLs.  Her cognitive testing did come out at 15 per therapy.  Her systolic blood pressures ranging 120-140 her weight is stable at 169 and in comparison to December 19 also 169 pounds.  No lower extremity edema.  Is on furosemide 20 mg.  She is on atorvastatin 40 mg and she has not yet had a lipid panel so we will get that tomorrow along with a CMP.  For chronic pain including back pain and knee pain she is on tramadol 50 mg 3 times daily.  She does seem to be in good spirits overall.  Does have a good sense of humor.  Is hard of hearing.  With staff is able to use her walker up and down the hallway.  She states her appetite to be good and her pain is also well managed.    Past Medical History:   Diagnosis Date     Age-related physical debility      Anemia      Arthritis      Bladder infection 01/03/2015    dx'd- on antibiotics     Blood type AB-     with Anti-C     Breast cancer (H)      Bursitis, knee      Cancer (H)     breast      Cancer (H) 2005    left breast     Colon cancer (H)      Depression      Gout      History of transfusion      Hypertension      Hypertension      Insomnia      Macular degeneration      Osteoarthritis of multiple joints      Osteopenia      Ovarian  cyst      Rosacea      Vitamin D deficiency             Family History   Problem Relation Age of Onset     Squamous cell carcinoma Mother      Colon Cancer Sister      Breast Cancer Maternal Aunt      Bone Cancer Maternal Aunt      Colon Cancer Paternal Aunt      Breast Cancer Cousin       Social History     Socioeconomic History     Marital status:      Spouse name: Not on file     Number of children: Not on file     Years of education: Not on file     Highest education level: Not on file   Occupational History     Not on file   Tobacco Use     Smoking status: Never Smoker     Smokeless tobacco: Never Used   Substance and Sexual Activity     Alcohol use: No     Drug use: No     Sexual activity: Never   Other Topics Concern     Parent/sibling w/ CABG, MI or angioplasty before 65F 55M? Not Asked   Social History Narrative     Not on file     Social Determinants of Health     Financial Resource Strain: Not on file   Food Insecurity: Not on file   Transportation Needs: Not on file   Physical Activity: Not on file   Stress: Not on file   Social Connections: Not on file   Intimate Partner Violence: Not on file   Housing Stability: Not on file        REVIEW OF SYSTEM:  She currently denies any new symptoms of fever chills fatigue sore throat postnasal drip wheezing chest pain dizziness vertigo nausea vomiting diarrhea dysuria frequency urgency.  She does have a history of hypertension, chronic pain syndrome, ASCVD, systolic heart failure, ischemic cardiomyopathy, cataracts, hard of hearing, history of breast cancer status post mastectomy, colon cancer status post partial resection    PHYSICAL EXAM:   Pleasant female in no acute distress.  Neck is supple without adenopathy.  Conjunctiva is pink and sclerae clear.  Lung sounds are clear throughout.  Cardiovascular S1-S2 regular rate and rhythm.  No lower extremity edema however does have chronic edema.  Gastrointestinal soft nontender with positive bowel sounds.   Musculoskeletal history of chronic pain and knee osteoarthritis.  Psychiatric: Pleasant affect.       Current Outpatient Medications:      aspirin (ASA) 81 MG EC tablet, Take 1 tablet (81 mg) by mouth daily Start tomorrow., Disp: 30 tablet, Rfl: 3     atorvastatin (LIPITOR) 40 MG tablet, Take 1 tablet (40 mg) by mouth every evening, Disp: , Rfl:      calcium carbonate-vitamin D (OSCAL W/D) 500-200 MG-UNIT tablet, Take 1 tablet by mouth daily, Disp: , Rfl:      carvedilol (COREG) 6.25 MG tablet, Take 1 tablet (6.25 mg) by mouth 2 times daily (with meals), Disp: , Rfl:      furosemide (LASIX) 20 MG tablet, Take 1 tablet (20 mg) by mouth daily, Disp: , Rfl:      losartan (COZAAR) 25 MG tablet, Take 1 tablet (25 mg) by mouth daily, Disp: , Rfl:      magnesium oxide (MAG-OX) 400 (241.3 Mg) MG tablet, Take 400 mg by mouth daily, Disp: , Rfl:      Multiple Vitamins-Minerals (PRESERVISION AREDS) CAPS, Take 1 capsule by mouth 2 times daily , Disp: , Rfl:      nitroGLYcerin (NITROSTAT) 0.4 MG sublingual tablet, For chest pain place 1 tablet under the tongue every 5 minutes for 3 doses. If symptoms persist 5 minutes after 1st dose call 911., Disp: , Rfl:      olopatadine (PATANOL) 0.1 % ophthalmic solution, Place 1 drop into both eyes 2 times daily, Disp: , Rfl:      Omega-3 Fatty Acids (FISH OIL OMEGA-3 PO), Take 1 capsule by mouth daily, Disp: , Rfl:      prednisoLONE acetate (PRED FORTE) 1 % ophthalmic suspension, Place 1 drop into both eyes 2 times daily , Disp: , Rfl:      ticagrelor (BRILINTA) 90 MG tablet, Take 1 tablet (90 mg) by mouth 2 times daily Dose to start tomorrow morning., Disp: 180 tablet, Rfl: 3     traMADol (ULTRAM) 50 MG tablet, Take 1 tablet (50 mg) by mouth 3 times daily, Disp: 90 tablet, Rfl: 0     VITAMIN D, CHOLECALCIFEROL, PO, Take 4,000 Units by mouth daily , Disp: , Rfl:        /75   Pulse 72   Temp 97.5  F (36.4  C)   Resp 22   Wt 76.7 kg (169 lb)   SpO2 99%   BMI 29.94 kg/m      LABS:    Last Comprehensive Metabolic Panel:  Sodium   Date Value Ref Range Status   12/16/2021 135 (L) 136 - 145 mmol/L Final   09/15/2006 134 133 - 144 mmol/L Final     Comment:     Delta Verified     Potassium   Date Value Ref Range Status   12/16/2021 4.5 3.5 - 5.0 mmol/L Final   09/15/2006 4.1 3.4 - 5.3 mmol/L Final     Chloride   Date Value Ref Range Status   12/16/2021 105 98 - 107 mmol/L Final   09/15/2006 99 94 - 109 mmol/L Final     Carbon Dioxide   Date Value Ref Range Status   09/15/2006 28 20 - 32 mmol/L Final     Carbon Dioxide (CO2)   Date Value Ref Range Status   12/16/2021 21 (L) 22 - 31 mmol/L Final     Anion Gap   Date Value Ref Range Status   12/16/2021 9 5 - 18 mmol/L Final   09/15/2006 7 6 - 17 mmol/L Final     Glucose   Date Value Ref Range Status   12/16/2021 103 70 - 125 mg/dL Final   09/14/2006 110 60 - 110 mg/dL Final     Urea Nitrogen   Date Value Ref Range Status   12/16/2021 35 (H) 8 - 28 mg/dL Final   09/14/2006 24 7 - 30 mg/dL Final     Creatinine   Date Value Ref Range Status   12/16/2021 0.85 0.60 - 1.10 mg/dL Final   09/14/2006 1.08 0.60 - 1.30 mg/dL Final     GFR Estimate   Date Value Ref Range Status   12/16/2021 62 >60 mL/min/1.73m2 Final     Comment:     As of July 11, 2021, eGFR is calculated by the CKD-EPI creatinine equation, without race adjustment. eGFR can be influenced by muscle mass, exercise, and diet. The reported eGFR is an estimation only and is only applicable if the renal function is stable.   05/26/2021 >60 >60 mL/min/1.73m2 Final   09/14/2006 53 (L) >60 mL/min/1.7m2 Final     Calcium   Date Value Ref Range Status   12/16/2021 8.7 8.5 - 10.5 mg/dL Final   09/14/2006 9.2 8.5 - 10.4 mg/dL Final     Recent Labs   Lab Test 10/21/20  1206 03/11/19  1241   CHOL 191 194   HDL 46* 52    102   TRIG 166* 198*           ASSESSMENT:    Encounter Diagnoses   Name Primary?     Gait instability Yes     Mixed hyperlipidemia      Essential hypertension      Degeneration of  lumbosacral intervertebral disc        PLAN:    Obtaining a CMP and lipid panel tomorrow.  In the meantime continue to work with the therapy component.  Blood pressures and weight are good.  We did talk about her hospitalization as well as future intervention with cardiology in January.  She is pleased so far with the information as well as her care and did not have any other questions.        Electronically signed by: Bry Jurado NP

## 2021-12-22 VITALS
BODY MASS INDEX: 30.12 KG/M2 | HEIGHT: 63 IN | DIASTOLIC BLOOD PRESSURE: 64 MMHG | SYSTOLIC BLOOD PRESSURE: 111 MMHG | WEIGHT: 170 LBS | RESPIRATION RATE: 20 BRPM | HEART RATE: 61 BPM | OXYGEN SATURATION: 98 % | TEMPERATURE: 98.2 F

## 2021-12-22 LAB
ALBUMIN SERPL-MCNC: 2.8 G/DL (ref 3.5–5)
ALP SERPL-CCNC: 76 U/L (ref 45–120)
ALT SERPL W P-5'-P-CCNC: 21 U/L (ref 0–45)
ANION GAP SERPL CALCULATED.3IONS-SCNC: 12 MMOL/L (ref 5–18)
AST SERPL W P-5'-P-CCNC: 12 U/L (ref 0–40)
BILIRUB SERPL-MCNC: 0.4 MG/DL (ref 0–1)
BUN SERPL-MCNC: 17 MG/DL (ref 8–28)
CALCIUM SERPL-MCNC: 8.6 MG/DL (ref 8.5–10.5)
CHLORIDE BLD-SCNC: 102 MMOL/L (ref 98–107)
CHOLEST SERPL-MCNC: 112 MG/DL
CO2 SERPL-SCNC: 24 MMOL/L (ref 22–31)
CREAT SERPL-MCNC: 0.74 MG/DL (ref 0.6–1.1)
FASTING STATUS PATIENT QL REPORTED: ABNORMAL
GFR SERPL CREATININE-BSD FRML MDRD: 78 ML/MIN/1.73M2
GLUCOSE BLD-MCNC: 98 MG/DL (ref 70–125)
HDLC SERPL-MCNC: 37 MG/DL
LDLC SERPL CALC-MCNC: 63 MG/DL
POTASSIUM BLD-SCNC: 4.1 MMOL/L (ref 3.5–5)
PROT SERPL-MCNC: 5.8 G/DL (ref 6–8)
SODIUM SERPL-SCNC: 138 MMOL/L (ref 136–145)
TRIGL SERPL-MCNC: 60 MG/DL

## 2021-12-22 PROCEDURE — 80061 LIPID PANEL: CPT | Performed by: INTERNAL MEDICINE

## 2021-12-22 PROCEDURE — 36415 COLL VENOUS BLD VENIPUNCTURE: CPT | Performed by: INTERNAL MEDICINE

## 2021-12-22 PROCEDURE — 80053 COMPREHEN METABOLIC PANEL: CPT | Performed by: INTERNAL MEDICINE

## 2021-12-22 PROCEDURE — P9604 ONE-WAY ALLOW PRORATED TRIP: HCPCS | Performed by: INTERNAL MEDICINE

## 2021-12-22 ASSESSMENT — MIFFLIN-ST. JEOR: SCORE: 1175.24

## 2021-12-23 ENCOUNTER — TRANSITIONAL CARE UNIT VISIT (OUTPATIENT)
Dept: GERIATRICS | Facility: CLINIC | Age: 86
End: 2021-12-23
Payer: COMMERCIAL

## 2021-12-23 DIAGNOSIS — R63.5 WEIGHT GAIN: Primary | ICD-10-CM

## 2021-12-23 DIAGNOSIS — R60.0 BILATERAL LEG EDEMA: ICD-10-CM

## 2021-12-23 DIAGNOSIS — I10 ESSENTIAL HYPERTENSION: ICD-10-CM

## 2021-12-23 DIAGNOSIS — M62.81 GENERALIZED MUSCLE WEAKNESS: ICD-10-CM

## 2021-12-23 PROCEDURE — 99310 SBSQ NF CARE HIGH MDM 45: CPT | Performed by: FAMILY MEDICINE

## 2021-12-23 NOTE — PROGRESS NOTES
[facility] GERIATRIC SERVICES    Facility:   [facility] ([facility]) [[id number]]   Code Status: FULL CODE      CHIEF COMPLAINT/REASON FOR VISIT:  Chief Complaint   Patient presents with     RECHECK     wts, rehab, bp, nutrit       HISTORY:      HPI: Marina is a 87 year old female who does reside in the transitional care unit room 122 and who I the opportunity to revisit with today not only secondary to her hospitalization December 11, 2021 through December 14, 2021 secondary chest pain was found to have a 99% proximal to mid LAD occlusion and is treated with PAT x1 as well as today's discussion of rehabilitation, blood pressures, weight gain and rehabilitation.  Regarding the rehabilitation she is participating in therapy however she feels that at 1 point she was all independent using her walker but now she needs assistance with ADLs and she like to be stronger again quicker but we did talk about the process.  Her systolic blood pressures ranging 111-149.  Her weight 170 pounds in comparison to December 19 169 pounds December 17 163 pounds.  She does have chronic lower extremity edema I do not feel it to be significantly increasing and nonpitting however due to the weight gain issues and a history we will go ahead and increase the furosemide to 20 mg twice daily and add the 10 of potassium.  Of note she did have a CMP performed yesterday see results below but the sodium 138 potassium 4.1.  Also went over her cholesterol 112 with LDL 63 and she is on atorvastatin 40 mg.  She recently did have a dietary visit as well as note due to the weight gain.  Her appetite is good she is not having any problems with sleep her albumin at 2.8 and can have extra nutrient supplements.  She is hard of hearing as well as difficulty with vision.  Denies any cough or shortness of breath or dyspnea.    Past Medical History:   Diagnosis Date     Age-related physical debility      Anemia      Arthritis      Bladder infection 01/03/2015     dx'd- on antibiotics     Blood type AB-     with Anti-C     Breast cancer (H)      Bursitis, knee      Cancer (H)     breast      Cancer (H) 2005    left breast     Colon cancer (H)      Depression      Gout      History of transfusion      Hypertension      Hypertension      Insomnia      Macular degeneration      Osteoarthritis of multiple joints      Osteopenia      Ovarian cyst      Rosacea      Vitamin D deficiency             Family History   Problem Relation Age of Onset     Squamous cell carcinoma Mother      Colon Cancer Sister      Breast Cancer Maternal Aunt      Bone Cancer Maternal Aunt      Colon Cancer Paternal Aunt      Breast Cancer Cousin       Social History     Socioeconomic History     Marital status:      Spouse name: Not on file     Number of children: Not on file     Years of education: Not on file     Highest education level: Not on file   Occupational History     Not on file   Tobacco Use     Smoking status: Never Smoker     Smokeless tobacco: Never Used   Substance and Sexual Activity     Alcohol use: No     Drug use: No     Sexual activity: Never   Other Topics Concern     Parent/sibling w/ CABG, MI or angioplasty before 65F 55M? Not Asked   Social History Narrative     Not on file     Social Determinants of Health     Financial Resource Strain: Not on file   Food Insecurity: Not on file   Transportation Needs: Not on file   Physical Activity: Not on file   Stress: Not on file   Social Connections: Not on file   Intimate Partner Violence: Not on file   Housing Stability: Not on file        REVIEW OF SYSTEM:  She currently denies any new symptoms of fever chills fatigue sore throat postnasal drip wheezing chest pain dizziness vertigo nausea vomiting diarrhea dysuria frequency urgency.  She does have a history of hypertension, chronic pain syndrome, ASCVD, systolic heart failure, ischemic cardiomyopathy, cataracts, hard of hearing, history of breast cancer status post mastectomy,  colon cancer status post partial resection    PHYSICAL EXAM:   Pleasant female in no acute distress.  Neck is supple without adenopathy.   Lung sounds are clear throughout.  Cardiovascular S1-S2 regular rate and rhythm.  Chronic lower extremity edema.  Nonpitting.  Gastrointestinal soft nontender with positive bowel sounds.  Musculoskeletal history of chronic pain and knee osteoarthritis.  Psychiatric: Pleasant affect.       Current Outpatient Medications:      aspirin (ASA) 81 MG EC tablet, Take 1 tablet (81 mg) by mouth daily Start tomorrow., Disp: 30 tablet, Rfl: 3     atorvastatin (LIPITOR) 40 MG tablet, Take 1 tablet (40 mg) by mouth every evening, Disp: , Rfl:      calcium carbonate-vitamin D (OSCAL W/D) 500-200 MG-UNIT tablet, Take 1 tablet by mouth daily, Disp: , Rfl:      carvedilol (COREG) 6.25 MG tablet, Take 1 tablet (6.25 mg) by mouth 2 times daily (with meals), Disp: , Rfl:      furosemide (LASIX) 20 MG tablet, Take 1 tablet (20 mg) by mouth daily, Disp: , Rfl:      losartan (COZAAR) 25 MG tablet, Take 1 tablet (25 mg) by mouth daily, Disp: , Rfl:      magnesium oxide (MAG-OX) 400 (241.3 Mg) MG tablet, Take 400 mg by mouth daily, Disp: , Rfl:      Multiple Vitamins-Minerals (PRESERVISION AREDS) CAPS, Take 1 capsule by mouth 2 times daily , Disp: , Rfl:      nitroGLYcerin (NITROSTAT) 0.4 MG sublingual tablet, For chest pain place 1 tablet under the tongue every 5 minutes for 3 doses. If symptoms persist 5 minutes after 1st dose call 911., Disp: , Rfl:      olopatadine (PATANOL) 0.1 % ophthalmic solution, Place 1 drop into both eyes 2 times daily, Disp: , Rfl:      Omega-3 Fatty Acids (FISH OIL OMEGA-3 PO), Take 1 capsule by mouth daily, Disp: , Rfl:      prednisoLONE acetate (PRED FORTE) 1 % ophthalmic suspension, Place 1 drop into both eyes 2 times daily , Disp: , Rfl:      ticagrelor (BRILINTA) 90 MG tablet, Take 1 tablet (90 mg) by mouth 2 times daily Dose to start tomorrow morning., Disp: 180  "tablet, Rfl: 3     traMADol (ULTRAM) 50 MG tablet, Take 1 tablet (50 mg) by mouth 3 times daily, Disp: 90 tablet, Rfl: 0     VITAMIN D, CHOLECALCIFEROL, PO, Take 4,000 Units by mouth daily , Disp: , Rfl:     /64   Pulse 61   Temp 98.2  F (36.8  C)   Resp 20   Ht 1.6 m (5' 3\")   Wt 77.1 kg (170 lb)   SpO2 98%   BMI 30.11 kg/m      LABS:   Last Comprehensive Metabolic Panel:  Sodium   Date Value Ref Range Status   12/22/2021 138 136 - 145 mmol/L Final   09/15/2006 134 133 - 144 mmol/L Final     Comment:     Delta Verified     Potassium   Date Value Ref Range Status   12/22/2021 4.1 3.5 - 5.0 mmol/L Final   09/15/2006 4.1 3.4 - 5.3 mmol/L Final     Chloride   Date Value Ref Range Status   12/22/2021 102 98 - 107 mmol/L Final   09/15/2006 99 94 - 109 mmol/L Final     Carbon Dioxide   Date Value Ref Range Status   09/15/2006 28 20 - 32 mmol/L Final     Carbon Dioxide (CO2)   Date Value Ref Range Status   12/22/2021 24 22 - 31 mmol/L Final     Anion Gap   Date Value Ref Range Status   12/22/2021 12 5 - 18 mmol/L Final   09/15/2006 7 6 - 17 mmol/L Final     Glucose   Date Value Ref Range Status   12/22/2021 98 70 - 125 mg/dL Final   09/14/2006 110 60 - 110 mg/dL Final     Urea Nitrogen   Date Value Ref Range Status   12/22/2021 17 8 - 28 mg/dL Final   09/14/2006 24 7 - 30 mg/dL Final     Creatinine   Date Value Ref Range Status   12/22/2021 0.74 0.60 - 1.10 mg/dL Final   09/14/2006 1.08 0.60 - 1.30 mg/dL Final     GFR Estimate   Date Value Ref Range Status   12/22/2021 78 >60 mL/min/1.73m2 Final     Comment:     Effective December 21, 2021 eGFRcr in adults is calculated using the 2021 CKD-EPI creatinine equation which includes age and gender (Maria Del Carmen woodruff al., NEJM, DOI: 10.1056/YXPRbm4169720)   05/26/2021 >60 >60 mL/min/1.73m2 Final   09/14/2006 53 (L) >60 mL/min/1.7m2 Final     Calcium   Date Value Ref Range Status   12/22/2021 8.6 8.5 - 10.5 mg/dL Final   09/14/2006 9.2 8.5 - 10.4 mg/dL Final     Bilirubin " Total   Date Value Ref Range Status   12/22/2021 0.4 0.0 - 1.0 mg/dL Final     Alkaline Phosphatase   Date Value Ref Range Status   12/22/2021 76 45 - 120 U/L Final     ALT   Date Value Ref Range Status   12/22/2021 21 0 - 45 U/L Final     AST   Date Value Ref Range Status   12/22/2021 12 0 - 40 U/L Final             Recent Labs   Lab Test 12/22/21  0649 10/21/20  1206   CHOL 112 191   HDL 37* 46*   LDL 63 112   TRIG 60 166*           ASSESSMENT:    Encounter Diagnoses   Name Primary?     Weight gain Yes     Bilateral leg edema      Essential hypertension      Generalized muscle weakness        PLAN:    We had a chance to go over her laboratory studies including her albumin.  Went over her cholesterol results is on atorvastatin 40 mg.  She is on furosemide currently at 20 mg but the weight does climb off but I do not hear any problems with her lungs or see extra edema in her legs however we will go ahead and increase the furosemide for 7 days to 20 mg twice daily potassium of 10.  She recently did have a dietary consult as well.  Talk to the charge nurse about the weight as well as the current plan of care.  She will continue to work with therapy to gain her independence.    For documentation purposes total visit 35 minutes which over 50% initially spent with the patient going over her laboratory studies, her medications, medication changes, the concern with her weight, increasing the furosemide, looking at her blood pressures, looking at her nutrition as well as discussing the rehabilitation process.        Electronically signed by: Bry Jurado NP

## 2021-12-23 NOTE — LETTER
12/23/2021        RE: Marina ADAME Edinson  [address]  [place] [address]        [facility] GERIATRIC SERVICES    Facility:   [facility] ([facility]) [[id number]]   Code Status: FULL CODE      CHIEF COMPLAINT/REASON FOR VISIT:  Chief Complaint   Patient presents with     RECHECK     wts, rehab, bp, nutrit       HISTORY:      HPI: Marina is a 87 year old female who does reside in the transitional care unit room 122 and who I the opportunity to revisit with today not only secondary to her hospitalization December 11, 2021 through December 14, 2021 secondary chest pain was found to have a 99% proximal to mid LAD occlusion and is treated with PAT x1 as well as today's discussion of rehabilitation, blood pressures, weight gain and rehabilitation.  Regarding the rehabilitation she is participating in therapy however she feels that at 1 point she was all independent using her walker but now she needs assistance with ADLs and she like to be stronger again quicker but we did talk about the process.  Her systolic blood pressures ranging 111-149.  Her weight 170 pounds in comparison to December 19 169 pounds December 17 163 pounds.  She does have chronic lower extremity edema I do not feel it to be significantly increasing and nonpitting however due to the weight gain issues and a history we will go ahead and increase the furosemide to 20 mg twice daily and add the 10 of potassium.  Of note she did have a CMP performed yesterday see results below but the sodium 138 potassium 4.1.  Also went over her cholesterol 112 with LDL 63 and she is on atorvastatin 40 mg.  She recently did have a dietary visit as well as note due to the weight gain.  Her appetite is good she is not having any problems with sleep her albumin at 2.8 and can have extra nutrient supplements.  She is hard of hearing as well as difficulty with vision.  Denies any cough or shortness of breath or dyspnea.    Past Medical History:   Diagnosis Date      Age-related physical debility      Anemia      Arthritis      Bladder infection 01/03/2015    dx'd- on antibiotics     Blood type AB-     with Anti-C     Breast cancer (H)      Bursitis, knee      Cancer (H)     breast      Cancer (H) 2005    left breast     Colon cancer (H)      Depression      Gout      History of transfusion      Hypertension      Hypertension      Insomnia      Macular degeneration      Osteoarthritis of multiple joints      Osteopenia      Ovarian cyst      Rosacea      Vitamin D deficiency             Family History   Problem Relation Age of Onset     Squamous cell carcinoma Mother      Colon Cancer Sister      Breast Cancer Maternal Aunt      Bone Cancer Maternal Aunt      Colon Cancer Paternal Aunt      Breast Cancer Cousin       Social History     Socioeconomic History     Marital status:      Spouse name: Not on file     Number of children: Not on file     Years of education: Not on file     Highest education level: Not on file   Occupational History     Not on file   Tobacco Use     Smoking status: Never Smoker     Smokeless tobacco: Never Used   Substance and Sexual Activity     Alcohol use: No     Drug use: No     Sexual activity: Never   Other Topics Concern     Parent/sibling w/ CABG, MI or angioplasty before 65F 55M? Not Asked   Social History Narrative     Not on file     Social Determinants of Health     Financial Resource Strain: Not on file   Food Insecurity: Not on file   Transportation Needs: Not on file   Physical Activity: Not on file   Stress: Not on file   Social Connections: Not on file   Intimate Partner Violence: Not on file   Housing Stability: Not on file        REVIEW OF SYSTEM:  She currently denies any new symptoms of fever chills fatigue sore throat postnasal drip wheezing chest pain dizziness vertigo nausea vomiting diarrhea dysuria frequency urgency.  She does have a history of hypertension, chronic pain syndrome, ASCVD, systolic heart failure,  ischemic cardiomyopathy, cataracts, hard of hearing, history of breast cancer status post mastectomy, colon cancer status post partial resection    PHYSICAL EXAM:   Pleasant female in no acute distress.  Neck is supple without adenopathy.   Lung sounds are clear throughout.  Cardiovascular S1-S2 regular rate and rhythm.  Chronic lower extremity edema.  Nonpitting.  Gastrointestinal soft nontender with positive bowel sounds.  Musculoskeletal history of chronic pain and knee osteoarthritis.  Psychiatric: Pleasant affect.       Current Outpatient Medications:      aspirin (ASA) 81 MG EC tablet, Take 1 tablet (81 mg) by mouth daily Start tomorrow., Disp: 30 tablet, Rfl: 3     atorvastatin (LIPITOR) 40 MG tablet, Take 1 tablet (40 mg) by mouth every evening, Disp: , Rfl:      calcium carbonate-vitamin D (OSCAL W/D) 500-200 MG-UNIT tablet, Take 1 tablet by mouth daily, Disp: , Rfl:      carvedilol (COREG) 6.25 MG tablet, Take 1 tablet (6.25 mg) by mouth 2 times daily (with meals), Disp: , Rfl:      furosemide (LASIX) 20 MG tablet, Take 1 tablet (20 mg) by mouth daily, Disp: , Rfl:      losartan (COZAAR) 25 MG tablet, Take 1 tablet (25 mg) by mouth daily, Disp: , Rfl:      magnesium oxide (MAG-OX) 400 (241.3 Mg) MG tablet, Take 400 mg by mouth daily, Disp: , Rfl:      Multiple Vitamins-Minerals (PRESERVISION AREDS) CAPS, Take 1 capsule by mouth 2 times daily , Disp: , Rfl:      nitroGLYcerin (NITROSTAT) 0.4 MG sublingual tablet, For chest pain place 1 tablet under the tongue every 5 minutes for 3 doses. If symptoms persist 5 minutes after 1st dose call 911., Disp: , Rfl:      olopatadine (PATANOL) 0.1 % ophthalmic solution, Place 1 drop into both eyes 2 times daily, Disp: , Rfl:      Omega-3 Fatty Acids (FISH OIL OMEGA-3 PO), Take 1 capsule by mouth daily, Disp: , Rfl:      prednisoLONE acetate (PRED FORTE) 1 % ophthalmic suspension, Place 1 drop into both eyes 2 times daily , Disp: , Rfl:      ticagrelor (BRILINTA) 90 MG  "tablet, Take 1 tablet (90 mg) by mouth 2 times daily Dose to start tomorrow morning., Disp: 180 tablet, Rfl: 3     traMADol (ULTRAM) 50 MG tablet, Take 1 tablet (50 mg) by mouth 3 times daily, Disp: 90 tablet, Rfl: 0     VITAMIN D, CHOLECALCIFEROL, PO, Take 4,000 Units by mouth daily , Disp: , Rfl:     /64   Pulse 61   Temp 98.2  F (36.8  C)   Resp 20   Ht 1.6 m (5' 3\")   Wt 77.1 kg (170 lb)   SpO2 98%   BMI 30.11 kg/m      LABS:   Last Comprehensive Metabolic Panel:  Sodium   Date Value Ref Range Status   12/22/2021 138 136 - 145 mmol/L Final   09/15/2006 134 133 - 144 mmol/L Final     Comment:     Delta Verified     Potassium   Date Value Ref Range Status   12/22/2021 4.1 3.5 - 5.0 mmol/L Final   09/15/2006 4.1 3.4 - 5.3 mmol/L Final     Chloride   Date Value Ref Range Status   12/22/2021 102 98 - 107 mmol/L Final   09/15/2006 99 94 - 109 mmol/L Final     Carbon Dioxide   Date Value Ref Range Status   09/15/2006 28 20 - 32 mmol/L Final     Carbon Dioxide (CO2)   Date Value Ref Range Status   12/22/2021 24 22 - 31 mmol/L Final     Anion Gap   Date Value Ref Range Status   12/22/2021 12 5 - 18 mmol/L Final   09/15/2006 7 6 - 17 mmol/L Final     Glucose   Date Value Ref Range Status   12/22/2021 98 70 - 125 mg/dL Final   09/14/2006 110 60 - 110 mg/dL Final     Urea Nitrogen   Date Value Ref Range Status   12/22/2021 17 8 - 28 mg/dL Final   09/14/2006 24 7 - 30 mg/dL Final     Creatinine   Date Value Ref Range Status   12/22/2021 0.74 0.60 - 1.10 mg/dL Final   09/14/2006 1.08 0.60 - 1.30 mg/dL Final     GFR Estimate   Date Value Ref Range Status   12/22/2021 78 >60 mL/min/1.73m2 Final     Comment:     Effective December 21, 2021 eGFRcr in adults is calculated using the 2021 CKD-EPI creatinine equation which includes age and gender ( et al., NEJM, DOI: 10.1056/YURXic4728631)   05/26/2021 >60 >60 mL/min/1.73m2 Final   09/14/2006 53 (L) >60 mL/min/1.7m2 Final     Calcium   Date Value Ref Range Status "   12/22/2021 8.6 8.5 - 10.5 mg/dL Final   09/14/2006 9.2 8.5 - 10.4 mg/dL Final     Bilirubin Total   Date Value Ref Range Status   12/22/2021 0.4 0.0 - 1.0 mg/dL Final     Alkaline Phosphatase   Date Value Ref Range Status   12/22/2021 76 45 - 120 U/L Final     ALT   Date Value Ref Range Status   12/22/2021 21 0 - 45 U/L Final     AST   Date Value Ref Range Status   12/22/2021 12 0 - 40 U/L Final             Recent Labs   Lab Test 12/22/21  0649 10/21/20  1206   CHOL 112 191   HDL 37* 46*   LDL 63 112   TRIG 60 166*           ASSESSMENT:    Encounter Diagnoses   Name Primary?     Weight gain Yes     Bilateral leg edema      Essential hypertension      Generalized muscle weakness        PLAN:    We had a chance to go over her laboratory studies including her albumin.  Went over her cholesterol results is on atorvastatin 40 mg.  She is on furosemide currently at 20 mg but the weight does climb off but I do not hear any problems with her lungs or see extra edema in her legs however we will go ahead and increase the furosemide for 7 days to 20 mg twice daily potassium of 10.  She recently did have a dietary consult as well.  Talk to the charge nurse about the weight as well as the current plan of care.  She will continue to work with therapy to gain her independence.    For documentation purposes total visit 35 minutes which over 50% initially spent with the patient going over her laboratory studies, her medications, medication changes, the concern with her weight, increasing the furosemide, looking at her blood pressures, looking at her nutrition as well as discussing the rehabilitation process.        Electronically signed by: Bry Jurado NP          Sincerely,        Bry Jurado NP

## 2021-12-27 ENCOUNTER — TRANSITIONAL CARE UNIT VISIT (OUTPATIENT)
Dept: GERIATRICS | Facility: CLINIC | Age: 86
End: 2021-12-27
Payer: COMMERCIAL

## 2021-12-27 VITALS
HEART RATE: 70 BPM | RESPIRATION RATE: 18 BRPM | WEIGHT: 168 LBS | HEIGHT: 63 IN | DIASTOLIC BLOOD PRESSURE: 72 MMHG | SYSTOLIC BLOOD PRESSURE: 130 MMHG | BODY MASS INDEX: 29.77 KG/M2 | TEMPERATURE: 98.3 F | OXYGEN SATURATION: 97 %

## 2021-12-27 DIAGNOSIS — M62.81 GENERALIZED MUSCLE WEAKNESS: ICD-10-CM

## 2021-12-27 DIAGNOSIS — M25.561 CHRONIC PAIN OF RIGHT KNEE: Primary | ICD-10-CM

## 2021-12-27 DIAGNOSIS — K59.00 CONSTIPATION, UNSPECIFIED CONSTIPATION TYPE: ICD-10-CM

## 2021-12-27 DIAGNOSIS — I10 ESSENTIAL HYPERTENSION: ICD-10-CM

## 2021-12-27 DIAGNOSIS — G89.29 CHRONIC PAIN OF RIGHT KNEE: Primary | ICD-10-CM

## 2021-12-27 PROCEDURE — 99309 SBSQ NF CARE MODERATE MDM 30: CPT | Performed by: FAMILY MEDICINE

## 2021-12-27 RX ORDER — SENNOSIDES 8.6 MG
1 TABLET ORAL 2 TIMES DAILY
Status: ON HOLD | COMMUNITY
End: 2022-06-07

## 2021-12-27 ASSESSMENT — MIFFLIN-ST. JEOR: SCORE: 1166.17

## 2021-12-27 NOTE — PROGRESS NOTES
[facility] GERIATRIC SERVICES    Facility:   [facility] ([facility]) [[id number]]   Code Status: FULL CODE      CHIEF COMPLAINT/REASON FOR VISIT:  Chief Complaint   Patient presents with     RECHECK     rehab, heart, bp, nutrition       HISTORY:      HPI: Marina is a 87 year old female who I had the opportunity to revisit with today not only secondary to her hospitalization December 11, 2021 through December 14, 2021 secondary to chest pain was found to have a 99% proximal to mid LAD occlusion who was treated with PAT x1 but also again today discussed her chronic pain, constipation, blood pressures and rehabilitation.  Regarding nutrition she states she is eating just fine.  Her weight is not accurate today they weighed her at 147 pounds yesterday they had her weight at 168 pounds on the 24th 170 pounds.  In looking at her she has not had a 20 pound weight loss in 24 hours.  For chronic lower extremity edema she is on furosemide 20 mg twice daily with 10 of K.  She is having some issues of constipation she does have a history of constipation we will add senna S twice daily.  She like to be able to move more and ambulate and be independent she is able to use her four-wheel walker with therapy but she is disappointed that she has deconditioning and generalized weakness.  Her systolic blood pressures ranging 130-140.  Regarding her pain particularly right knee pain he also has a history of right shoulder pain she does take tramadol schedule III times daily they can also put some ice packs on the area.  Denies any shortness of breath or dyspnea with exertion.    Past Medical History:   Diagnosis Date     Age-related physical debility      Anemia      Arthritis      Bladder infection 01/03/2015    dx'd- on antibiotics     Blood type AB-     with Anti-C     Breast cancer (H)      Bursitis, knee      Cancer (H)     breast      Cancer (H) 2005    left breast     Colon cancer (H)      Depression      Gout      History of  transfusion      Hypertension      Hypertension      Insomnia      Macular degeneration      Osteoarthritis of multiple joints      Osteopenia      Ovarian cyst      Rosacea      Vitamin D deficiency             Family History   Problem Relation Age of Onset     Squamous cell carcinoma Mother      Colon Cancer Sister      Breast Cancer Maternal Aunt      Bone Cancer Maternal Aunt      Colon Cancer Paternal Aunt      Breast Cancer Cousin       Social History     Socioeconomic History     Marital status:      Spouse name: Not on file     Number of children: Not on file     Years of education: Not on file     Highest education level: Not on file   Occupational History     Not on file   Tobacco Use     Smoking status: Never Smoker     Smokeless tobacco: Never Used   Substance and Sexual Activity     Alcohol use: No     Drug use: No     Sexual activity: Never   Other Topics Concern     Parent/sibling w/ CABG, MI or angioplasty before 65F 55M? Not Asked   Social History Narrative     Not on file     Social Determinants of Health     Financial Resource Strain: Not on file   Food Insecurity: Not on file   Transportation Needs: Not on file   Physical Activity: Not on file   Stress: Not on file   Social Connections: Not on file   Intimate Partner Violence: Not on file   Housing Stability: Not on file        REVIEW OF SYSTEM:  She currently denies any new symptoms of fever chills fatigue sore throat postnasal drip wheezing chest pain dizziness vertigo nausea vomiting diarrhea dysuria frequency urgency.  She does have a history of hypertension, chronic pain syndrome, ASCVD, systolic heart failure, ischemic cardiomyopathy, cataracts, hard of hearing, history of breast cancer status post mastectomy, colon cancer status post partial resection    PHYSICAL EXAM:   Pleasant female in no acute distress.  Neck is supple without adenopathy.   Lung sounds are clear throughout.  Cardiovascular S1-S2 regular rate and rhythm.   Chronic lower extremity edema.  Nonpitting.  Gastrointestinal soft nontender with positive bowel sounds.  Musculoskeletal history of chronic pain and knee osteoarthritis.  Psychiatric: Pleasant affect.    Current Outpatient Medications:      sennosides (SENOKOT) 8.6 MG tablet, Take 1 tablet by mouth 2 times daily, Disp: , Rfl:      aspirin (ASA) 81 MG EC tablet, Take 1 tablet (81 mg) by mouth daily Start tomorrow., Disp: 30 tablet, Rfl: 3     atorvastatin (LIPITOR) 40 MG tablet, Take 1 tablet (40 mg) by mouth every evening, Disp: , Rfl:      calcium carbonate-vitamin D (OSCAL W/D) 500-200 MG-UNIT tablet, Take 1 tablet by mouth daily, Disp: , Rfl:      carvedilol (COREG) 6.25 MG tablet, Take 1 tablet (6.25 mg) by mouth 2 times daily (with meals), Disp: , Rfl:      furosemide (LASIX) 20 MG tablet, Take 1 tablet (20 mg) by mouth daily, Disp: , Rfl:      losartan (COZAAR) 25 MG tablet, Take 1 tablet (25 mg) by mouth daily, Disp: , Rfl:      magnesium oxide (MAG-OX) 400 (241.3 Mg) MG tablet, Take 400 mg by mouth daily, Disp: , Rfl:      Multiple Vitamins-Minerals (PRESERVISION AREDS) CAPS, Take 1 capsule by mouth 2 times daily , Disp: , Rfl:      nitroGLYcerin (NITROSTAT) 0.4 MG sublingual tablet, For chest pain place 1 tablet under the tongue every 5 minutes for 3 doses. If symptoms persist 5 minutes after 1st dose call 911., Disp: , Rfl:      olopatadine (PATANOL) 0.1 % ophthalmic solution, Place 1 drop into both eyes 2 times daily, Disp: , Rfl:      Omega-3 Fatty Acids (FISH OIL OMEGA-3 PO), Take 1 capsule by mouth daily, Disp: , Rfl:      prednisoLONE acetate (PRED FORTE) 1 % ophthalmic suspension, Place 1 drop into both eyes 2 times daily , Disp: , Rfl:      ticagrelor (BRILINTA) 90 MG tablet, Take 1 tablet (90 mg) by mouth 2 times daily Dose to start tomorrow morning., Disp: 180 tablet, Rfl: 3     traMADol (ULTRAM) 50 MG tablet, Take 1 tablet (50 mg) by mouth 3 times daily, Disp: 90 tablet, Rfl: 0     VITAMIN D,  "CHOLECALCIFEROL, PO, Take 4,000 Units by mouth daily , Disp: , Rfl:        /72   Pulse 70   Temp 98.3  F (36.8  C)   Resp 18   Ht 1.6 m (5' 3\")   Wt 76.2 kg (168 lb)   SpO2 97%   BMI 29.76 kg/m      LABS:   Last Comprehensive Metabolic Panel:  Sodium   Date Value Ref Range Status   12/22/2021 138 136 - 145 mmol/L Final   09/15/2006 134 133 - 144 mmol/L Final     Comment:     Delta Verified     Potassium   Date Value Ref Range Status   12/22/2021 4.1 3.5 - 5.0 mmol/L Final   09/15/2006 4.1 3.4 - 5.3 mmol/L Final     Chloride   Date Value Ref Range Status   12/22/2021 102 98 - 107 mmol/L Final   09/15/2006 99 94 - 109 mmol/L Final     Carbon Dioxide   Date Value Ref Range Status   09/15/2006 28 20 - 32 mmol/L Final     Carbon Dioxide (CO2)   Date Value Ref Range Status   12/22/2021 24 22 - 31 mmol/L Final     Anion Gap   Date Value Ref Range Status   12/22/2021 12 5 - 18 mmol/L Final   09/15/2006 7 6 - 17 mmol/L Final     Glucose   Date Value Ref Range Status   12/22/2021 98 70 - 125 mg/dL Final   09/14/2006 110 60 - 110 mg/dL Final     Urea Nitrogen   Date Value Ref Range Status   12/22/2021 17 8 - 28 mg/dL Final   09/14/2006 24 7 - 30 mg/dL Final     Creatinine   Date Value Ref Range Status   12/22/2021 0.74 0.60 - 1.10 mg/dL Final   09/14/2006 1.08 0.60 - 1.30 mg/dL Final     GFR Estimate   Date Value Ref Range Status   12/22/2021 78 >60 mL/min/1.73m2 Final     Comment:     Effective December 21, 2021 eGFRcr in adults is calculated using the 2021 CKD-EPI creatinine equation which includes age and gender (Maria Del Carmen woodruff al., NEJM, DOI: 10.1056/BCZBka3250200)   05/26/2021 >60 >60 mL/min/1.73m2 Final   09/14/2006 53 (L) >60 mL/min/1.7m2 Final     Calcium   Date Value Ref Range Status   12/22/2021 8.6 8.5 - 10.5 mg/dL Final   09/14/2006 9.2 8.5 - 10.4 mg/dL Final           ASSESSMENT:    Encounter Diagnoses   Name Primary?     Chronic pain of right knee Yes     Constipation, unspecified constipation type      " Essential hypertension      Generalized muscle weakness        PLAN:    Continue with current medication management as well as now adding senna S twice daily.  Obtain accurate weights.  We did have a chance to talk about her blood pressures, rehabilitation, nutrition as well as her wish to be independent and discussion of gaining independence with therapy support.  She recently did have a dietary consult as well.  She did not have any other questions.        Electronically signed by: Bry Jurado NP

## 2021-12-27 NOTE — LETTER
12/27/2021        RE: Marina ADAME Edinson  [address]  [place] [address]        M [facility] GERIATRIC SERVICES    Facility:   [facility] ([facility]) [[id number]]   Code Status: FULL CODE      CHIEF COMPLAINT/REASON FOR VISIT:  Chief Complaint   Patient presents with     RECHECK     rehab, heart, bp, nutrition       HISTORY:      HPI: Marina is a 87 year old female who I had the opportunity to revisit with today not only secondary to her hospitalization December 11, 2021 through December 14, 2021 secondary to chest pain was found to have a 99% proximal to mid LAD occlusion who was treated with PAT x1 but also again today discussed her chronic pain, constipation, blood pressures and rehabilitation.  Regarding nutrition she states she is eating just fine.  Her weight is not accurate today they weighed her at 147 pounds yesterday they had her weight at 168 pounds on the 24th 170 pounds.  In looking at her she has not had a 20 pound weight loss in 24 hours.  For chronic lower extremity edema she is on furosemide 20 mg twice daily with 10 of K.  She is having some issues of constipation she does have a history of constipation we will add senna S twice daily.  She like to be able to move more and ambulate and be independent she is able to use her four-wheel walker with therapy but she is disappointed that she has deconditioning and generalized weakness.  Her systolic blood pressures ranging 130-140.  Regarding her pain particularly right knee pain he also has a history of right shoulder pain she does take tramadol schedule III times daily they can also put some ice packs on the area.  Denies any shortness of breath or dyspnea with exertion.    Past Medical History:   Diagnosis Date     Age-related physical debility      Anemia      Arthritis      Bladder infection 01/03/2015    dx'd- on antibiotics     Blood type AB-     with Anti-C     Breast cancer (H)      Bursitis, knee      Cancer (H)     breast       Cancer (H) 2005    left breast     Colon cancer (H)      Depression      Gout      History of transfusion      Hypertension      Hypertension      Insomnia      Macular degeneration      Osteoarthritis of multiple joints      Osteopenia      Ovarian cyst      Rosacea      Vitamin D deficiency             Family History   Problem Relation Age of Onset     Squamous cell carcinoma Mother      Colon Cancer Sister      Breast Cancer Maternal Aunt      Bone Cancer Maternal Aunt      Colon Cancer Paternal Aunt      Breast Cancer Cousin       Social History     Socioeconomic History     Marital status:      Spouse name: Not on file     Number of children: Not on file     Years of education: Not on file     Highest education level: Not on file   Occupational History     Not on file   Tobacco Use     Smoking status: Never Smoker     Smokeless tobacco: Never Used   Substance and Sexual Activity     Alcohol use: No     Drug use: No     Sexual activity: Never   Other Topics Concern     Parent/sibling w/ CABG, MI or angioplasty before 65F 55M? Not Asked   Social History Narrative     Not on file     Social Determinants of Health     Financial Resource Strain: Not on file   Food Insecurity: Not on file   Transportation Needs: Not on file   Physical Activity: Not on file   Stress: Not on file   Social Connections: Not on file   Intimate Partner Violence: Not on file   Housing Stability: Not on file        REVIEW OF SYSTEM:  She currently denies any new symptoms of fever chills fatigue sore throat postnasal drip wheezing chest pain dizziness vertigo nausea vomiting diarrhea dysuria frequency urgency.  She does have a history of hypertension, chronic pain syndrome, ASCVD, systolic heart failure, ischemic cardiomyopathy, cataracts, hard of hearing, history of breast cancer status post mastectomy, colon cancer status post partial resection    PHYSICAL EXAM:   Pleasant female in no acute distress.  Neck is supple without  adenopathy.   Lung sounds are clear throughout.  Cardiovascular S1-S2 regular rate and rhythm.  Chronic lower extremity edema.  Nonpitting.  Gastrointestinal soft nontender with positive bowel sounds.  Musculoskeletal history of chronic pain and knee osteoarthritis.  Psychiatric: Pleasant affect.    Current Outpatient Medications:      sennosides (SENOKOT) 8.6 MG tablet, Take 1 tablet by mouth 2 times daily, Disp: , Rfl:      aspirin (ASA) 81 MG EC tablet, Take 1 tablet (81 mg) by mouth daily Start tomorrow., Disp: 30 tablet, Rfl: 3     atorvastatin (LIPITOR) 40 MG tablet, Take 1 tablet (40 mg) by mouth every evening, Disp: , Rfl:      calcium carbonate-vitamin D (OSCAL W/D) 500-200 MG-UNIT tablet, Take 1 tablet by mouth daily, Disp: , Rfl:      carvedilol (COREG) 6.25 MG tablet, Take 1 tablet (6.25 mg) by mouth 2 times daily (with meals), Disp: , Rfl:      furosemide (LASIX) 20 MG tablet, Take 1 tablet (20 mg) by mouth daily, Disp: , Rfl:      losartan (COZAAR) 25 MG tablet, Take 1 tablet (25 mg) by mouth daily, Disp: , Rfl:      magnesium oxide (MAG-OX) 400 (241.3 Mg) MG tablet, Take 400 mg by mouth daily, Disp: , Rfl:      Multiple Vitamins-Minerals (PRESERVISION AREDS) CAPS, Take 1 capsule by mouth 2 times daily , Disp: , Rfl:      nitroGLYcerin (NITROSTAT) 0.4 MG sublingual tablet, For chest pain place 1 tablet under the tongue every 5 minutes for 3 doses. If symptoms persist 5 minutes after 1st dose call 911., Disp: , Rfl:      olopatadine (PATANOL) 0.1 % ophthalmic solution, Place 1 drop into both eyes 2 times daily, Disp: , Rfl:      Omega-3 Fatty Acids (FISH OIL OMEGA-3 PO), Take 1 capsule by mouth daily, Disp: , Rfl:      prednisoLONE acetate (PRED FORTE) 1 % ophthalmic suspension, Place 1 drop into both eyes 2 times daily , Disp: , Rfl:      ticagrelor (BRILINTA) 90 MG tablet, Take 1 tablet (90 mg) by mouth 2 times daily Dose to start tomorrow morning., Disp: 180 tablet, Rfl: 3     traMADol (ULTRAM) 50 MG  "tablet, Take 1 tablet (50 mg) by mouth 3 times daily, Disp: 90 tablet, Rfl: 0     VITAMIN D, CHOLECALCIFEROL, PO, Take 4,000 Units by mouth daily , Disp: , Rfl:        /72   Pulse 70   Temp 98.3  F (36.8  C)   Resp 18   Ht 1.6 m (5' 3\")   Wt 76.2 kg (168 lb)   SpO2 97%   BMI 29.76 kg/m      LABS:   Last Comprehensive Metabolic Panel:  Sodium   Date Value Ref Range Status   12/22/2021 138 136 - 145 mmol/L Final   09/15/2006 134 133 - 144 mmol/L Final     Comment:     Delta Verified     Potassium   Date Value Ref Range Status   12/22/2021 4.1 3.5 - 5.0 mmol/L Final   09/15/2006 4.1 3.4 - 5.3 mmol/L Final     Chloride   Date Value Ref Range Status   12/22/2021 102 98 - 107 mmol/L Final   09/15/2006 99 94 - 109 mmol/L Final     Carbon Dioxide   Date Value Ref Range Status   09/15/2006 28 20 - 32 mmol/L Final     Carbon Dioxide (CO2)   Date Value Ref Range Status   12/22/2021 24 22 - 31 mmol/L Final     Anion Gap   Date Value Ref Range Status   12/22/2021 12 5 - 18 mmol/L Final   09/15/2006 7 6 - 17 mmol/L Final     Glucose   Date Value Ref Range Status   12/22/2021 98 70 - 125 mg/dL Final   09/14/2006 110 60 - 110 mg/dL Final     Urea Nitrogen   Date Value Ref Range Status   12/22/2021 17 8 - 28 mg/dL Final   09/14/2006 24 7 - 30 mg/dL Final     Creatinine   Date Value Ref Range Status   12/22/2021 0.74 0.60 - 1.10 mg/dL Final   09/14/2006 1.08 0.60 - 1.30 mg/dL Final     GFR Estimate   Date Value Ref Range Status   12/22/2021 78 >60 mL/min/1.73m2 Final     Comment:     Effective December 21, 2021 eGFRcr in adults is calculated using the 2021 CKD-EPI creatinine equation which includes age and gender (Maria Del Carmen woodruff al., NEJM, DOI: 10.1056/BGWMmc2777373)   05/26/2021 >60 >60 mL/min/1.73m2 Final   09/14/2006 53 (L) >60 mL/min/1.7m2 Final     Calcium   Date Value Ref Range Status   12/22/2021 8.6 8.5 - 10.5 mg/dL Final   09/14/2006 9.2 8.5 - 10.4 mg/dL Final           ASSESSMENT:    Encounter Diagnoses   Name " Primary?     Chronic pain of right knee Yes     Constipation, unspecified constipation type      Essential hypertension      Generalized muscle weakness        PLAN:    Continue with current medication management as well as now adding senna S twice daily.  Obtain accurate weights.  We did have a chance to talk about her blood pressures, rehabilitation, nutrition as well as her wish to be independent and discussion of gaining independence with therapy support.  She recently did have a dietary consult as well.  She did not have any other questions.        Electronically signed by: Bry Jurado NP          Sincerely,        Bry Jurado NP

## 2021-12-29 VITALS
RESPIRATION RATE: 18 BRPM | TEMPERATURE: 98.1 F | BODY MASS INDEX: 29.77 KG/M2 | HEIGHT: 63 IN | OXYGEN SATURATION: 96 % | SYSTOLIC BLOOD PRESSURE: 143 MMHG | WEIGHT: 168 LBS | HEART RATE: 74 BPM | DIASTOLIC BLOOD PRESSURE: 82 MMHG

## 2021-12-29 ASSESSMENT — MIFFLIN-ST. JEOR: SCORE: 1166.17

## 2021-12-30 ENCOUNTER — TRANSITIONAL CARE UNIT VISIT (OUTPATIENT)
Dept: GERIATRICS | Facility: CLINIC | Age: 86
End: 2021-12-30
Payer: COMMERCIAL

## 2021-12-30 DIAGNOSIS — K59.00 CONSTIPATION, UNSPECIFIED CONSTIPATION TYPE: Primary | ICD-10-CM

## 2021-12-30 DIAGNOSIS — R29.898 BILATERAL LEG WEAKNESS: ICD-10-CM

## 2021-12-30 DIAGNOSIS — I10 ESSENTIAL HYPERTENSION: ICD-10-CM

## 2021-12-30 DIAGNOSIS — M19.91 PRIMARY OSTEOARTHRITIS, UNSPECIFIED SITE: ICD-10-CM

## 2021-12-30 PROCEDURE — 99309 SBSQ NF CARE MODERATE MDM 30: CPT | Performed by: FAMILY MEDICINE

## 2021-12-30 NOTE — LETTER
12/30/2021        RE: Marina ADAME Edinson  [address]  [place] [address]        M [facility] GERIATRIC SERVICES    Facility:   [facility] ([facility]) [[id number]]   Code Status: FULL CODE      CHIEF COMPLAINT/REASON FOR VISIT:  Chief Complaint   Patient presents with     RECHECK     rehab, heart, bp,        HISTORY:      HPI: Marina is a 87 year old female who does remain in the transitional care unit room 122 and who I the opportunity to revisit with today not only secondary to her hospitalization December 11, 2021 through December 14, 2021 secondary to chest pain and was found to have a 99% proximal to mid LAD occlusion was treated with PAT x1 but also discussed her hypertension, constipation, chronic pain, rehabilitation.  Regarding therapy she does use a walker and she states that she is still fairly weak and unable to gain her independence as fast as she was like however she states that she will make it because she wants to go back to the assisted living facility. She also has a history of chronic pain currently on tramadol scheduled 3 times daily. No new pain changes. Her systolic blood pressures ranging 130-140 and her weight is 168 pounds in comparison to December 23 169 pounds. She is on furosemide 20 mg twice daily. Earlier in the week she was having issues of constipation did add senna S twice daily and she states it has helped. Regarding her chronic lower extremity edema now has Tubigrip stockings. Her BIMS score was at 13. She believes that she has a cardiology visit next week and then thereafter she is going back in in a couple more weeks for her angiogram.    Past Medical History:   Diagnosis Date     Age-related physical debility      Anemia      Arthritis      Bladder infection 01/03/2015    dx'd- on antibiotics     Blood type AB-     with Anti-C     Breast cancer (H)      Bursitis, knee      Cancer (H)     breast      Cancer (H) 2005    left breast     Colon cancer (H)      Depression       Gout      History of transfusion      Hypertension      Hypertension      Insomnia      Macular degeneration      Osteoarthritis of multiple joints      Osteopenia      Ovarian cyst      Rosacea      Vitamin D deficiency             Family History   Problem Relation Age of Onset     Squamous cell carcinoma Mother      Colon Cancer Sister      Breast Cancer Maternal Aunt      Bone Cancer Maternal Aunt      Colon Cancer Paternal Aunt      Breast Cancer Cousin       Social History     Socioeconomic History     Marital status:      Spouse name: Not on file     Number of children: Not on file     Years of education: Not on file     Highest education level: Not on file   Occupational History     Not on file   Tobacco Use     Smoking status: Never Smoker     Smokeless tobacco: Never Used   Substance and Sexual Activity     Alcohol use: No     Drug use: No     Sexual activity: Never   Other Topics Concern     Parent/sibling w/ CABG, MI or angioplasty before 65F 55M? Not Asked   Social History Narrative     Not on file     Social Determinants of Health     Financial Resource Strain: Not on file   Food Insecurity: Not on file   Transportation Needs: Not on file   Physical Activity: Not on file   Stress: Not on file   Social Connections: Not on file   Intimate Partner Violence: Not on file   Housing Stability: Not on file        REVIEW OF SYSTEM:  She currently denies any new symptoms of fever chills fatigue sore throat postnasal drip wheezing chest pain dizziness vertigo nausea vomiting diarrhea dysuria frequency urgency.  She does have a history of hypertension, chronic pain syndrome, ASCVD, systolic heart failure, ischemic cardiomyopathy, cataracts, hard of hearing, history of breast cancer status post mastectomy, colon cancer status post partial resection    PHYSICAL EXAM:   Pleasant female in no acute distress.  Neck is supple without adenopathy.   Lung sounds are clear throughout.  Cardiovascular S1-S2  "regular rate and rhythm.  Chronic lower extremity edema.  Nonpitting.  Gastrointestinal soft nontender with positive bowel sounds.  Musculoskeletal history of chronic pain and knee osteoarthritis.  Psychiatric: Pleasant affect.   Drug and lactation database from the [place] [employer] of Medicine:  http://toxnet.nlm.nih.gov/cgi-bin/sis/htmlgen?LACT    BP (!) 143/82   Pulse 74   Temp 98.1  F (36.7  C)   Resp 18   Ht 1.6 m (5' 3\")   Wt 76.2 kg (168 lb)   SpO2 96%   BMI 29.76 kg/m      LABS:   Last Comprehensive Metabolic Panel:  Sodium   Date Value Ref Range Status   12/22/2021 138 136 - 145 mmol/L Final   09/15/2006 134 133 - 144 mmol/L Final     Comment:     Delta Verified     Potassium   Date Value Ref Range Status   12/22/2021 4.1 3.5 - 5.0 mmol/L Final   09/15/2006 4.1 3.4 - 5.3 mmol/L Final     Chloride   Date Value Ref Range Status   12/22/2021 102 98 - 107 mmol/L Final   09/15/2006 99 94 - 109 mmol/L Final     Carbon Dioxide   Date Value Ref Range Status   09/15/2006 28 20 - 32 mmol/L Final     Carbon Dioxide (CO2)   Date Value Ref Range Status   12/22/2021 24 22 - 31 mmol/L Final     Anion Gap   Date Value Ref Range Status   12/22/2021 12 5 - 18 mmol/L Final   09/15/2006 7 6 - 17 mmol/L Final     Glucose   Date Value Ref Range Status   12/22/2021 98 70 - 125 mg/dL Final   09/14/2006 110 60 - 110 mg/dL Final     Urea Nitrogen   Date Value Ref Range Status   12/22/2021 17 8 - 28 mg/dL Final   09/14/2006 24 7 - 30 mg/dL Final     Creatinine   Date Value Ref Range Status   12/22/2021 0.74 0.60 - 1.10 mg/dL Final   09/14/2006 1.08 0.60 - 1.30 mg/dL Final     GFR Estimate   Date Value Ref Range Status   12/22/2021 78 >60 mL/min/1.73m2 Final     Comment:     Effective December 21, 2021 eGFRcr in adults is calculated using the 2021 CKD-EPI creatinine equation which includes age and gender (Maria Del Carmen et al., NEJM, DOI: 10.1056/UWIMvb2456230)   05/26/2021 >60 >60 mL/min/1.73m2 Final   09/14/2006 53 (L) " >60 mL/min/1.7m2 Final     Calcium   Date Value Ref Range Status   12/22/2021 8.6 8.5 - 10.5 mg/dL Final   09/14/2006 9.2 8.5 - 10.4 mg/dL Final     Bilirubin Total   Date Value Ref Range Status   12/22/2021 0.4 0.0 - 1.0 mg/dL Final     Alkaline Phosphatase   Date Value Ref Range Status   12/22/2021 76 45 - 120 U/L Final     ALT   Date Value Ref Range Status   12/22/2021 21 0 - 45 U/L Final     AST   Date Value Ref Range Status   12/22/2021 12 0 - 40 U/L Final             Recent Labs   Lab Test 12/22/21  0649 10/21/20  1206   CHOL 112 191   HDL 37* 46*   LDL 63 112   TRIG 60 166*           ASSESSMENT:    Encounter Diagnoses   Name Primary?     Constipation, unspecified constipation type Yes     Bilateral leg weakness      Essential hypertension      Primary osteoarthritis, unspecified site        PLAN:    The senna S will continue twice daily. Getting some success with a bowel movement now. Continue with the pain control with Tylenol and tramadol. Continue to monitor her blood pressures and her weight along with her edema. Does have a visit with cardiology soon and eventually for an angiogram. Once again we did have a pleasant visit today. She is fun to talk with. She did not have any other questions.        Electronically signed by: Bry Jurado NP          Sincerely,        Bry Jurado NP

## 2021-12-30 NOTE — PROGRESS NOTES
[facility] GERIATRIC SERVICES    Facility:   [facility] ([facility]) [[id number]]   Code Status: FULL CODE      CHIEF COMPLAINT/REASON FOR VISIT:  Chief Complaint   Patient presents with     RECHECK     rehab, heart, bp,        HISTORY:      HPI: Marina is a 87 year old female who does remain in the transitional care unit room 122 and who I the opportunity to revisit with today not only secondary to her hospitalization December 11, 2021 through December 14, 2021 secondary to chest pain and was found to have a 99% proximal to mid LAD occlusion was treated with PAT x1 but also discussed her hypertension, constipation, chronic pain, rehabilitation.  Regarding therapy she does use a walker and she states that she is still fairly weak and unable to gain her independence as fast as she was like however she states that she will make it because she wants to go back to the assisted living facility. She also has a history of chronic pain currently on tramadol scheduled 3 times daily. No new pain changes. Her systolic blood pressures ranging 130-140 and her weight is 168 pounds in comparison to December 23 169 pounds. She is on furosemide 20 mg twice daily. Earlier in the week she was having issues of constipation did add senna S twice daily and she states it has helped. Regarding her chronic lower extremity edema now has Tubigrip stockings. Her BIMS score was at 13. She believes that she has a cardiology visit next week and then thereafter she is going back in in a couple more weeks for her angiogram.    Past Medical History:   Diagnosis Date     Age-related physical debility      Anemia      Arthritis      Bladder infection 01/03/2015    dx'd- on antibiotics     Blood type AB-     with Anti-C     Breast cancer (H)      Bursitis, knee      Cancer (H)     breast      Cancer (H) 2005    left breast     Colon cancer (H)      Depression      Gout      History of transfusion      Hypertension      Hypertension      Insomnia       Macular degeneration      Osteoarthritis of multiple joints      Osteopenia      Ovarian cyst      Rosacea      Vitamin D deficiency             Family History   Problem Relation Age of Onset     Squamous cell carcinoma Mother      Colon Cancer Sister      Breast Cancer Maternal Aunt      Bone Cancer Maternal Aunt      Colon Cancer Paternal Aunt      Breast Cancer Cousin       Social History     Socioeconomic History     Marital status:      Spouse name: Not on file     Number of children: Not on file     Years of education: Not on file     Highest education level: Not on file   Occupational History     Not on file   Tobacco Use     Smoking status: Never Smoker     Smokeless tobacco: Never Used   Substance and Sexual Activity     Alcohol use: No     Drug use: No     Sexual activity: Never   Other Topics Concern     Parent/sibling w/ CABG, MI or angioplasty before 65F 55M? Not Asked   Social History Narrative     Not on file     Social Determinants of Health     Financial Resource Strain: Not on file   Food Insecurity: Not on file   Transportation Needs: Not on file   Physical Activity: Not on file   Stress: Not on file   Social Connections: Not on file   Intimate Partner Violence: Not on file   Housing Stability: Not on file        REVIEW OF SYSTEM:  She currently denies any new symptoms of fever chills fatigue sore throat postnasal drip wheezing chest pain dizziness vertigo nausea vomiting diarrhea dysuria frequency urgency.  She does have a history of hypertension, chronic pain syndrome, ASCVD, systolic heart failure, ischemic cardiomyopathy, cataracts, hard of hearing, history of breast cancer status post mastectomy, colon cancer status post partial resection    PHYSICAL EXAM:   Pleasant female in no acute distress.  Neck is supple without adenopathy.   Lung sounds are clear throughout.  Cardiovascular S1-S2 regular rate and rhythm.  Chronic lower extremity edema.  Nonpitting.  Gastrointestinal soft  "nontender with positive bowel sounds.  Musculoskeletal history of chronic pain and knee osteoarthritis.  Psychiatric: Pleasant affect.   Drug and lactation database from the [place] [employer] Library of Medicine:  http://toxnet.nlm.nih.gov/cgi-bin/sis/htmlgen?LACT    BP (!) 143/82   Pulse 74   Temp 98.1  F (36.7  C)   Resp 18   Ht 1.6 m (5' 3\")   Wt 76.2 kg (168 lb)   SpO2 96%   BMI 29.76 kg/m      LABS:   Last Comprehensive Metabolic Panel:  Sodium   Date Value Ref Range Status   12/22/2021 138 136 - 145 mmol/L Final   09/15/2006 134 133 - 144 mmol/L Final     Comment:     Delta Verified     Potassium   Date Value Ref Range Status   12/22/2021 4.1 3.5 - 5.0 mmol/L Final   09/15/2006 4.1 3.4 - 5.3 mmol/L Final     Chloride   Date Value Ref Range Status   12/22/2021 102 98 - 107 mmol/L Final   09/15/2006 99 94 - 109 mmol/L Final     Carbon Dioxide   Date Value Ref Range Status   09/15/2006 28 20 - 32 mmol/L Final     Carbon Dioxide (CO2)   Date Value Ref Range Status   12/22/2021 24 22 - 31 mmol/L Final     Anion Gap   Date Value Ref Range Status   12/22/2021 12 5 - 18 mmol/L Final   09/15/2006 7 6 - 17 mmol/L Final     Glucose   Date Value Ref Range Status   12/22/2021 98 70 - 125 mg/dL Final   09/14/2006 110 60 - 110 mg/dL Final     Urea Nitrogen   Date Value Ref Range Status   12/22/2021 17 8 - 28 mg/dL Final   09/14/2006 24 7 - 30 mg/dL Final     Creatinine   Date Value Ref Range Status   12/22/2021 0.74 0.60 - 1.10 mg/dL Final   09/14/2006 1.08 0.60 - 1.30 mg/dL Final     GFR Estimate   Date Value Ref Range Status   12/22/2021 78 >60 mL/min/1.73m2 Final     Comment:     Effective December 21, 2021 eGFRcr in adults is calculated using the 2021 CKD-EPI creatinine equation which includes age and gender (Maria Del Carmen woodruff al., NEJM, DOI: 10.1056/NGJHys5534938)   05/26/2021 >60 >60 mL/min/1.73m2 Final   09/14/2006 53 (L) >60 mL/min/1.7m2 Final     Calcium   Date Value Ref Range Status   12/22/2021 8.6 8.5 - 10.5 " mg/dL Final   09/14/2006 9.2 8.5 - 10.4 mg/dL Final     Bilirubin Total   Date Value Ref Range Status   12/22/2021 0.4 0.0 - 1.0 mg/dL Final     Alkaline Phosphatase   Date Value Ref Range Status   12/22/2021 76 45 - 120 U/L Final     ALT   Date Value Ref Range Status   12/22/2021 21 0 - 45 U/L Final     AST   Date Value Ref Range Status   12/22/2021 12 0 - 40 U/L Final             Recent Labs   Lab Test 12/22/21  0649 10/21/20  1206   CHOL 112 191   HDL 37* 46*   LDL 63 112   TRIG 60 166*           ASSESSMENT:    Encounter Diagnoses   Name Primary?     Constipation, unspecified constipation type Yes     Bilateral leg weakness      Essential hypertension      Primary osteoarthritis, unspecified site        PLAN:    The senna S will continue twice daily. Getting some success with a bowel movement now. Continue with the pain control with Tylenol and tramadol. Continue to monitor her blood pressures and her weight along with her edema. Does have a visit with cardiology soon and eventually for an angiogram. Once again we did have a pleasant visit today. She is fun to talk with. She did not have any other questions.        Electronically signed by: Bry Jurado NP

## 2022-01-03 VITALS
TEMPERATURE: 97.9 F | SYSTOLIC BLOOD PRESSURE: 143 MMHG | WEIGHT: 168.2 LBS | HEART RATE: 82 BPM | DIASTOLIC BLOOD PRESSURE: 78 MMHG | OXYGEN SATURATION: 100 % | BODY MASS INDEX: 29.8 KG/M2 | RESPIRATION RATE: 18 BRPM

## 2022-01-04 ENCOUNTER — TRANSITIONAL CARE UNIT VISIT (OUTPATIENT)
Dept: GERIATRICS | Facility: CLINIC | Age: 87
End: 2022-01-04
Payer: COMMERCIAL

## 2022-01-04 DIAGNOSIS — G89.29 CHRONIC PAIN OF RIGHT KNEE: Primary | ICD-10-CM

## 2022-01-04 DIAGNOSIS — K59.00 CONSTIPATION, UNSPECIFIED CONSTIPATION TYPE: ICD-10-CM

## 2022-01-04 DIAGNOSIS — R53.81 PHYSICAL DECONDITIONING: ICD-10-CM

## 2022-01-04 DIAGNOSIS — M25.561 CHRONIC PAIN OF RIGHT KNEE: Primary | ICD-10-CM

## 2022-01-04 DIAGNOSIS — I10 ESSENTIAL HYPERTENSION: ICD-10-CM

## 2022-01-04 PROCEDURE — 99309 SBSQ NF CARE MODERATE MDM 30: CPT | Performed by: FAMILY MEDICINE

## 2022-01-04 NOTE — LETTER
1/4/2022        RE: Marina ADAME Edinson  [address]  [place] [address]        [facility] GERIATRIC SERVICES    Facility:   [facility] ([facility]) [[id number]]   Code Status: FULL CODE      CHIEF COMPLAINT/REASON FOR VISIT:  Chief Complaint   Patient presents with     RECHECK     rehab, meds, legs, po       HISTORY:      HPI: Marina is a 87 year old female who I had the pleasure revisiting with once again today not only secondary to her hospitalization December 11 through December 14, 2021 secondary to chest pain and was found to have a 99% proximal to mid LAD occlusion it was treated with PAT x1 but also discussed her current medications, nutrition, constipation and pain management.  Regarding therapy she does need assistance with ADLs she is able to use her four-wheel walker but she claims that she is making some progress but not good enough yet to go back to her apartment independently. She does have a history of chronic pain including osteoarthrosis. Does take tramadol 3 times daily. Systolic blood pressures ranging 130-140s. Her weight 168 pounds and in comparison to December 29 also 168 pounds. She has been on room air and also afebrile. No significant changes of the lower extremity edema. Does wear Tubigrip stockings. The legs are soft. Is having issues of constipation will now increase the senna S to 2 tablets twice daily rather than 1 tablet twice daily. Her bends at 13. Supposed to see the eye clinic tomorrow for a visit.    Past Medical History:   Diagnosis Date     Age-related physical debility      Anemia      Arthritis      Bladder infection 01/03/2015    dx'd- on antibiotics     Blood type AB-     with Anti-C     Breast cancer (H)      Bursitis, knee      Cancer (H)     breast      Cancer (H) 2005    left breast     Colon cancer (H)      Depression      Gout      History of transfusion      Hypertension      Hypertension      Insomnia      Macular degeneration      Osteoarthritis of  multiple joints      Osteopenia      Ovarian cyst      Rosacea      Vitamin D deficiency             Family History   Problem Relation Age of Onset     Squamous cell carcinoma Mother      Colon Cancer Sister      Breast Cancer Maternal Aunt      Bone Cancer Maternal Aunt      Colon Cancer Paternal Aunt      Breast Cancer Cousin       Social History     Socioeconomic History     Marital status:      Spouse name: Not on file     Number of children: Not on file     Years of education: Not on file     Highest education level: Not on file   Occupational History     Not on file   Tobacco Use     Smoking status: Never Smoker     Smokeless tobacco: Never Used   Substance and Sexual Activity     Alcohol use: No     Drug use: No     Sexual activity: Never   Other Topics Concern     Parent/sibling w/ CABG, MI or angioplasty before 65F 55M? Not Asked   Social History Narrative     Not on file     Social Determinants of Health     Financial Resource Strain: Not on file   Food Insecurity: Not on file   Transportation Needs: Not on file   Physical Activity: Not on file   Stress: Not on file   Social Connections: Not on file   Intimate Partner Violence: Not on file   Housing Stability: Not on file        REVIEW OF SYSTEM:  She currently denies any new symptoms of fever chills fatigue sore throat postnasal drip wheezing chest pain dizziness vertigo nausea vomiting diarrhea dysuria frequency urgency.  She does have a history of hypertension, chronic pain syndrome, ASCVD, systolic heart failure, ischemic cardiomyopathy, cataracts, hard of hearing, history of breast cancer status post mastectomy, colon cancer status post partial resection    PHYSICAL EXAM:   Pleasant female in no acute distress.  Neck is supple without adenopathy.   Lung sounds are clear throughout.  Cardiovascular S1-S2 regular rate and rhythm.  Chronic lower extremity edema.  Nonpitting. Tubigrip stockings. Gastrointestinal soft nontender with positive bowel  sounds.  Musculoskeletal history of chronic pain and knee osteoarthritis.  Psychiatric: Pleasant affect.    Current Outpatient Medications:      aspirin (ASA) 81 MG EC tablet, Take 1 tablet (81 mg) by mouth daily Start tomorrow., Disp: 30 tablet, Rfl: 3     atorvastatin (LIPITOR) 40 MG tablet, Take 1 tablet (40 mg) by mouth every evening, Disp: , Rfl:      calcium carbonate-vitamin D (OSCAL W/D) 500-200 MG-UNIT tablet, Take 1 tablet by mouth daily, Disp: , Rfl:      carvedilol (COREG) 6.25 MG tablet, Take 1 tablet (6.25 mg) by mouth 2 times daily (with meals), Disp: , Rfl:      furosemide (LASIX) 20 MG tablet, Take 1 tablet (20 mg) by mouth daily (Patient taking differently: Take 20 mg by mouth 2 times daily ), Disp: , Rfl:      losartan (COZAAR) 25 MG tablet, Take 1 tablet (25 mg) by mouth daily, Disp: , Rfl:      magnesium oxide (MAG-OX) 400 (241.3 Mg) MG tablet, Take 400 mg by mouth daily, Disp: , Rfl:      Multiple Vitamins-Minerals (PRESERVISION AREDS) CAPS, Take 1 capsule by mouth 2 times daily , Disp: , Rfl:      nitroGLYcerin (NITROSTAT) 0.4 MG sublingual tablet, For chest pain place 1 tablet under the tongue every 5 minutes for 3 doses. If symptoms persist 5 minutes after 1st dose call 911., Disp: , Rfl:      olopatadine (PATANOL) 0.1 % ophthalmic solution, Place 1 drop into both eyes 2 times daily, Disp: , Rfl:      Omega-3 Fatty Acids (FISH OIL OMEGA-3 PO), Take 1 capsule by mouth daily, Disp: , Rfl:      prednisoLONE acetate (PRED FORTE) 1 % ophthalmic suspension, Place 1 drop into both eyes 2 times daily , Disp: , Rfl:      sennosides (SENOKOT) 8.6 MG tablet, Take 1 tablet by mouth 2 times daily, Disp: , Rfl:      ticagrelor (BRILINTA) 90 MG tablet, Take 1 tablet (90 mg) by mouth 2 times daily Dose to start tomorrow morning., Disp: 180 tablet, Rfl: 3     traMADol (ULTRAM) 50 MG tablet, Take 1 tablet (50 mg) by mouth 3 times daily, Disp: 90 tablet, Rfl: 0     VITAMIN D, CHOLECALCIFEROL, PO, Take 4,000  Units by mouth daily , Disp: , Rfl:        BP (!) 143/78   Pulse 82   Temp 97.9  F (36.6  C)   Resp 18   Wt 76.3 kg (168 lb 3.2 oz)   SpO2 100%   BMI 29.80 kg/m      LABS:   Last Comprehensive Metabolic Panel:  Sodium   Date Value Ref Range Status   12/22/2021 138 136 - 145 mmol/L Final   09/15/2006 134 133 - 144 mmol/L Final     Comment:     Delta Verified     Potassium   Date Value Ref Range Status   12/22/2021 4.1 3.5 - 5.0 mmol/L Final   09/15/2006 4.1 3.4 - 5.3 mmol/L Final     Chloride   Date Value Ref Range Status   12/22/2021 102 98 - 107 mmol/L Final   09/15/2006 99 94 - 109 mmol/L Final     Carbon Dioxide   Date Value Ref Range Status   09/15/2006 28 20 - 32 mmol/L Final     Carbon Dioxide (CO2)   Date Value Ref Range Status   12/22/2021 24 22 - 31 mmol/L Final     Anion Gap   Date Value Ref Range Status   12/22/2021 12 5 - 18 mmol/L Final   09/15/2006 7 6 - 17 mmol/L Final     Glucose   Date Value Ref Range Status   12/22/2021 98 70 - 125 mg/dL Final   09/14/2006 110 60 - 110 mg/dL Final     Urea Nitrogen   Date Value Ref Range Status   12/22/2021 17 8 - 28 mg/dL Final   09/14/2006 24 7 - 30 mg/dL Final     Creatinine   Date Value Ref Range Status   12/22/2021 0.74 0.60 - 1.10 mg/dL Final   09/14/2006 1.08 0.60 - 1.30 mg/dL Final     GFR Estimate   Date Value Ref Range Status   12/22/2021 78 >60 mL/min/1.73m2 Final     Comment:     Effective December 21, 2021 eGFRcr in adults is calculated using the 2021 CKD-EPI creatinine equation which includes age and gender (Maria Del Carmen woodruff al., NEJM, DOI: 10.1056/VAEUxl6545539)   05/26/2021 >60 >60 mL/min/1.73m2 Final   09/14/2006 53 (L) >60 mL/min/1.7m2 Final     Calcium   Date Value Ref Range Status   12/22/2021 8.6 8.5 - 10.5 mg/dL Final   09/14/2006 9.2 8.5 - 10.4 mg/dL Final     Last Comprehensive Metabolic Panel:  Sodium   Date Value Ref Range Status   12/22/2021 138 136 - 145 mmol/L Final   09/15/2006 134 133 - 144 mmol/L Final     Comment:     Delta Verified      Potassium   Date Value Ref Range Status   12/22/2021 4.1 3.5 - 5.0 mmol/L Final   09/15/2006 4.1 3.4 - 5.3 mmol/L Final     Chloride   Date Value Ref Range Status   12/22/2021 102 98 - 107 mmol/L Final   09/15/2006 99 94 - 109 mmol/L Final     Carbon Dioxide   Date Value Ref Range Status   09/15/2006 28 20 - 32 mmol/L Final     Carbon Dioxide (CO2)   Date Value Ref Range Status   12/22/2021 24 22 - 31 mmol/L Final     Anion Gap   Date Value Ref Range Status   12/22/2021 12 5 - 18 mmol/L Final   09/15/2006 7 6 - 17 mmol/L Final     Glucose   Date Value Ref Range Status   12/22/2021 98 70 - 125 mg/dL Final   09/14/2006 110 60 - 110 mg/dL Final     Urea Nitrogen   Date Value Ref Range Status   12/22/2021 17 8 - 28 mg/dL Final   09/14/2006 24 7 - 30 mg/dL Final     Creatinine   Date Value Ref Range Status   12/22/2021 0.74 0.60 - 1.10 mg/dL Final   09/14/2006 1.08 0.60 - 1.30 mg/dL Final     GFR Estimate   Date Value Ref Range Status   12/22/2021 78 >60 mL/min/1.73m2 Final     Comment:     Effective December 21, 2021 eGFRcr in adults is calculated using the 2021 CKD-EPI creatinine equation which includes age and gender (Maria Del Carmen et al., NEJ, DOI: 10.1056/PIPOfg3897404)   05/26/2021 >60 >60 mL/min/1.73m2 Final   09/14/2006 53 (L) >60 mL/min/1.7m2 Final     Calcium   Date Value Ref Range Status   12/22/2021 8.6 8.5 - 10.5 mg/dL Final   09/14/2006 9.2 8.5 - 10.4 mg/dL Final     Bilirubin Total   Date Value Ref Range Status   12/22/2021 0.4 0.0 - 1.0 mg/dL Final     Alkaline Phosphatase   Date Value Ref Range Status   12/22/2021 76 45 - 120 U/L Final     ALT   Date Value Ref Range Status   12/22/2021 21 0 - 45 U/L Final     AST   Date Value Ref Range Status   12/22/2021 12 0 - 40 U/L Final                   ASSESSMENT:    Encounter Diagnoses   Name Primary?     Chronic pain of right knee Yes     Constipation, unspecified constipation type      Essential hypertension      Physical deconditioning        PLAN:    Increasing  the senna S2 tablets twice daily for short time due to constipation. Continue to monitor blood pressures. Her weights have been stable at 168 pounds. Appetite is good. Encourage the rehabilitation process. Supposed to have a visit with the eye doctor tomorrow.        Electronically signed by: Bry Jurado NP          Sincerely,        Bry Jurado NP

## 2022-01-04 NOTE — PROGRESS NOTES
[facility] GERIATRIC SERVICES    Facility:   [facility] ([facility]) [[id number]]   Code Status: FULL CODE      CHIEF COMPLAINT/REASON FOR VISIT:  Chief Complaint   Patient presents with     RECHECK     rehab, meds, legs, po       HISTORY:      HPI: Marina is a 87 year old female who I had the pleasure revisiting with once again today not only secondary to her hospitalization December 11 through December 14, 2021 secondary to chest pain and was found to have a 99% proximal to mid LAD occlusion it was treated with PAT x1 but also discussed her current medications, nutrition, constipation and pain management.  Regarding therapy she does need assistance with ADLs she is able to use her four-wheel walker but she claims that she is making some progress but not good enough yet to go back to her apartment independently. She does have a history of chronic pain including osteoarthrosis. Does take tramadol 3 times daily. Systolic blood pressures ranging 130-140s. Her weight 168 pounds and in comparison to December 29 also 168 pounds. She has been on room air and also afebrile. No significant changes of the lower extremity edema. Does wear Tubigrip stockings. The legs are soft. Is having issues of constipation will now increase the senna S to 2 tablets twice daily rather than 1 tablet twice daily. Her bends at 13. Supposed to see the eye clinic tomorrow for a visit.    Past Medical History:   Diagnosis Date     Age-related physical debility      Anemia      Arthritis      Bladder infection 01/03/2015    dx'd- on antibiotics     Blood type AB-     with Anti-C     Breast cancer (H)      Bursitis, knee      Cancer (H)     breast      Cancer (H) 2005    left breast     Colon cancer (H)      Depression      Gout      History of transfusion      Hypertension      Hypertension      Insomnia      Macular degeneration      Osteoarthritis of multiple joints      Osteopenia      Ovarian cyst      Rosacea      Vitamin D deficiency              Family History   Problem Relation Age of Onset     Squamous cell carcinoma Mother      Colon Cancer Sister      Breast Cancer Maternal Aunt      Bone Cancer Maternal Aunt      Colon Cancer Paternal Aunt      Breast Cancer Cousin       Social History     Socioeconomic History     Marital status:      Spouse name: Not on file     Number of children: Not on file     Years of education: Not on file     Highest education level: Not on file   Occupational History     Not on file   Tobacco Use     Smoking status: Never Smoker     Smokeless tobacco: Never Used   Substance and Sexual Activity     Alcohol use: No     Drug use: No     Sexual activity: Never   Other Topics Concern     Parent/sibling w/ CABG, MI or angioplasty before 65F 55M? Not Asked   Social History Narrative     Not on file     Social Determinants of Health     Financial Resource Strain: Not on file   Food Insecurity: Not on file   Transportation Needs: Not on file   Physical Activity: Not on file   Stress: Not on file   Social Connections: Not on file   Intimate Partner Violence: Not on file   Housing Stability: Not on file        REVIEW OF SYSTEM:  She currently denies any new symptoms of fever chills fatigue sore throat postnasal drip wheezing chest pain dizziness vertigo nausea vomiting diarrhea dysuria frequency urgency.  She does have a history of hypertension, chronic pain syndrome, ASCVD, systolic heart failure, ischemic cardiomyopathy, cataracts, hard of hearing, history of breast cancer status post mastectomy, colon cancer status post partial resection    PHYSICAL EXAM:   Pleasant female in no acute distress.  Neck is supple without adenopathy.   Lung sounds are clear throughout.  Cardiovascular S1-S2 regular rate and rhythm.  Chronic lower extremity edema.  Nonpitting. Tubigrip stockings. Gastrointestinal soft nontender with positive bowel sounds.  Musculoskeletal history of chronic pain and knee osteoarthritis.  Psychiatric: Pleasant  affect.    Current Outpatient Medications:      aspirin (ASA) 81 MG EC tablet, Take 1 tablet (81 mg) by mouth daily Start tomorrow., Disp: 30 tablet, Rfl: 3     atorvastatin (LIPITOR) 40 MG tablet, Take 1 tablet (40 mg) by mouth every evening, Disp: , Rfl:      calcium carbonate-vitamin D (OSCAL W/D) 500-200 MG-UNIT tablet, Take 1 tablet by mouth daily, Disp: , Rfl:      carvedilol (COREG) 6.25 MG tablet, Take 1 tablet (6.25 mg) by mouth 2 times daily (with meals), Disp: , Rfl:      furosemide (LASIX) 20 MG tablet, Take 1 tablet (20 mg) by mouth daily (Patient taking differently: Take 20 mg by mouth 2 times daily ), Disp: , Rfl:      losartan (COZAAR) 25 MG tablet, Take 1 tablet (25 mg) by mouth daily, Disp: , Rfl:      magnesium oxide (MAG-OX) 400 (241.3 Mg) MG tablet, Take 400 mg by mouth daily, Disp: , Rfl:      Multiple Vitamins-Minerals (PRESERVISION AREDS) CAPS, Take 1 capsule by mouth 2 times daily , Disp: , Rfl:      nitroGLYcerin (NITROSTAT) 0.4 MG sublingual tablet, For chest pain place 1 tablet under the tongue every 5 minutes for 3 doses. If symptoms persist 5 minutes after 1st dose call 911., Disp: , Rfl:      olopatadine (PATANOL) 0.1 % ophthalmic solution, Place 1 drop into both eyes 2 times daily, Disp: , Rfl:      Omega-3 Fatty Acids (FISH OIL OMEGA-3 PO), Take 1 capsule by mouth daily, Disp: , Rfl:      prednisoLONE acetate (PRED FORTE) 1 % ophthalmic suspension, Place 1 drop into both eyes 2 times daily , Disp: , Rfl:      sennosides (SENOKOT) 8.6 MG tablet, Take 1 tablet by mouth 2 times daily, Disp: , Rfl:      ticagrelor (BRILINTA) 90 MG tablet, Take 1 tablet (90 mg) by mouth 2 times daily Dose to start tomorrow morning., Disp: 180 tablet, Rfl: 3     traMADol (ULTRAM) 50 MG tablet, Take 1 tablet (50 mg) by mouth 3 times daily, Disp: 90 tablet, Rfl: 0     VITAMIN D, CHOLECALCIFEROL, PO, Take 4,000 Units by mouth daily , Disp: , Rfl:        BP (!) 143/78   Pulse 82   Temp 97.9  F (36.6  C)    Resp 18   Wt 76.3 kg (168 lb 3.2 oz)   SpO2 100%   BMI 29.80 kg/m      LABS:   Last Comprehensive Metabolic Panel:  Sodium   Date Value Ref Range Status   12/22/2021 138 136 - 145 mmol/L Final   09/15/2006 134 133 - 144 mmol/L Final     Comment:     Delta Verified     Potassium   Date Value Ref Range Status   12/22/2021 4.1 3.5 - 5.0 mmol/L Final   09/15/2006 4.1 3.4 - 5.3 mmol/L Final     Chloride   Date Value Ref Range Status   12/22/2021 102 98 - 107 mmol/L Final   09/15/2006 99 94 - 109 mmol/L Final     Carbon Dioxide   Date Value Ref Range Status   09/15/2006 28 20 - 32 mmol/L Final     Carbon Dioxide (CO2)   Date Value Ref Range Status   12/22/2021 24 22 - 31 mmol/L Final     Anion Gap   Date Value Ref Range Status   12/22/2021 12 5 - 18 mmol/L Final   09/15/2006 7 6 - 17 mmol/L Final     Glucose   Date Value Ref Range Status   12/22/2021 98 70 - 125 mg/dL Final   09/14/2006 110 60 - 110 mg/dL Final     Urea Nitrogen   Date Value Ref Range Status   12/22/2021 17 8 - 28 mg/dL Final   09/14/2006 24 7 - 30 mg/dL Final     Creatinine   Date Value Ref Range Status   12/22/2021 0.74 0.60 - 1.10 mg/dL Final   09/14/2006 1.08 0.60 - 1.30 mg/dL Final     GFR Estimate   Date Value Ref Range Status   12/22/2021 78 >60 mL/min/1.73m2 Final     Comment:     Effective December 21, 2021 eGFRcr in adults is calculated using the 2021 CKD-EPI creatinine equation which includes age and gender (Maria Del Carmen woodruff al., NEJM, DOI: 10.1056/HDETye7739326)   05/26/2021 >60 >60 mL/min/1.73m2 Final   09/14/2006 53 (L) >60 mL/min/1.7m2 Final     Calcium   Date Value Ref Range Status   12/22/2021 8.6 8.5 - 10.5 mg/dL Final   09/14/2006 9.2 8.5 - 10.4 mg/dL Final     Last Comprehensive Metabolic Panel:  Sodium   Date Value Ref Range Status   12/22/2021 138 136 - 145 mmol/L Final   09/15/2006 134 133 - 144 mmol/L Final     Comment:     Delta Verified     Potassium   Date Value Ref Range Status   12/22/2021 4.1 3.5 - 5.0 mmol/L Final   09/15/2006  4.1 3.4 - 5.3 mmol/L Final     Chloride   Date Value Ref Range Status   12/22/2021 102 98 - 107 mmol/L Final   09/15/2006 99 94 - 109 mmol/L Final     Carbon Dioxide   Date Value Ref Range Status   09/15/2006 28 20 - 32 mmol/L Final     Carbon Dioxide (CO2)   Date Value Ref Range Status   12/22/2021 24 22 - 31 mmol/L Final     Anion Gap   Date Value Ref Range Status   12/22/2021 12 5 - 18 mmol/L Final   09/15/2006 7 6 - 17 mmol/L Final     Glucose   Date Value Ref Range Status   12/22/2021 98 70 - 125 mg/dL Final   09/14/2006 110 60 - 110 mg/dL Final     Urea Nitrogen   Date Value Ref Range Status   12/22/2021 17 8 - 28 mg/dL Final   09/14/2006 24 7 - 30 mg/dL Final     Creatinine   Date Value Ref Range Status   12/22/2021 0.74 0.60 - 1.10 mg/dL Final   09/14/2006 1.08 0.60 - 1.30 mg/dL Final     GFR Estimate   Date Value Ref Range Status   12/22/2021 78 >60 mL/min/1.73m2 Final     Comment:     Effective December 21, 2021 eGFRcr in adults is calculated using the 2021 CKD-EPI creatinine equation which includes age and gender (Maria Del Carmen et al., NEJ, DOI: 10.1056/DJYAio0554167)   05/26/2021 >60 >60 mL/min/1.73m2 Final   09/14/2006 53 (L) >60 mL/min/1.7m2 Final     Calcium   Date Value Ref Range Status   12/22/2021 8.6 8.5 - 10.5 mg/dL Final   09/14/2006 9.2 8.5 - 10.4 mg/dL Final     Bilirubin Total   Date Value Ref Range Status   12/22/2021 0.4 0.0 - 1.0 mg/dL Final     Alkaline Phosphatase   Date Value Ref Range Status   12/22/2021 76 45 - 120 U/L Final     ALT   Date Value Ref Range Status   12/22/2021 21 0 - 45 U/L Final     AST   Date Value Ref Range Status   12/22/2021 12 0 - 40 U/L Final                   ASSESSMENT:    Encounter Diagnoses   Name Primary?     Chronic pain of right knee Yes     Constipation, unspecified constipation type      Essential hypertension      Physical deconditioning        PLAN:    Increasing the senna S2 tablets twice daily for short time due to constipation. Continue to monitor blood  pressures. Her weights have been stable at 168 pounds. Appetite is good. Encourage the rehabilitation process. Supposed to have a visit with the eye doctor tomorrow.        Electronically signed by: Bry Jurado NP

## 2022-01-05 VITALS
WEIGHT: 169.6 LBS | HEART RATE: 68 BPM | RESPIRATION RATE: 18 BRPM | SYSTOLIC BLOOD PRESSURE: 149 MMHG | BODY MASS INDEX: 30.04 KG/M2 | TEMPERATURE: 97.7 F | OXYGEN SATURATION: 98 % | DIASTOLIC BLOOD PRESSURE: 78 MMHG

## 2022-01-06 ENCOUNTER — TRANSITIONAL CARE UNIT VISIT (OUTPATIENT)
Dept: GERIATRICS | Facility: CLINIC | Age: 87
End: 2022-01-06
Payer: COMMERCIAL

## 2022-01-06 DIAGNOSIS — I10 ESSENTIAL HYPERTENSION: Primary | ICD-10-CM

## 2022-01-06 DIAGNOSIS — K59.00 CONSTIPATION, UNSPECIFIED CONSTIPATION TYPE: ICD-10-CM

## 2022-01-06 DIAGNOSIS — I21.4 NSTEMI (NON-ST ELEVATED MYOCARDIAL INFARCTION) (H): ICD-10-CM

## 2022-01-06 DIAGNOSIS — G89.29 OTHER CHRONIC PAIN: ICD-10-CM

## 2022-01-06 PROCEDURE — 99309 SBSQ NF CARE MODERATE MDM 30: CPT | Performed by: FAMILY MEDICINE

## 2022-01-06 NOTE — LETTER
1/6/2022        RE: Marina ADAME Edinson  [address]  [place] [address]        [facility] GERIATRIC SERVICES    Facility:   [facility] ([facility]) [[id number]]   Code Status: FULL CODE      CHIEF COMPLAINT/REASON FOR VISIT:  Chief Complaint   Patient presents with     RECHECK     constipa, htn, legs, rehab       HISTORY:      HPI: Marina is a 87 year old female Who does remain currently in the transitional care unit room 122 and who I the opportunity to revisit with today not only secondary to her hospitalization December 11, 2021 through December 14, 2021 secondary to chest pain was found to have a 99% proximal to mid LAD occlusion and was treated with PAT x1 but also again today discussed her blood pressures, edema, constipation and recent eye doctor visit.  She did have an eye doctor visit yesterday. She is legally blind. Her systolic blood pressures have been in the 140 range. Her weight most recently 169 lbs. in comparison to January 1 168 lbs. For lower extremity edema she is on furosemide 20 mg daily. At 1 point a week ago she was on twice daily which did seem to help her weight and her legs. She does have Tubigrip stockings. Regarding constipation recently increase the senna S to 2 tablets twice daily and she did have a bowel movement this morning. In the meantime she does ambulate up and down the hallway with her walker. Does have a history of chronic pain currently on Tylenol and tramadol. Appetite is good.    Past Medical History:   Diagnosis Date     Age-related physical debility      Anemia      Arthritis      Bladder infection 01/03/2015    dx'd- on antibiotics     Blood type AB-     with Anti-C     Breast cancer (H)      Bursitis, knee      Cancer (H)     breast      Cancer (H) 2005    left breast     Colon cancer (H)      Depression      Gout      History of transfusion      Hypertension      Hypertension      Insomnia      Macular degeneration      Osteoarthritis of multiple joints       Osteopenia      Ovarian cyst      Rosacea      Vitamin D deficiency             Family History   Problem Relation Age of Onset     Squamous cell carcinoma Mother      Colon Cancer Sister      Breast Cancer Maternal Aunt      Bone Cancer Maternal Aunt      Colon Cancer Paternal Aunt      Breast Cancer Cousin       Social History     Socioeconomic History     Marital status:      Spouse name: Not on file     Number of children: Not on file     Years of education: Not on file     Highest education level: Not on file   Occupational History     Not on file   Tobacco Use     Smoking status: Never Smoker     Smokeless tobacco: Never Used   Substance and Sexual Activity     Alcohol use: No     Drug use: No     Sexual activity: Never   Other Topics Concern     Parent/sibling w/ CABG, MI or angioplasty before 65F 55M? Not Asked   Social History Narrative     Not on file     Social Determinants of Health     Financial Resource Strain: Not on file   Food Insecurity: Not on file   Transportation Needs: Not on file   Physical Activity: Not on file   Stress: Not on file   Social Connections: Not on file   Intimate Partner Violence: Not on file   Housing Stability: Not on file        REVIEW OF SYSTEM:  She currently denies any new symptoms of fever chills fatigue sore throat postnasal drip wheezing chest pain dizziness vertigo nausea vomiting diarrhea dysuria frequency urgency.  She does have a history of hypertension, chronic pain syndrome, ASCVD, systolic heart failure, ischemic cardiomyopathy, cataracts, hard of hearing, history of breast cancer status post mastectomy, colon cancer status post partial resection    PHYSICAL EXAM:   Pleasant female in no acute distress.  Neck is supple without adenopathy.   Lung sounds are clear throughout.  Cardiovascular S1-S2 regular rate and rhythm.  Chronic lower extremity edema.  Nonpitting. Tubigrip stockings. Gastrointestinal soft nontender with positive bowel sounds.   Musculoskeletal history of chronic pain and knee osteoarthritis.  Psychiatric: Pleasant affect.    Current Outpatient Medications:      aspirin (ASA) 81 MG EC tablet, Take 1 tablet (81 mg) by mouth daily Start tomorrow., Disp: 30 tablet, Rfl: 3     atorvastatin (LIPITOR) 40 MG tablet, Take 1 tablet (40 mg) by mouth every evening, Disp: , Rfl:      calcium carbonate-vitamin D (OSCAL W/D) 500-200 MG-UNIT tablet, Take 1 tablet by mouth daily, Disp: , Rfl:      carvedilol (COREG) 6.25 MG tablet, Take 1 tablet (6.25 mg) by mouth 2 times daily (with meals), Disp: , Rfl:      furosemide (LASIX) 20 MG tablet, Take 1 tablet (20 mg) by mouth daily (Patient taking differently: Take 20 mg by mouth 2 times daily ), Disp: , Rfl:      losartan (COZAAR) 25 MG tablet, Take 1 tablet (25 mg) by mouth daily, Disp: , Rfl:      magnesium oxide (MAG-OX) 400 (241.3 Mg) MG tablet, Take 400 mg by mouth daily, Disp: , Rfl:      Multiple Vitamins-Minerals (PRESERVISION AREDS) CAPS, Take 1 capsule by mouth 2 times daily , Disp: , Rfl:      nitroGLYcerin (NITROSTAT) 0.4 MG sublingual tablet, For chest pain place 1 tablet under the tongue every 5 minutes for 3 doses. If symptoms persist 5 minutes after 1st dose call 911., Disp: , Rfl:      olopatadine (PATANOL) 0.1 % ophthalmic solution, Place 1 drop into both eyes 2 times daily, Disp: , Rfl:      Omega-3 Fatty Acids (FISH OIL OMEGA-3 PO), Take 1 capsule by mouth daily, Disp: , Rfl:      prednisoLONE acetate (PRED FORTE) 1 % ophthalmic suspension, Place 1 drop into both eyes 2 times daily , Disp: , Rfl:      sennosides (SENOKOT) 8.6 MG tablet, Take 1 tablet by mouth 2 times daily, Disp: , Rfl:      ticagrelor (BRILINTA) 90 MG tablet, Take 1 tablet (90 mg) by mouth 2 times daily Dose to start tomorrow morning., Disp: 180 tablet, Rfl: 3     traMADol (ULTRAM) 50 MG tablet, Take 1 tablet (50 mg) by mouth 3 times daily, Disp: 90 tablet, Rfl: 0     VITAMIN D, CHOLECALCIFEROL, PO, Take 4,000 Units by  mouth daily , Disp: , Rfl:        BP (!) 149/78   Pulse 68   Temp 97.7  F (36.5  C)   Resp 18   Wt 76.9 kg (169 lb 9.6 oz)   SpO2 98%   BMI 30.04 kg/m      LABS:   Last Comprehensive Metabolic Panel:  Sodium   Date Value Ref Range Status   12/22/2021 138 136 - 145 mmol/L Final   09/15/2006 134 133 - 144 mmol/L Final     Comment:     Delta Verified     Potassium   Date Value Ref Range Status   12/22/2021 4.1 3.5 - 5.0 mmol/L Final   09/15/2006 4.1 3.4 - 5.3 mmol/L Final     Chloride   Date Value Ref Range Status   12/22/2021 102 98 - 107 mmol/L Final   09/15/2006 99 94 - 109 mmol/L Final     Carbon Dioxide   Date Value Ref Range Status   09/15/2006 28 20 - 32 mmol/L Final     Carbon Dioxide (CO2)   Date Value Ref Range Status   12/22/2021 24 22 - 31 mmol/L Final     Anion Gap   Date Value Ref Range Status   12/22/2021 12 5 - 18 mmol/L Final   09/15/2006 7 6 - 17 mmol/L Final     Glucose   Date Value Ref Range Status   12/22/2021 98 70 - 125 mg/dL Final   09/14/2006 110 60 - 110 mg/dL Final     Urea Nitrogen   Date Value Ref Range Status   12/22/2021 17 8 - 28 mg/dL Final   09/14/2006 24 7 - 30 mg/dL Final     Creatinine   Date Value Ref Range Status   12/22/2021 0.74 0.60 - 1.10 mg/dL Final   09/14/2006 1.08 0.60 - 1.30 mg/dL Final     GFR Estimate   Date Value Ref Range Status   12/22/2021 78 >60 mL/min/1.73m2 Final     Comment:     Effective December 21, 2021 eGFRcr in adults is calculated using the 2021 CKD-EPI creatinine equation which includes age and gender (Maria Del Carmen et al., NEJ, DOI: 10.1056/QPSWvp5942615)   05/26/2021 >60 >60 mL/min/1.73m2 Final   09/14/2006 53 (L) >60 mL/min/1.7m2 Final     Calcium   Date Value Ref Range Status   12/22/2021 8.6 8.5 - 10.5 mg/dL Final   09/14/2006 9.2 8.5 - 10.4 mg/dL Final     Bilirubin Total   Date Value Ref Range Status   12/22/2021 0.4 0.0 - 1.0 mg/dL Final     Alkaline Phosphatase   Date Value Ref Range Status   12/22/2021 76 45 - 120 U/L Final     ALT   Date Value  Ref Range Status   12/22/2021 21 0 - 45 U/L Final     AST   Date Value Ref Range Status   12/22/2021 12 0 - 40 U/L Final                   ASSESSMENT:    Encounter Diagnoses   Name Primary?     Essential hypertension Yes     Other chronic pain      NSTEMI (non-ST elevated myocardial infarction) (H)      Constipation, unspecified constipation type        PLAN:    Continue with current medications including senna S for constipation. Continue to monitor blood pressures and her weight as well as her appetite. Continue to monitor the rehabilitation process. Currently today she is ambulating with a walker up and down the hallway. She did not have any other questions.        Electronically signed by: Bry Jurado NP          Sincerely,        Bry Jurado NP

## 2022-01-06 NOTE — PROGRESS NOTES
[facility] GERIATRIC SERVICES    Facility:   [facility] ([facility]) [[id number]]   Code Status: FULL CODE      CHIEF COMPLAINT/REASON FOR VISIT:  Chief Complaint   Patient presents with     RECHECK     constipa, htn, legs, rehab       HISTORY:      HPI: Marina is a 87 year old female Who does remain currently in the transitional care unit room 122 and who I the opportunity to revisit with today not only secondary to her hospitalization December 11, 2021 through December 14, 2021 secondary to chest pain was found to have a 99% proximal to mid LAD occlusion and was treated with PAT x1 but also again today discussed her blood pressures, edema, constipation and recent eye doctor visit.  She did have an eye doctor visit yesterday. She is legally blind. Her systolic blood pressures have been in the 140 range. Her weight most recently 169 lbs. in comparison to January 1 168 lbs. For lower extremity edema she is on furosemide 20 mg daily. At 1 point a week ago she was on twice daily which did seem to help her weight and her legs. She does have Tubigrip stockings. Regarding constipation recently increase the senna S to 2 tablets twice daily and she did have a bowel movement this morning. In the meantime she does ambulate up and down the hallway with her walker. Does have a history of chronic pain currently on Tylenol and tramadol. Appetite is good.    Past Medical History:   Diagnosis Date     Age-related physical debility      Anemia      Arthritis      Bladder infection 01/03/2015    dx'd- on antibiotics     Blood type AB-     with Anti-C     Breast cancer (H)      Bursitis, knee      Cancer (H)     breast      Cancer (H) 2005    left breast     Colon cancer (H)      Depression      Gout      History of transfusion      Hypertension      Hypertension      Insomnia      Macular degeneration      Osteoarthritis of multiple joints      Osteopenia      Ovarian cyst      Rosacea      Vitamin D deficiency             Family  History   Problem Relation Age of Onset     Squamous cell carcinoma Mother      Colon Cancer Sister      Breast Cancer Maternal Aunt      Bone Cancer Maternal Aunt      Colon Cancer Paternal Aunt      Breast Cancer Cousin       Social History     Socioeconomic History     Marital status:      Spouse name: Not on file     Number of children: Not on file     Years of education: Not on file     Highest education level: Not on file   Occupational History     Not on file   Tobacco Use     Smoking status: Never Smoker     Smokeless tobacco: Never Used   Substance and Sexual Activity     Alcohol use: No     Drug use: No     Sexual activity: Never   Other Topics Concern     Parent/sibling w/ CABG, MI or angioplasty before 65F 55M? Not Asked   Social History Narrative     Not on file     Social Determinants of Health     Financial Resource Strain: Not on file   Food Insecurity: Not on file   Transportation Needs: Not on file   Physical Activity: Not on file   Stress: Not on file   Social Connections: Not on file   Intimate Partner Violence: Not on file   Housing Stability: Not on file        REVIEW OF SYSTEM:  She currently denies any new symptoms of fever chills fatigue sore throat postnasal drip wheezing chest pain dizziness vertigo nausea vomiting diarrhea dysuria frequency urgency.  She does have a history of hypertension, chronic pain syndrome, ASCVD, systolic heart failure, ischemic cardiomyopathy, cataracts, hard of hearing, history of breast cancer status post mastectomy, colon cancer status post partial resection    PHYSICAL EXAM:   Pleasant female in no acute distress.  Neck is supple without adenopathy.   Lung sounds are clear throughout.  Cardiovascular S1-S2 regular rate and rhythm.  Chronic lower extremity edema.  Nonpitting. Tubigrip stockings. Gastrointestinal soft nontender with positive bowel sounds.  Musculoskeletal history of chronic pain and knee osteoarthritis.  Psychiatric: Pleasant  affect.    Current Outpatient Medications:      aspirin (ASA) 81 MG EC tablet, Take 1 tablet (81 mg) by mouth daily Start tomorrow., Disp: 30 tablet, Rfl: 3     atorvastatin (LIPITOR) 40 MG tablet, Take 1 tablet (40 mg) by mouth every evening, Disp: , Rfl:      calcium carbonate-vitamin D (OSCAL W/D) 500-200 MG-UNIT tablet, Take 1 tablet by mouth daily, Disp: , Rfl:      carvedilol (COREG) 6.25 MG tablet, Take 1 tablet (6.25 mg) by mouth 2 times daily (with meals), Disp: , Rfl:      furosemide (LASIX) 20 MG tablet, Take 1 tablet (20 mg) by mouth daily (Patient taking differently: Take 20 mg by mouth 2 times daily ), Disp: , Rfl:      losartan (COZAAR) 25 MG tablet, Take 1 tablet (25 mg) by mouth daily, Disp: , Rfl:      magnesium oxide (MAG-OX) 400 (241.3 Mg) MG tablet, Take 400 mg by mouth daily, Disp: , Rfl:      Multiple Vitamins-Minerals (PRESERVISION AREDS) CAPS, Take 1 capsule by mouth 2 times daily , Disp: , Rfl:      nitroGLYcerin (NITROSTAT) 0.4 MG sublingual tablet, For chest pain place 1 tablet under the tongue every 5 minutes for 3 doses. If symptoms persist 5 minutes after 1st dose call 911., Disp: , Rfl:      olopatadine (PATANOL) 0.1 % ophthalmic solution, Place 1 drop into both eyes 2 times daily, Disp: , Rfl:      Omega-3 Fatty Acids (FISH OIL OMEGA-3 PO), Take 1 capsule by mouth daily, Disp: , Rfl:      prednisoLONE acetate (PRED FORTE) 1 % ophthalmic suspension, Place 1 drop into both eyes 2 times daily , Disp: , Rfl:      sennosides (SENOKOT) 8.6 MG tablet, Take 1 tablet by mouth 2 times daily, Disp: , Rfl:      ticagrelor (BRILINTA) 90 MG tablet, Take 1 tablet (90 mg) by mouth 2 times daily Dose to start tomorrow morning., Disp: 180 tablet, Rfl: 3     traMADol (ULTRAM) 50 MG tablet, Take 1 tablet (50 mg) by mouth 3 times daily, Disp: 90 tablet, Rfl: 0     VITAMIN D, CHOLECALCIFEROL, PO, Take 4,000 Units by mouth daily , Disp: , Rfl:        BP (!) 149/78   Pulse 68   Temp 97.7  F (36.5  C)    Resp 18   Wt 76.9 kg (169 lb 9.6 oz)   SpO2 98%   BMI 30.04 kg/m      LABS:   Last Comprehensive Metabolic Panel:  Sodium   Date Value Ref Range Status   12/22/2021 138 136 - 145 mmol/L Final   09/15/2006 134 133 - 144 mmol/L Final     Comment:     Delta Verified     Potassium   Date Value Ref Range Status   12/22/2021 4.1 3.5 - 5.0 mmol/L Final   09/15/2006 4.1 3.4 - 5.3 mmol/L Final     Chloride   Date Value Ref Range Status   12/22/2021 102 98 - 107 mmol/L Final   09/15/2006 99 94 - 109 mmol/L Final     Carbon Dioxide   Date Value Ref Range Status   09/15/2006 28 20 - 32 mmol/L Final     Carbon Dioxide (CO2)   Date Value Ref Range Status   12/22/2021 24 22 - 31 mmol/L Final     Anion Gap   Date Value Ref Range Status   12/22/2021 12 5 - 18 mmol/L Final   09/15/2006 7 6 - 17 mmol/L Final     Glucose   Date Value Ref Range Status   12/22/2021 98 70 - 125 mg/dL Final   09/14/2006 110 60 - 110 mg/dL Final     Urea Nitrogen   Date Value Ref Range Status   12/22/2021 17 8 - 28 mg/dL Final   09/14/2006 24 7 - 30 mg/dL Final     Creatinine   Date Value Ref Range Status   12/22/2021 0.74 0.60 - 1.10 mg/dL Final   09/14/2006 1.08 0.60 - 1.30 mg/dL Final     GFR Estimate   Date Value Ref Range Status   12/22/2021 78 >60 mL/min/1.73m2 Final     Comment:     Effective December 21, 2021 eGFRcr in adults is calculated using the 2021 CKD-EPI creatinine equation which includes age and gender (Maria Del Carmen woodruff al., NEJM, DOI: 10.1056/LTPSkm0597252)   05/26/2021 >60 >60 mL/min/1.73m2 Final   09/14/2006 53 (L) >60 mL/min/1.7m2 Final     Calcium   Date Value Ref Range Status   12/22/2021 8.6 8.5 - 10.5 mg/dL Final   09/14/2006 9.2 8.5 - 10.4 mg/dL Final     Bilirubin Total   Date Value Ref Range Status   12/22/2021 0.4 0.0 - 1.0 mg/dL Final     Alkaline Phosphatase   Date Value Ref Range Status   12/22/2021 76 45 - 120 U/L Final     ALT   Date Value Ref Range Status   12/22/2021 21 0 - 45 U/L Final     AST   Date Value Ref Range Status    12/22/2021 12 0 - 40 U/L Final                   ASSESSMENT:    Encounter Diagnoses   Name Primary?     Essential hypertension Yes     Other chronic pain      NSTEMI (non-ST elevated myocardial infarction) (H)      Constipation, unspecified constipation type        PLAN:    Continue with current medications including senna S for constipation. Continue to monitor blood pressures and her weight as well as her appetite. Continue to monitor the rehabilitation process. Currently today she is ambulating with a walker up and down the hallway. She did not have any other questions.        Electronically signed by: Bry Jurado NP

## 2022-01-10 VITALS
SYSTOLIC BLOOD PRESSURE: 131 MMHG | TEMPERATURE: 98.8 F | DIASTOLIC BLOOD PRESSURE: 77 MMHG | WEIGHT: 169.8 LBS | HEART RATE: 92 BPM | OXYGEN SATURATION: 99 % | RESPIRATION RATE: 18 BRPM | BODY MASS INDEX: 30.08 KG/M2

## 2022-01-11 ENCOUNTER — TRANSITIONAL CARE UNIT VISIT (OUTPATIENT)
Dept: GERIATRICS | Facility: CLINIC | Age: 87
End: 2022-01-11
Payer: COMMERCIAL

## 2022-01-11 DIAGNOSIS — I25.10 CORONARY ARTERY CALCIFICATION SEEN ON CAT SCAN: ICD-10-CM

## 2022-01-11 DIAGNOSIS — I10 ESSENTIAL HYPERTENSION: ICD-10-CM

## 2022-01-11 DIAGNOSIS — M15.0 PRIMARY OSTEOARTHRITIS INVOLVING MULTIPLE JOINTS: ICD-10-CM

## 2022-01-11 DIAGNOSIS — I21.4 NSTEMI (NON-ST ELEVATED MYOCARDIAL INFARCTION) (H): Primary | ICD-10-CM

## 2022-01-11 PROCEDURE — 99316 NF DSCHRG MGMT 30 MIN+: CPT | Performed by: FAMILY MEDICINE

## 2022-01-11 NOTE — PROGRESS NOTES
[facility]    Facility:   [facility] ([facility]) [[id number]]   Code Status: FULL CODE      CHIEF COMPLAINT/REASON FOR VISIT:  Chief Complaint   Patient presents with     Discharge Summary     discharge       HISTORY:      HPI: Marina is a 87 year old female who was hospitalized December 11, 2021 through December 14, 2021 secondary to waking up from sleep about 10 PM with chest pain.  She did call the EMS and they transported her to the hospital.  Her work-up did include an echocardiogram December 11 with an ejection fraction of 33% and that is in comparison to a TTE of June 2019 with an ejection fraction of 55/60%.  She did have a coronary angiogram that showed 99% proximal to mid LAD occlusion and this was treated with PAT x1.  She also has 90% stenosis of the RCA and 80% stenosis of the RPDA that will need to be staged PCI after discharge.  The plan was dual antiplatelet therapy for 12 months which includes ticagrelor 90 mg twice daily along with aspirin 81 mg daily.  Her other updated medications will be Coreg 6.25 mg twice daily, losartan 25 mg and a statin or a atorvastatin 40 mg daily.  [facility] was cardiac outpatient rehab in the meantime she will go to transitional care.  Her laboratory studies on December 11 did show white cells of 9.0, hemoglobin 12.3, platelets 220, sodium 137, potassium 4.4, BUN 18, creatinine 0.76.  She has been in the transitional care unit to try to improve her strength balance confidence and overall conditioning.  She is now able to use her 4 wheeled walker up and down the hallway however she states she is still not as strong as she would like to be but she has past therapy recommendations.  She has been normotensive and afebrile and also on room air.  Good appetite.  Her weight has been stable anywhere from 168-169 pounds she does have chronic lower extremity edema she did get some CARO hose.  For short time she did receive furosemide 20 mg twice daily for  increased lower extremity edema.  She also did have a dietary consult as well.  She does have a history of chronic pain and currently on Tylenol and tramadol.  For constipation we did increase the senna S to 2 tablets twice daily.  She had a visit with the ophthalmologist on January 5.  She does have an upcoming cardiology visit to go over her heart as well as the future staging of her 90% stenosis of the RCA and 80% stenosis of the PDA.  On her cognition scale she scored 15-30.  Past Medical History:   Diagnosis Date     Age-related physical debility      Anemia      Arthritis      Bladder infection 01/03/2015    dx'd- on antibiotics     Blood type AB-     with Anti-C     Breast cancer (H)      Bursitis, knee      Cancer (H)     breast      Cancer (H) 2005    left breast     Colon cancer (H)      Depression      Gout      History of transfusion      Hypertension      Hypertension      Insomnia      Macular degeneration      Osteoarthritis of multiple joints      Osteopenia      Ovarian cyst      Rosacea      Vitamin D deficiency             Family History   Problem Relation Age of Onset     Squamous cell carcinoma Mother      Colon Cancer Sister      Breast Cancer Maternal Aunt      Bone Cancer Maternal Aunt      Colon Cancer Paternal Aunt      Breast Cancer Cousin       Social History     Socioeconomic History     Marital status:      Spouse name: Not on file     Number of children: Not on file     Years of education: Not on file     Highest education level: Not on file   Occupational History     Not on file   Tobacco Use     Smoking status: Never Smoker     Smokeless tobacco: Never Used   Substance and Sexual Activity     Alcohol use: No     Drug use: No     Sexual activity: Never   Other Topics Concern     Parent/sibling w/ CABG, MI or angioplasty before 65F 55M? Not Asked   Social History Narrative     Not on file     Social Determinants of Health     Financial Resource Strain: Not on file   Food  Insecurity: Not on file   Transportation Needs: Not on file   Physical Activity: Not on file   Stress: Not on file   Social Connections: Not on file   Intimate Partner Violence: Not on file   Housing Stability: Not on file        REVIEW OF SYSTEM:  She currently denies any new symptoms of fever chills fatigue sore throat postnasal drip wheezing chest pain dizziness vertigo nausea vomiting diarrhea dysuria frequency urgency.  She does have a history of hypertension, chronic pain syndrome, ASCVD, systolic heart failure, ischemic cardiomyopathy, cataracts, hard of hearing, history of breast cancer status post mastectomy, colon cancer status post partial resection    PHYSICAL EXAM:   Pleasant female in no acute distress.  Neck is supple without adenopathy.   Lung sounds are clear throughout.  Cardiovascular S1-S2 regular rate and rhythm.  Chronic lower extremity edema.  Nonpitting. Tubigrip stockings. Gastrointestinal soft nontender with positive bowel sounds.  Musculoskeletal history of chronic pain and knee osteoarthritis.  Psychiatric: Pleasant affect.    Current Outpatient Medications:      aspirin (ASA) 81 MG EC tablet, Take 1 tablet (81 mg) by mouth daily Start tomorrow., Disp: 30 tablet, Rfl: 3     atorvastatin (LIPITOR) 40 MG tablet, Take 1 tablet (40 mg) by mouth every evening, Disp: , Rfl:      calcium carbonate-vitamin D (OSCAL W/D) 500-200 MG-UNIT tablet, Take 1 tablet by mouth daily, Disp: , Rfl:      carvedilol (COREG) 6.25 MG tablet, Take 1 tablet (6.25 mg) by mouth 2 times daily (with meals), Disp: , Rfl:      furosemide (LASIX) 20 MG tablet, Take 1 tablet (20 mg) by mouth daily (Patient taking differently: Take 20 mg by mouth 2 times daily ), Disp: , Rfl:      losartan (COZAAR) 25 MG tablet, Take 1 tablet (25 mg) by mouth daily, Disp: , Rfl:      magnesium oxide (MAG-OX) 400 (241.3 Mg) MG tablet, Take 400 mg by mouth daily, Disp: , Rfl:      Multiple Vitamins-Minerals (PRESERVISION AREDS) CAPS, Take 1  capsule by mouth 2 times daily , Disp: , Rfl:      nitroGLYcerin (NITROSTAT) 0.4 MG sublingual tablet, For chest pain place 1 tablet under the tongue every 5 minutes for 3 doses. If symptoms persist 5 minutes after 1st dose call 911., Disp: , Rfl:      olopatadine (PATANOL) 0.1 % ophthalmic solution, Place 1 drop into both eyes 2 times daily, Disp: , Rfl:      Omega-3 Fatty Acids (FISH OIL OMEGA-3 PO), Take 1 capsule by mouth daily, Disp: , Rfl:      prednisoLONE acetate (PRED FORTE) 1 % ophthalmic suspension, Place 1 drop into both eyes 2 times daily , Disp: , Rfl:      sennosides (SENOKOT) 8.6 MG tablet, Take 1 tablet by mouth 2 times daily, Disp: , Rfl:      ticagrelor (BRILINTA) 90 MG tablet, Take 1 tablet (90 mg) by mouth 2 times daily Dose to start tomorrow morning., Disp: 180 tablet, Rfl: 3     traMADol (ULTRAM) 50 MG tablet, Take 1 tablet (50 mg) by mouth 3 times daily, Disp: 90 tablet, Rfl: 0     VITAMIN D, CHOLECALCIFEROL, PO, Take 4,000 Units by mouth daily , Disp: , Rfl:     /77   Pulse 92   Temp 98.8  F (37.1  C)   Resp 18   Wt 77 kg (169 lb 12.8 oz)   SpO2 99%   BMI 30.08 kg/m        LABS:   Last Comprehensive Metabolic Panel:  Sodium   Date Value Ref Range Status   12/22/2021 138 136 - 145 mmol/L Final   09/15/2006 134 133 - 144 mmol/L Final     Comment:     Delta Verified     Potassium   Date Value Ref Range Status   12/22/2021 4.1 3.5 - 5.0 mmol/L Final   09/15/2006 4.1 3.4 - 5.3 mmol/L Final     Chloride   Date Value Ref Range Status   12/22/2021 102 98 - 107 mmol/L Final   09/15/2006 99 94 - 109 mmol/L Final     Carbon Dioxide   Date Value Ref Range Status   09/15/2006 28 20 - 32 mmol/L Final     Carbon Dioxide (CO2)   Date Value Ref Range Status   12/22/2021 24 22 - 31 mmol/L Final     Anion Gap   Date Value Ref Range Status   12/22/2021 12 5 - 18 mmol/L Final   09/15/2006 7 6 - 17 mmol/L Final     Glucose   Date Value Ref Range Status   12/22/2021 98 70 - 125 mg/dL Final   09/14/2006  110 60 - 110 mg/dL Final     Urea Nitrogen   Date Value Ref Range Status   12/22/2021 17 8 - 28 mg/dL Final   09/14/2006 24 7 - 30 mg/dL Final     Creatinine   Date Value Ref Range Status   12/22/2021 0.74 0.60 - 1.10 mg/dL Final   09/14/2006 1.08 0.60 - 1.30 mg/dL Final     GFR Estimate   Date Value Ref Range Status   12/22/2021 78 >60 mL/min/1.73m2 Final     Comment:     Effective December 21, 2021 eGFRcr in adults is calculated using the 2021 CKD-EPI creatinine equation which includes age and gender (Maria Del Carmen et al., [place], DOI: 10.1056/ZDOCov2353043)   05/26/2021 >60 >60 mL/min/1.73m2 Final   09/14/2006 53 (L) >60 mL/min/1.7m2 Final     Calcium   Date Value Ref Range Status   12/22/2021 8.6 8.5 - 10.5 mg/dL Final   09/14/2006 9.2 8.5 - 10.4 mg/dL Final     Last Comprehensive Metabolic Panel:  Sodium   Date Value Ref Range Status   12/22/2021 138 136 - 145 mmol/L Final   09/15/2006 134 133 - 144 mmol/L Final     Comment:     Delta Verified     Potassium   Date Value Ref Range Status   12/22/2021 4.1 3.5 - 5.0 mmol/L Final   09/15/2006 4.1 3.4 - 5.3 mmol/L Final     Chloride   Date Value Ref Range Status   12/22/2021 102 98 - 107 mmol/L Final   09/15/2006 99 94 - 109 mmol/L Final     Carbon Dioxide   Date Value Ref Range Status   09/15/2006 28 20 - 32 mmol/L Final     Carbon Dioxide (CO2)   Date Value Ref Range Status   12/22/2021 24 22 - 31 mmol/L Final     Anion Gap   Date Value Ref Range Status   12/22/2021 12 5 - 18 mmol/L Final   09/15/2006 7 6 - 17 mmol/L Final     Glucose   Date Value Ref Range Status   12/22/2021 98 70 - 125 mg/dL Final   09/14/2006 110 60 - 110 mg/dL Final     Urea Nitrogen   Date Value Ref Range Status   12/22/2021 17 8 - 28 mg/dL Final   09/14/2006 24 7 - 30 mg/dL Final     Creatinine   Date Value Ref Range Status   12/22/2021 0.74 0.60 - 1.10 mg/dL Final   09/14/2006 1.08 0.60 - 1.30 mg/dL Final     GFR Estimate   Date Value Ref Range Status   12/22/2021 78 >60 mL/min/1.73m2 Final      Comment:     Effective December 21, 2021 eGFRcr in adults is calculated using the 2021 CKD-EPI creatinine equation which includes age and gender (Maria Del Carmen et al., NEJ, DOI: 10.1056/QXMRdi7289374)   05/26/2021 >60 >60 mL/min/1.73m2 Final   09/14/2006 53 (L) >60 mL/min/1.7m2 Final     Calcium   Date Value Ref Range Status   12/22/2021 8.6 8.5 - 10.5 mg/dL Final   09/14/2006 9.2 8.5 - 10.4 mg/dL Final     Bilirubin Total   Date Value Ref Range Status   12/22/2021 0.4 0.0 - 1.0 mg/dL Final     Alkaline Phosphatase   Date Value Ref Range Status   12/22/2021 76 45 - 120 U/L Final     ALT   Date Value Ref Range Status   12/22/2021 21 0 - 45 U/L Final     AST   Date Value Ref Range Status   12/22/2021 12 0 - 40 U/L Final             Recent Labs   Lab Test 12/22/21  0649 10/21/20  1206   CHOL 112 191   HDL 37* 46*   LDL 63 112   TRIG 60 166*         ASSESSMENT:    Encounter Diagnoses   Name Primary?     NSTEMI (non-ST elevated myocardial infarction) (H) Yes     Essential hypertension      Coronary artery calcification seen on CAT scan      Primary osteoarthritis involving multiple joints        MEDICAL EQUIPMENT NEEDS:  None    DISCHARGE PLAN/FACE TO FACE:  I certify that services are/were furnished while this patient was under the care of a physician and that a physician or an allowed non-physician practitioner (NPP), had a face-to-face encounter that meets the physician face-to-face encounter requirements. The encounter was in whole, or in part, related to the primary reason for home health. The patient is confined to his/her home and needs intermittent skilled nursing, physical therapy, speech-language pathology, or the continued need for occupational therapy. A plan of care has been established by a physician and is periodically reviewed by a physician.  Date of Face-to-Face Encounter: January 11, 2022    I certify that, based on my findings, the following services are medically necessary home health services: She will be  discharging to home with current medications and narcotics with an anticipated discharge date of January 12, 2022 with physical and Occupational Therapy Home health aide and nursing    My clinical findings support the need for the above skilled services because: (Please write a brief narrative summary that describes what the RN, PT, SLP, or other services will be doing in the home. A list of diagnoses in this section does not meet the CMS requirements.)  She will require the skilled services secondary to her chronic medical conditions as well as continuation of the rehabilitation process but also nursing for medication management, assessment of edema, lung sounds and blood pressure management    This patient is homebound because: (Please write a brief narrative summary describing the functional limitations as to why this patient is homebound and specifically what makes this patient homebound.)  Secondary to chronic medical conditions as well as self-care deficits but the continuation of the rehabilitation process.  Nursing for medication management including weights, lung sounds and medication management    The patient is, or has been, under my care and I have initiated the establishment of the plan of care. This patient will be followed by a physician who will periodically review the plan of care.    Schedule follow up visit with primary care provider within 7 days to reestablish care.  She will follow-up with cardiology as previously arranged.  I believe there is an appointment next week for further discussion of her staging.  She did have a recent visit with ophthalmology on January 5, 2022.  She will also follow-up with her primary care doctor regarding medication management and any future laboratory studies.    Discharge coordination of care greater than 30 minutes    Electronically signed by: Bry Jurado NP

## 2022-01-11 NOTE — LETTER
1/11/2022        RE: Marina ADAME Edinson  [address]  [place] [address]        M [facility] GERIATRIC SERVICES    Facility:   [facility] ([facility]) [[id number]]   Code Status: FULL CODE      CHIEF COMPLAINT/REASON FOR VISIT:  Chief Complaint   Patient presents with     Discharge Summary     discharge       HISTORY:      HPI: Marina is a 87 year old female who was hospitalized December 11, 2021 through December 14, 2021 secondary to waking up from sleep about 10 PM with chest pain.  She did call the EMS and they transported her to the hospital.  Her work-up did include an echocardiogram December 11 with an ejection fraction of 33% and that is in comparison to a TTE of June 2019 with an ejection fraction of 55/60%.  She did have a coronary angiogram that showed 99% proximal to mid LAD occlusion and this was treated with PAT x1.  She also has 90% stenosis of the RCA and 80% stenosis of the RPDA that will need to be staged PCI after discharge.  The plan was dual antiplatelet therapy for 12 months which includes ticagrelor 90 mg twice daily along with aspirin 81 mg daily.  Her other updated medications will be Coreg 6.25 mg twice daily, losartan 25 mg and a statin or a atorvastatin 40 mg daily.  [facility] was cardiac outpatient rehab in the meantime she will go to transitional care.  Her laboratory studies on December 11 did show white cells of 9.0, hemoglobin 12.3, platelets 220, sodium 137, potassium 4.4, BUN 18, creatinine 0.76.  She has been in the transitional care unit to try to improve her strength balance confidence and overall conditioning.  She is now able to use her 4 wheeled walker up and down the hallway however she states she is still not as strong as she would like to be but she has past therapy recommendations.  She has been normotensive and afebrile and also on room air.  Good appetite.  Her weight has been stable anywhere from 168-169 pounds she does have chronic lower extremity edema she  did get some CARO hose.  For short time she did receive furosemide 20 mg twice daily for increased lower extremity edema.  She also did have a dietary consult as well.  She does have a history of chronic pain and currently on Tylenol and tramadol.  For constipation we did increase the senna S to 2 tablets twice daily.  She had a visit with the ophthalmologist on January 5.  She does have an upcoming cardiology visit to go over her heart as well as the future staging of her 90% stenosis of the RCA and 80% stenosis of the PDA.  On her cognition scale she scored 15-30.  Past Medical History:   Diagnosis Date     Age-related physical debility      Anemia      Arthritis      Bladder infection 01/03/2015    dx'd- on antibiotics     Blood type AB-     with Anti-C     Breast cancer (H)      Bursitis, knee      Cancer (H)     breast      Cancer (H) 2005    left breast     Colon cancer (H)      Depression      Gout      History of transfusion      Hypertension      Hypertension      Insomnia      Macular degeneration      Osteoarthritis of multiple joints      Osteopenia      Ovarian cyst      Rosacea      Vitamin D deficiency             Family History   Problem Relation Age of Onset     Squamous cell carcinoma Mother      Colon Cancer Sister      Breast Cancer Maternal Aunt      Bone Cancer Maternal Aunt      Colon Cancer Paternal Aunt      Breast Cancer Cousin       Social History     Socioeconomic History     Marital status:      Spouse name: Not on file     Number of children: Not on file     Years of education: Not on file     Highest education level: Not on file   Occupational History     Not on file   Tobacco Use     Smoking status: Never Smoker     Smokeless tobacco: Never Used   Substance and Sexual Activity     Alcohol use: No     Drug use: No     Sexual activity: Never   Other Topics Concern     Parent/sibling w/ CABG, MI or angioplasty before 65F 55M? Not Asked   Social History Narrative     Not on file      Social Determinants of Health     Financial Resource Strain: Not on file   Food Insecurity: Not on file   Transportation Needs: Not on file   Physical Activity: Not on file   Stress: Not on file   Social Connections: Not on file   Intimate Partner Violence: Not on file   Housing Stability: Not on file        REVIEW OF SYSTEM:  She currently denies any new symptoms of fever chills fatigue sore throat postnasal drip wheezing chest pain dizziness vertigo nausea vomiting diarrhea dysuria frequency urgency.  She does have a history of hypertension, chronic pain syndrome, ASCVD, systolic heart failure, ischemic cardiomyopathy, cataracts, hard of hearing, history of breast cancer status post mastectomy, colon cancer status post partial resection    PHYSICAL EXAM:   Pleasant female in no acute distress.  Neck is supple without adenopathy.   Lung sounds are clear throughout.  Cardiovascular S1-S2 regular rate and rhythm.  Chronic lower extremity edema.  Nonpitting. Tubigrip stockings. Gastrointestinal soft nontender with positive bowel sounds.  Musculoskeletal history of chronic pain and knee osteoarthritis.  Psychiatric: Pleasant affect.    Current Outpatient Medications:      aspirin (ASA) 81 MG EC tablet, Take 1 tablet (81 mg) by mouth daily Start tomorrow., Disp: 30 tablet, Rfl: 3     atorvastatin (LIPITOR) 40 MG tablet, Take 1 tablet (40 mg) by mouth every evening, Disp: , Rfl:      calcium carbonate-vitamin D (OSCAL W/D) 500-200 MG-UNIT tablet, Take 1 tablet by mouth daily, Disp: , Rfl:      carvedilol (COREG) 6.25 MG tablet, Take 1 tablet (6.25 mg) by mouth 2 times daily (with meals), Disp: , Rfl:      furosemide (LASIX) 20 MG tablet, Take 1 tablet (20 mg) by mouth daily (Patient taking differently: Take 20 mg by mouth 2 times daily ), Disp: , Rfl:      losartan (COZAAR) 25 MG tablet, Take 1 tablet (25 mg) by mouth daily, Disp: , Rfl:      magnesium oxide (MAG-OX) 400 (241.3 Mg) MG tablet, Take 400 mg by mouth  daily, Disp: , Rfl:      Multiple Vitamins-Minerals (PRESERVISION AREDS) CAPS, Take 1 capsule by mouth 2 times daily , Disp: , Rfl:      nitroGLYcerin (NITROSTAT) 0.4 MG sublingual tablet, For chest pain place 1 tablet under the tongue every 5 minutes for 3 doses. If symptoms persist 5 minutes after 1st dose call 911., Disp: , Rfl:      olopatadine (PATANOL) 0.1 % ophthalmic solution, Place 1 drop into both eyes 2 times daily, Disp: , Rfl:      Omega-3 Fatty Acids (FISH OIL OMEGA-3 PO), Take 1 capsule by mouth daily, Disp: , Rfl:      prednisoLONE acetate (PRED FORTE) 1 % ophthalmic suspension, Place 1 drop into both eyes 2 times daily , Disp: , Rfl:      sennosides (SENOKOT) 8.6 MG tablet, Take 1 tablet by mouth 2 times daily, Disp: , Rfl:      ticagrelor (BRILINTA) 90 MG tablet, Take 1 tablet (90 mg) by mouth 2 times daily Dose to start tomorrow morning., Disp: 180 tablet, Rfl: 3     traMADol (ULTRAM) 50 MG tablet, Take 1 tablet (50 mg) by mouth 3 times daily, Disp: 90 tablet, Rfl: 0     VITAMIN D, CHOLECALCIFEROL, PO, Take 4,000 Units by mouth daily , Disp: , Rfl:     /77   Pulse 92   Temp 98.8  F (37.1  C)   Resp 18   Wt 77 kg (169 lb 12.8 oz)   SpO2 99%   BMI 30.08 kg/m        LABS:   Last Comprehensive Metabolic Panel:  Sodium   Date Value Ref Range Status   12/22/2021 138 136 - 145 mmol/L Final   09/15/2006 134 133 - 144 mmol/L Final     Comment:     Delta Verified     Potassium   Date Value Ref Range Status   12/22/2021 4.1 3.5 - 5.0 mmol/L Final   09/15/2006 4.1 3.4 - 5.3 mmol/L Final     Chloride   Date Value Ref Range Status   12/22/2021 102 98 - 107 mmol/L Final   09/15/2006 99 94 - 109 mmol/L Final     Carbon Dioxide   Date Value Ref Range Status   09/15/2006 28 20 - 32 mmol/L Final     Carbon Dioxide (CO2)   Date Value Ref Range Status   12/22/2021 24 22 - 31 mmol/L Final     Anion Gap   Date Value Ref Range Status   12/22/2021 12 5 - 18 mmol/L Final   09/15/2006 7 6 - 17 mmol/L Final      Glucose   Date Value Ref Range Status   12/22/2021 98 70 - 125 mg/dL Final   09/14/2006 110 60 - 110 mg/dL Final     Urea Nitrogen   Date Value Ref Range Status   12/22/2021 17 8 - 28 mg/dL Final   09/14/2006 24 7 - 30 mg/dL Final     Creatinine   Date Value Ref Range Status   12/22/2021 0.74 0.60 - 1.10 mg/dL Final   09/14/2006 1.08 0.60 - 1.30 mg/dL Final     GFR Estimate   Date Value Ref Range Status   12/22/2021 78 >60 mL/min/1.73m2 Final     Comment:     Effective December 21, 2021 eGFRcr in adults is calculated using the 2021 CKD-EPI creatinine equation which includes age and gender (Maria Del Carmen et al., [place], DOI: 10.1056/OTRTwp9957121)   05/26/2021 >60 >60 mL/min/1.73m2 Final   09/14/2006 53 (L) >60 mL/min/1.7m2 Final     Calcium   Date Value Ref Range Status   12/22/2021 8.6 8.5 - 10.5 mg/dL Final   09/14/2006 9.2 8.5 - 10.4 mg/dL Final     Last Comprehensive Metabolic Panel:  Sodium   Date Value Ref Range Status   12/22/2021 138 136 - 145 mmol/L Final   09/15/2006 134 133 - 144 mmol/L Final     Comment:     Delta Verified     Potassium   Date Value Ref Range Status   12/22/2021 4.1 3.5 - 5.0 mmol/L Final   09/15/2006 4.1 3.4 - 5.3 mmol/L Final     Chloride   Date Value Ref Range Status   12/22/2021 102 98 - 107 mmol/L Final   09/15/2006 99 94 - 109 mmol/L Final     Carbon Dioxide   Date Value Ref Range Status   09/15/2006 28 20 - 32 mmol/L Final     Carbon Dioxide (CO2)   Date Value Ref Range Status   12/22/2021 24 22 - 31 mmol/L Final     Anion Gap   Date Value Ref Range Status   12/22/2021 12 5 - 18 mmol/L Final   09/15/2006 7 6 - 17 mmol/L Final     Glucose   Date Value Ref Range Status   12/22/2021 98 70 - 125 mg/dL Final   09/14/2006 110 60 - 110 mg/dL Final     Urea Nitrogen   Date Value Ref Range Status   12/22/2021 17 8 - 28 mg/dL Final   09/14/2006 24 7 - 30 mg/dL Final     Creatinine   Date Value Ref Range Status   12/22/2021 0.74 0.60 - 1.10 mg/dL Final   09/14/2006 1.08 0.60 - 1.30 mg/dL Final      GFR Estimate   Date Value Ref Range Status   12/22/2021 78 >60 mL/min/1.73m2 Final     Comment:     Effective December 21, 2021 eGFRcr in adults is calculated using the 2021 CKD-EPI creatinine equation which includes age and gender (Maria Del Carmen et al., NEJ, DOI: 10.1056/WRNMqi5780813)   05/26/2021 >60 >60 mL/min/1.73m2 Final   09/14/2006 53 (L) >60 mL/min/1.7m2 Final     Calcium   Date Value Ref Range Status   12/22/2021 8.6 8.5 - 10.5 mg/dL Final   09/14/2006 9.2 8.5 - 10.4 mg/dL Final     Bilirubin Total   Date Value Ref Range Status   12/22/2021 0.4 0.0 - 1.0 mg/dL Final     Alkaline Phosphatase   Date Value Ref Range Status   12/22/2021 76 45 - 120 U/L Final     ALT   Date Value Ref Range Status   12/22/2021 21 0 - 45 U/L Final     AST   Date Value Ref Range Status   12/22/2021 12 0 - 40 U/L Final             Recent Labs   Lab Test 12/22/21  0649 10/21/20  1206   CHOL 112 191   HDL 37* 46*   LDL 63 112   TRIG 60 166*         ASSESSMENT:    Encounter Diagnoses   Name Primary?     NSTEMI (non-ST elevated myocardial infarction) (H) Yes     Essential hypertension      Coronary artery calcification seen on CAT scan      Primary osteoarthritis involving multiple joints        MEDICAL EQUIPMENT NEEDS:  None    DISCHARGE PLAN/FACE TO FACE:  I certify that services are/were furnished while this patient was under the care of a physician and that a physician or an allowed non-physician practitioner (NPP), had a face-to-face encounter that meets the physician face-to-face encounter requirements. The encounter was in whole, or in part, related to the primary reason for home health. The patient is confined to his/her home and needs intermittent skilled nursing, physical therapy, speech-language pathology, or the continued need for occupational therapy. A plan of care has been established by a physician and is periodically reviewed by a physician.  Date of Face-to-Face Encounter: January 11, 2022    I certify that, based on my  findings, the following services are medically necessary home health services: She will be discharging to home with current medications and narcotics with an anticipated discharge date of January 12, 2022 with physical and Occupational Therapy Home health aide and nursing    My clinical findings support the need for the above skilled services because: (Please write a brief narrative summary that describes what the RN, PT, SLP, or other services will be doing in the home. A list of diagnoses in this section does not meet the CMS requirements.)  She will require the skilled services secondary to her chronic medical conditions as well as continuation of the rehabilitation process but also nursing for medication management, assessment of edema, lung sounds and blood pressure management    This patient is homebound because: (Please write a brief narrative summary describing the functional limitations as to why this patient is homebound and specifically what makes this patient homebound.)  Secondary to chronic medical conditions as well as self-care deficits but the continuation of the rehabilitation process.  Nursing for medication management including weights, lung sounds and medication management    The patient is, or has been, under my care and I have initiated the establishment of the plan of care. This patient will be followed by a physician who will periodically review the plan of care.    Schedule follow up visit with primary care provider within 7 days to reestablish care.  She will follow-up with cardiology as previously arranged.  I believe there is an appointment next week for further discussion of her staging.  She did have a recent visit with ophthalmology on January 5, 2022.  She will also follow-up with her primary care doctor regarding medication management and any future laboratory studies.    Discharge coordination of care greater than 30 minutes    Electronically signed by: Bry Jurado  NP          Sincerely,        Bry Jurado NP

## 2022-01-12 ENCOUNTER — DOCUMENTATION ONLY (OUTPATIENT)
Dept: CARDIAC REHAB | Facility: CLINIC | Age: 87
End: 2022-01-12
Payer: COMMERCIAL

## 2022-01-12 NOTE — PROGRESS NOTES
Pt presented for her scheduled OP cardiac rehab appointment after being discharged from TCU this morning.  OP therapy is not appropriate for pt at this time.  Can physical therapy be arranged/ordered for her assisted living facility?

## 2022-01-18 PROCEDURE — G0180 MD CERTIFICATION HHA PATIENT: HCPCS | Performed by: INTERNAL MEDICINE

## 2022-01-25 ENCOUNTER — ASSISTED LIVING VISIT (OUTPATIENT)
Dept: GERIATRICS | Facility: CLINIC | Age: 87
End: 2022-01-25
Payer: COMMERCIAL

## 2022-01-25 DIAGNOSIS — R26.9 ABNORMAL GAIT: ICD-10-CM

## 2022-01-25 DIAGNOSIS — I10 ESSENTIAL HYPERTENSION: ICD-10-CM

## 2022-01-25 DIAGNOSIS — G89.29 CHRONIC PAIN OF RIGHT KNEE: Primary | ICD-10-CM

## 2022-01-25 DIAGNOSIS — M25.561 CHRONIC PAIN OF RIGHT KNEE: Primary | ICD-10-CM

## 2022-01-25 DIAGNOSIS — G89.29 OTHER CHRONIC PAIN: ICD-10-CM

## 2022-01-25 NOTE — PROGRESS NOTES
[facility] GERIATRIC SERVICES    Facility:   [facility] [[id number]]   Code Status: FULL CODE      CHIEF COMPLAINT/REASON FOR VISIT:  Chief Complaint   Patient presents with     FVP Care Coordination - Regulatory     JANINA-regulatory       HISTORY:      HPI: Marina is a 87 year old female who currently lives in the assisted living facility Apartments room [address] and [address]six and who I was requested to see secondary to a regulatory review of chronic medical conditions.  She washospitalized December 11, 2021 through December 14, 2021 secondary to waking up from sleep about 10 PM with chest pain.  She did call the EMS and they transported her to the hospital.  Her work-up did include an echocardiogram December 11 with an ejection fraction of 33% and that is in comparison to a TTE of June 2019 with an ejection fraction of 55/60%.  She did have a coronary angiogram that showed 99% proximal to mid LAD occlusion and this was treated with PAT x1.  She also has 90% stenosis of the RCA and 80% stenosis of the RPDA that will need to be staged PCI after discharge.  The plan was dual antiplatelet therapy for 12 months which includes ticagrelor 90 mg twice daily along with aspirin 81 mg daily.  She is now back in her apartment to which we did have a chance to address as well as go over the stay on the transitional care unit but also now that she is in her apartment.  She is also receiving therapy services.  She does have a therapy service later on today.  The meantime she does move about the facility in her four-wheel walker.  Does have a history of chronic pain including right knee pain.  She is on scheduled Tylenol and tramadol.  For her respiratory status she has been using the incentive spirometry.  She was at one point going to follow-up with cardiology but apparently has a difficult time getting there so she has not had the follow-up with cardiology.  Her appetite is good.  Denies any fever or chills.  No  Assumed care of pt at 1845; pt asleep and sedated, does not respond to verbal stimuli. Pt respirations near 40 breaths per minute, HOB at 30 and pt on back; bed low and locked; bed alarm engaged   increase in lower extremity edema.  Does not perform daily weights.    Past Medical History:   Diagnosis Date     Age-related physical debility      Anemia      Arthritis      Bladder infection 01/03/2015    dx'd- on antibiotics     Blood type AB-     with Anti-C     Breast cancer (H)      Bursitis, knee      Cancer (H)     breast      Cancer (H) 2005    left breast     Colon cancer (H)      Depression      Gout      History of transfusion      Hypertension      Hypertension      Insomnia      Macular degeneration      Osteoarthritis of multiple joints      Osteopenia      Ovarian cyst      Rosacea      Vitamin D deficiency             Family History   Problem Relation Age of Onset     Squamous cell carcinoma Mother      Colon Cancer Sister      Breast Cancer Maternal Aunt      Bone Cancer Maternal Aunt      Colon Cancer Paternal Aunt      Breast Cancer Cousin       Social History     Socioeconomic History     Marital status:      Spouse name: Not on file     Number of children: Not on file     Years of education: Not on file     Highest education level: Not on file   Occupational History     Not on file   Tobacco Use     Smoking status: Never Smoker     Smokeless tobacco: Never Used   Substance and Sexual Activity     Alcohol use: No     Drug use: No     Sexual activity: Never   Other Topics Concern     Parent/sibling w/ CABG, MI or angioplasty before 65F 55M? Not Asked   Social History Narrative     Not on file     Social Determinants of Health     Financial Resource Strain: Not on file   Food Insecurity: Not on file   Transportation Needs: Not on file   Physical Activity: Not on file   Stress: Not on file   Social Connections: Not on file   Intimate Partner Violence: Not on file   Housing Stability: Not on file        REVIEW OF SYSTEM:  She currently denies any new symptoms of fever chills fatigue sore throat postnasal drip wheezing chest pain dizziness vertigo nausea vomiting diarrhea dysuria frequency  urgency.  She does have a history of hypertension, chronic pain syndrome, ASCVD, systolic heart failure, ischemic cardiomyopathy, cataracts, hard of hearing, history of breast cancer status post mastectomy, colon cancer status post partial resection    PHYSICAL EXAM:   Pleasant female in no acute distress.  Head is normocephalic.  Legally blind.  Hearing aids.  Neck is supple without adenopathy.   Lung sounds are clear throughout.  Cardiovascular S1-S2 regular rate and rhythm.  Chronic lower extremity edema.  Nonpitting. Tubigrip stockings. Gastrointestinal soft nontender with positive bowel sounds.  Musculoskeletal history of chronic pain and knee osteoarthritis.  Psychiatric: Pleasant affect.    Current Outpatient Medications:      aspirin (ASA) 81 MG EC tablet, Take 1 tablet (81 mg) by mouth daily Start tomorrow., Disp: 30 tablet, Rfl: 3     atorvastatin (LIPITOR) 40 MG tablet, Take 1 tablet (40 mg) by mouth every evening, Disp: , Rfl:      calcium carbonate-vitamin D (OSCAL W/D) 500-200 MG-UNIT tablet, Take 1 tablet by mouth daily, Disp: , Rfl:      carvedilol (COREG) 6.25 MG tablet, Take 1 tablet (6.25 mg) by mouth 2 times daily (with meals), Disp: , Rfl:      furosemide (LASIX) 20 MG tablet, Take 1 tablet (20 mg) by mouth daily (Patient taking differently: Take 20 mg by mouth 2 times daily ), Disp: , Rfl:      losartan (COZAAR) 25 MG tablet, Take 1 tablet (25 mg) by mouth daily, Disp: , Rfl:      magnesium oxide (MAG-OX) 400 (241.3 Mg) MG tablet, Take 400 mg by mouth daily, Disp: , Rfl:      Multiple Vitamins-Minerals (PRESERVISION AREDS) CAPS, Take 1 capsule by mouth 2 times daily , Disp: , Rfl:      nitroGLYcerin (NITROSTAT) 0.4 MG sublingual tablet, For chest pain place 1 tablet under the tongue every 5 minutes for 3 doses. If symptoms persist 5 minutes after 1st dose call 911., Disp: , Rfl:      olopatadine (PATANOL) 0.1 % ophthalmic solution, Place 1 drop into both eyes 2 times daily, Disp: , Rfl:       Omega-3 Fatty Acids (FISH OIL OMEGA-3 PO), Take 1 capsule by mouth daily, Disp: , Rfl:      prednisoLONE acetate (PRED FORTE) 1 % ophthalmic suspension, Place 1 drop into both eyes 2 times daily , Disp: , Rfl:      sennosides (SENOKOT) 8.6 MG tablet, Take 1 tablet by mouth 2 times daily, Disp: , Rfl:      ticagrelor (BRILINTA) 90 MG tablet, Take 1 tablet (90 mg) by mouth 2 times daily Dose to start tomorrow morning., Disp: 180 tablet, Rfl: 3     traMADol (ULTRAM) 50 MG tablet, Take 1 tablet (50 mg) by mouth 3 times daily, Disp: 90 tablet, Rfl: 0     VITAMIN D, CHOLECALCIFEROL, PO, Take 4,000 Units by mouth daily , Disp: , Rfl:     There were no vitals taken for this visit.  There are no vital signs in the chart or computer.  Today's apical pulse was seventy-eight with respirations of eighteen  LABS:   Last Comprehensive Metabolic Panel:  Sodium   Date Value Ref Range Status   12/22/2021 138 136 - 145 mmol/L Final   09/15/2006 134 133 - 144 mmol/L Final     Comment:     Delta Verified     Potassium   Date Value Ref Range Status   12/22/2021 4.1 3.5 - 5.0 mmol/L Final   09/15/2006 4.1 3.4 - 5.3 mmol/L Final     Chloride   Date Value Ref Range Status   12/22/2021 102 98 - 107 mmol/L Final   09/15/2006 99 94 - 109 mmol/L Final     Carbon Dioxide   Date Value Ref Range Status   09/15/2006 28 20 - 32 mmol/L Final     Carbon Dioxide (CO2)   Date Value Ref Range Status   12/22/2021 24 22 - 31 mmol/L Final     Anion Gap   Date Value Ref Range Status   12/22/2021 12 5 - 18 mmol/L Final   09/15/2006 7 6 - 17 mmol/L Final     Glucose   Date Value Ref Range Status   12/22/2021 98 70 - 125 mg/dL Final   09/14/2006 110 60 - 110 mg/dL Final     Urea Nitrogen   Date Value Ref Range Status   12/22/2021 17 8 - 28 mg/dL Final   09/14/2006 24 7 - 30 mg/dL Final     Creatinine   Date Value Ref Range Status   12/22/2021 0.74 0.60 - 1.10 mg/dL Final   09/14/2006 1.08 0.60 - 1.30 mg/dL Final     GFR Estimate   Date Value Ref Range Status    12/22/2021 78 >60 mL/min/1.73m2 Final     Comment:     Effective December 21, 2021 eGFRcr in adults is calculated using the 2021 CKD-EPI creatinine equation which includes age and gender (Maria Del Carmen et al., [place], DOI: 10.1056/ZDVHyj4568821)   05/26/2021 >60 >60 mL/min/1.73m2 Final   09/14/2006 53 (L) >60 mL/min/1.7m2 Final     Calcium   Date Value Ref Range Status   12/22/2021 8.6 8.5 - 10.5 mg/dL Final   09/14/2006 9.2 8.5 - 10.4 mg/dL Final     Last Comprehensive Metabolic Panel:  Sodium   Date Value Ref Range Status   12/22/2021 138 136 - 145 mmol/L Final   09/15/2006 134 133 - 144 mmol/L Final     Comment:     Delta Verified     Potassium   Date Value Ref Range Status   12/22/2021 4.1 3.5 - 5.0 mmol/L Final   09/15/2006 4.1 3.4 - 5.3 mmol/L Final     Chloride   Date Value Ref Range Status   12/22/2021 102 98 - 107 mmol/L Final   09/15/2006 99 94 - 109 mmol/L Final     Carbon Dioxide   Date Value Ref Range Status   09/15/2006 28 20 - 32 mmol/L Final     Carbon Dioxide (CO2)   Date Value Ref Range Status   12/22/2021 24 22 - 31 mmol/L Final     Anion Gap   Date Value Ref Range Status   12/22/2021 12 5 - 18 mmol/L Final   09/15/2006 7 6 - 17 mmol/L Final     Glucose   Date Value Ref Range Status   12/22/2021 98 70 - 125 mg/dL Final   09/14/2006 110 60 - 110 mg/dL Final     Urea Nitrogen   Date Value Ref Range Status   12/22/2021 17 8 - 28 mg/dL Final   09/14/2006 24 7 - 30 mg/dL Final     Creatinine   Date Value Ref Range Status   12/22/2021 0.74 0.60 - 1.10 mg/dL Final   09/14/2006 1.08 0.60 - 1.30 mg/dL Final     GFR Estimate   Date Value Ref Range Status   12/22/2021 78 >60 mL/min/1.73m2 Final     Comment:     Effective December 21, 2021 eGFRcr in adults is calculated using the 2021 CKD-EPI creatinine equation which includes age and gender (Maria Del Carmen et al., NEJM, DOI: 10.1056/XFVNta6807310)   05/26/2021 >60 >60 mL/min/1.73m2 Final   09/14/2006 53 (L) >60 mL/min/1.7m2 Final     Calcium   Date Value Ref Range Status    12/22/2021 8.6 8.5 - 10.5 mg/dL Final   09/14/2006 9.2 8.5 - 10.4 mg/dL Final     Bilirubin Total   Date Value Ref Range Status   12/22/2021 0.4 0.0 - 1.0 mg/dL Final     Alkaline Phosphatase   Date Value Ref Range Status   12/22/2021 76 45 - 120 U/L Final     ALT   Date Value Ref Range Status   12/22/2021 21 0 - 45 U/L Final     AST   Date Value Ref Range Status   12/22/2021 12 0 - 40 U/L Final             Recent Labs   Lab Test 12/22/21  0649 10/21/20  1206   CHOL 112 191   HDL 37* 46*   LDL 63 112   TRIG 60 166*     CBC RESULTS: Recent Labs   Lab Test 12/16/21  0556   WBC 9.1   RBC 3.62*   HGB 10.9*   HCT 33.1*   MCV 91   MCH 30.1   MCHC 32.9   RDW 13.8            ASSESSMENT:    (M25.561,  G89.29) Chronic pain of right knee  (primary encounter diagnosis)  Comment: Chronic  Plan: She does try to continue to keep active.  Continue with tramadol and Tylenol    (G89.29) Other chronic pain  Comment: Also a history of chronic back pain  Plan: We will continue to try to keep active    (I10) Essential hypertension  Comment: No vital signs regularly checked in the apartments  Plan: Continue to manage and follow    (R26.9) Abnormal gait  Comment: Recently did come from rehabilitation to improve her gait and stamina and strength  Plan: Continue to manage and follow with home care services      Case Management:  I have reviewed the Assisted Living care plan, current immunizations and preventive care/cancer screening.. Future cancer screening is not clinically indicated secondary to age/goals of care.   Patient's desire to return to the community is present, but is not able due to care needs .    Information reviewed:  Medications, vital signs, orders, and nursing notes.  PLAN:    Requested that she might please check her daily weights.  There is no increase in lower extremity today and her lungs are clear.  Regarding her chronic right knee pain and osteoarthritis.  She will try to continue to keep active as well as  use ice packs in the meantime she does have Tylenol and tramadol as needed.  She can also use incentive spirometry.  She did appreciate the time on the transitional care unit and now back in her apartment.  She did not have any other questions.    Electronically signed by: Bry Jurado NP

## 2022-01-25 NOTE — LETTER
1/25/2022        RE: Marina ADAME Edinson  [address]  [place] [address]          [facility]    [id number]   Code Status: FULL CODE      CHIEF COMPLAINT/REASON FOR VISIT:  Chief Complaint   Patient presents with     FVP Care Coordination - Regulatory     detention-regulatory       HISTORY:      HPI: Marina is a 87 year old female who currently lives in the assisted living facility Apartments room [address] and twenty-six and who I was requested to see secondary to a regulatory review of chronic medical conditions.  She washospitalized December 11, 2021 through December 14, 2021 secondary to waking up from sleep about 10 PM with chest pain.  She did call the EMS and they transported her to the hospital.  Her work-up did include an echocardiogram December 11 with an ejection fraction of 33% and that is in comparison to a TTE of June 2019 with an ejection fraction of 55/60%.  She did have a coronary angiogram that showed 99% proximal to mid LAD occlusion and this was treated with PAT x1.  She also has 90% stenosis of the RCA and 80% stenosis of the RPDA that will need to be staged PCI after discharge.  The plan was dual antiplatelet therapy for 12 months which includes ticagrelor 90 mg twice daily along with aspirin 81 mg daily.  She is now back in her apartment to which we did have a chance to address as well as go over the stay on the transitional care unit but also now that she is in her apartment.  She is also receiving therapy services.  She does have a therapy service later on today.  The meantime she does move about the facility in her four-wheel walker.  Does have a history of chronic pain including right knee pain.  She is on scheduled Tylenol and tramadol.  For her respiratory status she has been using the incentive spirometry.  She was at one point going to follow-up with cardiology but apparently has a difficult time getting there so she has not had  the follow-up with cardiology.  Her appetite is good.  Denies any fever or chills.  No increase in lower extremity edema.  Does not perform daily weights.    Past Medical History:   Diagnosis Date     Age-related physical debility      Anemia      Arthritis      Bladder infection 01/03/2015    dx'd- on antibiotics     Blood type AB-     with Anti-C     Breast cancer (H)      Bursitis, knee      Cancer (H)     breast      Cancer (H) 2005    left breast     Colon cancer (H)      Depression      Gout      History of transfusion      Hypertension      Hypertension      Insomnia      Macular degeneration      Osteoarthritis of multiple joints      Osteopenia      Ovarian cyst      Rosacea      Vitamin D deficiency             Family History   Problem Relation Age of Onset     Squamous cell carcinoma Mother      Colon Cancer Sister      Breast Cancer Maternal Aunt      Bone Cancer Maternal Aunt      Colon Cancer Paternal Aunt      Breast Cancer Cousin       Social History     Socioeconomic History     Marital status:      Spouse name: Not on file     Number of children: Not on file     Years of education: Not on file     Highest education level: Not on file   Occupational History     Not on file   Tobacco Use     Smoking status: Never Smoker     Smokeless tobacco: Never Used   Substance and Sexual Activity     Alcohol use: No     Drug use: No     Sexual activity: Never   Other Topics Concern     Parent/sibling w/ CABG, MI or angioplasty before 65F 55M? Not Asked   Social History Narrative     Not on file     Social Determinants of Health     Financial Resource Strain: Not on file   Food Insecurity: Not on file   Transportation Needs: Not on file   Physical Activity: Not on file   Stress: Not on file   Social Connections: Not on file   Intimate Partner Violence: Not on file   Housing Stability: Not on file        REVIEW OF SYSTEM:  She currently denies any new symptoms of fever chills fatigue sore throat postnasal  drip wheezing chest pain dizziness vertigo nausea vomiting diarrhea dysuria frequency urgency.  She does have a history of hypertension, chronic pain syndrome, ASCVD, systolic heart failure, ischemic cardiomyopathy, cataracts, hard of hearing, history of breast cancer status post mastectomy, colon cancer status post partial resection    PHYSICAL EXAM:   Pleasant female in no acute distress.  Head is normocephalic.  Legally blind.  Hearing aids.  Neck is supple without adenopathy.   Lung sounds are clear throughout.  Cardiovascular S1-S2 regular rate and rhythm.  Chronic lower extremity edema.  Nonpitting. Tubigrip stockings. Gastrointestinal soft nontender with positive bowel sounds.  Musculoskeletal history of chronic pain and knee osteoarthritis.  Psychiatric: Pleasant affect.    Current Outpatient Medications:      aspirin (ASA) 81 MG EC tablet, Take 1 tablet (81 mg) by mouth daily Start tomorrow., Disp: 30 tablet, Rfl: 3     atorvastatin (LIPITOR) 40 MG tablet, Take 1 tablet (40 mg) by mouth every evening, Disp: , Rfl:      calcium carbonate-vitamin D (OSCAL W/D) 500-200 MG-UNIT tablet, Take 1 tablet by mouth daily, Disp: , Rfl:      carvedilol (COREG) 6.25 MG tablet, Take 1 tablet (6.25 mg) by mouth 2 times daily (with meals), Disp: , Rfl:      furosemide (LASIX) 20 MG tablet, Take 1 tablet (20 mg) by mouth daily (Patient taking differently: Take 20 mg by mouth 2 times daily ), Disp: , Rfl:      losartan (COZAAR) 25 MG tablet, Take 1 tablet (25 mg) by mouth daily, Disp: , Rfl:      magnesium oxide (MAG-OX) 400 (241.3 Mg) MG tablet, Take 400 mg by mouth daily, Disp: , Rfl:      Multiple Vitamins-Minerals (PRESERVISION AREDS) CAPS, Take 1 capsule by mouth 2 times daily , Disp: , Rfl:      nitroGLYcerin (NITROSTAT) 0.4 MG sublingual tablet, For chest pain place 1 tablet under the tongue every 5 minutes for 3 doses. If symptoms persist 5 minutes after 1st dose call 911., Disp: , Rfl:      olopatadine (PATANOL) 0.1  % ophthalmic solution, Place 1 drop into both eyes 2 times daily, Disp: , Rfl:      Omega-3 Fatty Acids (FISH OIL OMEGA-3 PO), Take 1 capsule by mouth daily, Disp: , Rfl:      prednisoLONE acetate (PRED FORTE) 1 % ophthalmic suspension, Place 1 drop into both eyes 2 times daily , Disp: , Rfl:      sennosides (SENOKOT) 8.6 MG tablet, Take 1 tablet by mouth 2 times daily, Disp: , Rfl:      ticagrelor (BRILINTA) 90 MG tablet, Take 1 tablet (90 mg) by mouth 2 times daily Dose to start tomorrow morning., Disp: 180 tablet, Rfl: 3     traMADol (ULTRAM) 50 MG tablet, Take 1 tablet (50 mg) by mouth 3 times daily, Disp: 90 tablet, Rfl: 0     VITAMIN D, CHOLECALCIFEROL, PO, Take 4,000 Units by mouth daily , Disp: , Rfl:     There were no vitals taken for this visit.  There are no vital signs in the chart or computer.  Today's apical pulse was seventy-eight with respirations of eighteen  LABS:   Last Comprehensive Metabolic Panel:  Sodium   Date Value Ref Range Status   12/22/2021 138 136 - 145 mmol/L Final   09/15/2006 134 133 - 144 mmol/L Final     Comment:     Delta Verified     Potassium   Date Value Ref Range Status   12/22/2021 4.1 3.5 - 5.0 mmol/L Final   09/15/2006 4.1 3.4 - 5.3 mmol/L Final     Chloride   Date Value Ref Range Status   12/22/2021 102 98 - 107 mmol/L Final   09/15/2006 99 94 - 109 mmol/L Final     Carbon Dioxide   Date Value Ref Range Status   09/15/2006 28 20 - 32 mmol/L Final     Carbon Dioxide (CO2)   Date Value Ref Range Status   12/22/2021 24 22 - 31 mmol/L Final     Anion Gap   Date Value Ref Range Status   12/22/2021 12 5 - 18 mmol/L Final   09/15/2006 7 6 - 17 mmol/L Final     Glucose   Date Value Ref Range Status   12/22/2021 98 70 - 125 mg/dL Final   09/14/2006 110 60 - 110 mg/dL Final     Urea Nitrogen   Date Value Ref Range Status   12/22/2021 17 8 - 28 mg/dL Final   09/14/2006 24 7 - 30 mg/dL Final     Creatinine   Date Value Ref Range Status   12/22/2021 0.74 0.60 - 1.10 mg/dL Final    09/14/2006 1.08 0.60 - 1.30 mg/dL Final     GFR Estimate   Date Value Ref Range Status   12/22/2021 78 >60 mL/min/1.73m2 Final     Comment:     Effective December 21, 2021 eGFRcr in adults is calculated using the 2021 CKD-EPI creatinine equation which includes age and gender (Maria Del Carmen et al., [place], DOI: 10.1056/ZRKJvq8530974)   05/26/2021 >60 >60 mL/min/1.73m2 Final   09/14/2006 53 (L) >60 mL/min/1.7m2 Final     Calcium   Date Value Ref Range Status   12/22/2021 8.6 8.5 - 10.5 mg/dL Final   09/14/2006 9.2 8.5 - 10.4 mg/dL Final     Last Comprehensive Metabolic Panel:  Sodium   Date Value Ref Range Status   12/22/2021 138 136 - 145 mmol/L Final   09/15/2006 134 133 - 144 mmol/L Final     Comment:     Delta Verified     Potassium   Date Value Ref Range Status   12/22/2021 4.1 3.5 - 5.0 mmol/L Final   09/15/2006 4.1 3.4 - 5.3 mmol/L Final     Chloride   Date Value Ref Range Status   12/22/2021 102 98 - 107 mmol/L Final   09/15/2006 99 94 - 109 mmol/L Final     Carbon Dioxide   Date Value Ref Range Status   09/15/2006 28 20 - 32 mmol/L Final     Carbon Dioxide (CO2)   Date Value Ref Range Status   12/22/2021 24 22 - 31 mmol/L Final     Anion Gap   Date Value Ref Range Status   12/22/2021 12 5 - 18 mmol/L Final   09/15/2006 7 6 - 17 mmol/L Final     Glucose   Date Value Ref Range Status   12/22/2021 98 70 - 125 mg/dL Final   09/14/2006 110 60 - 110 mg/dL Final     Urea Nitrogen   Date Value Ref Range Status   12/22/2021 17 8 - 28 mg/dL Final   09/14/2006 24 7 - 30 mg/dL Final     Creatinine   Date Value Ref Range Status   12/22/2021 0.74 0.60 - 1.10 mg/dL Final   09/14/2006 1.08 0.60 - 1.30 mg/dL Final     GFR Estimate   Date Value Ref Range Status   12/22/2021 78 >60 mL/min/1.73m2 Final     Comment:     Effective December 21, 2021 eGFRcr in adults is calculated using the 2021 CKD-EPI creatinine equation which includes age and gender (Maria Del Carmen et al., NEJM, DOI: 10.1056/JYKPqr6487459)   05/26/2021 >60 >60 mL/min/1.73m2 Final    09/14/2006 53 (L) >60 mL/min/1.7m2 Final     Calcium   Date Value Ref Range Status   12/22/2021 8.6 8.5 - 10.5 mg/dL Final   09/14/2006 9.2 8.5 - 10.4 mg/dL Final     Bilirubin Total   Date Value Ref Range Status   12/22/2021 0.4 0.0 - 1.0 mg/dL Final     Alkaline Phosphatase   Date Value Ref Range Status   12/22/2021 76 45 - 120 U/L Final     ALT   Date Value Ref Range Status   12/22/2021 21 0 - 45 U/L Final     AST   Date Value Ref Range Status   12/22/2021 12 0 - 40 U/L Final             Recent Labs   Lab Test 12/22/21  0649 10/21/20  1206   CHOL 112 191   HDL 37* 46*   LDL 63 112   TRIG 60 166*     CBC RESULTS: Recent Labs   Lab Test 12/16/21  0556   WBC 9.1   RBC 3.62*   HGB 10.9*   HCT 33.1*   MCV 91   MCH 30.1   MCHC 32.9   RDW 13.8            ASSESSMENT:    (M25.561,  G89.29) Chronic pain of right knee  (primary encounter diagnosis)  Comment: Chronic  Plan: She does try to continue to keep active.  Continue with tramadol and Tylenol    (G89.29) Other chronic pain  Comment: Also a history of chronic back pain  Plan: We will continue to try to keep active    (I10) Essential hypertension  Comment: No vital signs regularly checked in the apartments  Plan: Continue to manage and follow    (R26.9) Abnormal gait  Comment: Recently did come from rehabilitation to improve her gait and stamina and strength  Plan: Continue to manage and follow with home care services      Case Management:  I have reviewed the Assisted Living care plan, current immunizations and preventive care/cancer screening.. Future cancer screening is not clinically indicated secondary to age/goals of care.   Patient's desire to return to the community is present, but is not able due to care needs .    Information reviewed:  Medications, vital signs, orders, and nursing notes.  PLAN:    Requested that she might please check her daily weights.  There is no increase in lower extremity today and her lungs are clear.  Regarding her chronic  right knee pain and osteoarthritis.  She will try to continue to keep active as well as use ice packs in the meantime she does have Tylenol and tramadol as needed.  She can also use incentive spirometry.  She did appreciate the time on the transitional care unit and now back in her apartment.  She did not have any other questions.    Electronically signed by: Bry Jurado NP            Sincerely,        Bry Jurado NP

## 2022-01-31 DIAGNOSIS — I50.20 HEART FAILURE WITH REDUCED EJECTION FRACTION, NYHA CLASS II (H): ICD-10-CM

## 2022-01-31 DIAGNOSIS — I25.10 CORONARY ARTERY DISEASE INVOLVING NATIVE CORONARY ARTERY OF NATIVE HEART WITHOUT ANGINA PECTORIS: ICD-10-CM

## 2022-02-01 RX ORDER — FUROSEMIDE 20 MG
20 TABLET ORAL 2 TIMES DAILY
Qty: 180 TABLET | Refills: 3 | Status: SHIPPED | OUTPATIENT
Start: 2022-02-01

## 2022-02-01 RX ORDER — ATORVASTATIN CALCIUM 40 MG/1
TABLET, FILM COATED ORAL
Qty: 90 TABLET | Refills: 3 | Status: SHIPPED | OUTPATIENT
Start: 2022-02-01

## 2022-02-07 DIAGNOSIS — I25.10 CORONARY ARTERY DISEASE INVOLVING NATIVE CORONARY ARTERY OF NATIVE HEART WITHOUT ANGINA PECTORIS: ICD-10-CM

## 2022-02-07 DIAGNOSIS — Z53.9 DIAGNOSIS NOT YET DEFINED: Primary | ICD-10-CM

## 2022-02-07 RX ORDER — CARVEDILOL 6.25 MG/1
TABLET ORAL
Qty: 60 TABLET | Refills: 0 | Status: SHIPPED | OUTPATIENT
Start: 2022-02-07

## 2022-02-21 DIAGNOSIS — Z95.5 STATUS POST INSERTION OF DRUG-ELUTING STENT INTO LEFT ANTERIOR DESCENDING ARTERY: ICD-10-CM

## 2022-02-21 RX ORDER — TICAGRELOR 90 MG/1
TABLET ORAL
Qty: 60 TABLET | Refills: 0 | Status: SHIPPED | OUTPATIENT
Start: 2022-02-21 | End: 2022-03-21

## 2022-02-22 ENCOUNTER — LAB REQUISITION (OUTPATIENT)
Dept: LAB | Facility: CLINIC | Age: 87
End: 2022-02-22

## 2022-02-22 DIAGNOSIS — I25.10 ATHEROSCLEROTIC HEART DISEASE OF NATIVE CORONARY ARTERY WITHOUT ANGINA PECTORIS: ICD-10-CM

## 2022-02-22 LAB
ANION GAP SERPL CALCULATED.3IONS-SCNC: 11 MMOL/L (ref 5–18)
BUN SERPL-MCNC: 18 MG/DL (ref 8–28)
CALCIUM SERPL-MCNC: 9.4 MG/DL (ref 8.5–10.5)
CHLORIDE BLD-SCNC: 100 MMOL/L (ref 98–107)
CHOLEST SERPL-MCNC: 121 MG/DL
CO2 SERPL-SCNC: 25 MMOL/L (ref 22–31)
CREAT SERPL-MCNC: 1.3 MG/DL (ref 0.6–1.1)
GFR SERPL CREATININE-BSD FRML MDRD: 40 ML/MIN/1.73M2
GLUCOSE BLD-MCNC: 130 MG/DL (ref 70–125)
HDLC SERPL-MCNC: 49 MG/DL
LDLC SERPL CALC-MCNC: 53 MG/DL
POTASSIUM BLD-SCNC: 4.3 MMOL/L (ref 3.5–5)
SODIUM SERPL-SCNC: 136 MMOL/L (ref 136–145)
TRIGL SERPL-MCNC: 94 MG/DL

## 2022-02-22 PROCEDURE — 80061 LIPID PANEL: CPT | Performed by: FAMILY MEDICINE

## 2022-02-22 PROCEDURE — 80048 BASIC METABOLIC PNL TOTAL CA: CPT | Performed by: FAMILY MEDICINE

## 2022-03-21 DIAGNOSIS — Z95.5 STATUS POST INSERTION OF DRUG-ELUTING STENT INTO LEFT ANTERIOR DESCENDING ARTERY: ICD-10-CM

## 2022-03-21 RX ORDER — TICAGRELOR 90 MG/1
TABLET ORAL
Qty: 60 TABLET | Refills: 3 | Status: ON HOLD | OUTPATIENT
Start: 2022-03-21 | End: 2022-06-08

## 2022-04-11 ENCOUNTER — TELEPHONE (OUTPATIENT)
Dept: CARDIOLOGY | Facility: CLINIC | Age: 87
End: 2022-04-11
Payer: COMMERCIAL

## 2022-04-11 NOTE — TELEPHONE ENCOUNTER
[facility] Call Center    Phone Message    May a detailed message be left on voicemail: yes     Reason for Call: Other: Just a FYI:   Marina's care givers Danika & Silvino in [place] called and stated that Marina has been saying she SOB since her last stents were placed, which was January of 2022 and she is wanting the other stents placed. She is scheduled to see Dr Silva on June 2 at [place] when she comeback from [place].  They declined talking to triage.     Action Taken: Other: cardiology    Travel Screening: Not Applicable

## 2022-04-13 NOTE — TELEPHONE ENCOUNTER
Dr. Silva, looks like patient doesn't return from [place] until May/June. Has OV with you 6/2/22. OV to have H&P. Should we call patient to schedule for later same week? Please advise if any recommendations at this time. KALA,RN

## 2022-04-13 NOTE — TELEPHONE ENCOUNTER
===View-only below this line===  ----- Message -----  From: Louann Silva MD  Sent: 4/13/2022   4:02 PM CDT  To: Nick Hamm RN    I have not seen her in 3 years. It looks like she had heart failure and an NSTEMI in December. I cannot make any therapeutic recommendations without seeing her. Either she can come back now or see someone in [place] to figure out if she is in heart failure or not.    EG

## 2022-04-20 NOTE — TELEPHONE ENCOUNTER
Spoke with cousin via phone. Patient is here in [place], the caregivers/cousins are in [place] set to return in 10 days. Offered to call patient and review/discuss with patient for ongoing or new symptoms. Danika states that the patient is very [place], and over the phone is challenging. Discussion that once they return to [place], to call writer to be present and facilitate some questions/review of how patient is feeling/assessment. Danika states that they call her daily, and haven't noticed any changes, just the same ongoing complaint of SOB. Discussion with Danika and review of response by [facility]. Discussion that her SOB at this time could be multifactorial; heart failure/fluid, RCA which needs stent or simply the Brilinta medication. Would be best to have in person evaluation. Danika states that the patient requested to see Dr. Whatley her PCP first, which is arranged for 5/23/22, and then [facility] 6/2/22. They will call her as per their usual daily correspondence with her and assess if it is more urgent need, prior to their return home. If not, then once they are home will call writer to have over the phone discussion/assessment of symptoms with someone present to facilitate due to pt being [place]. No further questions at this time. Has direct office number to call. KALA,RN

## 2022-04-20 NOTE — TELEPHONE ENCOUNTER
PC with caregivers whom have spoken with the patient and is agreeable to be seen sooner. Checking [facility] schedule and availability. Caregivers return from [place] earliest end of first week of May. Pt declines seeing alternate physician. Will reach out to provider to ascertain if any alternate appt can be arranged for later on 5/11/22. Will discuss with provider, and return call to discuss. Caregivers agreeable and appreciative of the attempt. Otherwise will have to keep 6/2/22 appt with [facility]. KALA,Rn

## 2022-04-20 NOTE — TELEPHONE ENCOUNTER
Dr. Silva, on 5/11 you are at [facility] for the morning, 7 patients total, would it be possible to switch the 0920 appt, that is not scheduled at this time, and open the 1150 slot so that patient can be arranged? Caregivers state that they drive an hour to  patient and bring to appointments so the 0920, is not attainable. Please advise if 1150 or appt after lunch break at 1250 could be utilized. Thank you KALA,Rn

## 2022-04-21 NOTE — TELEPHONE ENCOUNTER
Verbal ok from provider to make appt at 1550. PC to caregiver, agreeable to slot and very appreciative of provider flexibility. Appt location, date and time asked to arrive at 1530 for appt check-in. Informed of 1 visitor policy, which Pearl will attend. No further questions at this time. Just in case their return from [place] is delayed, and unable to make appt June appt kept in place. Call direct office of writer if unable to make appt or other concerns. Verbalized understanding. KALA,RN

## 2022-05-11 ENCOUNTER — OFFICE VISIT (OUTPATIENT)
Dept: CARDIOLOGY | Facility: CLINIC | Age: 87
End: 2022-05-11
Payer: COMMERCIAL

## 2022-05-11 ENCOUNTER — TELEPHONE (OUTPATIENT)
Dept: CARDIOLOGY | Facility: CLINIC | Age: 87
End: 2022-05-11

## 2022-05-11 VITALS
WEIGHT: 161 LBS | OXYGEN SATURATION: 99 % | BODY MASS INDEX: 28.52 KG/M2 | DIASTOLIC BLOOD PRESSURE: 54 MMHG | RESPIRATION RATE: 18 BRPM | SYSTOLIC BLOOD PRESSURE: 112 MMHG | HEART RATE: 85 BPM

## 2022-05-11 DIAGNOSIS — I25.84 CORONARY ARTERY DISEASE DUE TO CALCIFIED CORONARY LESION: ICD-10-CM

## 2022-05-11 DIAGNOSIS — R06.09 DOE (DYSPNEA ON EXERTION): Primary | ICD-10-CM

## 2022-05-11 DIAGNOSIS — I25.10 CORONARY ARTERY DISEASE DUE TO CALCIFIED CORONARY LESION: ICD-10-CM

## 2022-05-11 LAB
ANION GAP SERPL CALCULATED.3IONS-SCNC: 10 MMOL/L (ref 5–18)
BASOPHILS # BLD AUTO: 0.1 10E3/UL (ref 0–0.2)
BASOPHILS NFR BLD AUTO: 1 %
BNP SERPL-MCNC: 76 PG/ML (ref 0–167)
BUN SERPL-MCNC: 27 MG/DL (ref 8–28)
CALCIUM SERPL-MCNC: 9.4 MG/DL (ref 8.5–10.5)
CHLORIDE BLD-SCNC: 101 MMOL/L (ref 98–107)
CHOLEST SERPL-MCNC: 120 MG/DL
CO2 SERPL-SCNC: 24 MMOL/L (ref 22–31)
CREAT SERPL-MCNC: 1.08 MG/DL (ref 0.6–1.1)
EOSINOPHIL # BLD AUTO: 0.3 10E3/UL (ref 0–0.7)
EOSINOPHIL NFR BLD AUTO: 3 %
ERYTHROCYTE [DISTWIDTH] IN BLOOD BY AUTOMATED COUNT: 13.6 % (ref 10–15)
FASTING STATUS PATIENT QL REPORTED: NO
GFR SERPL CREATININE-BSD FRML MDRD: 49 ML/MIN/1.73M2
GLUCOSE BLD-MCNC: 144 MG/DL (ref 70–125)
HCT VFR BLD AUTO: 35.1 % (ref 35–47)
HDLC SERPL-MCNC: 49 MG/DL
HGB BLD-MCNC: 11.4 G/DL (ref 11.7–15.7)
IMM GRANULOCYTES # BLD: 0 10E3/UL
IMM GRANULOCYTES NFR BLD: 0 %
LDLC SERPL CALC-MCNC: 47 MG/DL
LYMPHOCYTES # BLD AUTO: 4.1 10E3/UL (ref 0.8–5.3)
LYMPHOCYTES NFR BLD AUTO: 41 %
MCH RBC QN AUTO: 31.1 PG (ref 26.5–33)
MCHC RBC AUTO-ENTMCNC: 32.5 G/DL (ref 31.5–36.5)
MCV RBC AUTO: 96 FL (ref 78–100)
MONOCYTES # BLD AUTO: 1.2 10E3/UL (ref 0–1.3)
MONOCYTES NFR BLD AUTO: 13 %
NEUTROPHILS # BLD AUTO: 4 10E3/UL (ref 1.6–8.3)
NEUTROPHILS NFR BLD AUTO: 42 %
NRBC # BLD AUTO: 0 10E3/UL
NRBC BLD AUTO-RTO: 0 /100
PLATELET # BLD AUTO: 239 10E3/UL (ref 150–450)
POTASSIUM BLD-SCNC: 4.2 MMOL/L (ref 3.5–5)
RBC # BLD AUTO: 3.66 10E6/UL (ref 3.8–5.2)
SODIUM SERPL-SCNC: 135 MMOL/L (ref 136–145)
TRIGL SERPL-MCNC: 121 MG/DL
WBC # BLD AUTO: 9.6 10E3/UL (ref 4–11)

## 2022-05-11 PROCEDURE — 93000 ELECTROCARDIOGRAM COMPLETE: CPT | Performed by: INTERNAL MEDICINE

## 2022-05-11 PROCEDURE — 80048 BASIC METABOLIC PNL TOTAL CA: CPT | Performed by: INTERNAL MEDICINE

## 2022-05-11 PROCEDURE — 36415 COLL VENOUS BLD VENIPUNCTURE: CPT | Performed by: INTERNAL MEDICINE

## 2022-05-11 PROCEDURE — 99215 OFFICE O/P EST HI 40 MIN: CPT | Mod: 25 | Performed by: INTERNAL MEDICINE

## 2022-05-11 PROCEDURE — 85025 COMPLETE CBC W/AUTO DIFF WBC: CPT | Performed by: INTERNAL MEDICINE

## 2022-05-11 PROCEDURE — 80061 LIPID PANEL: CPT | Performed by: INTERNAL MEDICINE

## 2022-05-11 PROCEDURE — 83880 ASSAY OF NATRIURETIC PEPTIDE: CPT | Performed by: INTERNAL MEDICINE

## 2022-05-11 RX ORDER — SPIRONOLACTONE 25 MG/1
TABLET ORAL
Status: ON HOLD | COMMUNITY
Start: 2020-11-16 | End: 2022-06-07

## 2022-05-11 RX ORDER — MIRABEGRON 25 MG/1
TABLET, FILM COATED, EXTENDED RELEASE ORAL
Status: ON HOLD | COMMUNITY
End: 2022-06-07

## 2022-05-11 NOTE — LETTER
5/11/2022    Cale Whatley MD  [facility] [address]  [place] [address]    RE: Marina Neves       Dear Colleague,     I had the pleasure of seeing Marina Neves in the [facility].           HEART CARE ENCOUNTER CONSULTATON NOTE      [facility]  [phone]      Assessment/Recommendations   Assessment/Plan:  DE LOS SANTOS - EF 33% in December. Has not been seen by cardiology since. Check labs today, including BNP and CBC. EKG today NSR and without acute ischemia.    CAD - on aspirin and brillinta. Will set up for staged PCI of the main body RCA +/- the PDA.    HFrEF - will need medication titration pending her lab results and f/u in the CHF clinic for that.    F/U 4 months       History of Present Illness/Subjective    HPI: Marina Neves is a 88 year old female with hypertension who I met for complaints of chest pain with elevated BNP. Echo and stress test were grossly normal with mild LVH and LAE with aortic valve sclerosis.  CT chest incidentally notes coronary calcification (2/2017). She was admitted at the [facility] 12/2021 with chest pain and a NSTEMI.    She comes to clinic now, instead of January, as her family who manages her care only just now returned from [facility]. Marina has been short of breath with exertion since December. She denies pnd/orhtopnea, dizziness, falls, or bleeding. She has had no further chest pain since her PCI. Her appetite is good and she has lost some weight over the past year.      Recent Echocardiogram Results: 12/2021  Left ventricular function is moderately reduced. Biplane LVEF is 33%.  Severe hypokinesis to akinesis of the mid anterior, anteroseptal, inferoseptal, inferior segments and all distal segments consistent with LAD ischemia/infarction.  Global right ventricular function is normal.  No hemodynamically significant valve abnormalities.  The inferior vena cava is normal.  There is no prior study for direct  comparison.      Recent Coronary Angiogram Results: 12/2021    99% lesion in the prox-mid LAD s/p PCI using a 2.5*20 mm Synergy PAT. Acute closure of diseased, small D1.    90% lesion in the mid RCA - staged PCI down the road. 80% lesion of the R-PDA.    Non obstructive disease in the LCX.         Physical Examination  Review of Systems   Vitals: /54 (BP Location: Right arm, Patient Position: Sitting, Cuff Size: Adult Large)   Pulse 85   Resp 18   Wt 73 kg (161 lb)   SpO2 99%   BMI 28.52 kg/m    BMI= Body mass index is 28.52 kg/m .  Wt Readings from Last 3 Encounters:   05/11/22 73 kg (161 lb)   01/12/22 76.7 kg (169 lb)   01/10/22 77 kg (169 lb 12.8 oz)       General Appearance:   no distress, obese body habitus   ENT/Mouth: membranes moist, no oral lesions or bleeding gums.      EYES:  no scleral icterus, normal conjunctivae   Neck: no carotid bruits or thyromegaly   Chest/Lungs:   lungs are clear to auscultation, no rales or wheezing, no sternal scar, equal chest wall expansion    Cardiovascular:   Regular. Normal first and second heart sounds with no murmur. No rubs or gallops; the right carotid, radial and posterior tibial pulses are intact and the left carotid, radial and posterior tibial pulses are intact.  Jugular venous pressure is flat, chronic mild pedal edema bilaterally    Abdomen:  no organomegaly, masses, bruits, or tenderness; bowel sounds are present   Extremities: no cyanosis or clubbing   Skin: no xanthelasma, warm.    Neurologic: normal  bilateral, no tremors     Psychiatric: alert and oriented x3, calm        Please refer above for cardiac ROS details.        Medical History  Surgical History Family History Social History   Past Medical History:   Diagnosis Date     Age-related physical debility      Anemia      Arthritis      Bladder infection 01/03/2015    dx'd- on antibiotics     Blood type AB-     with Anti-C     Breast cancer (H)      Bursitis, knee      Cancer (H)     breast       Cancer (H) 2005    left breast     Colon cancer (H)      Depression      Gout      History of transfusion      Hypertension      Hypertension      Insomnia      Macular degeneration      Osteoarthritis of multiple joints      Osteopenia      Ovarian cyst      Rosacea      Vitamin D deficiency      Past Surgical History:   Procedure Laterality Date     ABDOMEN SURGERY      colon resection for ca 2015     BACK SURGERY Bilateral     L3-L5, L5-S1 decompression lami     BACK SURGERY      posterior spinal reconstruction     BREAST SURGERY       BREAST SURGERY      left mastectomy     CATARACT EXTRACTION Bilateral      COLON SURGERY       COLONOSCOPY       COLONOSCOPY N/A 8/4/2015    Procedure: COLONOSCOPY;  Surgeon: Yoseph Ferraro MD;  Location:  GI     CV CORONARY ANGIOGRAM N/A 12/12/2021    Procedure: Coronary Angiogram;  Surgeon: Everett Castellon MD;  Location: [facility] CARDIAC CATH LAB     CV PCI STENT DRUG ELUTING N/A 12/12/2021    Procedure: Percutaneous Coronary Intervention Stent Drug Eluting;  Surgeon: Everett Castellon MD;  Location: [facility] CARDIAC CATH LAB     DILATION AND CURETTAGE      onsil     EYE SURGERY      cataract bilateral     EYE SURGERY       GASTRECTOMY       MASTECTOMY, RADICAL Left      NEUROMA SURGERY Bilateral     feet     ORTHOPEDIC SURGERY      shoulder right, knee left     ORTHOPEDIC SURGERY      lami     OTHER SURGICAL HISTORY      left knee arthroscopy     OTHER SURGICAL HISTORY      right shoulder replacement     OTHER SURGICAL HISTORY      left breast biopsyneedle core     NE LAP,FULGURATE/EXCISE LESIONS N/A 3/29/2017    Procedure: LAPAROSCOPIC BILATERAL SALPING OOPHORECTOMY PELVIC WASHINGS;  Surgeon: Eduardo Smart MD;  Location: [facility];  Service: Gynecology     NE MASTECTOMY, SIMPLE, COMPLETE Right 1/7/2015    Procedure: RIGHT BREAST MASTECTOMY;  Surgeon: Meliton Bazan MD;  Location: [facility] OR;  Service: General     REPLACEMENT TOTAL KNEE  Right      TONSILLECTOMY & ADENOIDECTOMY       ZZC PART REMOVAL COLON W ANASTOMOSIS N/A 6/16/2014    Procedure: COLECTOMY;  Surgeon: Meliton Bazan MD;  Location: [facility];  Service: General     Family History   Problem Relation Age of Onset     Squamous cell carcinoma Mother      Colon Cancer Sister      Breast Cancer Maternal Aunt      Bone Cancer Maternal Aunt      Colon Cancer Paternal Aunt      Breast Cancer Cousin         Social History     Socioeconomic History     Marital status:      Spouse name: Not on file     Number of children: Not on file     Years of education: Not on file     Highest education level: Not on file   Occupational History     Not on file   Tobacco Use     Smoking status: Never Smoker     Smokeless tobacco: Never Used   Substance and Sexual Activity     Alcohol use: No     Drug use: No     Sexual activity: Never   Other Topics Concern     Parent/sibling w/ CABG, MI or angioplasty before 65F 55M? Not Asked   Social History Narrative     Not on file     Social Determinants of Health     Financial Resource Strain: Not on file   Food Insecurity: Not on file   Transportation Needs: Not on file   Physical Activity: Not on file   Stress: Not on file   Social Connections: Not on file   Intimate Partner Violence: Not on file   Housing Stability: Not on file           Medications  Allergies   Current Outpatient Medications   Medication Sig Dispense Refill     aspirin (ASA) 81 MG EC tablet Take 1 tablet (81 mg) by mouth daily Start tomorrow. (Patient taking differently: Take 81 mg by mouth every evening) 30 tablet 3     atorvastatin (LIPITOR) 40 MG tablet TAKE 1 TABLET BY MOUTH ONCE DAILY 90 tablet 3     BRILINTA 90 MG tablet TAKE 2 TABLETS BY MOUTH ONCE DAILY. 60 tablet 3     calcium carbonate-vitamin D (OSCAL W/D) 500-200 MG-UNIT tablet Take 1 tablet by mouth daily       carvedilol (COREG) 6.25 MG tablet TAKE 2 TABLETS BY MOUTH ONCE DAILY 60 tablet 0     furosemide (LASIX) 20  MG tablet Take 1 tablet (20 mg) by mouth 2 times daily 180 tablet 3     losartan (COZAAR) 25 MG tablet Take 1 tablet (25 mg) by mouth daily (Patient taking differently: Take 50 mg by mouth daily)       magnesium oxide (MAG-OX) 400 (241.3 Mg) MG tablet Take 400 mg by mouth daily       mirabegron (MYRBETRIQ) 25 MG 24 hr tablet 1 tab(s)       Multiple Vitamins-Minerals (PRESERVISION AREDS) CAPS Take 1 capsule by mouth 2 times daily        nitroGLYcerin (NITROSTAT) 0.4 MG sublingual tablet For chest pain place 1 tablet under the tongue every 5 minutes for 3 doses. If symptoms persist 5 minutes after 1st dose call 911.       olopatadine (PATANOL) 0.1 % ophthalmic solution Place 1 drop into both eyes 2 times daily       Omega-3 Fatty Acids (FISH OIL OMEGA-3 PO) Take 1 capsule by mouth daily       prednisoLONE acetate (PRED FORTE) 1 % ophthalmic suspension Place 1 drop into both eyes 2 times daily        sennosides (SENOKOT) 8.6 MG tablet Take 1 tablet by mouth 2 times daily       spironolactone (ALDACTONE) 25 MG tablet Take 1 tablet by mouth one time daily.       traMADol (ULTRAM) 50 MG tablet Take 1 tablet (50 mg) by mouth 3 times daily 90 tablet 0     VITAMIN D, CHOLECALCIFEROL, PO Take 4,000 Units by mouth daily          Allergies   Allergen Reactions     Blood-Group Specific Substance Other (See Comments)     Anti-D and Anti-C present. A delay in compatible RBC's may occur.     Celecoxib Unknown     Avoids due to sulfa allergy     Lisinopril Cough     Other reaction(s): cough     Oxybutynin Other (See Comments)     Other reaction(s): intolerable dry eye and mouth     Sulfa Drugs      Tetanus Toxoid Blisters     Tetanus Toxoids      Other reaction(s): Unknown     Tolterodine Other (See Comments)     Other reaction(s): intolerable dry eye and mouth     Piroxicam Rash     Other reaction(s): stomach upset          Lab Results    Chemistry/lipid CBC Cardiac Enzymes/BNP/TSH/INR   Recent Labs   Lab Test 02/22/22  1522   CHOL  121   HDL 49*   LDL 53   TRIG 94     Recent Labs   Lab Test 02/22/22  1522 12/22/21  0649 10/21/20  1206   LDL 53 63 112     Recent Labs   Lab Test 02/22/22  1522      POTASSIUM 4.3   CHLORIDE 100   CO2 25   *   BUN 18   CR 1.30*   GFRESTIMATED 40*   MARY 9.4     Recent Labs   Lab Test 02/22/22  1522 12/22/21  0649 12/16/21  0556   CR 1.30* 0.74 0.85     No results for input(s): A1C in the last 43352 hours.       Recent Labs   Lab Test 12/16/21  0556   WBC 9.1   HGB 10.9*   HCT 33.1*   MCV 91        Recent Labs   Lab Test 12/16/21  0556 12/11/21  0812 12/11/21  0321   HGB 10.9* 12.9 12.3    Recent Labs   Lab Test 05/21/19  1540 05/21/19  1315   TROPONINI <0.01 <0.01     Recent Labs   Lab Test 12/11/21  0321 05/21/19  1315   BNP  --  208*   NTBNPI 1,757  --      Recent Labs   Lab Test 02/13/18  1608   TSH 1.21     Recent Labs   Lab Test 05/21/19  1315   INR 0.94        Today's clinic visit entailed:  Review of prior external note(s) from [facility] information from epic reviewed  40 minutes spent on the date of the encounter doing chart review, review of outside records, review of test results, interpretation of tests, patient visit and documentation   Provider  Link to [facility] Help Grid     The level of medical decision making during this visit was of high complexity.        Louann Silva MD            Thank you for allowing me to participate in the care of your patient.      Sincerely,     Louann Silva MD     [facility]  cc:   No referring provider defined for this encounter.

## 2022-05-11 NOTE — PROGRESS NOTES
HEART CARE ENCOUNTER CONSULTATON NOTE      [facility]  [phone]      Assessment/Recommendations   Assessment/Plan:  DE LOS SANTOS - EF 33% in December. Has not been seen by cardiology since. Check labs today, including BNP and CBC. EKG today NSR and without acute ischemia.    CAD - on aspirin and brillinta. Will set up for staged PCI of the main body RCA +/- the PDA.    HFrEF - will need medication titration pending her lab results and f/u in the CHF clinic for that.    F/U 4 months       History of Present Illness/Subjective    HPI: Marina Neves is a 88 year old female with hypertension who I met for complaints of chest pain with elevated BNP. Echo and stress test were grossly normal with mild LVH and LAE with aortic valve sclerosis.  CT chest incidentally notes coronary calcification (2/2017). She was admitted at the [facility] 12/2021 with chest pain and a NSTEMI.    She comes to clinic now, instead of January, as her family who manages her care only just now returned from [facility]. Marina has been short of breath with exertion since December. She denies pnd/orhtopnea, dizziness, falls, or bleeding. She has had no further chest pain since her PCI. Her appetite is good and she has lost some weight over the past year.      Recent Echocardiogram Results: 12/2021  Left ventricular function is moderately reduced. Biplane LVEF is 33%.  Severe hypokinesis to akinesis of the mid anterior, anteroseptal, inferoseptal, inferior segments and all distal segments consistent with LAD ischemia/infarction.  Global right ventricular function is normal.  No hemodynamically significant valve abnormalities.  The inferior vena cava is normal.  There is no prior study for direct comparison.      Recent Coronary Angiogram Results: 12/2021    99% lesion in the prox-mid LAD s/p PCI using a 2.5*20 mm Synergy PAT. Acute closure of diseased, small D1.    90% lesion in the mid RCA - staged PCI down the road. 80% lesion of  the R-PDA.    Non obstructive disease in the LCX.         Physical Examination  Review of Systems   Vitals: /54 (BP Location: Right arm, Patient Position: Sitting, Cuff Size: Adult Large)   Pulse 85   Resp 18   Wt 73 kg (161 lb)   SpO2 99%   BMI 28.52 kg/m    BMI= Body mass index is 28.52 kg/m .  Wt Readings from Last 3 Encounters:   05/11/22 73 kg (161 lb)   01/12/22 76.7 kg (169 lb)   01/10/22 77 kg (169 lb 12.8 oz)       General Appearance:   no distress, obese body habitus   ENT/Mouth: membranes moist, no oral lesions or bleeding gums.      EYES:  no scleral icterus, normal conjunctivae   Neck: no carotid bruits or thyromegaly   Chest/Lungs:   lungs are clear to auscultation, no rales or wheezing, no sternal scar, equal chest wall expansion    Cardiovascular:   Regular. Normal first and second heart sounds with no murmur. No rubs or gallops; the right carotid, radial and posterior tibial pulses are intact and the left carotid, radial and posterior tibial pulses are intact.  Jugular venous pressure is flat, chronic mild pedal edema bilaterally    Abdomen:  no organomegaly, masses, bruits, or tenderness; bowel sounds are present   Extremities: no cyanosis or clubbing   Skin: no xanthelasma, warm.    Neurologic: normal  bilateral, no tremors     Psychiatric: alert and oriented x3, calm        Please refer above for cardiac ROS details.        Medical History  Surgical History Family History Social History   Past Medical History:   Diagnosis Date     Age-related physical debility      Anemia      Arthritis      Bladder infection 01/03/2015    dx'd- on antibiotics     Blood type AB-     with Anti-C     Breast cancer (H)      Bursitis, knee      Cancer (H)     breast      Cancer (H) 2005    left breast     Colon cancer (H)      Depression      Gout      History of transfusion      Hypertension      Hypertension      Insomnia      Macular degeneration      Osteoarthritis of multiple joints       Osteopenia      Ovarian cyst      Rosacea      Vitamin D deficiency      Past Surgical History:   Procedure Laterality Date     ABDOMEN SURGERY      colon resection for ca 2015     BACK SURGERY Bilateral     L3-L5, L5-S1 decompression lami     BACK SURGERY      posterior spinal reconstruction     BREAST SURGERY       BREAST SURGERY      left mastectomy     CATARACT EXTRACTION Bilateral      COLON SURGERY       COLONOSCOPY       COLONOSCOPY N/A 8/4/2015    Procedure: COLONOSCOPY;  Surgeon: Yoseph Ferraro MD;  Location:  GI     CV CORONARY ANGIOGRAM N/A 12/12/2021    Procedure: Coronary Angiogram;  Surgeon: Everett Castellon MD;  Location:  HEART CARDIAC CATH LAB     CV PCI STENT DRUG ELUTING N/A 12/12/2021    Procedure: Percutaneous Coronary Intervention Stent Drug Eluting;  Surgeon: Everett Castellon MD;  Location:  HEART CARDIAC CATH LAB     DILATION AND CURETTAGE      onsil     EYE SURGERY      cataract bilateral     EYE SURGERY       GASTRECTOMY       MASTECTOMY, RADICAL Left      NEUROMA SURGERY Bilateral     feet     ORTHOPEDIC SURGERY      shoulder right, knee left     ORTHOPEDIC SURGERY      lami     OTHER SURGICAL HISTORY      left knee arthroscopy     OTHER SURGICAL HISTORY      right shoulder replacement     OTHER SURGICAL HISTORY      left breast biopsyneedle core     CT LAP,FULGURATE/EXCISE LESIONS N/A 3/29/2017    Procedure: LAPAROSCOPIC BILATERAL SALPING OOPHORECTOMY PELVIC WASHINGS;  Surgeon: Eduardo Smart MD;  Location: [facility];  Service: Gynecology     CT MASTECTOMY, SIMPLE, COMPLETE Right 1/7/2015    Procedure: RIGHT BREAST MASTECTOMY;  Surgeon: Meliton Bazan MD;  Location: [facility];  Service: General     REPLACEMENT TOTAL KNEE Right      TONSILLECTOMY & ADENOIDECTOMY       ZZC PART REMOVAL COLON W ANASTOMOSIS N/A 6/16/2014    Procedure: COLECTOMY;  Surgeon: Meliton Bazan MD;  Location: [facility];  Service: General     Family History    Problem Relation Age of Onset     Squamous cell carcinoma Mother      Colon Cancer Sister      Breast Cancer Maternal Aunt      Bone Cancer Maternal Aunt      Colon Cancer Paternal Aunt      Breast Cancer Cousin         Social History     Socioeconomic History     Marital status:      Spouse name: Not on file     Number of children: Not on file     Years of education: Not on file     Highest education level: Not on file   Occupational History     Not on file   Tobacco Use     Smoking status: Never Smoker     Smokeless tobacco: Never Used   Substance and Sexual Activity     Alcohol use: No     Drug use: No     Sexual activity: Never   Other Topics Concern     Parent/sibling w/ CABG, MI or angioplasty before 65F 55M? Not Asked   Social History Narrative     Not on file     Social Determinants of Health     Financial Resource Strain: Not on file   Food Insecurity: Not on file   Transportation Needs: Not on file   Physical Activity: Not on file   Stress: Not on file   Social Connections: Not on file   Intimate Partner Violence: Not on file   Housing Stability: Not on file           Medications  Allergies   Current Outpatient Medications   Medication Sig Dispense Refill     aspirin (ASA) 81 MG EC tablet Take 1 tablet (81 mg) by mouth daily Start tomorrow. (Patient taking differently: Take 81 mg by mouth every evening) 30 tablet 3     atorvastatin (LIPITOR) 40 MG tablet TAKE 1 TABLET BY MOUTH ONCE DAILY 90 tablet 3     BRILINTA 90 MG tablet TAKE 2 TABLETS BY MOUTH ONCE DAILY. 60 tablet 3     calcium carbonate-vitamin D (OSCAL W/D) 500-200 MG-UNIT tablet Take 1 tablet by mouth daily       carvedilol (COREG) 6.25 MG tablet TAKE 2 TABLETS BY MOUTH ONCE DAILY 60 tablet 0     furosemide (LASIX) 20 MG tablet Take 1 tablet (20 mg) by mouth 2 times daily 180 tablet 3     losartan (COZAAR) 25 MG tablet Take 1 tablet (25 mg) by mouth daily (Patient taking differently: Take 50 mg by mouth daily)       magnesium oxide  (MAG-OX) 400 (241.3 Mg) MG tablet Take 400 mg by mouth daily       mirabegron (MYRBETRIQ) 25 MG 24 hr tablet 1 tab(s)       Multiple Vitamins-Minerals (PRESERVISION AREDS) CAPS Take 1 capsule by mouth 2 times daily        nitroGLYcerin (NITROSTAT) 0.4 MG sublingual tablet For chest pain place 1 tablet under the tongue every 5 minutes for 3 doses. If symptoms persist 5 minutes after 1st dose call 911.       olopatadine (PATANOL) 0.1 % ophthalmic solution Place 1 drop into both eyes 2 times daily       Omega-3 Fatty Acids (FISH OIL OMEGA-3 PO) Take 1 capsule by mouth daily       prednisoLONE acetate (PRED FORTE) 1 % ophthalmic suspension Place 1 drop into both eyes 2 times daily        sennosides (SENOKOT) 8.6 MG tablet Take 1 tablet by mouth 2 times daily       spironolactone (ALDACTONE) 25 MG tablet Take 1 tablet by mouth one time daily.       traMADol (ULTRAM) 50 MG tablet Take 1 tablet (50 mg) by mouth 3 times daily 90 tablet 0     VITAMIN D, CHOLECALCIFEROL, PO Take 4,000 Units by mouth daily          Allergies   Allergen Reactions     Blood-Group Specific Substance Other (See Comments)     Anti-D and Anti-C present. A delay in compatible RBC's may occur.     Celecoxib Unknown     Avoids due to sulfa allergy     Lisinopril Cough     Other reaction(s): cough     Oxybutynin Other (See Comments)     Other reaction(s): intolerable dry eye and mouth     Sulfa Drugs      Tetanus Toxoid Blisters     Tetanus Toxoids      Other reaction(s): Unknown     Tolterodine Other (See Comments)     Other reaction(s): intolerable dry eye and mouth     Piroxicam Rash     Other reaction(s): stomach upset          Lab Results    Chemistry/lipid CBC Cardiac Enzymes/BNP/TSH/INR   Recent Labs   Lab Test 02/22/22  1522   CHOL 121   HDL 49*   LDL 53   TRIG 94     Recent Labs   Lab Test 02/22/22  1522 12/22/21  0649 10/21/20  1206   LDL 53 63 112     Recent Labs   Lab Test 02/22/22  1522      POTASSIUM 4.3   CHLORIDE 100   CO2 25   GLC  130*   BUN 18   CR 1.30*   GFRESTIMATED 40*   MARY 9.4     Recent Labs   Lab Test 02/22/22  1522 12/22/21  0649 12/16/21  0556   CR 1.30* 0.74 0.85     No results for input(s): A1C in the last 58295 hours.       Recent Labs   Lab Test 12/16/21  0556   WBC 9.1   HGB 10.9*   HCT 33.1*   MCV 91        Recent Labs   Lab Test 12/16/21  0556 12/11/21  0812 12/11/21  0321   HGB 10.9* 12.9 12.3    Recent Labs   Lab Test 05/21/19  1540 05/21/19  1315   TROPONINI <0.01 <0.01     Recent Labs   Lab Test 12/11/21  0321 05/21/19  1315   BNP  --  208*   NTBNPI 1,757  --      Recent Labs   Lab Test 02/13/18  1608   TSH 1.21     Recent Labs   Lab Test 05/21/19  1315   INR 0.94        Today's clinic visit entailed:  Review of prior external note(s) from - [facility] information from epic reviewed  40 minutes spent on the date of the encounter doing chart review, review of outside records, review of test results, interpretation of tests, patient visit and documentation   Provider  Link to [facility] Help Grid     The level of medical decision making during this visit was of high complexity.        Louann Silva MD

## 2022-05-11 NOTE — H&P (VIEW-ONLY)
HEART CARE ENCOUNTER CONSULTATON NOTE      [facility]  [phone]      Assessment/Recommendations   Assessment/Plan:  DE LOS SANTOS - EF 33% in December. Has not been seen by cardiology since. Check labs today, including BNP and CBC. EKG today NSR and without acute ischemia.    CAD - on aspirin and brillinta. Will set up for staged PCI of the main body RCA +/- the PDA.    HFrEF - will need medication titration pending her lab results and f/u in the CHF clinic for that.    F/U 4 months       History of Present Illness/Subjective    HPI: Marina Neves is a 88 year old female with hypertension who I met for complaints of chest pain with elevated BNP. Echo and stress test were grossly normal with mild LVH and LAE with aortic valve sclerosis.  CT chest incidentally notes coronary calcification (2/2017). She was admitted at the [facility] 12/2021 with chest pain and a NSTEMI.    She comes to clinic now, instead of January, as her family who manages her care only just now returned from [facility]. Marina has been short of breath with exertion since December. She denies pnd/orhtopnea, dizziness, falls, or bleeding. She has had no further chest pain since her PCI. Her appetite is good and she has lost some weight over the past year.      Recent Echocardiogram Results: 12/2021  Left ventricular function is moderately reduced. Biplane LVEF is 33%.  Severe hypokinesis to akinesis of the mid anterior, anteroseptal, inferoseptal, inferior segments and all distal segments consistent with LAD ischemia/infarction.  Global right ventricular function is normal.  No hemodynamically significant valve abnormalities.  The inferior vena cava is normal.  There is no prior study for direct comparison.      Recent Coronary Angiogram Results: 12/2021    99% lesion in the prox-mid LAD s/p PCI using a 2.5*20 mm Synergy PAT. Acute closure of diseased, small D1.    90% lesion in the mid RCA - staged PCI down the road. 80% lesion of  the R-PDA.    Non obstructive disease in the LCX.         Physical Examination  Review of Systems   Vitals: /54 (BP Location: Right arm, Patient Position: Sitting, Cuff Size: Adult Large)   Pulse 85   Resp 18   Wt 73 kg (161 lb)   SpO2 99%   BMI 28.52 kg/m    BMI= Body mass index is 28.52 kg/m .  Wt Readings from Last 3 Encounters:   05/11/22 73 kg (161 lb)   01/12/22 76.7 kg (169 lb)   01/10/22 77 kg (169 lb 12.8 oz)       General Appearance:   no distress, obese body habitus   ENT/Mouth: membranes moist, no oral lesions or bleeding gums.      EYES:  no scleral icterus, normal conjunctivae   Neck: no carotid bruits or thyromegaly   Chest/Lungs:   lungs are clear to auscultation, no rales or wheezing, no sternal scar, equal chest wall expansion    Cardiovascular:   Regular. Normal first and second heart sounds with no murmur. No rubs or gallops; the right carotid, radial and posterior tibial pulses are intact and the left carotid, radial and posterior tibial pulses are intact.  Jugular venous pressure is flat, chronic mild pedal edema bilaterally    Abdomen:  no organomegaly, masses, bruits, or tenderness; bowel sounds are present   Extremities: no cyanosis or clubbing   Skin: no xanthelasma, warm.    Neurologic: normal  bilateral, no tremors     Psychiatric: alert and oriented x3, calm        Please refer above for cardiac ROS details.        Medical History  Surgical History Family History Social History   Past Medical History:   Diagnosis Date     Age-related physical debility      Anemia      Arthritis      Bladder infection 01/03/2015    dx'd- on antibiotics     Blood type AB-     with Anti-C     Breast cancer (H)      Bursitis, knee      Cancer (H)     breast      Cancer (H) 2005    left breast     Colon cancer (H)      Depression      Gout      History of transfusion      Hypertension      Hypertension      Insomnia      Macular degeneration      Osteoarthritis of multiple joints       Osteopenia      Ovarian cyst      Rosacea      Vitamin D deficiency      Past Surgical History:   Procedure Laterality Date     ABDOMEN SURGERY      colon resection for ca 2015     BACK SURGERY Bilateral     L3-L5, L5-S1 decompression lami     BACK SURGERY      posterior spinal reconstruction     BREAST SURGERY       BREAST SURGERY      left mastectomy     CATARACT EXTRACTION Bilateral      COLON SURGERY       COLONOSCOPY       COLONOSCOPY N/A 8/4/2015    Procedure: COLONOSCOPY;  Surgeon: Yoseph Ferraro MD;  Location:  GI     CV CORONARY ANGIOGRAM N/A 12/12/2021    Procedure: Coronary Angiogram;  Surgeon: Everett Castellon MD;  Location:  HEART CARDIAC CATH LAB     CV PCI STENT DRUG ELUTING N/A 12/12/2021    Procedure: Percutaneous Coronary Intervention Stent Drug Eluting;  Surgeon: Everett Castellon MD;  Location:  HEART CARDIAC CATH LAB     DILATION AND CURETTAGE      onsil     EYE SURGERY      cataract bilateral     EYE SURGERY       GASTRECTOMY       MASTECTOMY, RADICAL Left      NEUROMA SURGERY Bilateral     feet     ORTHOPEDIC SURGERY      shoulder right, knee left     ORTHOPEDIC SURGERY      lami     OTHER SURGICAL HISTORY      left knee arthroscopy     OTHER SURGICAL HISTORY      right shoulder replacement     OTHER SURGICAL HISTORY      left breast biopsyneedle core     SD LAP,FULGURATE/EXCISE LESIONS N/A 3/29/2017    Procedure: LAPAROSCOPIC BILATERAL SALPING OOPHORECTOMY PELVIC WASHINGS;  Surgeon: Eduardo Smart MD;  Location: [facility] [place];  Service: Gynecology     SD MASTECTOMY, SIMPLE, COMPLETE Right 1/7/2015    Procedure: RIGHT BREAST MASTECTOMY;  Surgeon: Meliton Bazan MD;  Location: [facility];  Service: General     REPLACEMENT TOTAL KNEE Right      TONSILLECTOMY & ADENOIDECTOMY       ZZC PART REMOVAL COLON W ANASTOMOSIS N/A 6/16/2014    Procedure: COLECTOMY;  Surgeon: Meliton Bazan MD;  Location: [facility];  Service: General     Family History    Problem Relation Age of Onset     Squamous cell carcinoma Mother      Colon Cancer Sister      Breast Cancer Maternal Aunt      Bone Cancer Maternal Aunt      Colon Cancer Paternal Aunt      Breast Cancer Cousin         Social History     Socioeconomic History     Marital status:      Spouse name: Not on file     Number of children: Not on file     Years of education: Not on file     Highest education level: Not on file   Occupational History     Not on file   Tobacco Use     Smoking status: Never Smoker     Smokeless tobacco: Never Used   Substance and Sexual Activity     Alcohol use: No     Drug use: No     Sexual activity: Never   Other Topics Concern     Parent/sibling w/ CABG, MI or angioplasty before 65F 55M? Not Asked   Social History Narrative     Not on file     Social Determinants of Health     Financial Resource Strain: Not on file   Food Insecurity: Not on file   Transportation Needs: Not on file   Physical Activity: Not on file   Stress: Not on file   Social Connections: Not on file   Intimate Partner Violence: Not on file   Housing Stability: Not on file           Medications  Allergies   Current Outpatient Medications   Medication Sig Dispense Refill     aspirin (ASA) 81 MG EC tablet Take 1 tablet (81 mg) by mouth daily Start tomorrow. (Patient taking differently: Take 81 mg by mouth every evening) 30 tablet 3     atorvastatin (LIPITOR) 40 MG tablet TAKE 1 TABLET BY MOUTH ONCE DAILY 90 tablet 3     BRILINTA 90 MG tablet TAKE 2 TABLETS BY MOUTH ONCE DAILY. 60 tablet 3     calcium carbonate-vitamin D (OSCAL W/D) 500-200 MG-UNIT tablet Take 1 tablet by mouth daily       carvedilol (COREG) 6.25 MG tablet TAKE 2 TABLETS BY MOUTH ONCE DAILY 60 tablet 0     furosemide (LASIX) 20 MG tablet Take 1 tablet (20 mg) by mouth 2 times daily 180 tablet 3     losartan (COZAAR) 25 MG tablet Take 1 tablet (25 mg) by mouth daily (Patient taking differently: Take 50 mg by mouth daily)       magnesium oxide  (MAG-OX) 400 (241.3 Mg) MG tablet Take 400 mg by mouth daily       mirabegron (MYRBETRIQ) 25 MG 24 hr tablet 1 tab(s)       Multiple Vitamins-Minerals (PRESERVISION AREDS) CAPS Take 1 capsule by mouth 2 times daily        nitroGLYcerin (NITROSTAT) 0.4 MG sublingual tablet For chest pain place 1 tablet under the tongue every 5 minutes for 3 doses. If symptoms persist 5 minutes after 1st dose call 911.       olopatadine (PATANOL) 0.1 % ophthalmic solution Place 1 drop into both eyes 2 times daily       Omega-3 Fatty Acids (FISH OIL OMEGA-3 PO) Take 1 capsule by mouth daily       prednisoLONE acetate (PRED FORTE) 1 % ophthalmic suspension Place 1 drop into both eyes 2 times daily        sennosides (SENOKOT) 8.6 MG tablet Take 1 tablet by mouth 2 times daily       spironolactone (ALDACTONE) 25 MG tablet Take 1 tablet by mouth one time daily.       traMADol (ULTRAM) 50 MG tablet Take 1 tablet (50 mg) by mouth 3 times daily 90 tablet 0     VITAMIN D, CHOLECALCIFEROL, PO Take 4,000 Units by mouth daily          Allergies   Allergen Reactions     Blood-Group Specific Substance Other (See Comments)     Anti-D and Anti-C present. A delay in compatible RBC's may occur.     Celecoxib Unknown     Avoids due to sulfa allergy     Lisinopril Cough     Other reaction(s): cough     Oxybutynin Other (See Comments)     Other reaction(s): intolerable dry eye and mouth     Sulfa Drugs      Tetanus Toxoid Blisters     Tetanus Toxoids      Other reaction(s): Unknown     Tolterodine Other (See Comments)     Other reaction(s): intolerable dry eye and mouth     Piroxicam Rash     Other reaction(s): stomach upset          Lab Results    Chemistry/lipid CBC Cardiac Enzymes/BNP/TSH/INR   Recent Labs   Lab Test 02/22/22  1522   CHOL 121   HDL 49*   LDL 53   TRIG 94     Recent Labs   Lab Test 02/22/22  1522 12/22/21  0649 10/21/20  1206   LDL 53 63 112     Recent Labs   Lab Test 02/22/22  1522      POTASSIUM 4.3   CHLORIDE 100   CO2 25   GLC  130*   BUN 18   CR 1.30*   GFRESTIMATED 40*   MARY 9.4     Recent Labs   Lab Test 02/22/22  1522 12/22/21  0649 12/16/21  0556   CR 1.30* 0.74 0.85     No results for input(s): A1C in the last 20692 hours.       Recent Labs   Lab Test 12/16/21  0556   WBC 9.1   HGB 10.9*   HCT 33.1*   MCV 91        Recent Labs   Lab Test 12/16/21  0556 12/11/21  0812 12/11/21  0321   HGB 10.9* 12.9 12.3    Recent Labs   Lab Test 05/21/19  1540 05/21/19  1315   TROPONINI <0.01 <0.01     Recent Labs   Lab Test 12/11/21  0321 05/21/19  1315   BNP  --  208*   NTBNPI 1,757  --      Recent Labs   Lab Test 02/13/18  1608   TSH 1.21     Recent Labs   Lab Test 05/21/19  1315   INR 0.94        Today's clinic visit entailed:  Review of prior external note(s) from - [facility] information from epic reviewed  40 minutes spent on the date of the encounter doing chart review, review of outside records, review of test results, interpretation of tests, patient visit and documentation   Provider  Link to [facility] Help Grid     The level of medical decision making during this visit was of high complexity.        Louann Silva MD

## 2022-05-11 NOTE — TELEPHONE ENCOUNTER
"Writer met with patient and her cousin, Danika. Marina is very hard of hearing. Education to go through cousin. [phone]    We were going to schedule her staged intervention but her cousin did not have her date book and states that \"she has appointments pretty much every day\". She requests a call in the morning to have this arranged.She will need renal protocol and it should be noted that she is incredibly hard of hearing. She also will not have anyone who can stay with her post-procedure. Ideally, her cousin states that she should stay overnight if able. She lives in senior living but it is independent and no one checks in on her. Will include this message in scheduling msg. Education to go through cousin. They were anxious to be on their way. -[facility] [place]   "

## 2022-05-11 NOTE — PATIENT INSTRUCTIONS
It was a pleasure seeing you at [facility] today.        Here are my suggestions for your care:    1.  Schedule the coronary angiogram and stenting of the right artery    2.  Stay on your same medications      Let's meet again in 4 months.    You can always call my nurse Alondra Hamm RN who is a nurse helping me in the care of my patients. She can be reached at [phone] if you have any questions.    For scheduling, please call my  Laura Reece at [phone].    Thank you again for trusting me with your care. Please feel free to call my office at any time if you have any question or if I can assist you in any way.    Loaunn Silva MD  [facility]

## 2022-05-12 LAB
ATRIAL RATE - MUSE: 80 BPM
DIASTOLIC BLOOD PRESSURE - MUSE: NORMAL MMHG
INTERPRETATION ECG - MUSE: NORMAL
P AXIS - MUSE: 7 DEGREES
PR INTERVAL - MUSE: 132 MS
QRS DURATION - MUSE: 86 MS
QT - MUSE: 380 MS
QTC - MUSE: 438 MS
R AXIS - MUSE: 28 DEGREES
SYSTOLIC BLOOD PRESSURE - MUSE: NORMAL MMHG
T AXIS - MUSE: 28 DEGREES
VENTRICULAR RATE- MUSE: 80 BPM

## 2022-05-25 ENCOUNTER — ASSISTED LIVING VISIT (OUTPATIENT)
Dept: GERIATRICS | Facility: CLINIC | Age: 87
End: 2022-05-25
Payer: COMMERCIAL

## 2022-05-25 DIAGNOSIS — Z95.5 STATUS POST INSERTION OF DRUG-ELUTING STENT INTO LEFT ANTERIOR DESCENDING ARTERY: ICD-10-CM

## 2022-05-25 DIAGNOSIS — H35.3290 EXUDATIVE AGE-RELATED MACULAR DEGENERATION, UNSPECIFIED LATERALITY, UNSPECIFIED STAGE (H): ICD-10-CM

## 2022-05-25 DIAGNOSIS — G89.29 OTHER CHRONIC PAIN: Primary | ICD-10-CM

## 2022-05-25 DIAGNOSIS — I25.10 CORONARY ARTERY DISEASE INVOLVING NATIVE CORONARY ARTERY OF NATIVE HEART WITHOUT ANGINA PECTORIS: Primary | ICD-10-CM

## 2022-05-25 DIAGNOSIS — H91.93 BILATERAL HEARING LOSS, UNSPECIFIED HEARING LOSS TYPE: ICD-10-CM

## 2022-05-25 DIAGNOSIS — I10 ESSENTIAL (PRIMARY) HYPERTENSION: ICD-10-CM

## 2022-05-25 DIAGNOSIS — R26.9 ABNORMAL GAIT: ICD-10-CM

## 2022-05-25 NOTE — PROGRESS NOTES
[facility] GERIATRIC SERVICES    Facility[facility]   [facility] [[id number]   Code Status: FULL CODE      CHIEF COMPLAINT/REASON FOR VISIT:  Chief Complaint   Patient presents with     P Care Coordination - Regulatory       HISTORY:      HPI: Marina is a 88 year old female who currently lives in the assisted living facility [place] and who I was asked to visit with today secondary to a regulatory review of chronic medical conditions.  Our last visit together was in January.  She was hospitalized December 11 through December 14, 2021 secondary to chest pain.  Did spend some time on the transitional care unit.  She does move about the facility in her walker.  Today she does have a visit with her eye doctor secondary to macular degeneration.  She otherwise states she is doing fine however does have chronic pain.  Denies any other respiratory or cardiovascular issues.  She states her appetite to be okay.  Is hard of hearing does wear hearing aids.  For history of chronic pain is on scheduled tramadol and Tylenol.    Past Medical History:   Diagnosis Date     Age-related physical debility      Anemia      Arthritis      Bladder infection 01/03/2015    dx'd- on antibiotics     Blood type AB-     with Anti-C     Breast cancer (H)      Bursitis, knee      Cancer (H)     breast      Cancer (H) 2005    left breast     Colon cancer (H)      Depression      Gout      History of transfusion      Hypertension      Hypertension      Insomnia      Macular degeneration      Osteoarthritis of multiple joints      Osteopenia      Ovarian cyst      Rosacea      Vitamin D deficiency             Family History   Problem Relation Age of Onset     Squamous cell carcinoma Mother      Colon Cancer Sister      Breast Cancer Maternal Aunt      Bone Cancer Maternal Aunt      Colon Cancer Paternal Aunt      Breast Cancer Cousin       Social History     Socioeconomic History     Marital status:    Tobacco Use     Smoking  status: Never Smoker     Smokeless tobacco: Never Used   Substance and Sexual Activity     Alcohol use: No     Drug use: No     Sexual activity: Never        REVIEW OF SYSTEM:  She currently denies any new symptoms of fever chills fatigue sore throat postnasal drip wheezing chest pain dizziness vertigo nausea vomiting diarrhea dysuria frequency urgency.  She does have a history of hypertension, chronic pain syndrome, ASCVD, systolic heart failure, ischemic cardiomyopathy, cataracts, hard of hearing, history of breast cancer status post mastectomy, colon cancer status post partial resection       PHYSICAL EXAM:   Pleasant female in no acute distress.  Head is normocephalic.  Legally blind.  Hearing aids.     Lung sounds are clear throughout.  Cardiovascular S1-S2 regular rate and rhythm.  Chronic lower extremity edema.  Nonpitting. Tubigrip stockings. Gastrointestinal soft nontender with positive bowel sounds.  Musculoskeletal history of chronic pain and knee osteoarthritis.  Psychiatric: Pleasant affect.       Current Outpatient Medications:      aspirin (ASA) 81 MG EC tablet, Take 1 tablet (81 mg) by mouth daily Start tomorrow. (Patient taking differently: Take 81 mg by mouth every evening), Disp: 30 tablet, Rfl: 3     atorvastatin (LIPITOR) 40 MG tablet, TAKE 1 TABLET BY MOUTH ONCE DAILY, Disp: 90 tablet, Rfl: 3     BRILINTA 90 MG tablet, TAKE 2 TABLETS BY MOUTH ONCE DAILY., Disp: 60 tablet, Rfl: 3     calcium carbonate-vitamin D (OSCAL W/D) 500-200 MG-UNIT tablet, Take 1 tablet by mouth daily, Disp: , Rfl:      carvedilol (COREG) 6.25 MG tablet, TAKE 2 TABLETS BY MOUTH ONCE DAILY, Disp: 60 tablet, Rfl: 0     furosemide (LASIX) 20 MG tablet, Take 1 tablet (20 mg) by mouth 2 times daily, Disp: 180 tablet, Rfl: 3     losartan (COZAAR) 25 MG tablet, Take 1 tablet (25 mg) by mouth daily (Patient taking differently: Take 50 mg by mouth daily), Disp: , Rfl:      magnesium oxide (MAG-OX) 400 (241.3 Mg) MG tablet, Take  400 mg by mouth daily, Disp: , Rfl:      mirabegron (MYRBETRIQ) 25 MG 24 hr tablet, 1 tab(s), Disp: , Rfl:      Multiple Vitamins-Minerals (PRESERVISION AREDS) CAPS, Take 1 capsule by mouth 2 times daily , Disp: , Rfl:      nitroGLYcerin (NITROSTAT) 0.4 MG sublingual tablet, For chest pain place 1 tablet under the tongue every 5 minutes for 3 doses. If symptoms persist 5 minutes after 1st dose call 911., Disp: , Rfl:      olopatadine (PATANOL) 0.1 % ophthalmic solution, Place 1 drop into both eyes 2 times daily, Disp: , Rfl:      Omega-3 Fatty Acids (FISH OIL OMEGA-3 PO), Take 1 capsule by mouth daily, Disp: , Rfl:      prednisoLONE acetate (PRED FORTE) 1 % ophthalmic suspension, Place 1 drop into both eyes 2 times daily , Disp: , Rfl:      sennosides (SENOKOT) 8.6 MG tablet, Take 1 tablet by mouth 2 times daily, Disp: , Rfl:      spironolactone (ALDACTONE) 25 MG tablet, Take 1 tablet by mouth one time daily., Disp: , Rfl:      traMADol (ULTRAM) 50 MG tablet, Take 1 tablet (50 mg) by mouth 3 times daily, Disp: 90 tablet, Rfl: 0     VITAMIN D, CHOLECALCIFEROL, PO, Take 4,000 Units by mouth daily , Disp: , Rfl:     There were no vitals taken for this visit.  Apical pulse today 82 with respirations of 18  LABS:   Recent Labs   Lab Test 05/11/22  1649 02/22/22  1522   CHOL 120 121   HDL 49* 49*   LDL 47 53   TRIG 121 94     CBC RESULTS: Recent Labs   Lab Test 05/11/22  1649   WBC 9.6   RBC 3.66*   HGB 11.4*   HCT 35.1   MCV 96   MCH 31.1   MCHC 32.5   RDW 13.6        Last Comprehensive Metabolic Panel:  Sodium   Date Value Ref Range Status   05/11/2022 135 (L) 136 - 145 mmol/L Final   09/15/2006 134 133 - 144 mmol/L Final     Comment:     Delta Verified     Potassium   Date Value Ref Range Status   05/11/2022 4.2 3.5 - 5.0 mmol/L Final   09/15/2006 4.1 3.4 - 5.3 mmol/L Final     Chloride   Date Value Ref Range Status   05/11/2022 101 98 - 107 mmol/L Final   09/15/2006 99 94 - 109 mmol/L Final     Carbon Dioxide    Date Value Ref Range Status   09/15/2006 28 20 - 32 mmol/L Final     Carbon Dioxide (CO2)   Date Value Ref Range Status   05/11/2022 24 22 - 31 mmol/L Final     Anion Gap   Date Value Ref Range Status   05/11/2022 10 5 - 18 mmol/L Final   09/15/2006 7 6 - 17 mmol/L Final     Glucose   Date Value Ref Range Status   05/11/2022 144 (H) 70 - 125 mg/dL Final   09/14/2006 110 60 - 110 mg/dL Final     Urea Nitrogen   Date Value Ref Range Status   05/11/2022 27 8 - 28 mg/dL Final   09/14/2006 24 7 - 30 mg/dL Final     Creatinine   Date Value Ref Range Status   05/11/2022 1.08 0.60 - 1.10 mg/dL Final   09/14/2006 1.08 0.60 - 1.30 mg/dL Final     GFR Estimate   Date Value Ref Range Status   05/11/2022 49 (L) >60 mL/min/1.73m2 Final     Comment:     Effective December 21, 2021 eGFRcr in adults is calculated using the 2021 CKD-EPI creatinine equation which includes age and gender (Maria Del Carmen et al., NEJ, DOI: 10.1056/CSVFal4234790)   05/26/2021 >60 >60 mL/min/1.73m2 Final   09/14/2006 53 (L) >60 mL/min/1.7m2 Final     Calcium   Date Value Ref Range Status   05/11/2022 9.4 8.5 - 10.5 mg/dL Final   09/14/2006 9.2 8.5 - 10.4 mg/dL Final         ASSESSMENT:    (G89.29) Other chronic pain  (primary encounter diagnosis)  Comment: Is able to still ambulate with her walker  Plan: Is on tramadol and Tylenol    (H91.93) Bilateral hearing loss, unspecified hearing loss type  Comment: Has hearing aids  Plan: No new changes    (H39.8437) Exudative age-related macular degeneration, unspecified laterality, unspecified stage (H)  Comment: Chronic  Plan: Does have a visit with the eye doctor today.    4. Gait abnormality: Is able to use her four-wheel walker.  History of chronic pain.  No new changes.  Case Management:  I have reviewed the Assisted Living care plan, current immunizations and preventive care/cancer screening.. Future cancer screening is not clinically indicated secondary to age/goals of care.   Patient's desire to return to the  community is present, but is not able due to care needs .    Information reviewed:  Medications, vital signs, orders, and nursing notes.  PLAN:    No medication changes with this visit.  Recently did have some laboratory studies.  Her pain is managed with Tylenol and tramadol.  Does have a visit with the eye doctor today.  She did not have any other questions.    Electronically signed by: Bry Jurado NP

## 2022-05-25 NOTE — LETTER
5/25/2022        RE: Marina ADAME Edinson  [address]  [place] [address]          M [facility] GERIATRIC SERVICES    Facility:   [facility] [[id number]]   Code Status: FULL CODE      CHIEF COMPLAINT/REASON FOR VISIT:  Chief Complaint   Patient presents with     FVP Care Coordination - Regulatory       HISTORY:      HPI: Marina is a 88 year old female who currently lives in the assisted living facility Apartments [place] and who I was asked to visit with today secondary to a regulatory review of chronic medical conditions.  Our last visit together was in January.  She was hospitalized December 11 through December 14, 2021 secondary to chest pain.  Did spend some time on the transitional care unit.  She does move about the facility in her walker.  Today she does have a visit with her eye doctor secondary to macular degeneration.  She otherwise states she is doing fine however does have chronic pain.  Denies any other respiratory or cardiovascular issues.  She states her appetite to be okay.  Is hard of hearing does wear hearing aids.  For history of chronic pain is on scheduled tramadol and Tylenol.    Past Medical History:   Diagnosis Date     Age-related physical debility      Anemia      Arthritis      Bladder infection 01/03/2015    dx'd- on antibiotics     Blood type AB-     with Anti-C     Breast cancer (H)      Bursitis, knee      Cancer (H)     breast      Cancer (H) 2005    left breast     Colon cancer (H)      Depression      Gout      History of transfusion      Hypertension      Hypertension      Insomnia      Macular degeneration      Osteoarthritis of multiple joints      Osteopenia      Ovarian cyst      Rosacea      Vitamin D deficiency             Family History   Problem Relation Age of Onset     Squamous cell carcinoma Mother      Colon Cancer Sister      Breast Cancer Maternal Aunt      Bone Cancer Maternal Aunt      Colon Cancer Paternal Aunt      Breast Cancer Cousin        Social History     Socioeconomic History     Marital status:    Tobacco Use     Smoking status: Never Smoker     Smokeless tobacco: Never Used   Substance and Sexual Activity     Alcohol use: No     Drug use: No     Sexual activity: Never        REVIEW OF SYSTEM:  She currently denies any new symptoms of fever chills fatigue sore throat postnasal drip wheezing chest pain dizziness vertigo nausea vomiting diarrhea dysuria frequency urgency.  She does have a history of hypertension, chronic pain syndrome, ASCVD, systolic heart failure, ischemic cardiomyopathy, cataracts, hard of hearing, history of breast cancer status post mastectomy, colon cancer status post partial resection       PHYSICAL EXAM:   Pleasant female in no acute distress.  Head is normocephalic.  Legally blind.  Hearing aids.     Lung sounds are clear throughout.  Cardiovascular S1-S2 regular rate and rhythm.  Chronic lower extremity edema.  Nonpitting. Tubigrip stockings. Gastrointestinal soft nontender with positive bowel sounds.  Musculoskeletal history of chronic pain and knee osteoarthritis.  Psychiatric: Pleasant affect.       Current Outpatient Medications:      aspirin (ASA) 81 MG EC tablet, Take 1 tablet (81 mg) by mouth daily Start tomorrow. (Patient taking differently: Take 81 mg by mouth every evening), Disp: 30 tablet, Rfl: 3     atorvastatin (LIPITOR) 40 MG tablet, TAKE 1 TABLET BY MOUTH ONCE DAILY, Disp: 90 tablet, Rfl: 3     BRILINTA 90 MG tablet, TAKE 2 TABLETS BY MOUTH ONCE DAILY., Disp: 60 tablet, Rfl: 3     calcium carbonate-vitamin D (OSCAL W/D) 500-200 MG-UNIT tablet, Take 1 tablet by mouth daily, Disp: , Rfl:      carvedilol (COREG) 6.25 MG tablet, TAKE 2 TABLETS BY MOUTH ONCE DAILY, Disp: 60 tablet, Rfl: 0     furosemide (LASIX) 20 MG tablet, Take 1 tablet (20 mg) by mouth 2 times daily, Disp: 180 tablet, Rfl: 3     losartan (COZAAR) 25 MG tablet, Take 1 tablet (25 mg) by mouth daily (Patient taking differently: Take 50  mg by mouth daily), Disp: , Rfl:      magnesium oxide (MAG-OX) 400 (241.3 Mg) MG tablet, Take 400 mg by mouth daily, Disp: , Rfl:      mirabegron (MYRBETRIQ) 25 MG 24 hr tablet, 1 tab(s), Disp: , Rfl:      Multiple Vitamins-Minerals (PRESERVISION AREDS) CAPS, Take 1 capsule by mouth 2 times daily , Disp: , Rfl:      nitroGLYcerin (NITROSTAT) 0.4 MG sublingual tablet, For chest pain place 1 tablet under the tongue every 5 minutes for 3 doses. If symptoms persist 5 minutes after 1st dose call 911., Disp: , Rfl:      olopatadine (PATANOL) 0.1 % ophthalmic solution, Place 1 drop into both eyes 2 times daily, Disp: , Rfl:      Omega-3 Fatty Acids (FISH OIL OMEGA-3 PO), Take 1 capsule by mouth daily, Disp: , Rfl:      prednisoLONE acetate (PRED FORTE) 1 % ophthalmic suspension, Place 1 drop into both eyes 2 times daily , Disp: , Rfl:      sennosides (SENOKOT) 8.6 MG tablet, Take 1 tablet by mouth 2 times daily, Disp: , Rfl:      spironolactone (ALDACTONE) 25 MG tablet, Take 1 tablet by mouth one time daily., Disp: , Rfl:      traMADol (ULTRAM) 50 MG tablet, Take 1 tablet (50 mg) by mouth 3 times daily, Disp: 90 tablet, Rfl: 0     VITAMIN D, CHOLECALCIFEROL, PO, Take 4,000 Units by mouth daily , Disp: , Rfl:     There were no vitals taken for this visit.  Apical pulse today 82 with respirations of 18  LABS:   Recent Labs   Lab Test 05/11/22  1649 02/22/22  1522   CHOL 120 121   HDL 49* 49*   LDL 47 53   TRIG 121 94     CBC RESULTS: Recent Labs   Lab Test 05/11/22  1649   WBC 9.6   RBC 3.66*   HGB 11.4*   HCT 35.1   MCV 96   MCH 31.1   MCHC 32.5   RDW 13.6        Last Comprehensive Metabolic Panel:  Sodium   Date Value Ref Range Status   05/11/2022 135 (L) 136 - 145 mmol/L Final   09/15/2006 134 133 - 144 mmol/L Final     Comment:     Delta Verified     Potassium   Date Value Ref Range Status   05/11/2022 4.2 3.5 - 5.0 mmol/L Final   09/15/2006 4.1 3.4 - 5.3 mmol/L Final     Chloride   Date Value Ref Range Status    05/11/2022 101 98 - 107 mmol/L Final   09/15/2006 99 94 - 109 mmol/L Final     Carbon Dioxide   Date Value Ref Range Status   09/15/2006 28 20 - 32 mmol/L Final     Carbon Dioxide (CO2)   Date Value Ref Range Status   05/11/2022 24 22 - 31 mmol/L Final     Anion Gap   Date Value Ref Range Status   05/11/2022 10 5 - 18 mmol/L Final   09/15/2006 7 6 - 17 mmol/L Final     Glucose   Date Value Ref Range Status   05/11/2022 144 (H) 70 - 125 mg/dL Final   09/14/2006 110 60 - 110 mg/dL Final     Urea Nitrogen   Date Value Ref Range Status   05/11/2022 27 8 - 28 mg/dL Final   09/14/2006 24 7 - 30 mg/dL Final     Creatinine   Date Value Ref Range Status   05/11/2022 1.08 0.60 - 1.10 mg/dL Final   09/14/2006 1.08 0.60 - 1.30 mg/dL Final     GFR Estimate   Date Value Ref Range Status   05/11/2022 49 (L) >60 mL/min/1.73m2 Final     Comment:     Effective December 21, 2021 eGFRcr in adults is calculated using the 2021 CKD-EPI creatinine equation which includes age and gender (Maria Del Carmen et al., NEJM, DOI: 10.1056/EIDTuu5637648)   05/26/2021 >60 >60 mL/min/1.73m2 Final   09/14/2006 53 (L) >60 mL/min/1.7m2 Final     Calcium   Date Value Ref Range Status   05/11/2022 9.4 8.5 - 10.5 mg/dL Final   09/14/2006 9.2 8.5 - 10.4 mg/dL Final         ASSESSMENT:    (G89.29) Other chronic pain  (primary encounter diagnosis)  Comment: Is able to still ambulate with her walker  Plan: Is on tramadol and Tylenol    (H91.93) Bilateral hearing loss, unspecified hearing loss type  Comment: Has hearing aids  Plan: No new changes    (H35.6818) Exudative age-related macular degeneration, unspecified laterality, unspecified stage (H)  Comment: Chronic  Plan: Does have a visit with the eye doctor today.    4. Gait abnormality: Is able to use her four-wheel walker.  History of chronic pain.  No new changes.  Case Management:  I have reviewed the Assisted Living care plan, current immunizations and preventive care/cancer screening.. Future cancer screening is  not clinically indicated secondary to age/goals of care.   Patient's desire to return to the community is present, but is not able due to care needs .    Information reviewed:  Medications, vital signs, orders, and nursing notes.  PLAN:    No medication changes with this visit.  Recently did have some laboratory studies.  Her pain is managed with Tylenol and tramadol.  Does have a visit with the eye doctor today.  She did not have any other questions.    Electronically signed by: Bry Jurado NP            Sincerely,        Bry Jurado NP

## 2022-05-29 DIAGNOSIS — Z11.59 ENCOUNTER FOR SCREENING FOR OTHER VIRAL DISEASES: Primary | ICD-10-CM

## 2022-06-01 NOTE — TELEPHONE ENCOUNTER
Chela Gross 2 weeks ago     MZ    Thanks Mal! If the patient no longer needs renal protocol, she can come in at 7:30 am instead of 7 - if you can make sure she is aware of that when you speak to her next that would be great!     STAGED PCI/LHC WITH [facility] ONLY     7:30 AM ADMIT ON 6/7     H&P: 5/11 WITH [facility]     ALERTS: NONE     COVID TESTING ON 6/3 [facility]     Cath lab aware that patient needs to be monitored overnight.     Message text       You  Chris Vo 2 weeks ago         Hi Pakjacquelin!   Just a heads up, that she will not need renal prot anymore- her repeat labs look better.   Thanks!   Emre     Message text       Chris Vo  You 2 weeks ago     PV    STAGED PCI/LHC WITH [facility] ONLY     7AM ADMIT ON 6/7 (OFFERED A SOONER DATE, BUT PT'S COUSIN OMAYRA DECLINED)     H&P: 5/11 WITH [facility]     ALERTS:RENAL PROTOCOL     COVID TESTING ON 6/3 [facility]     **ALSO, I'VE REACHED OUT TO THE CATH LAB NOTIFYING THEM THAT PT WILL NEED TO BE ADMITTED OVERNIGHT. THEY SHOULD BE ABLE TO ACCOMMODATE HER, BUT I WILL UPDATE YOU IF ANY ISSUES ARISE**     THANKS!   -CHRIS    Message text       You  Chris Vo 3 weeks ago     [facility],   [facility] only- actually looks like it is a staged intervention. She should have renal protocol and scheduling through her cousin, Omayra [phone]. She also does not have anyone who can stay with her overnight afterward and the family is requesting the opportunity for her to stay overnight, if able.   Mal

## 2022-06-02 NOTE — TELEPHONE ENCOUNTER
Attempted to call Danika & Yo - patient's contact for medical information to make sure angiogram letter was received. Brief office teach was given when patient was in to see EMG but we were unable to schedule due to lateness in the day and they both did not have their calender's with them. Marina is extremely hard of hearing especially on the telephone. Will keep trying. Order set placed. -[facility]

## 2022-06-03 ENCOUNTER — PREP FOR PROCEDURE (OUTPATIENT)
Dept: CARDIOLOGY | Facility: CLINIC | Age: 87
End: 2022-06-03
Payer: COMMERCIAL

## 2022-06-03 ENCOUNTER — LAB (OUTPATIENT)
Dept: LAB | Facility: CLINIC | Age: 87
End: 2022-06-03
Payer: COMMERCIAL

## 2022-06-03 DIAGNOSIS — I10 ESSENTIAL (PRIMARY) HYPERTENSION: ICD-10-CM

## 2022-06-03 DIAGNOSIS — I25.10 CORONARY ARTERY DISEASE DUE TO CALCIFIED CORONARY LESION: Primary | ICD-10-CM

## 2022-06-03 DIAGNOSIS — I25.10 CORONARY ARTERY DISEASE INVOLVING NATIVE CORONARY ARTERY OF NATIVE HEART WITHOUT ANGINA PECTORIS: ICD-10-CM

## 2022-06-03 DIAGNOSIS — Z11.59 ENCOUNTER FOR SCREENING FOR OTHER VIRAL DISEASES: ICD-10-CM

## 2022-06-03 DIAGNOSIS — I25.84 CORONARY ARTERY DISEASE DUE TO CALCIFIED CORONARY LESION: Primary | ICD-10-CM

## 2022-06-03 DIAGNOSIS — Z95.5 STATUS POST INSERTION OF DRUG-ELUTING STENT INTO LEFT ANTERIOR DESCENDING ARTERY: ICD-10-CM

## 2022-06-03 PROCEDURE — U0003 INFECTIOUS AGENT DETECTION BY NUCLEIC ACID (DNA OR RNA); SEVERE ACUTE RESPIRATORY SYNDROME CORONAVIRUS 2 (SARS-COV-2) (CORONAVIRUS DISEASE [COVID-19]), AMPLIFIED PROBE TECHNIQUE, MAKING USE OF HIGH THROUGHPUT TECHNOLOGIES AS DESCRIBED BY CMS-2020-01-R: HCPCS

## 2022-06-03 PROCEDURE — U0005 INFEC AGEN DETEC AMPLI PROBE: HCPCS

## 2022-06-03 RX ORDER — DIAZEPAM 5 MG
5 TABLET ORAL
Status: CANCELLED | OUTPATIENT
Start: 2022-06-07

## 2022-06-03 RX ORDER — LIDOCAINE 40 MG/G
CREAM TOPICAL
Status: CANCELLED | OUTPATIENT
Start: 2022-06-03

## 2022-06-03 RX ORDER — ASPIRIN 325 MG
325 TABLET ORAL ONCE
Status: CANCELLED | OUTPATIENT
Start: 2022-06-07 | End: 2022-06-03

## 2022-06-03 RX ORDER — FENTANYL CITRATE 50 UG/ML
25 INJECTION, SOLUTION INTRAMUSCULAR; INTRAVENOUS
Status: CANCELLED | OUTPATIENT
Start: 2022-06-07

## 2022-06-03 RX ORDER — SODIUM CHLORIDE 9 MG/ML
INJECTION, SOLUTION INTRAVENOUS CONTINUOUS
Status: CANCELLED | OUTPATIENT
Start: 2022-06-07

## 2022-06-03 RX ORDER — ASPIRIN 81 MG/1
243 TABLET, CHEWABLE ORAL ONCE
Status: CANCELLED | OUTPATIENT
Start: 2022-06-07

## 2022-06-03 NOTE — TELEPHONE ENCOUNTER
Marina DEEP Edinson  [address]   [place] [address]  [phone] (home)     PCP:  Cale Whatley  H&P completed by:  SERAFIN  Admit date 6/7 Arrival time:  0730  Anticoagulation:  NA  Previous PCI: Yes  Bypass Grafts: No  Renal Issues: No -- not anymore- per , ok to come at 7:30 now as she no longer needs renal protocol.  Diabetic?: No  Device?: No    Ambulation status: ambulatory with walker. VERY [place].     Reason for Visit:  Patient seen for pre-procedure education in preparation for: Staged PCI + LHC teach done back on 5/11 but checked in via telephone 6/3.    Procedure Prep:  EKG results obtained, dated: To be completed on day of cath lab procedure  Hemogram results obtained: To be completed on day of cath lab procedure  Basic Metabolic Panel results obtained: To be completed on day of cath lab procedure  Pertinent cardiac test results: in epic  COVID-19 test results obtained: Completed within [facility]     Patient Education    1. Your arrival time is 0730.  Location is [facility] - [address] [place] [id number] - Main Entrance of the Hospital  2. Please plan on being at the hospital all day.  3. At any time, emergencies and/or urgent cases may come up which could delay the start of your procedure.    COVID Testing Instructions  *Mandatory COVID Testing:   ALL Patients will need to complete a COVID test no sooner than 4 days prior to their procedure (regardless of vaccination status).      To schedule COVID testing Please call [phone]    If you want to complete this at an outside facility please call them to find out if they will have the results within the appropriate time frame and their fax number.  You will need to provide us with that information so we can send the order.    The facility completing the test will need to fax the results to [phone]    If you are running into and issues please call us.     Pre-procedure instructions  Take  your temperature in the morning prior to coming in.  If your temperature is 100 F please call  [facility] [phone] and notify them.  If you do not have access to a thermometer at home, please come in for testing.  If you are running a temperature your procedure may be rescheduled.  Patient instructed to not Eat or Drink after midnight.  Patient instructed to shower the evening before or the morning of the procedure.  Patient instructed to arrange for transportation home following procedure from a responsible family member or friend. No driving for at least 24 hours.  Patient instructed to have a responsible adult with them for 24 hours post-procedure.  Post-procedure follow up process.  Conscious sedation discussed.      Pre-procedure medication instructions.  Continue medications as scheduled, with a small amount of water on the day of the procedure unless indicated. (NO Diabetic Medications or Blood Thinners)  Pt instructed not to consume Alcohol, Tobacco, Caffeine, or Carbonated beverages 12 hours prior to procedure.  Patient instructed to take 324 mg of Aspirin the night before and morning of procedure: Yes  Other medication: instructed to only take hold diuretics  a.m. of the procedure.             Patient states understanding of procedure and agrees to proceed.    *PATIENTS RECORDS AVAILABLE IN [facility] UNLESS OTHERWISE INDICATED*      Patient Active Problem List   Diagnosis     S/P right colectomy     Primary osteoarthritis involving multiple joints     Patellar bursitis, right     Osteopenia     Nutritional deficiency     Malignant neoplasm of colon (H)     Essential hypertension     GERD (gastroesophageal reflux disease)     Dry eyes     Coronary artery calcification seen on CAT scan     Constipation     Colitis     Chronic pain of right knee     Chronic pain     Breast cancer of upper-outer quadrant of right female breast (H)     Allergy     Age-related physical debility     Abdominal pain in female patient      Bilateral hearing loss, unspecified hearing loss type     NSTEMI (non-ST elevated myocardial infarction) (H)     Abnormal gait     Acquired clawfoot, left foot     Acquired hallux valgus     Degeneration of lumbosacral intervertebral disc     Exudative age-related macular degeneration (H)     Hearing loss     History of fall     History of malignant neoplasm of colon     History of total right knee replacement     Legal blindness, as defined in [place]     Localized edema     Long term (current) use of aspirin     Long term (current) use of opiate analgesic     Mixed hyperlipidemia     Obesity due to excess calories     Osteoporosis     Personal history of malignant neoplasm of breast     Urinary urgency     Urge incontinence of urine     Tear film insufficiency     Primary osteoarthritis     History of arthrodesis       Current Outpatient Medications   Medication Sig Dispense Refill     aspirin (ASA) 81 MG EC tablet Take 1 tablet (81 mg) by mouth daily Start tomorrow. (Patient taking differently: Take 81 mg by mouth every evening) 30 tablet 3     atorvastatin (LIPITOR) 40 MG tablet TAKE 1 TABLET BY MOUTH ONCE DAILY 90 tablet 3     BRILINTA 90 MG tablet TAKE 2 TABLETS BY MOUTH ONCE DAILY. 60 tablet 3     calcium carbonate-vitamin D (OSCAL W/D) 500-200 MG-UNIT tablet Take 1 tablet by mouth daily       carvedilol (COREG) 6.25 MG tablet TAKE 2 TABLETS BY MOUTH ONCE DAILY 60 tablet 0     furosemide (LASIX) 20 MG tablet Take 1 tablet (20 mg) by mouth 2 times daily 180 tablet 3     losartan (COZAAR) 25 MG tablet Take 1 tablet (25 mg) by mouth daily (Patient taking differently: Take 50 mg by mouth daily)       magnesium oxide (MAG-OX) 400 (241.3 Mg) MG tablet Take 400 mg by mouth daily       mirabegron (MYRBETRIQ) 25 MG 24 hr tablet 1 tab(s)       Multiple Vitamins-Minerals (PRESERVISION AREDS) CAPS Take 1 capsule by mouth 2 times daily        nitroGLYcerin (NITROSTAT) 0.4 MG sublingual tablet For  chest pain place 1 tablet under the tongue every 5 minutes for 3 doses. If symptoms persist 5 minutes after 1st dose call 911.       olopatadine (PATANOL) 0.1 % ophthalmic solution Place 1 drop into both eyes 2 times daily       Omega-3 Fatty Acids (FISH OIL OMEGA-3 PO) Take 1 capsule by mouth daily       prednisoLONE acetate (PRED FORTE) 1 % ophthalmic suspension Place 1 drop into both eyes 2 times daily        sennosides (SENOKOT) 8.6 MG tablet Take 1 tablet by mouth 2 times daily       spironolactone (ALDACTONE) 25 MG tablet Take 1 tablet by mouth one time daily.       traMADol (ULTRAM) 50 MG tablet Take 1 tablet (50 mg) by mouth 3 times daily 90 tablet 0     VITAMIN D, CHOLECALCIFEROL, PO Take 4,000 Units by mouth daily          Allergies   Allergen Reactions     Blood-Group Specific Substance Other (See Comments)     Anti-D and Anti-C present. A delay in compatible RBC's may occur.     Celecoxib Unknown     Avoids due to sulfa allergy     Lisinopril Cough     Other reaction(s): cough     Oxybutynin Other (See Comments)     Other reaction(s): intolerable dry eye and mouth     Sulfa Drugs      Tetanus Toxoid Blisters     Tetanus Toxoids      Other reaction(s): Unknown     Tolterodine Other (See Comments)     Other reaction(s): intolerable dry eye and mouth     Piroxicam Rash     Other reaction(s): stomach upset       Stephanie Rider RN on 6/3/2022 at 12:05 PM        Pearl, her cousin, will be her . She comes from senior living and will be staying overnight- this was pre-established with cath lab staff. She is very [place]. -[facility]

## 2022-06-04 LAB — SARS-COV-2 RNA RESP QL NAA+PROBE: NEGATIVE

## 2022-06-07 ENCOUNTER — HOSPITAL ENCOUNTER (OUTPATIENT)
Facility: HOSPITAL | Age: 87
Discharge: NURSING, REHAB OR LONG-TERM CARE | End: 2022-06-08
Attending: INTERNAL MEDICINE | Admitting: FAMILY MEDICINE
Payer: COMMERCIAL

## 2022-06-07 DIAGNOSIS — G89.4 CHRONIC PAIN SYNDROME: ICD-10-CM

## 2022-06-07 DIAGNOSIS — I25.10 CORONARY ARTERY DISEASE DUE TO CALCIFIED CORONARY LESION: ICD-10-CM

## 2022-06-07 DIAGNOSIS — R06.09 DOE (DYSPNEA ON EXERTION): ICD-10-CM

## 2022-06-07 DIAGNOSIS — I25.84 CORONARY ARTERY DISEASE DUE TO CALCIFIED CORONARY LESION: ICD-10-CM

## 2022-06-07 LAB
ABO/RH(D): ABNORMAL
ACT BLD: 305 SECONDS (ref 74–150)
ACT BLD: 326 SECONDS (ref 74–150)
ANION GAP SERPL CALCULATED.3IONS-SCNC: 8 MMOL/L (ref 5–18)
ANTIBODY ID: NORMAL
ANTIBODY SCREEN: POSITIVE
ATRIAL RATE - MUSE: 69 BPM
ATRIAL RATE - MUSE: 77 BPM
BLD PROD TYP BPU: NORMAL
BLD PROD TYP BPU: NORMAL
BLOOD COMPONENT TYPE: NORMAL
BLOOD COMPONENT TYPE: NORMAL
BUN SERPL-MCNC: 19 MG/DL (ref 8–28)
CALCIUM SERPL-MCNC: 8.3 MG/DL (ref 8.5–10.5)
CHLORIDE BLD-SCNC: 104 MMOL/L (ref 98–107)
CHOLEST SERPL-MCNC: 109 MG/DL
CO2 SERPL-SCNC: 24 MMOL/L (ref 22–31)
CODING SYSTEM: NORMAL
CODING SYSTEM: NORMAL
CREAT SERPL-MCNC: 0.79 MG/DL (ref 0.6–1.1)
CROSSMATCH: NORMAL
CROSSMATCH: NORMAL
DIASTOLIC BLOOD PRESSURE - MUSE: NORMAL MMHG
DIASTOLIC BLOOD PRESSURE - MUSE: NORMAL MMHG
ERYTHROCYTE [DISTWIDTH] IN BLOOD BY AUTOMATED COUNT: 13.9 % (ref 10–15)
FASTING STATUS PATIENT QL REPORTED: YES
GFR SERPL CREATININE-BSD FRML MDRD: 72 ML/MIN/1.73M2
GLUCOSE BLD-MCNC: 101 MG/DL (ref 70–125)
HCT VFR BLD AUTO: 34.3 % (ref 35–47)
HDLC SERPL-MCNC: 44 MG/DL
HGB BLD-MCNC: 11.3 G/DL (ref 11.7–15.7)
INTERPRETATION ECG - MUSE: NORMAL
INTERPRETATION ECG - MUSE: NORMAL
LDLC SERPL CALC-MCNC: 46 MG/DL
MCH RBC QN AUTO: 31.3 PG (ref 26.5–33)
MCHC RBC AUTO-ENTMCNC: 32.9 G/DL (ref 31.5–36.5)
MCV RBC AUTO: 95 FL (ref 78–100)
P AXIS - MUSE: 10 DEGREES
P AXIS - MUSE: 6 DEGREES
PLATELET # BLD AUTO: 212 10E3/UL (ref 150–450)
POTASSIUM BLD-SCNC: 4 MMOL/L (ref 3.5–5)
PR INTERVAL - MUSE: 142 MS
PR INTERVAL - MUSE: 154 MS
QRS DURATION - MUSE: 82 MS
QRS DURATION - MUSE: 86 MS
QT - MUSE: 396 MS
QT - MUSE: 416 MS
QTC - MUSE: 445 MS
QTC - MUSE: 448 MS
R AXIS - MUSE: 1 DEGREES
R AXIS - MUSE: 8 DEGREES
RBC # BLD AUTO: 3.61 10E6/UL (ref 3.8–5.2)
SODIUM SERPL-SCNC: 136 MMOL/L (ref 136–145)
SPECIMEN EXPIRATION DATE: ABNORMAL
SPECIMEN EXPIRATION DATE: NORMAL
SYSTOLIC BLOOD PRESSURE - MUSE: NORMAL MMHG
SYSTOLIC BLOOD PRESSURE - MUSE: NORMAL MMHG
T AXIS - MUSE: 13 DEGREES
T AXIS - MUSE: 18 DEGREES
TRIGL SERPL-MCNC: 93 MG/DL
UNIT ABO/RH: NORMAL
UNIT ABO/RH: NORMAL
UNIT NUMBER: NORMAL
UNIT NUMBER: NORMAL
UNIT STATUS: NORMAL
UNIT STATUS: NORMAL
UNIT TYPE ISBT: 2800
UNIT TYPE ISBT: 2800
VENTRICULAR RATE- MUSE: 69 BPM
VENTRICULAR RATE- MUSE: 77 BPM
WBC # BLD AUTO: 7.6 10E3/UL (ref 4–11)

## 2022-06-07 PROCEDURE — 93571 IV DOP VEL&/PRESS C FLO 1ST: CPT | Performed by: INTERNAL MEDICINE

## 2022-06-07 PROCEDURE — 85027 COMPLETE CBC AUTOMATED: CPT | Performed by: INTERNAL MEDICINE

## 2022-06-07 PROCEDURE — 86902 BLOOD TYPE ANTIGEN DONOR EA: CPT | Performed by: INTERNAL MEDICINE

## 2022-06-07 PROCEDURE — C9601 PERC DRUG-EL COR STENT BRAN: HCPCS | Mod: RC | Performed by: INTERNAL MEDICINE

## 2022-06-07 PROCEDURE — 92929 PR PRQ TRLUML CORONARY BM STENT W/ANGIO ADDL ART/BRNCH: CPT | Mod: RC | Performed by: INTERNAL MEDICINE

## 2022-06-07 PROCEDURE — 92928 PRQ TCAT PLMT NTRAC ST 1 LES: CPT | Mod: RC | Performed by: INTERNAL MEDICINE

## 2022-06-07 PROCEDURE — 250N000011 HC RX IP 250 OP 636: Performed by: INTERNAL MEDICINE

## 2022-06-07 PROCEDURE — 86870 RBC ANTIBODY IDENTIFICATION: CPT | Performed by: INTERNAL MEDICINE

## 2022-06-07 PROCEDURE — 99152 MOD SED SAME PHYS/QHP 5/>YRS: CPT | Performed by: INTERNAL MEDICINE

## 2022-06-07 PROCEDURE — 93010 ELECTROCARDIOGRAM REPORT: CPT | Performed by: INTERNAL MEDICINE

## 2022-06-07 PROCEDURE — 80061 LIPID PANEL: CPT | Performed by: INTERNAL MEDICINE

## 2022-06-07 PROCEDURE — 93571 IV DOP VEL&/PRESS C FLO 1ST: CPT | Mod: 26 | Performed by: INTERNAL MEDICINE

## 2022-06-07 PROCEDURE — C1887 CATHETER, GUIDING: HCPCS | Performed by: INTERNAL MEDICINE

## 2022-06-07 PROCEDURE — 93454 CORONARY ARTERY ANGIO S&I: CPT | Mod: XU | Performed by: INTERNAL MEDICINE

## 2022-06-07 PROCEDURE — 99214 OFFICE O/P EST MOD 30 MIN: CPT | Performed by: INTERNAL MEDICINE

## 2022-06-07 PROCEDURE — 86922 COMPATIBILITY TEST ANTIGLOB: CPT | Performed by: INTERNAL MEDICINE

## 2022-06-07 PROCEDURE — C1894 INTRO/SHEATH, NON-LASER: HCPCS | Performed by: INTERNAL MEDICINE

## 2022-06-07 PROCEDURE — C1725 CATH, TRANSLUMIN NON-LASER: HCPCS | Performed by: INTERNAL MEDICINE

## 2022-06-07 PROCEDURE — 99153 MOD SED SAME PHYS/QHP EA: CPT | Performed by: INTERNAL MEDICINE

## 2022-06-07 PROCEDURE — 258N000003 HC RX IP 258 OP 636: Performed by: INTERNAL MEDICINE

## 2022-06-07 PROCEDURE — 86901 BLOOD TYPING SEROLOGIC RH(D): CPT | Performed by: INTERNAL MEDICINE

## 2022-06-07 PROCEDURE — C1769 GUIDE WIRE: HCPCS | Performed by: INTERNAL MEDICINE

## 2022-06-07 PROCEDURE — 85347 COAGULATION TIME ACTIVATED: CPT | Mod: 91

## 2022-06-07 PROCEDURE — 80048 BASIC METABOLIC PNL TOTAL CA: CPT | Performed by: INTERNAL MEDICINE

## 2022-06-07 PROCEDURE — C9600 PERC DRUG-EL COR STENT SING: HCPCS | Performed by: INTERNAL MEDICINE

## 2022-06-07 PROCEDURE — 86850 RBC ANTIBODY SCREEN: CPT | Performed by: INTERNAL MEDICINE

## 2022-06-07 PROCEDURE — 999N000054 HC STATISTIC EKG NON-CHARGEABLE

## 2022-06-07 PROCEDURE — 250N000009 HC RX 250: Performed by: INTERNAL MEDICINE

## 2022-06-07 PROCEDURE — 272N000001 HC OR GENERAL SUPPLY STERILE: Performed by: INTERNAL MEDICINE

## 2022-06-07 PROCEDURE — 250N000013 HC RX MED GY IP 250 OP 250 PS 637: Performed by: INTERNAL MEDICINE

## 2022-06-07 PROCEDURE — C1874 STENT, COATED/COV W/DEL SYS: HCPCS | Performed by: INTERNAL MEDICINE

## 2022-06-07 PROCEDURE — 36415 COLL VENOUS BLD VENIPUNCTURE: CPT | Performed by: INTERNAL MEDICINE

## 2022-06-07 PROCEDURE — 93005 ELECTROCARDIOGRAM TRACING: CPT

## 2022-06-07 DEVICE — STENT CORONARY DES SYNERGY XD MR US 3.00X28MM H7493941828300: Type: IMPLANTABLE DEVICE | Site: CORONARY | Status: FUNCTIONAL

## 2022-06-07 DEVICE — STENT RESOLUTE ONYX DE 2.7FR 2.00X12MM RONYX DE20012UX: Type: IMPLANTABLE DEVICE | Site: CORONARY | Status: FUNCTIONAL

## 2022-06-07 RX ORDER — FENTANYL CITRATE 50 UG/ML
25 INJECTION, SOLUTION INTRAMUSCULAR; INTRAVENOUS
Status: DISCONTINUED | OUTPATIENT
Start: 2022-06-07 | End: 2022-06-07

## 2022-06-07 RX ORDER — DOCUSATE SODIUM 100 MG/1
100 CAPSULE, LIQUID FILLED ORAL 2 TIMES DAILY
Status: DISCONTINUED | OUTPATIENT
Start: 2022-06-07 | End: 2022-06-08 | Stop reason: HOSPADM

## 2022-06-07 RX ORDER — MAGNESIUM OXIDE 400 MG/1
400 TABLET ORAL DAILY
Status: DISCONTINUED | OUTPATIENT
Start: 2022-06-07 | End: 2022-06-08 | Stop reason: HOSPADM

## 2022-06-07 RX ORDER — NITROGLYCERIN 0.4 MG/1
0.4 TABLET SUBLINGUAL EVERY 5 MIN PRN
Status: DISCONTINUED | OUTPATIENT
Start: 2022-06-07 | End: 2022-06-07

## 2022-06-07 RX ORDER — FENTANYL CITRATE 50 UG/ML
25 INJECTION, SOLUTION INTRAMUSCULAR; INTRAVENOUS
Status: DISCONTINUED | OUTPATIENT
Start: 2022-06-07 | End: 2022-06-07 | Stop reason: HOSPADM

## 2022-06-07 RX ORDER — PREDNISOLONE ACETATE 10 MG/ML
1 SUSPENSION/ DROPS OPHTHALMIC 2 TIMES DAILY
Status: DISCONTINUED | OUTPATIENT
Start: 2022-06-07 | End: 2022-06-08 | Stop reason: HOSPADM

## 2022-06-07 RX ORDER — ESCITALOPRAM OXALATE 5 MG/1
5 TABLET ORAL DAILY
Status: DISCONTINUED | OUTPATIENT
Start: 2022-06-07 | End: 2022-06-08 | Stop reason: HOSPADM

## 2022-06-07 RX ORDER — LIDOCAINE 40 MG/G
CREAM TOPICAL
Status: DISCONTINUED | OUTPATIENT
Start: 2022-06-07 | End: 2022-06-07 | Stop reason: HOSPADM

## 2022-06-07 RX ORDER — NITROGLYCERIN 0.4 MG/1
TABLET SUBLINGUAL
Status: DISCONTINUED | OUTPATIENT
Start: 2022-06-07 | End: 2022-06-07 | Stop reason: HOSPADM

## 2022-06-07 RX ORDER — ESCITALOPRAM OXALATE 5 MG/1
5 TABLET ORAL DAILY
COMMUNITY

## 2022-06-07 RX ORDER — HEPARIN SODIUM 1000 [USP'U]/ML
INJECTION, SOLUTION INTRAVENOUS; SUBCUTANEOUS
Status: DISCONTINUED | OUTPATIENT
Start: 2022-06-07 | End: 2022-06-07 | Stop reason: HOSPADM

## 2022-06-07 RX ORDER — VIT C/E/ZN/COPPR/LUTEIN/ZEAXAN 60 MG-6 MG
1 CAPSULE ORAL 2 TIMES DAILY
Status: DISCONTINUED | OUTPATIENT
Start: 2022-06-07 | End: 2022-06-08 | Stop reason: HOSPADM

## 2022-06-07 RX ORDER — LOSARTAN POTASSIUM 25 MG/1
25 TABLET ORAL DAILY
Status: DISCONTINUED | OUTPATIENT
Start: 2022-06-07 | End: 2022-06-08 | Stop reason: HOSPADM

## 2022-06-07 RX ORDER — ATORVASTATIN CALCIUM 40 MG/1
40 TABLET, FILM COATED ORAL DAILY
Status: DISCONTINUED | OUTPATIENT
Start: 2022-06-07 | End: 2022-06-08 | Stop reason: HOSPADM

## 2022-06-07 RX ORDER — NITROGLYCERIN 0.4 MG/1
TABLET SUBLINGUAL
Qty: 25 TABLET | Refills: 3 | Status: SHIPPED | OUTPATIENT
Start: 2022-06-07

## 2022-06-07 RX ORDER — ASPIRIN 81 MG/1
81 TABLET ORAL EVERY EVENING
Status: DISCONTINUED | OUTPATIENT
Start: 2022-06-07 | End: 2022-06-07

## 2022-06-07 RX ORDER — OXYCODONE HYDROCHLORIDE 5 MG/1
10 TABLET ORAL EVERY 4 HOURS PRN
Status: DISCONTINUED | OUTPATIENT
Start: 2022-06-07 | End: 2022-06-08 | Stop reason: HOSPADM

## 2022-06-07 RX ORDER — ASPIRIN 325 MG
325 TABLET ORAL ONCE
Status: COMPLETED | OUTPATIENT
Start: 2022-06-07 | End: 2022-06-07

## 2022-06-07 RX ORDER — NITROGLYCERIN 0.4 MG/1
0.4 TABLET SUBLINGUAL EVERY 5 MIN PRN
Status: DISCONTINUED | OUTPATIENT
Start: 2022-06-07 | End: 2022-06-08 | Stop reason: HOSPADM

## 2022-06-07 RX ORDER — FENTANYL CITRATE 50 UG/ML
INJECTION, SOLUTION INTRAMUSCULAR; INTRAVENOUS
Status: DISCONTINUED | OUTPATIENT
Start: 2022-06-07 | End: 2022-06-07 | Stop reason: HOSPADM

## 2022-06-07 RX ORDER — ASPIRIN 81 MG/1
243 TABLET, CHEWABLE ORAL ONCE
Status: COMPLETED | OUTPATIENT
Start: 2022-06-07 | End: 2022-06-07

## 2022-06-07 RX ORDER — ASPIRIN 81 MG/1
81 TABLET ORAL DAILY
Status: DISCONTINUED | OUTPATIENT
Start: 2022-06-08 | End: 2022-06-08 | Stop reason: HOSPADM

## 2022-06-07 RX ORDER — OLOPATADINE HYDROCHLORIDE 1 MG/ML
1 SOLUTION/ DROPS OPHTHALMIC 2 TIMES DAILY
Status: DISCONTINUED | OUTPATIENT
Start: 2022-06-07 | End: 2022-06-08 | Stop reason: HOSPADM

## 2022-06-07 RX ORDER — ACETAMINOPHEN 325 MG/1
650 TABLET ORAL EVERY 4 HOURS PRN
Status: DISCONTINUED | OUTPATIENT
Start: 2022-06-07 | End: 2022-06-08 | Stop reason: HOSPADM

## 2022-06-07 RX ORDER — TRAMADOL HYDROCHLORIDE 50 MG/1
50 TABLET ORAL 3 TIMES DAILY
Status: DISCONTINUED | OUTPATIENT
Start: 2022-06-07 | End: 2022-06-07

## 2022-06-07 RX ORDER — FLUMAZENIL 0.1 MG/ML
0.2 INJECTION, SOLUTION INTRAVENOUS
Status: ACTIVE | OUTPATIENT
Start: 2022-06-07 | End: 2022-06-07

## 2022-06-07 RX ORDER — ASPIRIN 81 MG/1
81 TABLET, CHEWABLE ORAL ONCE
Status: DISCONTINUED | OUTPATIENT
Start: 2022-06-07 | End: 2022-06-07

## 2022-06-07 RX ORDER — FUROSEMIDE 20 MG
20 TABLET ORAL 2 TIMES DAILY
Status: DISCONTINUED | OUTPATIENT
Start: 2022-06-07 | End: 2022-06-08 | Stop reason: HOSPADM

## 2022-06-07 RX ORDER — SODIUM CHLORIDE 9 MG/ML
INJECTION, SOLUTION INTRAVENOUS CONTINUOUS
Status: DISCONTINUED | OUTPATIENT
Start: 2022-06-07 | End: 2022-06-07 | Stop reason: HOSPADM

## 2022-06-07 RX ORDER — NALOXONE HYDROCHLORIDE 0.4 MG/ML
0.4 INJECTION, SOLUTION INTRAMUSCULAR; INTRAVENOUS; SUBCUTANEOUS
Status: ACTIVE | OUTPATIENT
Start: 2022-06-07 | End: 2022-06-07

## 2022-06-07 RX ORDER — NALOXONE HYDROCHLORIDE 0.4 MG/ML
0.2 INJECTION, SOLUTION INTRAMUSCULAR; INTRAVENOUS; SUBCUTANEOUS
Status: ACTIVE | OUTPATIENT
Start: 2022-06-07 | End: 2022-06-07

## 2022-06-07 RX ORDER — OXYCODONE HYDROCHLORIDE 5 MG/1
5 TABLET ORAL EVERY 4 HOURS PRN
Status: DISCONTINUED | OUTPATIENT
Start: 2022-06-07 | End: 2022-06-08 | Stop reason: HOSPADM

## 2022-06-07 RX ORDER — TRAMADOL HYDROCHLORIDE 50 MG/1
50 TABLET ORAL 3 TIMES DAILY
Status: DISCONTINUED | OUTPATIENT
Start: 2022-06-07 | End: 2022-06-08 | Stop reason: HOSPADM

## 2022-06-07 RX ORDER — DIAZEPAM 5 MG
5 TABLET ORAL
Status: DISCONTINUED | OUTPATIENT
Start: 2022-06-07 | End: 2022-06-07 | Stop reason: HOSPADM

## 2022-06-07 RX ORDER — ATROPINE SULFATE 0.1 MG/ML
0.5 INJECTION INTRAVENOUS
Status: ACTIVE | OUTPATIENT
Start: 2022-06-07 | End: 2022-06-07

## 2022-06-07 RX ORDER — ASPIRIN 81 MG/1
81 TABLET, CHEWABLE ORAL DAILY
Qty: 30 TABLET | Refills: 3 | Status: SHIPPED | OUTPATIENT
Start: 2022-06-07

## 2022-06-07 RX ORDER — DOCUSATE SODIUM 100 MG/1
100 CAPSULE, LIQUID FILLED ORAL 2 TIMES DAILY PRN
COMMUNITY

## 2022-06-07 RX ORDER — HYDRALAZINE HYDROCHLORIDE 20 MG/ML
10 INJECTION INTRAMUSCULAR; INTRAVENOUS EVERY 4 HOURS PRN
Status: DISCONTINUED | OUTPATIENT
Start: 2022-06-07 | End: 2022-06-08 | Stop reason: HOSPADM

## 2022-06-07 RX ORDER — SODIUM CHLORIDE 9 MG/ML
INJECTION, SOLUTION INTRAVENOUS CONTINUOUS
Status: DISCONTINUED | OUTPATIENT
Start: 2022-06-07 | End: 2022-06-07

## 2022-06-07 RX ORDER — CARVEDILOL 3.12 MG/1
6.25 TABLET ORAL 2 TIMES DAILY WITH MEALS
Status: DISCONTINUED | OUTPATIENT
Start: 2022-06-07 | End: 2022-06-08 | Stop reason: HOSPADM

## 2022-06-07 RX ORDER — ONDANSETRON 2 MG/ML
4 INJECTION INTRAMUSCULAR; INTRAVENOUS EVERY 6 HOURS PRN
Status: DISCONTINUED | OUTPATIENT
Start: 2022-06-07 | End: 2022-06-08 | Stop reason: HOSPADM

## 2022-06-07 RX ORDER — METOPROLOL TARTRATE 1 MG/ML
5 INJECTION, SOLUTION INTRAVENOUS
Status: DISCONTINUED | OUTPATIENT
Start: 2022-06-07 | End: 2022-06-08 | Stop reason: HOSPADM

## 2022-06-07 RX ORDER — ONDANSETRON 4 MG/1
4 TABLET, ORALLY DISINTEGRATING ORAL EVERY 6 HOURS PRN
Status: DISCONTINUED | OUTPATIENT
Start: 2022-06-07 | End: 2022-06-08 | Stop reason: HOSPADM

## 2022-06-07 RX ADMIN — OLOPATADINE HYDROCHLORIDE 1 DROP: 1 SOLUTION OPHTHALMIC at 20:50

## 2022-06-07 RX ADMIN — TICAGRELOR 90 MG: 90 TABLET ORAL at 22:01

## 2022-06-07 RX ADMIN — ESCITALOPRAM 5 MG: 5 TABLET, FILM COATED ORAL at 19:28

## 2022-06-07 RX ADMIN — SODIUM CHLORIDE: 9 INJECTION, SOLUTION INTRAVENOUS at 08:34

## 2022-06-07 RX ADMIN — ACETAMINOPHEN 650 MG: 325 TABLET ORAL at 22:01

## 2022-06-07 RX ADMIN — ACETAMINOPHEN 650 MG: 325 TABLET ORAL at 16:18

## 2022-06-07 RX ADMIN — CARVEDILOL 6.25 MG: 3.12 TABLET, FILM COATED ORAL at 22:10

## 2022-06-07 RX ADMIN — Medication 1 CAPSULE: at 20:48

## 2022-06-07 RX ADMIN — TRAMADOL HYDROCHLORIDE 50 MG: 50 TABLET, COATED ORAL at 19:25

## 2022-06-07 RX ADMIN — ATORVASTATIN CALCIUM 40 MG: 40 TABLET, FILM COATED ORAL at 19:27

## 2022-06-07 RX ADMIN — LOSARTAN POTASSIUM 25 MG: 25 TABLET, FILM COATED ORAL at 19:25

## 2022-06-07 RX ADMIN — FUROSEMIDE 20 MG: 20 TABLET ORAL at 20:48

## 2022-06-07 RX ADMIN — ASPIRIN 325 MG: 325 TABLET ORAL at 08:38

## 2022-06-07 RX ADMIN — PREDNISOLONE ACETATE 1 DROP: 10 SUSPENSION/ DROPS OPHTHALMIC at 20:49

## 2022-06-07 RX ADMIN — DOCUSATE SODIUM 100 MG: 100 CAPSULE, LIQUID FILLED ORAL at 20:49

## 2022-06-07 RX ADMIN — Medication 400 MG: at 19:26

## 2022-06-07 RX ADMIN — SODIUM CHLORIDE: 9 INJECTION, SOLUTION INTRAVENOUS at 14:37

## 2022-06-07 NOTE — CONSULTS
Care Management Initial Consult      WON contacted for consult from pt bedside nurse. [facility] Pt to have procedure today and will need to be watched following procedure, as pt lives alone. SW met with pt and she would like to go to TCU at [facility] or agreeable to respite care there as well. Family bedside. WON contacted Kaur in admissions at [facility] with request.referral sent via epic.    1045: SW received call from Kaur at [facility], they are able to accept pt prior to 6 pm to the TCU. Will need orders sent for TCU. Pt bedside nurse updated.Transportation TBD    SW received call from bedside nurse, pt unable to be transported until 8 PM per surgery. WON placed call to Kaur at TCU admissions- and left message to return call regarding pt. Pt to be transported to inpatient bed this afternoon. Anticipate admission to TCU on 6/8      SIDDHARTHA Gonzalez

## 2022-06-07 NOTE — INTERVAL H&P NOTE
"I have reviewed the surgical (or preoperative) H&P that is linked to this encounter, and examined the patient. There are no significant changes    Clinical Conditions Present on Arrival:  Clinically Significant Risk Factors Present on Admission                  # Platelet Defect: home medication list includes an antiplatelet medication  # Overweight: Estimated body mass index is 28.52 kg/m  as calculated from the following:    Height as of this encounter: 1.6 m (5' 3\").    Weight as of this encounter: 73 kg (161 lb).       "

## 2022-06-07 NOTE — PLAN OF CARE
Problem: Bleeding (Cardiac Catheterization)  Goal: Absence of Bleeding  Outcome: Ongoing, Progressing   Goal Outcome Evaluation:        Patient has a hematoma, slight bruising right radial and manual pressure held starting at 3:30 pm. Cath lab called and cath lab nurse coming to see the patient. /74. NSR 70's. Patient said not much numbness and tingling in right arm. Cath lab nurse here. Texted Dr. Bryant.

## 2022-06-07 NOTE — PROGRESS NOTES
Progress Note  Patient admitted by cardiology services for elective PCI.  Hospital medicine consulted to manage chronic right knee pain from OA.      Assessment/Plan  CAD admitted for staged PCI of RCA.  Status post angiogram.  Please see formal cath report.  Cardiology managing primarily.  Medications per cardiology    Ischemic cardiomyopathy.  EF 33% last December.  Currently compensated.  Resume home meds per cardiology.  Routine labs in a.m.    Right knee pain, chronic.  Post prior knee replacement in the past.  Requesting resuming her home tramadol.  Hold tramadol resume, avoid other narcotics per request from family members.  Can use as needed Tylenol in addition for pain control    Hypertension stable continue home meds    DVT PPX per cardiology    Barriers to discharge post angiogram evaluation    Anticipated Discharge date 1 or 2 days, hobs admission        Subjective  Patient seen post angiogram.  No chest pain or breathing difficulty currently.  Has significant right knee pain which is longstanding, requesting tramadol.  No recent trauma no fevers.  Family at bedside.    Objective  Vital signs in last 24 hours  Temp:  [97.9  F (36.6  C)-98.3  F (36.8  C)] 97.9  F (36.6  C)  Pulse:  [59-79] 62  Resp:  [14-20] 20  BP: (110-162)/(58-81) 154/71  SpO2:  [97 %-100 %] 97 %    Input and Output in 24 hrs     Intake/Output Summary (Last 24 hours) at 6/7/2022 1832  Last data filed at 6/7/2022 1808  Gross per 24 hour   Intake 50 ml   Output 550 ml   Net -500 ml       GEN: Alert and oriented. Not in acute distress  HEENT: Atraumatic    Pupils- round and reactive to light bilaterally   Neck- supple, no JVP elevation, no lymphadenopathy or thyromegaly   Sclera- anicteric   Mucous membrane- moist and pink  CHEST: Clear to auscultation bilaterally  HEART: S1S2 regular. No murmurs, rubs or gallops  ABDOMEN: Soft. Non-tender, non-distended. No organomegaly. No guarding or rigidity. Bowel sounds- active  Extremities: No  pedal oedema, healed surgical scar right knee noted no swelling or erythema  CNS: No focal neurological deficit. No involuntary movements  SKIN: No skin rash, no cyanosis or clubbing      Pertinent Labs     Recent Results (from the past 24 hour(s))   Basic metabolic panel    Collection Time: 06/07/22  7:57 AM   Result Value Ref Range    Sodium 136 136 - 145 mmol/L    Potassium 4.0 3.5 - 5.0 mmol/L    Chloride 104 98 - 107 mmol/L    Carbon Dioxide (CO2) 24 22 - 31 mmol/L    Anion Gap 8 5 - 18 mmol/L    Urea Nitrogen 19 8 - 28 mg/dL    Creatinine 0.79 0.60 - 1.10 mg/dL    Calcium 8.3 (L) 8.5 - 10.5 mg/dL    Glucose 101 70 - 125 mg/dL    GFR Estimate 72 >60 mL/min/1.73m2   CBC with platelets    Collection Time: 06/07/22  7:57 AM   Result Value Ref Range    WBC Count 7.6 4.0 - 11.0 10e3/uL    RBC Count 3.61 (L) 3.80 - 5.20 10e6/uL    Hemoglobin 11.3 (L) 11.7 - 15.7 g/dL    Hematocrit 34.3 (L) 35.0 - 47.0 %    MCV 95 78 - 100 fL    MCH 31.3 26.5 - 33.0 pg    MCHC 32.9 31.5 - 36.5 g/dL    RDW 13.9 10.0 - 15.0 %    Platelet Count 212 150 - 450 10e3/uL   Lipid panel reflex to direct LDL    Collection Time: 06/07/22  7:57 AM   Result Value Ref Range    Cholesterol 109 <=199 mg/dL    Triglycerides 93 <=149 mg/dL    Direct Measure HDL 44 (L) >=50 mg/dL    LDL Cholesterol Calculated 46 <=129 mg/dL    Patient Fasting > 8hrs? Yes    Adult Type and Screen    Collection Time: 06/07/22  7:57 AM   Result Value Ref Range    ABO/RH(D) AB NEG     Antibody Screen Positive (A) Negative    SPECIMEN EXPIRATION DATE [id number]    Antibody identification    Collection Time: 06/07/22  7:57 AM   Result Value Ref Range    Antibody Identification 3hr for more blood  Anti-C  Anti-D     SPECIMEN EXPIRATION DATE [id number]    ECG 12-Lead with MUSE - [facility]    Collection Time: 06/07/22  7:57 AM   Result Value Ref Range    Systolic Blood Pressure  mmHg    Diastolic Blood Pressure  mmHg    Ventricular Rate 77 BPM    Atrial Rate 77 BPM     NH Interval 142 ms    QRS Duration 86 ms     ms    QTc 448 ms    P Axis 10 degrees    R AXIS 8 degrees    T Axis 18 degrees    Interpretation ECG       Sinus rhythm with occasional Premature ventricular complexes  Nonspecific T-wave changes  When compared with ECG of 11-MAY-2022 16:39,  Premature ventricular complexes are now Present  Premature atrial complexes are no longer Present  Confirmed by THIERRY ASHTON MD LOC:WW ([id number]) on 6/7/2022 9:29:08 AM     Prepare red blood cells (unit)    Collection Time: 06/07/22 11:40 AM   Result Value Ref Range    CROSSMATCH COMPATIBLE     UNIT ABO/RH AB Neg     Unit Number [id number]     Unit Status Ready     Blood Component Type Red Blood Cells     Product Code [id number]     CODING SYSTEM [id number]     UNIT TYPE ISBT 2800    Prepare red blood cells (unit)    Collection Time: 06/07/22 11:40 AM   Result Value Ref Range    CROSSMATCH COMPATIBLE     UNIT ABO/RH AB Neg     Unit Number [id number]     Unit Status Ready     Blood Component Type Red Blood Cells     Product Code [id number]     CODING SYSTEM [id number]     UNIT TYPE ISBT 2800    Activated clotting time celite, POCT    Collection Time: 06/07/22  1:36 PM   Result Value Ref Range    Activated Clotting Time (Celite) POCT 326 (H) 74 - 150 seconds   Activated clotting time celite, POCT    Collection Time: 06/07/22  1:44 PM   Result Value Ref Range    Activated Clotting Time (Celite) POCT 305 (H) 74 - 150 seconds   ECG 12-lead, with Muse    Collection Time: 06/07/22  2:47 PM   Result Value Ref Range    Systolic Blood Pressure  mmHg    Diastolic Blood Pressure  mmHg    Ventricular Rate 69 BPM    Atrial Rate 69 BPM    NH Interval 154 ms    QRS Duration 82 ms     ms    QTc 445 ms    P Axis 6 degrees    R AXIS 1 degrees    T Axis 13 degrees    Interpretation ECG       Sinus rhythm  Normal ECG  When compared with ECG of 07-JUN-2022 07:57,  Premature ventricular complexes are no longer Present  Confirmed by JOSE ANN  LES LOC:SINTIA ([id number]) on 6/7/2022 4:14:30 PM         EKG Results reviewed       Advanced Care Planning      Bryan Mitchell MD MGinaD

## 2022-06-07 NOTE — PLAN OF CARE
Problem: Arrhythmia/Dysrhythmia (Cardiac Catheterization)  Goal: Stable Heart Rate and Rhythm  Outcome: Ongoing, Progressing   Goal Outcome Evaluation:        Patient arrived on cart from cath lab with 2 RN's. Right TR band C/D/I. Fingers cool yet no c/o numbness or tingling. Pulses present. Frequent VS checks and TR band checks. Patient NSR. Does have chronic right shoulder and knee aches. Cousin bedside and supportive. TR band to come off 1555 and cath lab nurse said 17 ml in cuff since had a ooze in cath lab. No bleeding. Meal ordered since patient hungry. Patient calm and sleepy. Did not know she was at [facility] understands she had an angiogram. Knows her birth date. Reoriented to place. Monitor post angio checks. Call light in reach.

## 2022-06-07 NOTE — Clinical Note
Balloon prepped per 's instructions.  
Balloon removed intact.  
Catheter removed over wire.  
Catheter removed over wire.  
FFR measurements completed.  
Family has been updated.  
Guide removed intact.  
Hemodynamic equipment used: 5 lead ECG, OhaiK With 3 Leads, Machine BP Cuff and pulse oximeter probe.
Lab results reviewed and abnormals discussed with physician.  
Max pressure = 12 arnoldo. Total duration = 30 seconds. 
Patient education provided.   
Pre-calculated contrast dose 365 ml
Prepped: groin. Prepped with: ChloraPrep. The patient was draped. .Pre-procedure site marking:Insertion site not predetermined
Procedure is scheduled in [facility] IR/CV 2.  
Procedure scheduled as Elective.  
RCA Cine(s)  injected and visualized utilizing power injector system.
RCA Cine(s)  injected and visualized utilizing power injector system.
Removed intact  
Right arm board applied. 
Sheath inserted in the right radial artery. 
Stent deployed in the middle right coronary artery. Max pressure = 14 arnoldo. Total duration = 30 seconds. 
Stent inserted over the wire.   
Stent inserted over the wire.   
Stent prepped per 's instructions.  
Stent prepped per 's instructions.  
The DP pulses are 1+ bilaterally. The PT pulses are 1+ bilaterally. 
The ECG shows a sinus tachycardia. ECG rate  = 68 bpm. 
The first balloon was inserted into the right coronary artery and middle right coronary artery.Max pressure = 16 arnoldo. Total duration = 20 seconds.      
Total contrast given (ml) 132  
Wire Prepped.  
soft/nontender...

## 2022-06-07 NOTE — PLAN OF CARE
Goal Outcome Evaluation:      Pt admitted to [facility] for staged PCI.  Pt's family concerned about her going home alone. They state they are unable to stay with her in her apartment and they feel she would have a hard a hard time at their house.   called looking into TCU at [facility].     Pt c/o right knee pain. Pt took tramadol at home. Ice bag applied to right knee.    Pt prepped and ready for procedure.    Yusra Riggs RN

## 2022-06-07 NOTE — PRE-PROCEDURE
GENERAL PRE-PROCEDURE:   Procedure:  Staged RCA PCI, left heart cathterization  Date/Time:  6/7/2022 8:18 AM    Written consent obtained?: Yes    Risks and benefits: Risks, benefits and alternatives were discussed    Consent given by:  Patient  Patient states understanding of procedure being performed: Yes    Patient's understanding of procedure matches consent: Yes    Procedure consent matches procedure scheduled: Yes    Expected level of sedation:  Moderate  Appropriately NPO:  Yes  ASA Class:  3 (Dyspnea on exertion, CAD; s/p pLAD PCI, HFrEF, anemia, hx of Breast CA, HTN, aortic stenosis)  Mallampati  :  Grade 3- soft palate visible, posterior pharyngeal wall not visible (Poor dentition)  Lungs:  Lungs clear with good breath sounds bilaterally  History & Physical reviewed:  History and physical reviewed and no updates needed  Statement of review:  I have reviewed the lab findings, diagnostic data, medications, and the plan for sedation

## 2022-06-07 NOTE — PHARMACY-ADMISSION MEDICATION HISTORY
"Pharmacy Note - Admission Medication History    Pertinent Provider Information: none     ______________________________________________________________________    Prior To Admission (PTA) med list completed and updated in EMR.       PTA Med List   Medication Sig Last Dose     aspirin (ASA) 81 MG chewable tablet Take 1 tablet (81 mg) by mouth daily Starting tomorrow.      aspirin (ASA) 81 MG EC tablet Take 1 tablet (81 mg) by mouth daily Start tomorrow. (Patient taking differently: Take 81 mg by mouth every evening) 6/6/2022 at Unknown time     atorvastatin (LIPITOR) 40 MG tablet TAKE 1 TABLET BY MOUTH ONCE DAILY 6/6/2022 at Unknown time     BRILINTA 90 MG tablet TAKE 2 TABLETS BY MOUTH ONCE DAILY. 6/6/2022 at Unknown time     carvedilol (COREG) 6.25 MG tablet TAKE 2 TABLETS BY MOUTH ONCE DAILY 6/6/2022 at Unknown time     docusate sodium (COLACE) 100 MG capsule Take 100 mg by mouth 2 times daily 6/6/2022 at Unknown time     escitalopram (LEXAPRO) 5 MG tablet Take 5 mg by mouth daily 6/6/2022 at Unknown time     furosemide (LASIX) 20 MG tablet Take 1 tablet (20 mg) by mouth 2 times daily 6/6/2022 at Unknown time     losartan (COZAAR) 25 MG tablet Take 1 tablet (25 mg) by mouth daily 6/6/2022 at Unknown time     magnesium oxide (MAG-OX) 400 (241.3 Mg) MG tablet Take 400 mg by mouth daily 6/6/2022 at Unknown time     Multiple Vitamins-Minerals (PRESERVISION AREDS) CAPS Take 1 capsule by mouth 2 times daily  6/6/2022 at Unknown time     nitroGLYcerin (NITROSTAT) 0.4 MG sublingual tablet One tablet under the tongue every 5 minutes if needed for chest pain. May repeat every 5 minutes for a maximum of 3 doses in 15 minutes\"      nitroGLYcerin (NITROSTAT) 0.4 MG sublingual tablet For chest pain place 1 tablet under the tongue every 5 minutes for 3 doses. If symptoms persist 5 minutes after 1st dose call 911. Unknown at Unknown time     olopatadine (PATANOL) 0.1 % ophthalmic solution Place 1 drop into both eyes 2 times daily " 6/6/2022 at Unknown time     Omega-3 Fatty Acids (FISH OIL OMEGA-3 PO) Take 1 capsule by mouth daily 6/6/2022 at Unknown time     prednisoLONE acetate (PRED FORTE) 1 % ophthalmic suspension Place 1 drop into both eyes 2 times daily  6/6/2022 at Unknown time     [START ON 6/8/2022] ticagrelor (BRILINTA) 90 MG tablet Take 1 tablet (90 mg) by mouth 2 times daily Start tomorrow morning.      traMADol (ULTRAM) 50 MG tablet Take 1 tablet (50 mg) by mouth 3 times daily 6/7/2022 at Unknown time     VITAMIN D, CHOLECALCIFEROL, PO Take 4,000 Units by mouth daily  6/6/2022 at Unknown time       Information source(s): Patient, Family member, Clinic records, Hospital records and [facility]  Method of interview communication: in-person    Summary of Changes to PTA Med List  New: none  Discontinued: none  Changed: none    Patient was asked about OTC/herbal products specifically.  PTA med list reflects this.    In the past week, patient estimated taking medication this percent of the time:  greater than 90%.    Allergies were reviewed, assessed, and updated with the patient.      Patient did not bring any medications to the hospital and can't retrieve from home. No multi-dose medications are available for use during hospital stay.     The information provided in this note is only as accurate as the sources available at the time of the update(s).    Thank you for the opportunity to participate in the care of this patient.    Art Kohli RPH  6/7/2022 5:03 PM

## 2022-06-08 VITALS
SYSTOLIC BLOOD PRESSURE: 120 MMHG | HEART RATE: 79 BPM | HEIGHT: 63 IN | DIASTOLIC BLOOD PRESSURE: 59 MMHG | OXYGEN SATURATION: 97 % | RESPIRATION RATE: 20 BRPM | BODY MASS INDEX: 28.01 KG/M2 | TEMPERATURE: 97.9 F | WEIGHT: 158.1 LBS

## 2022-06-08 PROBLEM — I25.10 CORONARY ARTERY DISEASE DUE TO CALCIFIED CORONARY LESION: Status: ACTIVE | Noted: 2022-06-08

## 2022-06-08 PROBLEM — I25.84 CORONARY ARTERY DISEASE DUE TO CALCIFIED CORONARY LESION: Status: ACTIVE | Noted: 2022-06-08

## 2022-06-08 LAB
ANION GAP SERPL CALCULATED.3IONS-SCNC: 10 MMOL/L (ref 5–18)
BASOPHILS # BLD AUTO: 0.1 10E3/UL (ref 0–0.2)
BASOPHILS NFR BLD AUTO: 1 %
BUN SERPL-MCNC: 18 MG/DL (ref 8–28)
CALCIUM SERPL-MCNC: 8.2 MG/DL (ref 8.5–10.5)
CHLORIDE BLD-SCNC: 105 MMOL/L (ref 98–107)
CO2 SERPL-SCNC: 21 MMOL/L (ref 22–31)
CREAT SERPL-MCNC: 0.74 MG/DL (ref 0.6–1.1)
EOSINOPHIL # BLD AUTO: 0.2 10E3/UL (ref 0–0.7)
EOSINOPHIL NFR BLD AUTO: 2 %
ERYTHROCYTE [DISTWIDTH] IN BLOOD BY AUTOMATED COUNT: 14 % (ref 10–15)
GFR SERPL CREATININE-BSD FRML MDRD: 77 ML/MIN/1.73M2
GLUCOSE BLD-MCNC: 102 MG/DL (ref 70–125)
HCT VFR BLD AUTO: 31.7 % (ref 35–47)
HGB BLD-MCNC: 10.3 G/DL (ref 11.7–15.7)
IMM GRANULOCYTES # BLD: 0 10E3/UL
IMM GRANULOCYTES NFR BLD: 0 %
LYMPHOCYTES # BLD AUTO: 2.1 10E3/UL (ref 0.8–5.3)
LYMPHOCYTES NFR BLD AUTO: 27 %
MCH RBC QN AUTO: 31 PG (ref 26.5–33)
MCHC RBC AUTO-ENTMCNC: 32.5 G/DL (ref 31.5–36.5)
MCV RBC AUTO: 96 FL (ref 78–100)
MONOCYTES # BLD AUTO: 1.1 10E3/UL (ref 0–1.3)
MONOCYTES NFR BLD AUTO: 15 %
NEUTROPHILS # BLD AUTO: 4.3 10E3/UL (ref 1.6–8.3)
NEUTROPHILS NFR BLD AUTO: 55 %
NRBC # BLD AUTO: 0 10E3/UL
NRBC BLD AUTO-RTO: 0 /100
PLATELET # BLD AUTO: 185 10E3/UL (ref 150–450)
POTASSIUM BLD-SCNC: 4.1 MMOL/L (ref 3.5–5)
RBC # BLD AUTO: 3.32 10E6/UL (ref 3.8–5.2)
SODIUM SERPL-SCNC: 136 MMOL/L (ref 136–145)
WBC # BLD AUTO: 7.8 10E3/UL (ref 4–11)

## 2022-06-08 PROCEDURE — 99213 OFFICE O/P EST LOW 20 MIN: CPT | Performed by: FAMILY MEDICINE

## 2022-06-08 PROCEDURE — 36415 COLL VENOUS BLD VENIPUNCTURE: CPT | Performed by: INTERNAL MEDICINE

## 2022-06-08 PROCEDURE — 85025 COMPLETE CBC W/AUTO DIFF WBC: CPT | Performed by: INTERNAL MEDICINE

## 2022-06-08 PROCEDURE — 250N000009 HC RX 250: Performed by: INTERNAL MEDICINE

## 2022-06-08 PROCEDURE — 250N000013 HC RX MED GY IP 250 OP 250 PS 637: Performed by: INTERNAL MEDICINE

## 2022-06-08 PROCEDURE — 80048 BASIC METABOLIC PNL TOTAL CA: CPT | Performed by: INTERNAL MEDICINE

## 2022-06-08 PROCEDURE — 99213 OFFICE O/P EST LOW 20 MIN: CPT | Performed by: NURSE PRACTITIONER

## 2022-06-08 PROCEDURE — 93005 ELECTROCARDIOGRAM TRACING: CPT | Performed by: INTERNAL MEDICINE

## 2022-06-08 RX ORDER — TRAMADOL HYDROCHLORIDE 50 MG/1
50 TABLET ORAL 3 TIMES DAILY
Qty: 10 TABLET | Refills: 0 | Status: SHIPPED | OUTPATIENT
Start: 2022-06-08 | End: 2022-06-11

## 2022-06-08 RX ADMIN — TICAGRELOR 90 MG: 90 TABLET ORAL at 08:11

## 2022-06-08 RX ADMIN — FUROSEMIDE 20 MG: 20 TABLET ORAL at 08:11

## 2022-06-08 RX ADMIN — ATORVASTATIN CALCIUM 40 MG: 40 TABLET, FILM COATED ORAL at 08:11

## 2022-06-08 RX ADMIN — LOSARTAN POTASSIUM 25 MG: 25 TABLET, FILM COATED ORAL at 08:11

## 2022-06-08 RX ADMIN — ESCITALOPRAM 5 MG: 5 TABLET, FILM COATED ORAL at 08:59

## 2022-06-08 RX ADMIN — OLOPATADINE HYDROCHLORIDE 1 DROP: 1 SOLUTION OPHTHALMIC at 08:13

## 2022-06-08 RX ADMIN — TRAMADOL HYDROCHLORIDE 50 MG: 50 TABLET, COATED ORAL at 08:12

## 2022-06-08 RX ADMIN — Medication 1 CAPSULE: at 08:12

## 2022-06-08 RX ADMIN — ASPIRIN 81 MG: 81 TABLET, COATED ORAL at 08:12

## 2022-06-08 RX ADMIN — Medication 400 MG: at 08:11

## 2022-06-08 RX ADMIN — PREDNISOLONE ACETATE 1 DROP: 10 SUSPENSION/ DROPS OPHTHALMIC at 08:10

## 2022-06-08 RX ADMIN — DOCUSATE SODIUM 100 MG: 100 CAPSULE, LIQUID FILLED ORAL at 08:14

## 2022-06-08 RX ADMIN — CARVEDILOL 6.25 MG: 3.12 TABLET, FILM COATED ORAL at 08:10

## 2022-06-08 NOTE — PLAN OF CARE
Problem: Plan of Care - These are the overarching goals to be used throughout the patient stay.    Goal: Absence of Hospital-Acquired Illness or Injury  Intervention: Identify and Manage Fall Risk  Recent Flowsheet Documentation  Taken 6/7/2022 2332 by Michelle Mijares RN  Safety Promotion/Fall Prevention: bed alarm on     Problem: Plan of Care - These are the overarching goals to be used throughout the patient stay.    Goal: Plan of Care Review/Shift Note  Description: The Plan of Care Review/Shift note should be completed every shift.  The Outcome Evaluation is a brief statement about your assessment that the patient is improving, declining, or no change.  This information will be displayed automatically on your shift note.  Outcome: Ongoing, Progressing     Problem: Plan of Care - These are the overarching goals to be used throughout the patient stay.    Goal: Absence of Hospital-Acquired Illness or Injury  Intervention: Prevent Skin Injury  Recent Flowsheet Documentation  Taken 6/8/2022 0059 by Michelle Mijares RN  Body Position:    position changed independently    turned    right  Taken 6/7/2022 2332 by Michelle Mijares RN  Body Position:    turned    right     Problem: Arrhythmia/Dysrhythmia (Cardiac Catheterization)  Goal: Stable Heart Rate and Rhythm  Outcome: Ongoing, Progressing     Problem: Bleeding (Cardiac Catheterization)  Goal: Absence of Bleeding  Outcome: Met   Goal Outcome Evaluation:      Post angiogram, right radial access with prior bleeding, none at this time, bruised above site on forearm. Patient complains of right knee soreness at times and this writer rubbed knee and it felt better. Patient has been 1:1 supervision for safety, patient forget's to use call light, reoriented to place time and situation.purwik was in place at start of shift, now not in place, patient unable to void unless she sit's on toilet and has been up to bathroom 3x's with 4 wheel walker,transfer belt and assist  of 1 staff and voided a good amount with no problem. Rhythm=sinus rosa with occ PAC's and HR=50-60's. Lungs diminished posterior lobe and clear in upper anterior lobe's. 02 sat's % during sleep, apnea episodes for 30 second's noted and 02 sat dropped to 89, but recovered quickly to 100%. Patient slow to move when waking up from deep sleep and needs to be reoriented to where she is. Patient is very [place], but will not put hearing aids on unless she is up for the day, is able to hear when talking load into ear. Bed alarm on for safety and chair alarm when up. Will reorient to call light when more awake. Patient is able to turn self in bed.

## 2022-06-08 NOTE — PROGRESS NOTES
Care Management Discharge Note    Discharge Date: 06/08/2022       Discharge Disposition: Transitional Care    Discharge Services:  Nursing PT and OT    Discharge DME:  walker    Discharge Transportation: family or friend will provide    Private pay costs discussed: Not applicable    PAS Confirmation Code:  ([id number])  Patient/family educated on Medicare website which has current facility and service quality ratings:      Education Provided on the Discharge Plan:    Persons Notified of Discharge Plans: patient, daughter, son in law  Patient/Family in Agreement with the Plan:      Handoff Referral Completed: Yes    Additional Information:  Chart reviewed. WON spoke to  Althea in admissions at [facility] [phone]. Althea confirms patient has a  Bed available today. SW met with patient and her daughter, they are in agreement and will transport patient after lunch about 1:00pm.  RN also present.   VM left for Althea in admissions to provide update on ride time.    PAS Done.    GRETA Leung

## 2022-06-08 NOTE — PROGRESS NOTES
TR band removed at 2130.  Area is bruised and slightly swollen, had some minor issues with bleeding earlier in the evening.  Entire area is soft and non-tender.  Bruising was outlined with a skin marker at time of TR removal.      Patient needs reminders not to use right hand/arm to transfer in/out of bed or for repositioning.  Explained risk of bleeding, patient verbalizes an understanding.  Worried that she may forget in the middle of the night when she wakes up.      Arm board can be removed at this time, however, choosing to leave it in place for the next shift to assess site and remove at their discretion.     Mei Patterson, RN 6/7/2022 10:49 PM

## 2022-06-08 NOTE — PLAN OF CARE
Problem: Plan of Care - These are the overarching goals to be used throughout the patient stay.    Goal: Readiness for Transition of Care  Outcome: Ongoing, Progressing     Problem: Arrhythmia/Dysrhythmia (Cardiac Catheterization)  Goal: Stable Heart Rate and Rhythm  Outcome: Ongoing, Progressing     Problem: Bleeding (Cardiac Catheterization)  Goal: Absence of Bleeding  Outcome: Ongoing, Progressing   Goal Outcome Evaluation:          Alert. Orientation fluctuates. Forgetful, worst later of the day expressed by patient.    At sun down, patient was a bit confused with situation and disoriented to place and time. Re-oriented, patient got clearer.    She had a coronary angiogram today. She came to the unit early afternoon. Received patient around 1530. Right radial site, was bruised around puncture site and hematoma noted above TR band. Manual pressure applied for almost 30 minutes to resolve. Affected arm elevated with pillow. Cath lab informed , came saw patient and readjusted TR band. Procedural MD was updated via text. Patient bled little bit during air removal from TR band, followed protocol for succeeding actions. Reverse Barbeau test Normal.    1:1 inititated due to confusion and unable to follow restrictions for right wrist, and for safety.    Prn acetaminophen was given x 2  for chronic right knee pain and low back pain in addition to her  Scheduled Tramadol. Ice applied.    Patient had urinary retention. Bladder scanned greater than 300cc. Straight cathed x 1.    Little weak and unsteady. Assist of 1 with transfer. Falls precaution and interventions in placed.

## 2022-06-08 NOTE — PROGRESS NOTES
[facility]  [phone]    Assessment/Recommendations   ASSESSMENT:    Severe coronary artery disease- s/p staged PCI with PAT x1 to the mRCA and PAT x1 to the rPDA; POD #1, previous mLAD PCI with mild disease proximal and distal to the stent, mild LM, moderate prox and distal Cx disease; not flow limiting, mild-moderate pRCA. 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 Patient being considered for TCU at the family's request.  and Hospitalist following.    Continue current cardiac medications    Cardiology follow up is arranged on 6/23/2022 with Iqra Singh CNP at [facility] in [place]    Activity restrictions and reportable s/s were discussed with the patient who verbalizes understanding. She will need reinforcement of this teaching due to cognitive impairments.    Thank you for the opportunity to participate in the care of this patient       History of Present Illness/Subjective    HPI: Marina Neves is a 88 year old female history of coronary artery disease with prior LAD PCI, ischemic cardiomyopathy; EF 33%, HTN, HLD, HFrEF who underwent successful planned PCI to the mid RCA and RPDA, receiving PAT x2. The patient was monitored overnight on telemetry because she did not have a family member who could stay with her at home.     ECHOCARDIOGRAM 12/2021  Interpretation Summary  Left ventricular function is moderately reduced. Biplane LVEF is 33%.  Severe hypokinesis to akinesis of the mid anterior, anteroseptal,  inferoseptal, inferior segments and all distal segments consistent with LAD  ischemia/infarction.  Global right ventricular function is  normal.  No hemodynamically significant valve abnormalities.  The inferior vena cava is normal.  There is no prior study for direct comparison.    CORONARY ANGIOGRAM 6/7/2022  Conclusion      Staged PCI in a woman with an ischemic cardiomyopathy.    Diffuse coronary calcification.    Mild left main stenosis.    Patent mid LAD stent with mild disease proximal and distal to the stent.    Moderate proximal and distal LCx disease without flow limitation across both lesions.    Mild-moderate proximal RCA lesions. Severe mid RCA and right PDA stenosis, each treated with a PAT with good angiographic results.     Recommendations    General Recommendations:  - Follow up visit with Nurse Practitioner in 1-2 weeks.   - Recommended follow up with doctor Dr. Silva in 4-6 weeks.   - The patient will be observed in telemetry overnight and if stable, the patient will be allowed to return home tomorrow.   - Arterial sheath removed from radial artery with TR Band placement.   - Recommend cardiac rehabilitation.     Medications:  - Continue dual antiplatelet therapy for 12 month(s).   - Continue high dose statin therapy indefinitely.   - Risk factor management for atherosclerosis.     Coronary Findings      Diagnostic  Dominance: Right    Left Main   Ost LM to Dist LM lesion is 20% stenosed. The lesion is calcified.   Left Anterior Descending   Mid LAD-1 lesion is 30% stenosed.   Previously placed Mid LAD-2 drug eluting stent is widely patent. Previous treatment took place 6-12 months ago. No thrombosis in the previous stent. No restenosis in the previous stent.   Left Circumflex   Prox Cx to Mid Cx lesion is 60% stenosed. Pressure wire/FFR was performed on the lesion. Pre adenosine administration FFR: 1. Post adenosine administration FFR: 0.96. IV adenosine infusion GUIDEWIRE VASCULAR COMET II 185CML PRESSURE [id number] supply used.   Mid Cx to Dist Cx lesion is 40% stenosed. Pressure wire/FFR was performed on the lesion. Post  "adenosine administration FFR: 0.96. IV adenosine infusion GUIDEWIRE VASCULAR COMET II 185CML PRESSURE [id number] supply used.   Right Coronary Artery   Prox RCA-1 lesion is 30% stenosed.   Prox RCA-2 lesion is 40% stenosed.   Mid RCA lesion is 90% stenosed. Culprit lesion.   Dist RCA lesion is 30% stenosed.   Right Posterior Descending Artery   RPDA lesion is 70% stenosed.       Intervention      Mid RCA lesion   Stent   Lesion length: 20 mm. A CATH LAUNCHER 6FR JR 5.0 [id number] guide catheter was successfully placed. The GUIDEWIRE FORTE FLOPPY J TOP [id number] crossed the lesion. The pre-interventional distal flow is normal ([place] 3). A STENT CORONARY PAT SYNERGY XD MR US 3.64Q80VR [id number] drug eluting stent was successfully placed. Post-stent angioplasty was performed using a CATH BALLOON NC EMERGE 3.08J79QC [id number] supply. . The post-interventional distal flow is normal (VIRGINIA 3). The intervention was successful. No complications occurred at this lesion.   Supplies used: STENT CORONARY PAT SYNERGY XD MR US 3.24B78IB [id number]; CATH BALLOON NC EMERGE 3.83R63NE [id number]   There is a 0% residual stenosis post intervention.   RPDA lesion   Stent   Lesion length: 12 mm. A CATH LAUNCHER 6FR JR 5.0 [id number] guide catheter was successfully placed. The GUIDEWIRE FORTE FLOPPY J TOP [id number] crossed the lesion. The pre-interventional distal flow is normal (VIRGINIA 3). A STENT RESOLUTE SILVINO DE 2.7FR 2.53V64HL RONYX [id number] drug eluting stent was successfully placed. No post-stent angioplasty was performed. The post-interventional distal flow is normal (VIRGINIA 3). The intervention was successful. No complications occurred at this lesion.   Supplies used: STENT RESOLUTE SILVINO DE 2.7FR 2.56A53BK RONYX [id number]   There is a 0% residual stenosis post intervention.              Physical Examination  Review of Systems   Vitals: /59   Pulse 80   Temp 97.9  F (36.6  C) (Oral)   Resp 20   Ht 1.6 m (5' 3\")  "  Wt 71.7 kg (158 lb 1.6 oz)   SpO2 97%   BMI 28.01 kg/m    BMI= Body mass index is 28.01 kg/m .  Wt Readings from Last 3 Encounters:   06/08/22 71.7 kg (158 lb 1.6 oz)   05/11/22 73 kg (161 lb)   01/12/22 76.7 kg (169 lb)     General Appearance:   Elderly WF. [place]. No acute distress, obese body habitus   ENT/Mouth: membranes moist, no oral lesions or bleeding gums.      EYES:  no scleral icterus, normal conjunctivae   Neck: no carotid bruits or thyromegaly   Chest/Lungs:   lungs are clear to auscultation, no rales or wheezing, equal chest wall expansion    Cardiovascular:   Regular. Normal first and second heart sounds with no murmurs, rubs, or gallops; the carotid, radial and posterior tibial pulses are intact, Jugular venous pressure not elevated, chronic pedal edema   Abdomen:  no organomegaly, masses, bruits, or tenderness; bowel sounds are present   Extremities: no cyanosis or clubbing   Skin: no xanthelasma, warm. Rt radial vascular access site with soft ecchymosis present; outlined and unchanged. No hematoma. No redness, warmth, or drainage.   Neurologic: no tremors     Psychiatric: alert and oriented x3, calm        No events overnight. BP elevated but returned to normal following usual antihypertensive regimen this morning. Right radial vascular access site without complication. Soft bruising present.        Medical History  Surgical History Family History Social History   Past Medical History:   Diagnosis Date     Age-related physical debility      Anemia      Arthritis      Bladder infection 01/03/2015    dx'd- on antibiotics     Blood type AB-     with Anti-C     Breast cancer (H)      Bursitis, knee      Cancer (H)     breast      Cancer (H) 2005    left breast     Colon cancer (H)      Depression      Gout      History of transfusion      Hypertension      Hypertension      Insomnia      Macular degeneration      Osteoarthritis of multiple joints      Osteopenia      Ovarian cyst      Rosacea       Vitamin D deficiency      Past Surgical History:   Procedure Laterality Date     ABDOMEN SURGERY      colon resection for ca 2015     BACK SURGERY Bilateral     L3-L5, L5-S1 decompression lami     BACK SURGERY      posterior spinal reconstruction     BREAST SURGERY       BREAST SURGERY      left mastectomy     CATARACT EXTRACTION Bilateral      COLON SURGERY       COLONOSCOPY       COLONOSCOPY N/A 8/4/2015    Procedure: COLONOSCOPY;  Surgeon: Yoseph Ferraro MD;  Location:  GI     CV CORONARY ANGIOGRAM N/A 12/12/2021    Procedure: Coronary Angiogram;  Surgeon: Everett Castellon MD;  Location: [facility] CARDIAC CATH LAB     CV CORONARY ANGIOGRAM N/A 6/7/2022    Procedure: Coronary Angiogram;  Surgeon: Louann Silva MD;  Location: [facility] CV     CV FRACTIONAL FLOW RATIO WIRE N/A 6/7/2022    Procedure: Fractional Flow Ratio Wire;  Surgeon: Louann Silva MD;  Location: [facility] CV     CV PCI N/A 6/7/2022    Procedure: Percutaneous Coronary Intervention;  Surgeon: Louann Silva MD;  Location: [facility] CV     CV PCI STENT DRUG ELUTING N/A 12/12/2021    Procedure: Percutaneous Coronary Intervention Stent Drug Eluting;  Surgeon: Everett Castellon MD;  Location: [facility] CARDIAC CATH LAB     DILATION AND CURETTAGE      onsil     EYE SURGERY      cataract bilateral     EYE SURGERY       GASTRECTOMY       MASTECTOMY, RADICAL Left      NEUROMA SURGERY Bilateral     feet     ORTHOPEDIC SURGERY      shoulder right, knee left     ORTHOPEDIC SURGERY      lami     OTHER SURGICAL HISTORY      left knee arthroscopy     OTHER SURGICAL HISTORY      right shoulder replacement     OTHER SURGICAL HISTORY      left breast biopsyneedle core     IL LAP,FULGURATE/EXCISE LESIONS N/A 3/29/2017    Procedure: LAPAROSCOPIC BILATERAL SALPING OOPHORECTOMY PELVIC WASHINGS;  Surgeon: Eduardo Smart MD;  Location: [facility];  Service: Gynecology     IL MASTECTOMY, SIMPLE, COMPLETE Right 1/7/2015  "   Procedure: RIGHT BREAST MASTECTOMY;  Surgeon: Meliton Bazan MD;  Location: [facility];  Service: General     REPLACEMENT TOTAL KNEE Right      TONSILLECTOMY & ADENOIDECTOMY       ZZC PART REMOVAL COLON W ANASTOMOSIS N/A 6/16/2014    Procedure: COLECTOMY;  Surgeon: Meliton Bazan MD;  Location: [facility];  Service: General     Family History   Problem Relation Age of Onset     Squamous cell carcinoma Mother      Colon Cancer Sister      Breast Cancer Maternal Aunt      Bone Cancer Maternal Aunt      Colon Cancer Paternal Aunt      Breast Cancer Cousin         Social History     Socioeconomic History     Marital status:      Spouse name: Not on file     Number of children: Not on file     Years of education: Not on file     Highest education level: Not on file   Occupational History     Not on file   Tobacco Use     Smoking status: Never Smoker     Smokeless tobacco: Never Used   Substance and Sexual Activity     Alcohol use: No     Drug use: No     Sexual activity: Never   Other Topics Concern     Parent/sibling w/ CABG, MI or angioplasty before 65F 55M? Not Asked   Social History Narrative     Not on file     Social Determinants of Health     Financial Resource Strain: Not on file   Food Insecurity: Not on file   Transportation Needs: Not on file   Physical Activity: Not on file   Stress: Not on file   Social Connections: Not on file   Intimate Partner Violence: Not on file   Housing Stability: Not on file           Medications  Allergies   Current Outpatient Medications   Medication Sig Dispense Refill     aspirin (ASA) 81 MG chewable tablet Take 1 tablet (81 mg) by mouth daily Starting tomorrow. 30 tablet 3     nitroGLYcerin (NITROSTAT) 0.4 MG sublingual tablet One tablet under the tongue every 5 minutes if needed for chest pain. May repeat every 5 minutes for a maximum of 3 doses in 15 minutes\" 25 tablet 3     ticagrelor (BRILINTA) 90 MG tablet Take 1 tablet (90 mg) by mouth 2 " times daily Start tomorrow morning. 180 tablet 3       Allergies   Allergen Reactions     Blood-Group Specific Substance Other (See Comments)     Anti-D and Anti-C present. A delay in compatible RBC's may occur.     Celecoxib Unknown     Avoids due to sulfa allergy     Lisinopril Cough     Other reaction(s): cough     Oxybutynin Other (See Comments)     Other reaction(s): intolerable dry eye and mouth     Sulfa Drugs      Tetanus Toxoid Blisters     Tetanus Toxoids      Other reaction(s): Unknown     Tolterodine Other (See Comments)     Other reaction(s): intolerable dry eye and mouth     Piroxicam Rash     Other reaction(s): stomach upset          Lab Results    Chemistry/lipid CBC Cardiac Enzymes/BNP/TSH/INR   Recent Labs   Lab Test 06/07/22  0757   CHOL 109   HDL 44*   LDL 46   TRIG 93     Recent Labs   Lab Test 06/07/22  0757 05/11/22  1649 02/22/22  1522   LDL 46 47 53     Recent Labs   Lab Test 06/08/22  0443      POTASSIUM 4.1   CHLORIDE 105   CO2 21*      BUN 18   CR 0.74   GFRESTIMATED 77   MARY 8.2*     Recent Labs   Lab Test 06/08/22  0443 06/07/22  0757 05/11/22  1649   CR 0.74 0.79 1.08     No results for input(s): A1C in the last 71734 hours.       Recent Labs   Lab Test 06/08/22  0443   WBC 7.8   HGB 10.3*   HCT 31.7*   MCV 96        Recent Labs   Lab Test 06/08/22  0443 06/07/22  0757 05/11/22  1649   HGB 10.3* 11.3* 11.4*    Recent Labs   Lab Test 05/21/19  1540 05/21/19  1315   TROPONINI <0.01 <0.01     Recent Labs   Lab Test 05/11/22  1649 12/11/21  0321 05/21/19  1315   BNP 76  --  208*   NTBNPI  --  1,757  --      Recent Labs   Lab Test 02/13/18  1608   TSH 1.21     Recent Labs   Lab Test 05/21/19  1315   INR 0.94        Milly Carrasquillo, CNP

## 2022-06-08 NOTE — PLAN OF CARE
Goal Outcome Evaluation:    Plan of Care Reviewed With: patient, family     Patient discharging to TCU. Paperwork reviewed. Packet sent with family transporting patient to [facility] TCU. Patient wheeled out to vehicle.

## 2022-06-08 NOTE — PROGRESS NOTES
[facility]    Medicine Progress Note - Hospitalist Service    Date of Admission:  6/7/2022    Assessment & Plan          88-year-old female admitted for elective staged PCI of RCA.  [facility] [place] management.  Patient lives alone and [facility] assisted living but has benefited from TCU services.  Family requested TCU to be considered after PCI procedure.  She is done while at TCU in the past    Chronic right knee pain  --- Secondary to OA with previous TKA  --- Continue home tramadol  --- Allergy to piroxicam and celecoxib  --- Family requests no other narcotics to be given if possible    Coronary artery disease  --- Status post staged PCI.  Stent placed to RCA as well as stenting to PDA.  --- Previous PCI with stenting to LAD and mild disease proximal distant to the stent.  Moderate proximal and distal circumflex disease it was not flow-limiting.  --- Continue dual antiplatelet therapy with aspirin and Brilinta, beta-blocker and statin.    Ischemic cardiomyopathy  --- Most recent ejection fraction 33%.  --- Continue medication management with beta-blocker ARB, loop diuretic  --- Euvolemic.  No evidence for decompensated heart failure.           Diet: Low Saturated Fat Na <2400 mg  Room Service    De Guzman Catheter: Not present  Central Lines: None  Cardiac Monitoring: ACTIVE order. Indication: Post- PCI/Angiogram (24 hours)  Code Status:  full code    Disposition Plan   Expected Discharge: 06/09/2022     Anticipated discharge location:  Awaiting care coordination huddle  Delays: NA           The patient's care was discussed with the Care Coordinator/, Patient and Patient's Family.    Karrie Madrid MD  Hospitalist Service  [facility]  Text page via [employer] Paging/Directory         Clinically Significant Risk Factors Present on Admission                # Platelet Defect: home medication list includes an antiplatelet medication        ______________________________________________________________________    Interval History   --- Patient seen and chart reviewed today.  She is overall feeling okay.  Very hard of hearing.  Family with her and are grateful that she is able to go to [facility] as she lives alone    Data reviewed today: I reviewed all medications, new labs and imaging results over the last 24 hours..    Physical Exam   Vital Signs: Temp: 97.9  F (36.6  C) Temp src: Oral BP: (!) 174/89 Pulse: 80   Resp: 20 SpO2: 97 % O2 Device: None (Room air) Oxygen Delivery: 2 LPM  Weight: 158 lbs 1.6 oz  General Appearance: Pleasant, eating lunch, no apparent distress  Respiratory: Clear to auscultation bilaterally  Cardiovascular: Regular rate and rhythm without significant murmurs rubs or gallops  GI: Soft and nontender without hepatosplenomegaly  Skin: Bruising right forearm stable  Other: Pleasant and cooperative.  No focal neurodeficits appreciated.    Data   Recent Labs   Lab 06/08/22  0443 06/07/22  0757   WBC 7.8 7.6   HGB 10.3* 11.3*   MCV 96 95    212    136   POTASSIUM 4.1 4.0   CHLORIDE 105 104   CO2 21* 24   BUN 18 19   CR 0.74 0.79   ANIONGAP 10 8   MARY 8.2* 8.3*    101     No results found for this or any previous visit (from the past 24 hour(s)).

## 2022-06-12 ENCOUNTER — LAB REQUISITION (OUTPATIENT)
Dept: LAB | Facility: CLINIC | Age: 87
End: 2022-06-12
Payer: COMMERCIAL

## 2022-06-12 DIAGNOSIS — R79.89 OTHER SPECIFIED ABNORMAL FINDINGS OF BLOOD CHEMISTRY: ICD-10-CM

## 2022-06-13 ENCOUNTER — TELEPHONE (OUTPATIENT)
Dept: GERIATRICS | Facility: CLINIC | Age: 87
End: 2022-06-13
Payer: COMMERCIAL

## 2022-06-13 DIAGNOSIS — M25.561 CHRONIC PAIN OF RIGHT KNEE: Primary | ICD-10-CM

## 2022-06-13 DIAGNOSIS — G89.29 CHRONIC PAIN OF RIGHT KNEE: Primary | ICD-10-CM

## 2022-06-13 RX ORDER — TRAMADOL HYDROCHLORIDE 50 MG/1
50 TABLET ORAL 3 TIMES DAILY
COMMUNITY
End: 2022-06-13

## 2022-06-13 RX ORDER — TRAMADOL HYDROCHLORIDE 50 MG/1
50 TABLET ORAL 3 TIMES DAILY
Qty: 30 TABLET | Refills: 0 | Status: SHIPPED | OUTPATIENT
Start: 2022-06-13 | End: 2022-06-23

## 2022-06-13 NOTE — ANESTHESIA PREPROCEDURE EVALUATION
Anesthesia Evaluation      Patient summary reviewed     Airway   Mallampati: II   Pulmonary - negative ROS and normal exam                          Cardiovascular - normal exam  (+) hypertension, ,     ECG reviewed        Neuro/Psych - negative ROS     Endo/Other - negative ROS      GI/Hepatic/Renal    (+) GERD well controlled,        Other findings: Hb 12.0, K 4.4      Dental    (+) poor dentition                       Anesthesia Plan  Planned anesthetic: general endotracheal    ASA 2   Induction: intravenous       Post-op plan: routine recovery          
No
No

## 2022-06-13 NOTE — TELEPHONE ENCOUNTER
[facility] Geriatrics Triage Nurse Telephone Encounter    Provider: MARY Arreola  Facility: [facility]  Facility Type:  TCU    Caller: Grace  Call Back Number: [phone]    Allergies:    Allergies   Allergen Reactions     Blood-Group Specific Substance Other (See Comments)     Anti-D and Anti-C present. A delay in compatible RBC's may occur.     Celecoxib Unknown     Avoids due to sulfa allergy     Lisinopril Cough     Other reaction(s): cough     Oxybutynin Other (See Comments)     Other reaction(s): intolerable dry eye and mouth     Sulfa Drugs      Tetanus Toxoid Blisters     Tetanus Toxoids      Other reaction(s): Unknown     Tolterodine Other (See Comments)     Other reaction(s): intolerable dry eye and mouth     Piroxicam Rash     Other reaction(s): stomach upset        Reason for call: Pt admitted with an Rx for tramadol 50mg tid x3 days. The pt has had an order for tramadol 50mg tid prior to her going to the ER and wondering if they can continue the medications.     Verbal Order/Direction given by Provider: Re-instate tramadol 50mg tid    Provider giving Order:  MARY Arreola    Verbal Order given to: Grace Claire RN

## 2022-06-14 LAB
ANION GAP SERPL CALCULATED.3IONS-SCNC: 11 MMOL/L (ref 5–18)
BUN SERPL-MCNC: 14 MG/DL (ref 8–28)
CALCIUM SERPL-MCNC: 8.2 MG/DL (ref 8.5–10.5)
CHLORIDE BLD-SCNC: 102 MMOL/L (ref 98–107)
CO2 SERPL-SCNC: 26 MMOL/L (ref 22–31)
CREAT SERPL-MCNC: 0.75 MG/DL (ref 0.6–1.1)
GFR SERPL CREATININE-BSD FRML MDRD: 76 ML/MIN/1.73M2
GLUCOSE BLD-MCNC: 106 MG/DL (ref 70–125)
POTASSIUM BLD-SCNC: 3.5 MMOL/L (ref 3.5–5)
SODIUM SERPL-SCNC: 139 MMOL/L (ref 136–145)

## 2022-06-14 PROCEDURE — 82310 ASSAY OF CALCIUM: CPT | Performed by: INTERNAL MEDICINE

## 2022-06-14 PROCEDURE — P9604 ONE-WAY ALLOW PRORATED TRIP: HCPCS | Performed by: INTERNAL MEDICINE

## 2022-06-14 PROCEDURE — 36415 COLL VENOUS BLD VENIPUNCTURE: CPT | Performed by: INTERNAL MEDICINE

## 2022-06-15 ENCOUNTER — TRANSITIONAL CARE UNIT VISIT (OUTPATIENT)
Dept: GERIATRICS | Facility: CLINIC | Age: 87
End: 2022-06-15
Payer: COMMERCIAL

## 2022-06-15 VITALS
WEIGHT: 158 LBS | TEMPERATURE: 99.3 F | RESPIRATION RATE: 18 BRPM | HEART RATE: 69 BPM | OXYGEN SATURATION: 94 % | SYSTOLIC BLOOD PRESSURE: 103 MMHG | BODY MASS INDEX: 27.99 KG/M2 | DIASTOLIC BLOOD PRESSURE: 67 MMHG

## 2022-06-15 DIAGNOSIS — R06.09 DOE (DYSPNEA ON EXERTION): Primary | ICD-10-CM

## 2022-06-15 DIAGNOSIS — R09.81 NASAL CONGESTION: ICD-10-CM

## 2022-06-15 DIAGNOSIS — M15.0 PRIMARY OSTEOARTHRITIS INVOLVING MULTIPLE JOINTS: ICD-10-CM

## 2022-06-15 DIAGNOSIS — I10 ESSENTIAL HYPERTENSION: ICD-10-CM

## 2022-06-15 PROCEDURE — 99310 SBSQ NF CARE HIGH MDM 45: CPT | Performed by: FAMILY MEDICINE

## 2022-06-15 NOTE — LETTER
6/15/2022        RE: Marina ADAME Edinson  [address]  [place] [address]        M [facility] GERIATRIC SERVICES    Facility:   [facility] ([facility]) [[id number]]   Code Status: FULL CODE      HPI:   Marina is a 88 year old female who Was hospitalized June 2000 2021 secondary to dyspnea on exertion with a history of CAD and chronic pain.  The echo and stress test were normal.  Her recent echocardiogram results in December 2021 did show a left ventricular function LVEF of 33% also has severe hypokinesis and akinesis of the mid anterior, anterior septal, inferior septal, inferior segments in all segments consistent with LAD ischemia/infarction.  See results of her most recent laboratory studies during her visit.  Due to issues of shortness of breath and self-care deficits they want her to spend some time in the transitional care unit.  She now is currently in the transitional care unit to which I have seen her previous times before and has spent some time last December in the transitional care unit.  Currently her systolic blood pressures ranging 103-140 she has been afebrile and her weight is at 158 pounds and in comparison to June 11 also 158 pounds.  However yesterday did have a symptoms of cough and congestion as well as a sore throat and they did test her positive for COVID but she does feel like she is getting a little bit worse than better so we will give her the Paxlovid medication.  She otherwise has room independence.  Unable to do any therapy doing the testing positive for COVID.  She wants to be able to go home however she does live in the apartments next-door and I still think she needs more assistance with basic ADLs that she could get otherwise especially when her symptoms are worsening and not improving over the past 24 hours.    Past Medical History:  Past Medical History:   Diagnosis Date     Age-related physical debility      Anemia      Arthritis      Bladder infection 01/03/2015     dx'd- on antibiotics     Blood type AB-     with Anti-C     Breast cancer (H)      Bursitis, knee      Cancer (H)     breast      Cancer (H) 2005    left breast     Colon cancer (H)      Depression      Gout      History of transfusion      Hypertension      Hypertension      Insomnia      Macular degeneration      Osteoarthritis of multiple joints      Osteopenia      Ovarian cyst      Rosacea      Vitamin D deficiency            Surgical History:  Past Surgical History:   Procedure Laterality Date     ABDOMEN SURGERY      colon resection for ca 2015     BACK SURGERY Bilateral     L3-L5, L5-S1 decompression lami     BACK SURGERY      posterior spinal reconstruction     BREAST SURGERY       BREAST SURGERY      left mastectomy     CATARACT EXTRACTION Bilateral      COLON SURGERY       COLONOSCOPY       COLONOSCOPY N/A 8/4/2015    Procedure: COLONOSCOPY;  Surgeon: Yoseph Ferraro MD;  Location:  GI     CV CORONARY ANGIOGRAM N/A 12/12/2021    Procedure: Coronary Angiogram;  Surgeon: Everett Castellon MD;  Location: [facility] CARDIAC CATH LAB     CV CORONARY ANGIOGRAM N/A 6/7/2022    Procedure: Coronary Angiogram;  Surgeon: Louann Silva MD;  Location: [facility] CATH LAB CV     CV FRACTIONAL FLOW RATIO WIRE N/A 6/7/2022    Procedure: Fractional Flow Ratio Wire;  Surgeon: Louann Silva MD;  Location: [facility] LAB CV     CV PCI N/A 6/7/2022    Procedure: Percutaneous Coronary Intervention;  Surgeon: Louann Silva MD;  Location: [facility] LAB CV     CV PCI STENT DRUG ELUTING N/A 12/12/2021    Procedure: Percutaneous Coronary Intervention Stent Drug Eluting;  Surgeon: Everett Castellon MD;  Location: [facility] CARDIAC CATH LAB     DILATION AND CURETTAGE      onsil     EYE SURGERY      cataract bilateral     EYE SURGERY       GASTRECTOMY       MASTECTOMY, RADICAL Left      NEUROMA SURGERY Bilateral     feet     ORTHOPEDIC SURGERY      shoulder right, knee left     ORTHOPEDIC SURGERY      lami      OTHER SURGICAL HISTORY      left knee arthroscopy     OTHER SURGICAL HISTORY      right shoulder replacement     OTHER SURGICAL HISTORY      left breast biopsyneedle core     MN LAP,FULGURATE/EXCISE LESIONS N/A 3/29/2017    Procedure: LAPAROSCOPIC BILATERAL SALPING OOPHORECTOMY PELVIC WASHINGS;  Surgeon: Eduardo Smart MD;  Location: [facility];  Service: Gynecology     MN MASTECTOMY, SIMPLE, COMPLETE Right 1/7/2015    Procedure: RIGHT BREAST MASTECTOMY;  Surgeon: Meliton Bazan MD;  Location: [facility];  Service: General     REPLACEMENT TOTAL KNEE Right      TONSILLECTOMY & ADENOIDECTOMY       ZZC PART REMOVAL COLON W ANASTOMOSIS N/A 6/16/2014    Procedure: COLECTOMY;  Surgeon: Meliton Bazan MD;  Location: [facility];  Service: General       Family History:   Family History   Problem Relation Age of Onset     Squamous cell carcinoma Mother      Colon Cancer Sister      Breast Cancer Maternal Aunt      Bone Cancer Maternal Aunt      Colon Cancer Paternal Aunt      Breast Cancer Cousin        Social History:    Social History     Socioeconomic History     Marital status:    Tobacco Use     Smoking status: Never Smoker     Smokeless tobacco: Never Used   Substance and Sexual Activity     Alcohol use: No     Drug use: No     Sexual activity: Never        REVIEW OF SYSTEM:  She currently does have symptoms of nasal congestion cough sore throat and chronic dyspnea on exertion.  However she states her appetite to be normal.  Denies any headache stiff neck swollen glands and does have a history of chronic pain and osteoarthritis of her major joints.  Hard of hearing.  Macular degeneration.    PHYSICAL EXAM:   Pleasant female in no acute distress.  She is hard of hearing as well as does have a nasal congestion as well as sore throat and does wear hearing aids.  Her head is normocephalic.  Lung sounds are clear throughout.  Cardiovascular S1-S2 regular rate and rhythm and chronic  "lower extremity edema.  Musculoskeletal history of chronic pain including knee osteoarthritis does use a walker.  Gastrointestinal positive bowel sounds and nontender.  Psychiatric: Pleasant affect.    Vitals:    06/15/22 0101   BP: 103/67   Pulse: 69   Resp: 18   Temp: 99.3  F (37.4  C)   SpO2: 94%   Weight: 71.7 kg (158 lb)         Medication List:  Current Outpatient Medications   Medication Sig     aspirin (ASA) 81 MG chewable tablet Take 1 tablet (81 mg) by mouth daily Starting tomorrow.     atorvastatin (LIPITOR) 40 MG tablet TAKE 1 TABLET BY MOUTH ONCE DAILY (Patient taking differently: No sig reported)     carvedilol (COREG) 6.25 MG tablet TAKE 2 TABLETS BY MOUTH ONCE DAILY (Patient taking differently: No sig reported)     docusate sodium (COLACE) 100 MG capsule Take 100 mg by mouth 2 times daily     escitalopram (LEXAPRO) 5 MG tablet Take 5 mg by mouth daily     furosemide (LASIX) 20 MG tablet Take 1 tablet (20 mg) by mouth 2 times daily (Patient taking differently: Take 20 mg by mouth 2 times daily)     losartan (COZAAR) 25 MG tablet Take 1 tablet (25 mg) by mouth daily (Patient taking differently: Take 25 mg by mouth daily)     magnesium oxide (MAG-OX) 400 (241.3 Mg) MG tablet Take 400 mg by mouth daily     Multiple Vitamins-Minerals (PRESERVISION AREDS) CAPS Take 1 capsule by mouth 2 times daily      nitroGLYcerin (NITROSTAT) 0.4 MG sublingual tablet One tablet under the tongue every 5 minutes if needed for chest pain. May repeat every 5 minutes for a maximum of 3 doses in 15 minutes\"     olopatadine (PATANOL) 0.1 % ophthalmic solution Place 1 drop into both eyes 2 times daily     Omega-3 Fatty Acids (FISH OIL OMEGA-3 PO) Take 1 capsule by mouth daily     prednisoLONE acetate (PRED FORTE) 1 % ophthalmic suspension Place 1 drop into both eyes 2 times daily     ticagrelor (BRILINTA) 90 MG tablet Take 1 tablet (90 mg) by mouth 2 times daily Start tomorrow morning.     traMADol (ULTRAM) 50 MG tablet Take 1 " tablet (50 mg) by mouth 3 times daily     VITAMIN D, CHOLECALCIFEROL, PO Take 4,000 Units by mouth daily     No current facility-administered medications for this visit.        Labs:  CBC RESULTS: Recent Labs   Lab Test 06/08/22  0443   WBC 7.8   RBC 3.32*   HGB 10.3*   HCT 31.7*   MCV 96   MCH 31.0   MCHC 32.5   RDW 14.0        Recent Labs   Lab Test 06/07/22  0757 05/11/22  1649   CHOL 109 120   HDL 44* 49*   LDL 46 47   TRIG 93 121     Last Comprehensive Metabolic Panel:  Sodium   Date Value Ref Range Status   06/14/2022 139 136 - 145 mmol/L Final   09/15/2006 134 133 - 144 mmol/L Final     Comment:     Delta Verified     Potassium   Date Value Ref Range Status   06/14/2022 3.5 3.5 - 5.0 mmol/L Final   09/15/2006 4.1 3.4 - 5.3 mmol/L Final     Chloride   Date Value Ref Range Status   06/14/2022 102 98 - 107 mmol/L Final   09/15/2006 99 94 - 109 mmol/L Final     Carbon Dioxide   Date Value Ref Range Status   09/15/2006 28 20 - 32 mmol/L Final     Carbon Dioxide (CO2)   Date Value Ref Range Status   06/14/2022 26 22 - 31 mmol/L Final     Anion Gap   Date Value Ref Range Status   06/14/2022 11 5 - 18 mmol/L Final   09/15/2006 7 6 - 17 mmol/L Final     Glucose   Date Value Ref Range Status   06/14/2022 106 70 - 125 mg/dL Final   09/14/2006 110 60 - 110 mg/dL Final     Urea Nitrogen   Date Value Ref Range Status   06/14/2022 14 8 - 28 mg/dL Final   09/14/2006 24 7 - 30 mg/dL Final     Creatinine   Date Value Ref Range Status   06/14/2022 0.75 0.60 - 1.10 mg/dL Final   09/14/2006 1.08 0.60 - 1.30 mg/dL Final     GFR Estimate   Date Value Ref Range Status   06/14/2022 76 >60 mL/min/1.73m2 Final     Comment:     Effective December 21, 2021 eGFRcr in adults is calculated using the 2021 CKD-EPI creatinine equation which includes age and gender (Maria Del Carmen woodruff al., NEJM, DOI: 10.1056/VFAPxd5212616)   05/26/2021 >60 >60 mL/min/1.73m2 Final   09/14/2006 53 (L) >60 mL/min/1.7m2 Final     Calcium   Date Value Ref Range Status    06/14/2022 8.2 (L) 8.5 - 10.5 mg/dL Final   09/14/2006 9.2 8.5 - 10.4 mg/dL Final     Bilirubin Total   Date Value Ref Range Status   12/22/2021 0.4 0.0 - 1.0 mg/dL Final     Alkaline Phosphatase   Date Value Ref Range Status   12/22/2021 76 45 - 120 U/L Final     ALT   Date Value Ref Range Status   12/22/2021 21 0 - 45 U/L Final     AST   Date Value Ref Range Status   12/22/2021 12 0 - 40 U/L Final                 Assessment:   (R06.00) DE LOS SANTOS (dyspnea on exertion)  (primary encounter diagnosis)  Comment: Chronic  Plan: Continue to monitor and follow    (I10) Essential hypertension  Comment: Stable  Plan: Continue to monitor    (M89.49) Primary osteoarthritis involving multiple joints  Comment: Does use Tylenol and tramadol scheduled  Plan: Continue to monitor    (R09.81) Nasal congestion  Comment: With positive COVID  Plan: We will add Paxlovid      PLAN:    Adding in Paxlovid due to her increase in symptoms and worsening symptoms of nasal congestion sore throat and no other history of chronic medical conditions I do wish that she would stay in the transitional care unit for further monitoring as well as nutritional support.  She wants to be able to go home sooner than later if.  We did talk about the importance of sticking around for close evaluation because she does live in the apartments next-door and she also does admit that she does have clear on going home due to fear of failing as well as following.    For documentation purposes total visit 35 minutes which over 50% was spent with the patient going over her recent hospitalization, chronic medical conditions, current medications, current symptoms with positive COVID as well as her current treatment modalities and monitoring of her chronic medical conditions.      Electronically signed by: Bry Jurado NP          Sincerely,        Bry Jurado NP

## 2022-06-15 NOTE — PROGRESS NOTES
[facility] GERIATRIC SERVICES    Facility:   [facility] ([facility]) [[id number]]   Code Status: FULL CODE      HPI:   Marina is a 88 year old female who Was hospitalized June 2000 2021 secondary to dyspnea on exertion with a history of CAD and chronic pain.  The echo and stress test were normal.  Her recent echocardiogram results in December 2021 did show a left ventricular function LVEF of 33% also has severe hypokinesis and akinesis of the mid anterior, anterior septal, inferior septal, inferior segments in all segments consistent with LAD ischemia/infarction.  See results of her most recent laboratory studies during her visit.  Due to issues of shortness of breath and self-care deficits they want her to spend some time in the transitional care unit.  She now is currently in the transitional care unit to which I have seen her previous times before and has spent some time last December in the transitional care unit.  Currently her systolic blood pressures ranging 103-140 she has been afebrile and her weight is at 158 pounds and in comparison to June 11 also 158 pounds.  However yesterday did have a symptoms of cough and congestion as well as a sore throat and they did test her positive for COVID but she does feel like she is getting a little bit worse than better so we will give her the Paxlovid medication.  She otherwise has room independence.  Unable to do any therapy doing the testing positive for COVID.  She wants to be able to go home however she does live in the apartments next-door and I still think she needs more assistance with basic ADLs that she could get otherwise especially when her symptoms are worsening and not improving over the past 24 hours.    Past Medical History:  Past Medical History:   Diagnosis Date     Age-related physical debility      Anemia      Arthritis      Bladder infection 01/03/2015    dx'd- on antibiotics     Blood type AB-     with Anti-C     Breast cancer (H)      Bursitis,  knee      Cancer (H)     breast      Cancer (H) 2005    left breast     Colon cancer (H)      Depression      Gout      History of transfusion      Hypertension      Hypertension      Insomnia      Macular degeneration      Osteoarthritis of multiple joints      Osteopenia      Ovarian cyst      Rosacea      Vitamin D deficiency            Surgical History:  Past Surgical History:   Procedure Laterality Date     ABDOMEN SURGERY      colon resection for ca 2015     BACK SURGERY Bilateral     L3-L5, L5-S1 decompression lami     BACK SURGERY      posterior spinal reconstruction     BREAST SURGERY       BREAST SURGERY      left mastectomy     CATARACT EXTRACTION Bilateral      COLON SURGERY       COLONOSCOPY       COLONOSCOPY N/A 8/4/2015    Procedure: COLONOSCOPY;  Surgeon: Yoseph Ferraro MD;  Location:  GI     CV CORONARY ANGIOGRAM N/A 12/12/2021    Procedure: Coronary Angiogram;  Surgeon: Everett Castellon MD;  Location: [facility] CARDIAC CATH LAB     CV CORONARY ANGIOGRAM N/A 6/7/2022    Procedure: Coronary Angiogram;  Surgeon: Louann Silva MD;  Location: [facility] CV     CV FRACTIONAL FLOW RATIO WIRE N/A 6/7/2022    Procedure: Fractional Flow Ratio Wire;  Surgeon: Louann Silva MD;  Location: [facility] CV     CV PCI N/A 6/7/2022    Procedure: Percutaneous Coronary Intervention;  Surgeon: Louann Silva MD;  Location: [facility] CV     CV PCI STENT DRUG ELUTING N/A 12/12/2021    Procedure: Percutaneous Coronary Intervention Stent Drug Eluting;  Surgeon: Everett Castellon MD;  Location: [facility] CARDIAC CATH LAB     DILATION AND CURETTAGE      onsil     EYE SURGERY      cataract bilateral     EYE SURGERY       GASTRECTOMY       MASTECTOMY, RADICAL Left      NEUROMA SURGERY Bilateral     feet     ORTHOPEDIC SURGERY      shoulder right, knee left     ORTHOPEDIC SURGERY      lami     OTHER SURGICAL HISTORY      left knee arthroscopy     OTHER SURGICAL HISTORY      right  shoulder replacement     OTHER SURGICAL HISTORY      left breast biopsyneedle core     MO LAP,FULGURATE/EXCISE LESIONS N/A 3/29/2017    Procedure: LAPAROSCOPIC BILATERAL SALPING OOPHORECTOMY PELVIC WASHINGS;  Surgeon: Eduardo Smart MD;  Location: [facility];  Service: Gynecology     MO MASTECTOMY, SIMPLE, COMPLETE Right 1/7/2015    Procedure: RIGHT BREAST MASTECTOMY;  Surgeon: Meliton Bazan MD;  Location: [facility];  Service: General     REPLACEMENT TOTAL KNEE Right      TONSILLECTOMY & ADENOIDECTOMY       ZZC PART REMOVAL COLON W ANASTOMOSIS N/A 6/16/2014    Procedure: COLECTOMY;  Surgeon: Meliton Bazan MD;  Location: [facility];  Service: General       Family History:   Family History   Problem Relation Age of Onset     Squamous cell carcinoma Mother      Colon Cancer Sister      Breast Cancer Maternal Aunt      Bone Cancer Maternal Aunt      Colon Cancer Paternal Aunt      Breast Cancer Cousin        Social History:    Social History     Socioeconomic History     Marital status:    Tobacco Use     Smoking status: Never Smoker     Smokeless tobacco: Never Used   Substance and Sexual Activity     Alcohol use: No     Drug use: No     Sexual activity: Never        REVIEW OF SYSTEM:  She currently does have symptoms of nasal congestion cough sore throat and chronic dyspnea on exertion.  However she states her appetite to be normal.  Denies any headache stiff neck swollen glands and does have a history of chronic pain and osteoarthritis of her major joints.  Hard of hearing.  Macular degeneration.    PHYSICAL EXAM:   Pleasant female in no acute distress.  She is hard of hearing as well as does have a nasal congestion as well as sore throat and does wear hearing aids.  Her head is normocephalic.  Lung sounds are clear throughout.  Cardiovascular S1-S2 regular rate and rhythm and chronic lower extremity edema.  Musculoskeletal history of chronic pain including knee  "osteoarthritis does use a walker.  Gastrointestinal positive bowel sounds and nontender.  Psychiatric: Pleasant affect.    Vitals:    06/15/22 0101   BP: 103/67   Pulse: 69   Resp: 18   Temp: 99.3  F (37.4  C)   SpO2: 94%   Weight: 71.7 kg (158 lb)         Medication List:  Current Outpatient Medications   Medication Sig     aspirin (ASA) 81 MG chewable tablet Take 1 tablet (81 mg) by mouth daily Starting tomorrow.     atorvastatin (LIPITOR) 40 MG tablet TAKE 1 TABLET BY MOUTH ONCE DAILY (Patient taking differently: No sig reported)     carvedilol (COREG) 6.25 MG tablet TAKE 2 TABLETS BY MOUTH ONCE DAILY (Patient taking differently: No sig reported)     docusate sodium (COLACE) 100 MG capsule Take 100 mg by mouth 2 times daily     escitalopram (LEXAPRO) 5 MG tablet Take 5 mg by mouth daily     furosemide (LASIX) 20 MG tablet Take 1 tablet (20 mg) by mouth 2 times daily (Patient taking differently: Take 20 mg by mouth 2 times daily)     losartan (COZAAR) 25 MG tablet Take 1 tablet (25 mg) by mouth daily (Patient taking differently: Take 25 mg by mouth daily)     magnesium oxide (MAG-OX) 400 (241.3 Mg) MG tablet Take 400 mg by mouth daily     Multiple Vitamins-Minerals (PRESERVISION AREDS) CAPS Take 1 capsule by mouth 2 times daily      nitroGLYcerin (NITROSTAT) 0.4 MG sublingual tablet One tablet under the tongue every 5 minutes if needed for chest pain. May repeat every 5 minutes for a maximum of 3 doses in 15 minutes\"     olopatadine (PATANOL) 0.1 % ophthalmic solution Place 1 drop into both eyes 2 times daily     Omega-3 Fatty Acids (FISH OIL OMEGA-3 PO) Take 1 capsule by mouth daily     prednisoLONE acetate (PRED FORTE) 1 % ophthalmic suspension Place 1 drop into both eyes 2 times daily     ticagrelor (BRILINTA) 90 MG tablet Take 1 tablet (90 mg) by mouth 2 times daily Start tomorrow morning.     traMADol (ULTRAM) 50 MG tablet Take 1 tablet (50 mg) by mouth 3 times daily     VITAMIN D, CHOLECALCIFEROL, PO Take " 4,000 Units by mouth daily     No current facility-administered medications for this visit.        Labs:  CBC RESULTS: Recent Labs   Lab Test 06/08/22  0443   WBC 7.8   RBC 3.32*   HGB 10.3*   HCT 31.7*   MCV 96   MCH 31.0   MCHC 32.5   RDW 14.0        Recent Labs   Lab Test 06/07/22  0757 05/11/22  1649   CHOL 109 120   HDL 44* 49*   LDL 46 47   TRIG 93 121     Last Comprehensive Metabolic Panel:  Sodium   Date Value Ref Range Status   06/14/2022 139 136 - 145 mmol/L Final   09/15/2006 134 133 - 144 mmol/L Final     Comment:     Delta Verified     Potassium   Date Value Ref Range Status   06/14/2022 3.5 3.5 - 5.0 mmol/L Final   09/15/2006 4.1 3.4 - 5.3 mmol/L Final     Chloride   Date Value Ref Range Status   06/14/2022 102 98 - 107 mmol/L Final   09/15/2006 99 94 - 109 mmol/L Final     Carbon Dioxide   Date Value Ref Range Status   09/15/2006 28 20 - 32 mmol/L Final     Carbon Dioxide (CO2)   Date Value Ref Range Status   06/14/2022 26 22 - 31 mmol/L Final     Anion Gap   Date Value Ref Range Status   06/14/2022 11 5 - 18 mmol/L Final   09/15/2006 7 6 - 17 mmol/L Final     Glucose   Date Value Ref Range Status   06/14/2022 106 70 - 125 mg/dL Final   09/14/2006 110 60 - 110 mg/dL Final     Urea Nitrogen   Date Value Ref Range Status   06/14/2022 14 8 - 28 mg/dL Final   09/14/2006 24 7 - 30 mg/dL Final     Creatinine   Date Value Ref Range Status   06/14/2022 0.75 0.60 - 1.10 mg/dL Final   09/14/2006 1.08 0.60 - 1.30 mg/dL Final     GFR Estimate   Date Value Ref Range Status   06/14/2022 76 >60 mL/min/1.73m2 Final     Comment:     Effective December 21, 2021 eGFRcr in adults is calculated using the 2021 CKD-EPI creatinine equation which includes age and gender (Maria Del Carmen woodruff al., NEJM, DOI: 10.1056/RQREdg4186448)   05/26/2021 >60 >60 mL/min/1.73m2 Final   09/14/2006 53 (L) >60 mL/min/1.7m2 Final     Calcium   Date Value Ref Range Status   06/14/2022 8.2 (L) 8.5 - 10.5 mg/dL Final   09/14/2006 9.2 8.5 - 10.4 mg/dL  Final     Bilirubin Total   Date Value Ref Range Status   12/22/2021 0.4 0.0 - 1.0 mg/dL Final     Alkaline Phosphatase   Date Value Ref Range Status   12/22/2021 76 45 - 120 U/L Final     ALT   Date Value Ref Range Status   12/22/2021 21 0 - 45 U/L Final     AST   Date Value Ref Range Status   12/22/2021 12 0 - 40 U/L Final                 Assessment:   (R06.00) DE LOS SANTOS (dyspnea on exertion)  (primary encounter diagnosis)  Comment: Chronic  Plan: Continue to monitor and follow    (I10) Essential hypertension  Comment: Stable  Plan: Continue to monitor    (M89.49) Primary osteoarthritis involving multiple joints  Comment: Does use Tylenol and tramadol scheduled  Plan: Continue to monitor    (R09.81) Nasal congestion  Comment: With positive COVID  Plan: We will add Paxlovid      PLAN:    Adding in Paxlovid due to her increase in symptoms and worsening symptoms of nasal congestion sore throat and no other history of chronic medical conditions I do wish that she would stay in the transitional care unit for further monitoring as well as nutritional support.  She wants to be able to go home sooner than later if.  We did talk about the importance of sticking around for close evaluation because she does live in the apartments next-door and she also does admit that she does have clear on going home due to fear of failing as well as following.    For documentation purposes total visit 35 minutes which over 50% was spent with the patient going over her recent hospitalization, chronic medical conditions, current medications, current symptoms with positive COVID as well as her current treatment modalities and monitoring of her chronic medical conditions.      Electronically signed by: Bry Jurado NP

## 2022-06-20 ENCOUNTER — TRANSITIONAL CARE UNIT VISIT (OUTPATIENT)
Dept: GERIATRICS | Facility: CLINIC | Age: 87
End: 2022-06-20
Payer: COMMERCIAL

## 2022-06-20 VITALS
HEART RATE: 72 BPM | DIASTOLIC BLOOD PRESSURE: 73 MMHG | OXYGEN SATURATION: 94 % | BODY MASS INDEX: 27.99 KG/M2 | SYSTOLIC BLOOD PRESSURE: 150 MMHG | RESPIRATION RATE: 18 BRPM | TEMPERATURE: 98.8 F | WEIGHT: 158 LBS

## 2022-06-20 DIAGNOSIS — I10 ESSENTIAL HYPERTENSION: Primary | ICD-10-CM

## 2022-06-20 DIAGNOSIS — R26.9 ABNORMAL GAIT: ICD-10-CM

## 2022-06-20 DIAGNOSIS — M15.0 PRIMARY OSTEOARTHRITIS INVOLVING MULTIPLE JOINTS: ICD-10-CM

## 2022-06-20 DIAGNOSIS — R54 AGE-RELATED PHYSICAL DEBILITY: ICD-10-CM

## 2022-06-20 PROBLEM — I21.4 ACUTE NON-ST SEGMENT ELEVATION MYOCARDIAL INFARCTION (H): Status: ACTIVE | Noted: 2022-06-20

## 2022-06-20 PROBLEM — F41.9 ANXIETY: Status: ACTIVE | Noted: 2022-06-20

## 2022-06-20 PROCEDURE — 99309 SBSQ NF CARE MODERATE MDM 30: CPT | Performed by: FAMILY MEDICINE

## 2022-06-20 NOTE — LETTER
6/20/2022        RE: Marina ADAME Edinson  [address]  [place] [address]        [facility] GERIATRIC SERVICES    Facility:   [facility] ([facility]) [[id number]]   Code Status: FULL CODE      CHIEF COMPLAINT/REASON FOR VISIT:  Chief Complaint   Patient presents with     RECHECK       HISTORY:      HPI: Marina is a 88 year old female who I was asked to visit with today secondary to not only review of her chronic medical conditions but also recent tested positive for COVID and was placed on Paxlovid as well as going over her blood pressures, chronic pain and constipation.  She feels that she is constipated.  Nursing staff is not sure she does have stool softeners scheduled as well as as needed so I did asked staff to give them to her as needed if she has not had a bowel movement.  She has been afebrile and her overall COVID symptoms have decreased ever since the initiation of the Paxlovid.  She does admit to chronic generalized weakness as well as chronic pain throughout her knees.  Is able to use her four-wheel walker.  For pain she is on scheduled Tylenol and tramadol.  Her systolic blood pressures ranging 120-140 and her last weight on June 14 was 158 pounds.  Had a chance to talk about her chronic medical conditions as well as perhaps going home in the near future.  I heard rumor that she will be discharged this week back to her apartment next-door.  She is excited about that as well.    Past Medical History:   Diagnosis Date     Age-related physical debility      Anemia      Arthritis      Bladder infection 01/03/2015    dx'd- on antibiotics     Blood type AB-     with Anti-C     Breast cancer (H)      Bursitis, knee      Cancer (H)     breast      Cancer (H) 2005    left breast     Colon cancer (H)      Depression      Gout      History of transfusion      Hypertension      Hypertension      Insomnia      Macular degeneration      Osteoarthritis of multiple joints      Osteopenia      Ovarian cyst       Rosacea      Vitamin D deficiency             Family History   Problem Relation Age of Onset     Squamous cell carcinoma Mother      Colon Cancer Sister      Breast Cancer Maternal Aunt      Bone Cancer Maternal Aunt      Colon Cancer Paternal Aunt      Breast Cancer Cousin       Social History     Socioeconomic History     Marital status:    Tobacco Use     Smoking status: Never Smoker     Smokeless tobacco: Never Used   Substance and Sexual Activity     Alcohol use: No     Drug use: No     Sexual activity: Never        REVIEW OF SYSTEM:  She currently does not complain of any new symptoms of cough or cold postnasal drip sore throat wheezing chest pain dizziness vertigo nausea vomiting diarrhea dysuria frequency urgency.  She does have a history of chronic pain including osteoarthritis as well as hard of hearing and macular degeneration.    PHYSICAL EXAM:   Pleasant female in no acute distress.  She is feeling better than from her previous visit.  Neck is supple without adenopathy.  Lung sounds are clear throughout.  Cardiovascular S1-S2 regular rate and rhythm but chronic but no new changes in her lower extremity edema.  Gastrointestinal nontender.  Musculoskeletal chronic pain including knee osteoarthritis is able to use her four-wheel walker.  Psychiatric: Pleasant affect.       Current Outpatient Medications:      aspirin (ASA) 81 MG chewable tablet, Take 1 tablet (81 mg) by mouth daily Starting tomorrow., Disp: 30 tablet, Rfl: 3     atorvastatin (LIPITOR) 40 MG tablet, TAKE 1 TABLET BY MOUTH ONCE DAILY (Patient taking differently: No sig reported), Disp: 90 tablet, Rfl: 3     carvedilol (COREG) 6.25 MG tablet, TAKE 2 TABLETS BY MOUTH ONCE DAILY (Patient taking differently: No sig reported), Disp: 60 tablet, Rfl: 0     docusate sodium (COLACE) 100 MG capsule, Take 100 mg by mouth 2 times daily, Disp: , Rfl:      escitalopram (LEXAPRO) 5 MG tablet, Take 5 mg by mouth daily, Disp: , Rfl:       "furosemide (LASIX) 20 MG tablet, Take 1 tablet (20 mg) by mouth 2 times daily (Patient taking differently: Take 20 mg by mouth 2 times daily), Disp: 180 tablet, Rfl: 3     losartan (COZAAR) 25 MG tablet, Take 1 tablet (25 mg) by mouth daily (Patient taking differently: Take 25 mg by mouth daily), Disp: , Rfl:      magnesium oxide (MAG-OX) 400 (241.3 Mg) MG tablet, Take 400 mg by mouth daily, Disp: , Rfl:      Multiple Vitamins-Minerals (PRESERVISION AREDS) CAPS, Take 1 capsule by mouth 2 times daily , Disp: , Rfl:      nitroGLYcerin (NITROSTAT) 0.4 MG sublingual tablet, One tablet under the tongue every 5 minutes if needed for chest pain. May repeat every 5 minutes for a maximum of 3 doses in 15 minutes\", Disp: 25 tablet, Rfl: 3     olopatadine (PATANOL) 0.1 % ophthalmic solution, Place 1 drop into both eyes 2 times daily, Disp: , Rfl:      Omega-3 Fatty Acids (FISH OIL OMEGA-3 PO), Take 1 capsule by mouth daily, Disp: , Rfl:      prednisoLONE acetate (PRED FORTE) 1 % ophthalmic suspension, Place 1 drop into both eyes 2 times daily, Disp: , Rfl:      ticagrelor (BRILINTA) 90 MG tablet, Take 1 tablet (90 mg) by mouth 2 times daily Start tomorrow morning., Disp: 180 tablet, Rfl: 3     traMADol (ULTRAM) 50 MG tablet, Take 1 tablet (50 mg) by mouth 3 times daily, Disp: 30 tablet, Rfl: 0     VITAMIN D, CHOLECALCIFEROL, PO, Take 4,000 Units by mouth daily, Disp: , Rfl:     BP (!) 150/73   Pulse 72   Temp 98.8  F (37.1  C)   Resp 18   Wt 71.7 kg (158 lb)   SpO2 94%   BMI 27.99 kg/m      LABS:   Last Comprehensive Metabolic Panel:  Sodium   Date Value Ref Range Status   06/14/2022 139 136 - 145 mmol/L Final   09/15/2006 134 133 - 144 mmol/L Final     Comment:     Delta Verified     Potassium   Date Value Ref Range Status   06/14/2022 3.5 3.5 - 5.0 mmol/L Final   09/15/2006 4.1 3.4 - 5.3 mmol/L Final     Chloride   Date Value Ref Range Status   06/14/2022 102 98 - 107 mmol/L Final   09/15/2006 99 94 - 109 mmol/L Final "     Carbon Dioxide   Date Value Ref Range Status   09/15/2006 28 20 - 32 mmol/L Final     Carbon Dioxide (CO2)   Date Value Ref Range Status   06/14/2022 26 22 - 31 mmol/L Final     Anion Gap   Date Value Ref Range Status   06/14/2022 11 5 - 18 mmol/L Final   09/15/2006 7 6 - 17 mmol/L Final     Glucose   Date Value Ref Range Status   06/14/2022 106 70 - 125 mg/dL Final   09/14/2006 110 60 - 110 mg/dL Final     Urea Nitrogen   Date Value Ref Range Status   06/14/2022 14 8 - 28 mg/dL Final   09/14/2006 24 7 - 30 mg/dL Final     Creatinine   Date Value Ref Range Status   06/14/2022 0.75 0.60 - 1.10 mg/dL Final   09/14/2006 1.08 0.60 - 1.30 mg/dL Final     GFR Estimate   Date Value Ref Range Status   06/14/2022 76 >60 mL/min/1.73m2 Final     Comment:     Effective December 21, 2021 eGFRcr in adults is calculated using the 2021 CKD-EPI creatinine equation which includes age and gender (Maria Del Carmen woodruff al., NEJ, DOI: 10.1056/WTGAjt9367163)   05/26/2021 >60 >60 mL/min/1.73m2 Final   09/14/2006 53 (L) >60 mL/min/1.7m2 Final     Calcium   Date Value Ref Range Status   06/14/2022 8.2 (L) 8.5 - 10.5 mg/dL Final   09/14/2006 9.2 8.5 - 10.4 mg/dL Final           ASSESSMENT:    Encounter Diagnoses   Name Primary?     Essential hypertension Yes     Primary osteoarthritis involving multiple joints      Abnormal gait      Age-related physical debility        PLAN:    We did have a chance to talk about her gait as well as her history of chronic pain and her Tylenol and tramadol.  Also talked about her positive COVID symptoms as well as being on the Paxlovid and she does feel comfortable after our conversation.  Now that she is probably going back to her apartment coming up again later on this week she is not sure if she is able to strong enough to be able to go back home again.  Perhaps staff or  may want to inquire her concerns.  At 1 point though to it was thought that she may benefit from long-term care due to extra  assistance and help.  At any rate staff will keep me updated for any other concerns.        Electronically signed by: Bry Jurado NP          Sincerely,        Bry Jurado NP

## 2022-06-20 NOTE — PROGRESS NOTES
[facility] GERIATRIC SERVICES    Facility:   [facility] ([facility]) [[id number]]   Code Status: FULL CODE      CHIEF COMPLAINT/REASON FOR VISIT:  Chief Complaint   Patient presents with     RECHECK       HISTORY:      HPI: Marina is a 88 year old female who I was asked to visit with today secondary to not only review of her chronic medical conditions but also recent tested positive for COVID and was placed on Paxlovid as well as going over her blood pressures, chronic pain and constipation.  She feels that she is constipated.  Nursing staff is not sure she does have stool softeners scheduled as well as as needed so I did asked staff to give them to her as needed if she has not had a bowel movement.  She has been afebrile and her overall COVID symptoms have decreased ever since the initiation of the Paxlovid.  She does admit to chronic generalized weakness as well as chronic pain throughout her knees.  Is able to use her four-wheel walker.  For pain she is on scheduled Tylenol and tramadol.  Her systolic blood pressures ranging 120-140 and her last weight on June 14 was 158 pounds.  Had a chance to talk about her chronic medical conditions as well as perhaps going home in the near future.  I heard rumor that she will be discharged this week back to her apartment next-door.  She is excited about that as well.    Past Medical History:   Diagnosis Date     Age-related physical debility      Anemia      Arthritis      Bladder infection 01/03/2015    dx'd- on antibiotics     Blood type AB-     with Anti-C     Breast cancer (H)      Bursitis, knee      Cancer (H)     breast      Cancer (H) 2005    left breast     Colon cancer (H)      Depression      Gout      History of transfusion      Hypertension      Hypertension      Insomnia      Macular degeneration      Osteoarthritis of multiple joints      Osteopenia      Ovarian cyst      Rosacea      Vitamin D deficiency             Family History   Problem Relation Age of  Onset     Squamous cell carcinoma Mother      Colon Cancer Sister      Breast Cancer Maternal Aunt      Bone Cancer Maternal Aunt      Colon Cancer Paternal Aunt      Breast Cancer Cousin       Social History     Socioeconomic History     Marital status:    Tobacco Use     Smoking status: Never Smoker     Smokeless tobacco: Never Used   Substance and Sexual Activity     Alcohol use: No     Drug use: No     Sexual activity: Never        REVIEW OF SYSTEM:  She currently does not complain of any new symptoms of cough or cold postnasal drip sore throat wheezing chest pain dizziness vertigo nausea vomiting diarrhea dysuria frequency urgency.  She does have a history of chronic pain including osteoarthritis as well as hard of hearing and macular degeneration.    PHYSICAL EXAM:   Pleasant female in no acute distress.  She is feeling better than from her previous visit.  Neck is supple without adenopathy.  Lung sounds are clear throughout.  Cardiovascular S1-S2 regular rate and rhythm but chronic but no new changes in her lower extremity edema.  Gastrointestinal nontender.  Musculoskeletal chronic pain including knee osteoarthritis is able to use her four-wheel walker.  Psychiatric: Pleasant affect.       Current Outpatient Medications:      aspirin (ASA) 81 MG chewable tablet, Take 1 tablet (81 mg) by mouth daily Starting tomorrow., Disp: 30 tablet, Rfl: 3     atorvastatin (LIPITOR) 40 MG tablet, TAKE 1 TABLET BY MOUTH ONCE DAILY (Patient taking differently: No sig reported), Disp: 90 tablet, Rfl: 3     carvedilol (COREG) 6.25 MG tablet, TAKE 2 TABLETS BY MOUTH ONCE DAILY (Patient taking differently: No sig reported), Disp: 60 tablet, Rfl: 0     docusate sodium (COLACE) 100 MG capsule, Take 100 mg by mouth 2 times daily, Disp: , Rfl:      escitalopram (LEXAPRO) 5 MG tablet, Take 5 mg by mouth daily, Disp: , Rfl:      furosemide (LASIX) 20 MG tablet, Take 1 tablet (20 mg) by mouth 2 times daily (Patient taking  "differently: Take 20 mg by mouth 2 times daily), Disp: 180 tablet, Rfl: 3     losartan (COZAAR) 25 MG tablet, Take 1 tablet (25 mg) by mouth daily (Patient taking differently: Take 25 mg by mouth daily), Disp: , Rfl:      magnesium oxide (MAG-OX) 400 (241.3 Mg) MG tablet, Take 400 mg by mouth daily, Disp: , Rfl:      Multiple Vitamins-Minerals (PRESERVISION AREDS) CAPS, Take 1 capsule by mouth 2 times daily , Disp: , Rfl:      nitroGLYcerin (NITROSTAT) 0.4 MG sublingual tablet, One tablet under the tongue every 5 minutes if needed for chest pain. May repeat every 5 minutes for a maximum of 3 doses in 15 minutes\", Disp: 25 tablet, Rfl: 3     olopatadine (PATANOL) 0.1 % ophthalmic solution, Place 1 drop into both eyes 2 times daily, Disp: , Rfl:      Omega-3 Fatty Acids (FISH OIL OMEGA-3 PO), Take 1 capsule by mouth daily, Disp: , Rfl:      prednisoLONE acetate (PRED FORTE) 1 % ophthalmic suspension, Place 1 drop into both eyes 2 times daily, Disp: , Rfl:      ticagrelor (BRILINTA) 90 MG tablet, Take 1 tablet (90 mg) by mouth 2 times daily Start tomorrow morning., Disp: 180 tablet, Rfl: 3     traMADol (ULTRAM) 50 MG tablet, Take 1 tablet (50 mg) by mouth 3 times daily, Disp: 30 tablet, Rfl: 0     VITAMIN D, CHOLECALCIFEROL, PO, Take 4,000 Units by mouth daily, Disp: , Rfl:     BP (!) 150/73   Pulse 72   Temp 98.8  F (37.1  C)   Resp 18   Wt 71.7 kg (158 lb)   SpO2 94%   BMI 27.99 kg/m      LABS:   Last Comprehensive Metabolic Panel:  Sodium   Date Value Ref Range Status   06/14/2022 139 136 - 145 mmol/L Final   09/15/2006 134 133 - 144 mmol/L Final     Comment:     Delta Verified     Potassium   Date Value Ref Range Status   06/14/2022 3.5 3.5 - 5.0 mmol/L Final   09/15/2006 4.1 3.4 - 5.3 mmol/L Final     Chloride   Date Value Ref Range Status   06/14/2022 102 98 - 107 mmol/L Final   09/15/2006 99 94 - 109 mmol/L Final     Carbon Dioxide   Date Value Ref Range Status   09/15/2006 28 20 - 32 mmol/L Final "     Carbon Dioxide (CO2)   Date Value Ref Range Status   06/14/2022 26 22 - 31 mmol/L Final     Anion Gap   Date Value Ref Range Status   06/14/2022 11 5 - 18 mmol/L Final   09/15/2006 7 6 - 17 mmol/L Final     Glucose   Date Value Ref Range Status   06/14/2022 106 70 - 125 mg/dL Final   09/14/2006 110 60 - 110 mg/dL Final     Urea Nitrogen   Date Value Ref Range Status   06/14/2022 14 8 - 28 mg/dL Final   09/14/2006 24 7 - 30 mg/dL Final     Creatinine   Date Value Ref Range Status   06/14/2022 0.75 0.60 - 1.10 mg/dL Final   09/14/2006 1.08 0.60 - 1.30 mg/dL Final     GFR Estimate   Date Value Ref Range Status   06/14/2022 76 >60 mL/min/1.73m2 Final     Comment:     Effective December 21, 2021 eGFRcr in adults is calculated using the 2021 CKD-EPI creatinine equation which includes age and gender (Maria Del Carmen et al., [facility], DOI: 10.1056/HAVMik6001723)   05/26/2021 >60 >60 mL/min/1.73m2 Final   09/14/2006 53 (L) >60 mL/min/1.7m2 Final     Calcium   Date Value Ref Range Status   06/14/2022 8.2 (L) 8.5 - 10.5 mg/dL Final   09/14/2006 9.2 8.5 - 10.4 mg/dL Final           ASSESSMENT:    Encounter Diagnoses   Name Primary?     Essential hypertension Yes     Primary osteoarthritis involving multiple joints      Abnormal gait      Age-related physical debility        PLAN:    We did have a chance to talk about her gait as well as her history of chronic pain and her Tylenol and tramadol.  Also talked about her positive COVID symptoms as well as being on the Paxlovid and she does feel comfortable after our conversation.  Now that she is probably going back to her apartment coming up again later on this week she is not sure if she is able to strong enough to be able to go back home again.  Perhaps staff or  may want to inquire her concerns.  At 1 point though to it was thought that she may benefit from long-term care due to extra assistance and help.  At any rate staff will keep me updated for any other  concerns.        Electronically signed by: Bry Jurado NP

## 2022-06-21 VITALS
RESPIRATION RATE: 20 BRPM | SYSTOLIC BLOOD PRESSURE: 134 MMHG | WEIGHT: 158 LBS | TEMPERATURE: 98 F | OXYGEN SATURATION: 98 % | BODY MASS INDEX: 27.99 KG/M2 | HEART RATE: 56 BPM | DIASTOLIC BLOOD PRESSURE: 68 MMHG

## 2022-06-22 ENCOUNTER — TRANSITIONAL CARE UNIT VISIT (OUTPATIENT)
Dept: GERIATRICS | Facility: CLINIC | Age: 87
End: 2022-06-22
Payer: COMMERCIAL

## 2022-06-22 DIAGNOSIS — R26.9 ABNORMAL GAIT: ICD-10-CM

## 2022-06-22 DIAGNOSIS — K59.00 CONSTIPATION, UNSPECIFIED CONSTIPATION TYPE: ICD-10-CM

## 2022-06-22 DIAGNOSIS — I10 ESSENTIAL HYPERTENSION: Primary | ICD-10-CM

## 2022-06-22 DIAGNOSIS — M15.0 PRIMARY OSTEOARTHRITIS INVOLVING MULTIPLE JOINTS: ICD-10-CM

## 2022-06-22 PROCEDURE — 99316 NF DSCHRG MGMT 30 MIN+: CPT | Performed by: FAMILY MEDICINE

## 2022-06-22 NOTE — PROGRESS NOTES
[facility] GERIATRIC SERVICES    Facility:   [facility] ([facility]) [[id number]]   Code Status: FULL CODE      CHIEF COMPLAINT/REASON FOR VISIT:  Chief Complaint   Patient presents with     Discharge Summary Nursing Home       HISTORY:      HPI: Marina is a 88 year old female who was hospitalized secondary to chest pain who also does have a history of CAD, dyspnea on exertion with an ejection fraction December 2021 of 33% as well as a history of chronic pain.  Her recent echocardiogram in December 2021 did show ejection fraction of 33% her recent coronary angiogram in December 2021 did show a 99% lesion in the proximal-mid LAD and a 90% lesion in the mid RCA.  She was admitted June 6, 2022 for CAD stage IV PCA of RCA status post angiogram.  Her lab work did include a sodium of 136, potassium 4.0, BUN 19, creatinine 0.79, white cell count of 7.6, hemoglobin 11.3 with platelets of 212.  Her total cholesterol 109 with an LDL of 46  She has now been in the transitional care unit working with therapy to improve her strength balance and overall conditioning.  She has been able to participate with the rehabilitation services.  She does use a 4 wheeled walker.  She does have a history of chronic pain to which she does take scheduled tramadol and Tylenol.  During her stay she has been normotensive and afebrile and also on room air.  She did end up testing positive for COVID and due to sore throat and nasal congestion she was placed on a course of Paxlovid to which she tolerated the medication just fine and she is feeling better.  She does have a history of constipation currently on stool softeners.  Her last recorded weight was 158 pounds on June 13.    Past Medical History:   Diagnosis Date     Age-related physical debility      Anemia      Arthritis      Bladder infection 01/03/2015    dx'd- on antibiotics     Blood type AB-     with Anti-C     Breast cancer (H)      Bursitis, knee      Cancer (H)     breast      Cancer  (H) 2005    left breast     Colon cancer (H)      Depression      Gout      History of transfusion      Hypertension      Hypertension      Insomnia      Macular degeneration      Osteoarthritis of multiple joints      Osteopenia      Ovarian cyst      Rosacea      Vitamin D deficiency             Family History   Problem Relation Age of Onset     Squamous cell carcinoma Mother      Colon Cancer Sister      Breast Cancer Maternal Aunt      Bone Cancer Maternal Aunt      Colon Cancer Paternal Aunt      Breast Cancer Cousin       Social History     Socioeconomic History     Marital status:    Tobacco Use     Smoking status: Never Smoker     Smokeless tobacco: Never Used   Substance and Sexual Activity     Alcohol use: No     Drug use: No     Sexual activity: Never        REVIEW OF SYSTEM:  She currently does not complain of any new symptoms of cough or cold postnasal drip sore throat wheezing chest pain dizziness vertigo nausea vomiting diarrhea dysuria frequency urgency.  She does have a history of chronic pain including osteoarthritis as well as hard of hearing and macular degeneration.    PHYSICAL EXAM:   Pleasant female in no acute distress.  She is feeling better than from her previous visit.   Lung sounds are clear throughout.  Cardiovascular S1-S2 regular rate and rhythm but chronic but no new changes in her lower extremity edema.  Gastrointestinal nontender.  Musculoskeletal chronic pain including knee osteoarthritis is able to use her four-wheel walker.  Psychiatric: Pleasant affect.       Current Outpatient Medications:      aspirin (ASA) 81 MG chewable tablet, Take 1 tablet (81 mg) by mouth daily Starting tomorrow., Disp: 30 tablet, Rfl: 3     atorvastatin (LIPITOR) 40 MG tablet, TAKE 1 TABLET BY MOUTH ONCE DAILY (Patient taking differently: No sig reported), Disp: 90 tablet, Rfl: 3     carvedilol (COREG) 6.25 MG tablet, TAKE 2 TABLETS BY MOUTH ONCE DAILY (Patient taking differently: No sig  "reported), Disp: 60 tablet, Rfl: 0     docusate sodium (COLACE) 100 MG capsule, Take 100 mg by mouth 2 times daily, Disp: , Rfl:      escitalopram (LEXAPRO) 5 MG tablet, Take 5 mg by mouth daily, Disp: , Rfl:      furosemide (LASIX) 20 MG tablet, Take 1 tablet (20 mg) by mouth 2 times daily (Patient taking differently: Take 20 mg by mouth 2 times daily), Disp: 180 tablet, Rfl: 3     losartan (COZAAR) 25 MG tablet, Take 1 tablet (25 mg) by mouth daily (Patient taking differently: Take 25 mg by mouth daily), Disp: , Rfl:      magnesium oxide (MAG-OX) 400 (241.3 Mg) MG tablet, Take 400 mg by mouth daily, Disp: , Rfl:      Multiple Vitamins-Minerals (PRESERVISION AREDS) CAPS, Take 1 capsule by mouth 2 times daily , Disp: , Rfl:      nitroGLYcerin (NITROSTAT) 0.4 MG sublingual tablet, One tablet under the tongue every 5 minutes if needed for chest pain. May repeat every 5 minutes for a maximum of 3 doses in 15 minutes\", Disp: 25 tablet, Rfl: 3     olopatadine (PATANOL) 0.1 % ophthalmic solution, Place 1 drop into both eyes 2 times daily, Disp: , Rfl:      Omega-3 Fatty Acids (FISH OIL OMEGA-3 PO), Take 1 capsule by mouth daily, Disp: , Rfl:      prednisoLONE acetate (PRED FORTE) 1 % ophthalmic suspension, Place 1 drop into both eyes 2 times daily, Disp: , Rfl:      ticagrelor (BRILINTA) 90 MG tablet, Take 1 tablet (90 mg) by mouth 2 times daily Start tomorrow morning., Disp: 180 tablet, Rfl: 3     traMADol (ULTRAM) 50 MG tablet, Take 1 tablet (50 mg) by mouth 3 times daily, Disp: 30 tablet, Rfl: 0     VITAMIN D, CHOLECALCIFEROL, PO, Take 4,000 Units by mouth daily, Disp: , Rfl:     /68   Pulse 56   Temp 98  F (36.7  C)   Resp 20   Wt 71.7 kg (158 lb)   SpO2 98%   BMI 27.99 kg/m      LABS:   Last Comprehensive Metabolic Panel:  Sodium   Date Value Ref Range Status   06/14/2022 139 136 - 145 mmol/L Final   09/15/2006 134 133 - 144 mmol/L Final     Comment:     Delta Verified     Potassium   Date Value Ref Range " Status   06/14/2022 3.5 3.5 - 5.0 mmol/L Final   09/15/2006 4.1 3.4 - 5.3 mmol/L Final     Chloride   Date Value Ref Range Status   06/14/2022 102 98 - 107 mmol/L Final   09/15/2006 99 94 - 109 mmol/L Final     Carbon Dioxide   Date Value Ref Range Status   09/15/2006 28 20 - 32 mmol/L Final     Carbon Dioxide (CO2)   Date Value Ref Range Status   06/14/2022 26 22 - 31 mmol/L Final     Anion Gap   Date Value Ref Range Status   06/14/2022 11 5 - 18 mmol/L Final   09/15/2006 7 6 - 17 mmol/L Final     Glucose   Date Value Ref Range Status   06/14/2022 106 70 - 125 mg/dL Final   09/14/2006 110 60 - 110 mg/dL Final     Urea Nitrogen   Date Value Ref Range Status   06/14/2022 14 8 - 28 mg/dL Final   09/14/2006 24 7 - 30 mg/dL Final     Creatinine   Date Value Ref Range Status   06/14/2022 0.75 0.60 - 1.10 mg/dL Final   09/14/2006 1.08 0.60 - 1.30 mg/dL Final     GFR Estimate   Date Value Ref Range Status   06/14/2022 76 >60 mL/min/1.73m2 Final     Comment:     Effective December 21, 2021 eGFRcr in adults is calculated using the 2021 CKD-EPI creatinine equation which includes age and gender (Maria Del Carmen et al., NEJ, DOI: 10.1056/WLTPbf4245840)   05/26/2021 >60 >60 mL/min/1.73m2 Final   09/14/2006 53 (L) >60 mL/min/1.7m2 Final     Calcium   Date Value Ref Range Status   06/14/2022 8.2 (L) 8.5 - 10.5 mg/dL Final   09/14/2006 9.2 8.5 - 10.4 mg/dL Final     Recent Labs   Lab Test 06/07/22  0757 05/11/22  1649   CHOL 109 120   HDL 44* 49*   LDL 46 47   TRIG 93 121     CBC RESULTS: Recent Labs   Lab Test 06/08/22  0443   WBC 7.8   RBC 3.32*   HGB 10.3*   HCT 31.7*   MCV 96   MCH 31.0   MCHC 32.5   RDW 14.0              ASSESSMENT:    Encounter Diagnoses   Name Primary?     Essential hypertension Yes     Primary osteoarthritis involving multiple joints      Abnormal gait      Constipation, unspecified constipation type        PLAN:    She will now be discharging to home with the current medications and tramadol with an  approximated discharge date of June 24, 2022 as well as receive physical and occupational therapy home health aide and nursing through [facility].  She does live in the apartments next-door to this facility.  She will require the skilled services secondary to her chronic medical conditions, self-care deficits, recent hospitalization as well as continuation of home therapy and various exercises due to chronic medical conditions including pain as well as nursing for medication management including blood pressure evaluation.  She will be homebound secondary to chronic medical conditions as well as her recent hospitalization and her stay on the transitional care unit but also continuation of the rehabilitation process as well as home safety due to a history of chronic pain and balance concerns as well as continuation of nursing for medication management for pain as well as cardiovascular management.  She does have a follow-up visit with the heart clinic on June 27 and then again on August 9.  She will also follow-up with her primary care doctor regarding medication management and any future laboratory studies.  Had a chance to go over her stay in the transitional care unit as well as her medications as well as her treatment modalities.  She agrees with the current plan of care and she did not have any other questions.    Discharge coordination of care greater than 30 minutes.        Electronically signed by: Bry Jurado NP

## 2022-06-22 NOTE — LETTER
6/22/2022        RE: Marina ADAME Edinson  [address]  [place] [address]        [facility] GERIATRIC SERVICES    Facility:   [facility] ([facility]) [[id number]]   Code Status: FULL CODE      CHIEF COMPLAINT/REASON FOR VISIT:  Chief Complaint   Patient presents with     Discharge Summary Nursing Home       HISTORY:      HPI: Marina is a 88 year old female who was hospitalized secondary to chest pain who also does have a history of CAD, dyspnea on exertion with an ejection fraction December 2021 of 33% as well as a history of chronic pain.  Her recent echocardiogram in December 2021 did show ejection fraction of 33% her recent coronary angiogram in December 2021 did show a 99% lesion in the proximal-mid LAD and a 90% lesion in the mid RCA.  She was admitted June 6, 2022 for CAD stage IV PCA of RCA status post angiogram.  Her lab work did include a sodium of 136, potassium 4.0, BUN 19, creatinine 0.79, white cell count of 7.6, hemoglobin 11.3 with platelets of 212.  Her total cholesterol 109 with an LDL of 46  She has now been in the transitional care unit working with therapy to improve her strength balance and overall conditioning.  She has been able to participate with the rehabilitation services.  She does use a 4 wheeled walker.  She does have a history of chronic pain to which she does take scheduled tramadol and Tylenol.  During her stay she has been normotensive and afebrile and also on room air.  She did end up testing positive for COVID and due to sore throat and nasal congestion she was placed on a course of Paxlovid to which she tolerated the medication just fine and she is feeling better.  She does have a history of constipation currently on stool softeners.  Her last recorded weight was 158 pounds on June 13.    Past Medical History:   Diagnosis Date     Age-related physical debility      Anemia      Arthritis      Bladder infection 01/03/2015    dx'd- on antibiotics     Blood type AB-      with Anti-C     Breast cancer (H)      Bursitis, knee      Cancer (H)     breast      Cancer (H) 2005    left breast     Colon cancer (H)      Depression      Gout      History of transfusion      Hypertension      Hypertension      Insomnia      Macular degeneration      Osteoarthritis of multiple joints      Osteopenia      Ovarian cyst      Rosacea      Vitamin D deficiency             Family History   Problem Relation Age of Onset     Squamous cell carcinoma Mother      Colon Cancer Sister      Breast Cancer Maternal Aunt      Bone Cancer Maternal Aunt      Colon Cancer Paternal Aunt      Breast Cancer Cousin       Social History     Socioeconomic History     Marital status:    Tobacco Use     Smoking status: Never Smoker     Smokeless tobacco: Never Used   Substance and Sexual Activity     Alcohol use: No     Drug use: No     Sexual activity: Never        REVIEW OF SYSTEM:  She currently does not complain of any new symptoms of cough or cold postnasal drip sore throat wheezing chest pain dizziness vertigo nausea vomiting diarrhea dysuria frequency urgency.  She does have a history of chronic pain including osteoarthritis as well as hard of hearing and macular degeneration.    PHYSICAL EXAM:   Pleasant female in no acute distress.  She is feeling better than from her previous visit.   Lung sounds are clear throughout.  Cardiovascular S1-S2 regular rate and rhythm but chronic but no new changes in her lower extremity edema.  Gastrointestinal nontender.  Musculoskeletal chronic pain including knee osteoarthritis is able to use her four-wheel walker.  Psychiatric: Pleasant affect.       Current Outpatient Medications:      aspirin (ASA) 81 MG chewable tablet, Take 1 tablet (81 mg) by mouth daily Starting tomorrow., Disp: 30 tablet, Rfl: 3     atorvastatin (LIPITOR) 40 MG tablet, TAKE 1 TABLET BY MOUTH ONCE DAILY (Patient taking differently: No sig reported), Disp: 90 tablet, Rfl: 3     carvedilol (COREG)  "6.25 MG tablet, TAKE 2 TABLETS BY MOUTH ONCE DAILY (Patient taking differently: No sig reported), Disp: 60 tablet, Rfl: 0     docusate sodium (COLACE) 100 MG capsule, Take 100 mg by mouth 2 times daily, Disp: , Rfl:      escitalopram (LEXAPRO) 5 MG tablet, Take 5 mg by mouth daily, Disp: , Rfl:      furosemide (LASIX) 20 MG tablet, Take 1 tablet (20 mg) by mouth 2 times daily (Patient taking differently: Take 20 mg by mouth 2 times daily), Disp: 180 tablet, Rfl: 3     losartan (COZAAR) 25 MG tablet, Take 1 tablet (25 mg) by mouth daily (Patient taking differently: Take 25 mg by mouth daily), Disp: , Rfl:      magnesium oxide (MAG-OX) 400 (241.3 Mg) MG tablet, Take 400 mg by mouth daily, Disp: , Rfl:      Multiple Vitamins-Minerals (PRESERVISION AREDS) CAPS, Take 1 capsule by mouth 2 times daily , Disp: , Rfl:      nitroGLYcerin (NITROSTAT) 0.4 MG sublingual tablet, One tablet under the tongue every 5 minutes if needed for chest pain. May repeat every 5 minutes for a maximum of 3 doses in 15 minutes\", Disp: 25 tablet, Rfl: 3     olopatadine (PATANOL) 0.1 % ophthalmic solution, Place 1 drop into both eyes 2 times daily, Disp: , Rfl:      Omega-3 Fatty Acids (FISH OIL OMEGA-3 PO), Take 1 capsule by mouth daily, Disp: , Rfl:      prednisoLONE acetate (PRED FORTE) 1 % ophthalmic suspension, Place 1 drop into both eyes 2 times daily, Disp: , Rfl:      ticagrelor (BRILINTA) 90 MG tablet, Take 1 tablet (90 mg) by mouth 2 times daily Start tomorrow morning., Disp: 180 tablet, Rfl: 3     traMADol (ULTRAM) 50 MG tablet, Take 1 tablet (50 mg) by mouth 3 times daily, Disp: 30 tablet, Rfl: 0     VITAMIN D, CHOLECALCIFEROL, PO, Take 4,000 Units by mouth daily, Disp: , Rfl:     /68   Pulse 56   Temp 98  F (36.7  C)   Resp 20   Wt 71.7 kg (158 lb)   SpO2 98%   BMI 27.99 kg/m      LABS:   Last Comprehensive Metabolic Panel:  Sodium   Date Value Ref Range Status   06/14/2022 139 136 - 145 mmol/L Final   09/15/2006 134 133 " - 144 mmol/L Final     Comment:     Delta Verified     Potassium   Date Value Ref Range Status   06/14/2022 3.5 3.5 - 5.0 mmol/L Final   09/15/2006 4.1 3.4 - 5.3 mmol/L Final     Chloride   Date Value Ref Range Status   06/14/2022 102 98 - 107 mmol/L Final   09/15/2006 99 94 - 109 mmol/L Final     Carbon Dioxide   Date Value Ref Range Status   09/15/2006 28 20 - 32 mmol/L Final     Carbon Dioxide (CO2)   Date Value Ref Range Status   06/14/2022 26 22 - 31 mmol/L Final     Anion Gap   Date Value Ref Range Status   06/14/2022 11 5 - 18 mmol/L Final   09/15/2006 7 6 - 17 mmol/L Final     Glucose   Date Value Ref Range Status   06/14/2022 106 70 - 125 mg/dL Final   09/14/2006 110 60 - 110 mg/dL Final     Urea Nitrogen   Date Value Ref Range Status   06/14/2022 14 8 - 28 mg/dL Final   09/14/2006 24 7 - 30 mg/dL Final     Creatinine   Date Value Ref Range Status   06/14/2022 0.75 0.60 - 1.10 mg/dL Final   09/14/2006 1.08 0.60 - 1.30 mg/dL Final     GFR Estimate   Date Value Ref Range Status   06/14/2022 76 >60 mL/min/1.73m2 Final     Comment:     Effective December 21, 2021 eGFRcr in adults is calculated using the 2021 CKD-EPI creatinine equation which includes age and gender (Maria Del Carmen et al., NE, DOI: 10.1056/EAUKky6050105)   05/26/2021 >60 >60 mL/min/1.73m2 Final   09/14/2006 53 (L) >60 mL/min/1.7m2 Final     Calcium   Date Value Ref Range Status   06/14/2022 8.2 (L) 8.5 - 10.5 mg/dL Final   09/14/2006 9.2 8.5 - 10.4 mg/dL Final     Recent Labs   Lab Test 06/07/22  0757 05/11/22  1649   CHOL 109 120   HDL 44* 49*   LDL 46 47   TRIG 93 121     CBC RESULTS: Recent Labs   Lab Test 06/08/22  0443   WBC 7.8   RBC 3.32*   HGB 10.3*   HCT 31.7*   MCV 96   MCH 31.0   MCHC 32.5   RDW 14.0              ASSESSMENT:    Encounter Diagnoses   Name Primary?     Essential hypertension Yes     Primary osteoarthritis involving multiple joints      Abnormal gait      Constipation, unspecified constipation type        PLAN:    She  will now be discharging to home with the current medications and tramadol with an approximated discharge date of June 24, 2022 as well as receive physical and occupational therapy home health aide and nursing through [facility].  She does live in the apartments next-door to this facility.  She will require the skilled services secondary to her chronic medical conditions, self-care deficits, recent hospitalization as well as continuation of home therapy and various exercises due to chronic medical conditions including pain as well as nursing for medication management including blood pressure evaluation.  She will be homebound secondary to chronic medical conditions as well as her recent hospitalization and her stay on the transitional care unit but also continuation of the rehabilitation process as well as home safety due to a history of chronic pain and balance concerns as well as continuation of nursing for medication management for pain as well as cardiovascular management.  She does have a follow-up visit with the heart clinic on June 27 and then again on August 9.  She will also follow-up with her primary care doctor regarding medication management and any future laboratory studies.  Had a chance to go over her stay in the transitional care unit as well as her medications as well as her treatment modalities.  She agrees with the current plan of care and she did not have any other questions.    Discharge coordination of care greater than 30 minutes.        Electronically signed by: Bry Jurado NP          Sincerely,        Bry Jurado NP

## 2022-06-23 DIAGNOSIS — M25.561 CHRONIC PAIN OF RIGHT KNEE: ICD-10-CM

## 2022-06-23 DIAGNOSIS — G89.29 CHRONIC PAIN OF RIGHT KNEE: ICD-10-CM

## 2022-06-23 RX ORDER — TRAMADOL HYDROCHLORIDE 50 MG/1
50 TABLET ORAL 3 TIMES DAILY
Qty: 90 TABLET | Refills: 0 | Status: SHIPPED | OUTPATIENT
Start: 2022-06-23 | End: 2022-06-28

## 2022-06-25 ENCOUNTER — APPOINTMENT (OUTPATIENT)
Dept: GENERAL RADIOLOGY | Facility: CLINIC | Age: 87
End: 2022-06-25
Attending: EMERGENCY MEDICINE
Payer: COMMERCIAL

## 2022-06-25 ENCOUNTER — PATIENT OUTREACH (OUTPATIENT)
Dept: GERIATRIC MEDICINE | Facility: CLINIC | Age: 87
End: 2022-06-25

## 2022-06-25 ENCOUNTER — HOSPITAL ENCOUNTER (OUTPATIENT)
Facility: CLINIC | Age: 87
Setting detail: OBSERVATION
Discharge: NURSING, REHAB OR LONG-TERM CARE | End: 2022-06-28
Attending: EMERGENCY MEDICINE | Admitting: EMERGENCY MEDICINE
Payer: COMMERCIAL

## 2022-06-25 DIAGNOSIS — M75.51 BURSITIS OF RIGHT SHOULDER: ICD-10-CM
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
## 2022-06-25 DIAGNOSIS — Z86.16 HISTORY OF COVID-19: ICD-10-CM

## 2022-06-25 DIAGNOSIS — K59.00 CONSTIPATION, UNSPECIFIED CONSTIPATION TYPE: Primary | ICD-10-CM

## 2022-06-25 DIAGNOSIS — M19.90 ARTHRITIS: ICD-10-CM

## 2022-06-25 DIAGNOSIS — M75.52 SUBACROMIAL BURSITIS OF LEFT SHOULDER JOINT: ICD-10-CM

## 2022-06-25 DIAGNOSIS — M75.52 BURSITIS OF LEFT SHOULDER: ICD-10-CM

## 2022-06-25 DIAGNOSIS — M25.512 LEFT SHOULDER PAIN, UNSPECIFIED CHRONICITY: ICD-10-CM

## 2022-06-25 LAB
ANION GAP SERPL CALCULATED.3IONS-SCNC: 13 MMOL/L (ref 7–15)
BASOPHILS # BLD AUTO: 0 10E3/UL (ref 0–0.2)
BASOPHILS NFR BLD AUTO: 0 %
BUN SERPL-MCNC: 12.2 MG/DL (ref 8–23)
CALCIUM SERPL-MCNC: 8.1 MG/DL (ref 8.8–10.2)
CHLORIDE SERPL-SCNC: 97 MMOL/L (ref 98–107)
CREAT SERPL-MCNC: 0.6 MG/DL (ref 0.51–0.95)
DEPRECATED HCO3 PLAS-SCNC: 23 MMOL/L (ref 22–29)
EOSINOPHIL # BLD AUTO: 0 10E3/UL (ref 0–0.7)
EOSINOPHIL NFR BLD AUTO: 0 %
ERYTHROCYTE [DISTWIDTH] IN BLOOD BY AUTOMATED COUNT: 14.6 % (ref 10–15)
GFR SERPL CREATININE-BSD FRML MDRD: 86 ML/MIN/1.73M2
GLUCOSE SERPL-MCNC: 146 MG/DL (ref 70–99)
HCT VFR BLD AUTO: 30 % (ref 35–47)
HGB BLD-MCNC: 10 G/DL (ref 11.7–15.7)
IMM GRANULOCYTES # BLD: 0 10E3/UL
IMM GRANULOCYTES NFR BLD: 0 %
LYMPHOCYTES # BLD AUTO: 2.7 10E3/UL (ref 0.8–5.3)
LYMPHOCYTES NFR BLD AUTO: 23 %
MCH RBC QN AUTO: 30.8 PG (ref 26.5–33)
MCHC RBC AUTO-ENTMCNC: 33.3 G/DL (ref 31.5–36.5)
MCV RBC AUTO: 92 FL (ref 78–100)
MONOCYTES # BLD AUTO: 2 10E3/UL (ref 0–1.3)
MONOCYTES NFR BLD AUTO: 17 %
NEUTROPHILS # BLD AUTO: 6.8 10E3/UL (ref 1.6–8.3)
NEUTROPHILS NFR BLD AUTO: 60 %
NRBC # BLD AUTO: 0 10E3/UL
NRBC BLD AUTO-RTO: 0 /100
PLATELET # BLD AUTO: 239 10E3/UL (ref 150–450)
POTASSIUM SERPL-SCNC: 3.1 MMOL/L (ref 3.4–5.3)
RBC # BLD AUTO: 3.25 10E6/UL (ref 3.8–5.2)
SARS-COV-2 RNA RESP QL NAA+PROBE: POSITIVE
SODIUM SERPL-SCNC: 133 MMOL/L (ref 136–145)
WBC # BLD AUTO: 11.6 10E3/UL (ref 4–11)

## 2022-06-25 PROCEDURE — U0005 INFEC AGEN DETEC AMPLI PROBE: HCPCS | Performed by: EMERGENCY MEDICINE

## 2022-06-25 PROCEDURE — 250N000013 HC RX MED GY IP 250 OP 250 PS 637: Performed by: PHYSICIAN ASSISTANT

## 2022-06-25 PROCEDURE — 73560 X-RAY EXAM OF KNEE 1 OR 2: CPT | Mod: LT

## 2022-06-25 PROCEDURE — 73030 X-RAY EXAM OF SHOULDER: CPT | Mod: 26 | Performed by: RADIOLOGY

## 2022-06-25 PROCEDURE — 85025 COMPLETE CBC W/AUTO DIFF WBC: CPT | Performed by: EMERGENCY MEDICINE

## 2022-06-25 PROCEDURE — 73060 X-RAY EXAM OF HUMERUS: CPT | Mod: 26 | Performed by: RADIOLOGY

## 2022-06-25 PROCEDURE — 73060 X-RAY EXAM OF HUMERUS: CPT | Mod: LT

## 2022-06-25 PROCEDURE — 36415 COLL VENOUS BLD VENIPUNCTURE: CPT | Performed by: EMERGENCY MEDICINE

## 2022-06-25 PROCEDURE — 99219 PR INITIAL OBSERVATION CARE,LEVEL II: CPT | Performed by: PHYSICIAN ASSISTANT

## 2022-06-25 PROCEDURE — C9803 HOPD COVID-19 SPEC COLLECT: HCPCS | Performed by: EMERGENCY MEDICINE

## 2022-06-25 PROCEDURE — 73560 X-RAY EXAM OF KNEE 1 OR 2: CPT | Mod: 26 | Performed by: RADIOLOGY

## 2022-06-25 PROCEDURE — 80048 BASIC METABOLIC PNL TOTAL CA: CPT | Performed by: EMERGENCY MEDICINE

## 2022-06-25 PROCEDURE — 99285 EMERGENCY DEPT VISIT HI MDM: CPT | Mod: 25 | Performed by: EMERGENCY MEDICINE

## 2022-06-25 PROCEDURE — 99285 EMERGENCY DEPT VISIT HI MDM: CPT | Performed by: EMERGENCY MEDICINE

## 2022-06-25 PROCEDURE — 250N000013 HC RX MED GY IP 250 OP 250 PS 637: Performed by: EMERGENCY MEDICINE

## 2022-06-25 PROCEDURE — G0378 HOSPITAL OBSERVATION PER HR: HCPCS

## 2022-06-25 PROCEDURE — 73030 X-RAY EXAM OF SHOULDER: CPT | Mod: LT

## 2022-06-25 RX ORDER — OLOPATADINE HYDROCHLORIDE 1 MG/ML
1 SOLUTION/ DROPS OPHTHALMIC 2 TIMES DAILY
Status: DISCONTINUED | OUTPATIENT
Start: 2022-06-25 | End: 2022-06-25

## 2022-06-25 RX ORDER — BISACODYL 5 MG
5 TABLET, DELAYED RELEASE (ENTERIC COATED) ORAL DAILY PRN
Status: DISCONTINUED | OUTPATIENT
Start: 2022-06-25 | End: 2022-06-28 | Stop reason: HOSPADM

## 2022-06-25 RX ORDER — ASPIRIN 81 MG/1
81 TABLET, CHEWABLE ORAL DAILY
Status: DISCONTINUED | OUTPATIENT
Start: 2022-06-26 | End: 2022-06-28 | Stop reason: HOSPADM

## 2022-06-25 RX ORDER — TRAMADOL HYDROCHLORIDE 50 MG/1
50 TABLET ORAL ONCE
Status: COMPLETED | OUTPATIENT
Start: 2022-06-25 | End: 2022-06-25

## 2022-06-25 RX ORDER — ESCITALOPRAM OXALATE 5 MG/1
5 TABLET ORAL DAILY
Status: DISCONTINUED | OUTPATIENT
Start: 2022-06-26 | End: 2022-06-28 | Stop reason: HOSPADM

## 2022-06-25 RX ORDER — LANOLIN ALCOHOL/MO/W.PET/CERES
6 CREAM (GRAM) TOPICAL
Status: DISCONTINUED | OUTPATIENT
Start: 2022-06-25 | End: 2022-06-28 | Stop reason: HOSPADM

## 2022-06-25 RX ORDER — DOCUSATE SODIUM 100 MG/1
100 CAPSULE, LIQUID FILLED ORAL 2 TIMES DAILY
Status: DISCONTINUED | OUTPATIENT
Start: 2022-06-25 | End: 2022-06-28 | Stop reason: HOSPADM

## 2022-06-25 RX ORDER — MULTIPLE VITAMINS W/ MINERALS TAB 9MG-400MCG
1 TAB ORAL DAILY
Status: DISCONTINUED | OUTPATIENT
Start: 2022-06-26 | End: 2022-06-28 | Stop reason: HOSPADM

## 2022-06-25 RX ORDER — CARVEDILOL 12.5 MG/1
12.5 TABLET ORAL DAILY
Status: DISCONTINUED | OUTPATIENT
Start: 2022-06-26 | End: 2022-06-28 | Stop reason: HOSPADM

## 2022-06-25 RX ORDER — POTASSIUM CHLORIDE 750 MG/1
40 TABLET, EXTENDED RELEASE ORAL ONCE
Status: COMPLETED | OUTPATIENT
Start: 2022-06-25 | End: 2022-06-25

## 2022-06-25 RX ORDER — BISACODYL 5 MG
10 TABLET, DELAYED RELEASE (ENTERIC COATED) ORAL DAILY PRN
Status: DISCONTINUED | OUTPATIENT
Start: 2022-06-25 | End: 2022-06-28 | Stop reason: HOSPADM

## 2022-06-25 RX ORDER — FUROSEMIDE 20 MG
20 TABLET ORAL
Status: DISCONTINUED | OUTPATIENT
Start: 2022-06-26 | End: 2022-06-28 | Stop reason: HOSPADM

## 2022-06-25 RX ORDER — ONDANSETRON 2 MG/ML
4 INJECTION INTRAMUSCULAR; INTRAVENOUS EVERY 6 HOURS PRN
Status: DISCONTINUED | OUTPATIENT
Start: 2022-06-25 | End: 2022-06-28 | Stop reason: HOSPADM

## 2022-06-25 RX ORDER — ACETAMINOPHEN 325 MG/1
975 TABLET ORAL EVERY 8 HOURS
Status: DISCONTINUED | OUTPATIENT
Start: 2022-06-25 | End: 2022-06-28 | Stop reason: HOSPADM

## 2022-06-25 RX ORDER — LOSARTAN POTASSIUM 25 MG/1
25 TABLET ORAL DAILY
Status: DISCONTINUED | OUTPATIENT
Start: 2022-06-26 | End: 2022-06-28 | Stop reason: HOSPADM

## 2022-06-25 RX ORDER — OLOPATADINE HYDROCHLORIDE 1 MG/ML
1 SOLUTION/ DROPS OPHTHALMIC 2 TIMES DAILY
Status: DISCONTINUED | OUTPATIENT
Start: 2022-06-25 | End: 2022-06-26

## 2022-06-25 RX ORDER — TRAMADOL HYDROCHLORIDE 50 MG/1
50 TABLET ORAL 3 TIMES DAILY
Status: DISCONTINUED | OUTPATIENT
Start: 2022-06-26 | End: 2022-06-27

## 2022-06-25 RX ORDER — PREDNISOLONE ACETATE 10 MG/ML
1 SUSPENSION/ DROPS OPHTHALMIC 2 TIMES DAILY
Status: DISCONTINUED | OUTPATIENT
Start: 2022-06-25 | End: 2022-06-26

## 2022-06-25 RX ORDER — ATORVASTATIN CALCIUM 40 MG/1
40 TABLET, FILM COATED ORAL AT BEDTIME
Status: DISCONTINUED | OUTPATIENT
Start: 2022-06-25 | End: 2022-06-28 | Stop reason: HOSPADM

## 2022-06-25 RX ORDER — MAGNESIUM OXIDE 400 MG/1
400 TABLET ORAL DAILY
Status: DISCONTINUED | OUTPATIENT
Start: 2022-06-26 | End: 2022-06-28 | Stop reason: HOSPADM

## 2022-06-25 RX ORDER — ONDANSETRON 4 MG/1
4 TABLET, ORALLY DISINTEGRATING ORAL EVERY 6 HOURS PRN
Status: DISCONTINUED | OUTPATIENT
Start: 2022-06-25 | End: 2022-06-28 | Stop reason: HOSPADM

## 2022-06-25 RX ORDER — ACETAMINOPHEN 325 MG/1
650 TABLET ORAL ONCE
Status: COMPLETED | OUTPATIENT
Start: 2022-06-25 | End: 2022-06-25

## 2022-06-25 RX ADMIN — ATORVASTATIN CALCIUM 40 MG: 40 TABLET, FILM COATED ORAL at 23:33

## 2022-06-25 RX ADMIN — ACETAMINOPHEN 650 MG: 325 TABLET, FILM COATED ORAL at 20:07

## 2022-06-25 RX ADMIN — ACETAMINOPHEN 975 MG: 325 TABLET, FILM COATED ORAL at 23:33

## 2022-06-25 RX ADMIN — POTASSIUM CHLORIDE 40 MEQ: 750 TABLET, EXTENDED RELEASE ORAL at 23:33

## 2022-06-25 RX ADMIN — TRAMADOL HYDROCHLORIDE 50 MG: 50 TABLET, COATED ORAL at 23:32

## 2022-06-25 RX ADMIN — DOCUSATE SODIUM 100 MG: 100 CAPSULE, LIQUID FILLED ORAL at 23:33

## 2022-06-25 RX ADMIN — TICAGRELOR 90 MG: 90 TABLET ORAL at 23:32

## 2022-06-25 NOTE — ED PROVIDER NOTES
"    [facility] ([facility])  6/25/22 [facility] X    History     Chief Complaint   Patient presents with     Fall     HPI  Marina Neves is a 88 year old female with history of CAD, chronic pain who presents for evaluation after a fall.  Of note, patient is extremely hard of hearing despite having her hearing aids in and has difficulty answering questions due to this.  Patient reports she fell at the nursing home today and someone called to have her picked up off the ground.  She states that she went to sit and misjudged her chair, falling and landing on her buttocks.  Here in the ED she endorses gluteal pain, left shoulder pain and chronic left knee pain.  She states that she went to bed feeling fine, but now has left shoulder pain.  She states that her shoulder pain started yesterday.  She endorses chronic left knee pain stating that is her \"bad knee\".  Patient denies head trauma, back pain, abdominal pain or chest pain.     She was in a TCU recently and did have COVID, was treated with antivirals.  This is per the TCU note from 6/22/22.      CORONARY ANGIOGRAM 6/7/2022    Staged PCI in a woman with an ischemic cardiomyopathy.    Diffuse coronary calcification.    Mild left main stenosis.    Patent mid LAD stent with mild disease proximal and distal to the stent.    Moderate proximal and distal LCx disease without flow limitation across both lesions.    Mild-moderate proximal RCA lesions. Severe mid RCA and right PDA stenosis, each treated with a PAT with good angiographic results.       Past Medical History  Past Medical History:   Diagnosis Date     Age-related physical debility      Anemia      Arthritis      Bladder infection 01/03/2015    dx'd- on antibiotics     Blood type AB-     with Anti-C     Breast cancer (H)      Bursitis, knee      Cancer (H)     breast      Cancer (H) 2005    left breast     Colon cancer (H)      Depression      Gout      History of transfusion      " Hypertension      Hypertension      Insomnia      Macular degeneration      Osteoarthritis of multiple joints      Osteopenia      Ovarian cyst      Rosacea      Vitamin D deficiency      Past Surgical History:   Procedure Laterality Date     ABDOMEN SURGERY      colon resection for ca 2015     BACK SURGERY Bilateral     L3-L5, L5-S1 decompression lami     BACK SURGERY      posterior spinal reconstruction     BREAST SURGERY       BREAST SURGERY      left mastectomy     CATARACT EXTRACTION Bilateral      COLON SURGERY       COLONOSCOPY       COLONOSCOPY N/A 8/4/2015    Procedure: COLONOSCOPY;  Surgeon: Yoseph Ferraro MD;  Location:  GI     CV CORONARY ANGIOGRAM N/A 12/12/2021    Procedure: Coronary Angiogram;  Surgeon: Everett Castellon MD;  Location: [facility] CARDIAC CATH LAB     CV CORONARY ANGIOGRAM N/A 6/7/2022    Procedure: Coronary Angiogram;  Surgeon: Louann Silva MD;  Location: [facility] LAB CV     CV FRACTIONAL FLOW RATIO WIRE N/A 6/7/2022    Procedure: Fractional Flow Ratio Wire;  Surgeon: Louann Silva MD;  Location: [facility] CV     CV PCI N/A 6/7/2022    Procedure: Percutaneous Coronary Intervention;  Surgeon: Louann Silva MD;  Location: [facility] CV     CV PCI STENT DRUG ELUTING N/A 12/12/2021    Procedure: Percutaneous Coronary Intervention Stent Drug Eluting;  Surgeon: Everett Castellon MD;  Location: [facility] CARDIAC CATH LAB     DILATION AND CURETTAGE      onsil     EYE SURGERY      cataract bilateral     EYE SURGERY       GASTRECTOMY       MASTECTOMY, RADICAL Left      NEUROMA SURGERY Bilateral     feet     ORTHOPEDIC SURGERY      shoulder right, knee left     ORTHOPEDIC SURGERY      lami     OTHER SURGICAL HISTORY      left knee arthroscopy     OTHER SURGICAL HISTORY      right shoulder replacement     OTHER SURGICAL HISTORY      left breast biopsyneedle core     MI LAP,FULGURATE/EXCISE LESIONS N/A 3/29/2017    Procedure: LAPAROSCOPIC BILATERAL SALPING  OOPHORECTOMY PELVIC WASHINGS;  Surgeon: Eduardo Smart MD;  Location: [facility];  Service: Gynecology     NC MASTECTOMY, SIMPLE, COMPLETE Right 1/7/2015    Procedure: RIGHT BREAST MASTECTOMY;  Surgeon: Meliton Bazan MD;  Location: [facility];  Service: General     REPLACEMENT TOTAL KNEE Right      TONSILLECTOMY & ADENOIDECTOMY       ZZC PART REMOVAL COLON W ANASTOMOSIS N/A 6/16/2014    Procedure: COLECTOMY;  Surgeon: Meliton Bazan MD;  Location: [facility];  Service: General     No current outpatient medications on file.    Allergies   Allergen Reactions     Blood-Group Specific Substance Other (See Comments)     Anti-D and Anti-C present. A delay in compatible RBC's may occur.     Celecoxib Unknown     Avoids due to sulfa allergy     Lisinopril Cough     Other reaction(s): cough  Other reaction(s): cough     Oxybutynin Other (See Comments)     Other reaction(s): intolerable dry eye and mouth  Other reaction(s): intolerable dry eye and mouth     Sulfa Drugs      Tetanus Toxoid Blisters     Tetanus Toxoids      Other reaction(s): Unknown  Other reaction(s): Unknown     Tolterodine Other (See Comments)     Other reaction(s): intolerable dry eye and mouth  Other reaction(s): intolerable dry eye and mouth     Piroxicam Rash     Other reaction(s): stomach upset  Other reaction(s): stomach upset     Family History  Family History   Problem Relation Age of Onset     Squamous cell carcinoma Mother      Colon Cancer Sister      Breast Cancer Maternal Aunt      Bone Cancer Maternal Aunt      Colon Cancer Paternal Aunt      Breast Cancer Cousin      Social History   Social History     Tobacco Use     Smoking status: Never Smoker     Smokeless tobacco: Never Used   Substance Use Topics     Alcohol use: No     Drug use: No      Past medical history, past surgical history, medications, allergies, family history, and social history were reviewed with the patient. No additional pertinent items.   "     Review of Systems   Reason unable to perform ROS: Hearing issues.     A complete review of systems was attempted but limited due to hard of hearing.    Physical Exam   BP: 131/67  Pulse: 82  Temp: 96.9  F (36.1  C)  Resp: 16  Height: 160 cm (5' 3\")  Weight: 71.7 kg (158 lb)  SpO2: 95 %  Physical Exam  Vitals and nursing note reviewed.   Constitutional:       General: She is not in acute distress.     Appearance: She is not diaphoretic.      Comments: Elderly female, extremely hard of hearing, no acute distress   HENT:      Head: Atraumatic.      Mouth/Throat:      Mouth: Mucous membranes are moist.   Eyes:      Pupils: Pupils are equal, round, and reactive to light.   Neck:      Comments: No posterior tenderness to palpation  Cardiovascular:      Rate and Rhythm: Regular rhythm.      Pulses: Normal pulses.      Heart sounds: Normal heart sounds. No murmur heard.  Pulmonary:      Effort: No respiratory distress.      Breath sounds: Normal breath sounds.   Chest:      Chest wall: No tenderness.   Abdominal:      General: Bowel sounds are normal. There is no distension.      Palpations: Abdomen is soft.      Tenderness: There is no abdominal tenderness. There is no guarding.   Musculoskeletal:         General: Tenderness present.      Cervical back: No tenderness.      Thoracic back: No tenderness.      Lumbar back: No tenderness.      Comments: Tender to palpation over left knee, left shoulder, left proximal humerus   Skin:     Findings: No abrasion or laceration.   Neurological:      Mental Status: She is alert and oriented to person, place, and time.         ED Course     5:41 PM  The patient was seen and examined by Mildred Stein MD in Room HWX.     Procedures       The medical record was reviewed and interpreted.  Current labs reviewed and interpreted.              Results for orders placed or performed during the hospital encounter of 06/25/22   XR Shoulder Left G/E 3 Views     Status: None    " Narrative    EXAM: XR SHOULDER LEFT G/E 3 VIEWS, XR HUMERUS LEFT G/E 2 VIEWS  LOCATION: [facility]  DATE/TIME: 6/25/2022 7:15 PM    INDICATION: fall, pain, eval for injury  COMPARISON: 12/23/2020.      Impression    IMPRESSION: Severe glenohumeral joint degenerative change with remodeling and flattening of the humeral head, chronic. No evidence of an acute fracture. No dislocation. Bones are demineralized.    No evidence of acute left humerus fracture.    Humerus XR,  G/E 2 views, left     Status: None    Narrative    EXAM: XR SHOULDER LEFT G/E 3 VIEWS, XR HUMERUS LEFT G/E 2 VIEWS  LOCATION: [facility]  DATE/TIME: 6/25/2022 7:15 PM    INDICATION: fall, pain, eval for injury  COMPARISON: 12/23/2020.      Impression    IMPRESSION: Severe glenohumeral joint degenerative change with remodeling and flattening of the humeral head, chronic. No evidence of an acute fracture. No dislocation. Bones are demineralized.    No evidence of acute left humerus fracture.    XR Knee Left 1/2 Views     Status: None    Narrative    EXAM: XR KNEE LT 1/2 VW  LOCATION: [facility]  DATE/TIME: 6/25/2022 7:15 PM    INDICATION: fall, pain, eval for fx  COMPARISON: None.      Impression    IMPRESSION: Advanced medial compartment predominant degenerative changes. No definitive fracture. No joint effusion. Bones are demineralized.    Atherosclerotic vascular calcifications.   Basic metabolic panel     Status: Abnormal   Result Value Ref Range    Creatinine 0.60 0.51 - 0.95 mg/dL    Sodium 133 (L) 136 - 145 mmol/L    Potassium 3.1 (L) 3.4 - 5.3 mmol/L    Urea Nitrogen 12.2 8.0 - 23.0 mg/dL    Chloride 97 (L) 98 - 107 mmol/L    Carbon Dioxide (CO2) 23 22 - 29 mmol/L    Anion Gap 13 7 - 15 mmol/L    Glucose 146 (H) 70 - 99 mg/dL    GFR Estimate 86 >60 mL/min/1.73m2    Calcium 8.1 (L) 8.8 - 10.2 mg/dL    Asymptomatic COVID-19 Virus (Coronavirus) by PCR Nasopharyngeal     Status: Abnormal    Specimen: Nasopharyngeal; Swab   Result Value Ref Range    SARS CoV2 PCR Positive (A) Negative, Testing sent to reference lab. Results will be returned via unsolicited result    Narrative    Testing was performed using the Xpert Xpress SARS-CoV-2 Assay on the  Cepheid Gene-Xpert Instrument Systems. Additional information about  this Emergency Use Authorization (EUA) assay can be found via the Lab  Guide. This test should be ordered for the detection of SARS-CoV-2 in  individuals who meet SARS-CoV-2 clinical and/or epidemiological  criteria. Test performance is unknown in asymptomatic patients. This  test is for in vitro diagnostic use under the FDA EUA for  laboratories certified under CLIA to perform high complexity testing.  This test has not been FDA cleared or approved. A negative result  does not rule out the presence of PCR inhibitors in the specimen or  target RNA in concentration below the limit of detection for the  assay. The possibility of a false negative should be considered if  the patient's recent exposure or clinical presentation suggests  COVID-19. This test was validated by the [facility]. This laboratory is certified under  the Clinical Laboratory Improvement Amendments of 1988 (CLIA-88) as  qualified to perform high complexity laboratory testing.     CBC with platelets and differential     Status: Abnormal   Result Value Ref Range    WBC Count 11.6 (H) 4.0 - 11.0 10e3/uL    RBC Count 3.25 (L) 3.80 - 5.20 10e6/uL    Hemoglobin 10.0 (L) 11.7 - 15.7 g/dL    Hematocrit 30.0 (L) 35.0 - 47.0 %    MCV 92 78 - 100 fL    MCH 30.8 26.5 - 33.0 pg    MCHC 33.3 31.5 - 36.5 g/dL    RDW 14.6 10.0 - 15.0 %    Platelet Count 239 150 - 450 10e3/uL    % Neutrophils 60 %    % Lymphocytes 23 %    % Monocytes 17 %    % Eosinophils 0 %    % Basophils 0 %    % Immature Granulocytes  0 %    NRBCs per 100 WBC 0 <1 /100    Absolute Neutrophils 6.8 1.6 - 8.3 10e3/uL    Absolute Lymphocytes 2.7 0.8 - 5.3 10e3/uL    Absolute Monocytes 2.0 (H) 0.0 - 1.3 10e3/uL    Absolute Eosinophils 0.0 0.0 - 0.7 10e3/uL    Absolute Basophils 0.0 0.0 - 0.2 10e3/uL    Absolute Immature Granulocytes 0.0 <=0.4 10e3/uL    Absolute NRBCs 0.0 10e3/uL   CBC with Platelets & Differential     Status: Abnormal    Narrative    The following orders were created for panel order CBC with Platelets & Differential.  Procedure                               Abnormality         Status                     ---------                               -----------         ------                     CBC with platelets and d...[[id number]]  Abnormal            Final result                 Please view results for these tests on the individual orders.     Medications   aspirin (ASA) chewable tablet 81 mg (has no administration in time range)   atorvastatin (LIPITOR) tablet 40 mg (40 mg Oral Given 6/25/22 2333)   carvedilol (COREG) tablet 12.5 mg (has no administration in time range)   docusate sodium (COLACE) capsule 100 mg (100 mg Oral Given 6/25/22 2333)   escitalopram (LEXAPRO) tablet 5 mg (has no administration in time range)   furosemide (LASIX) tablet 20 mg (has no administration in time range)   losartan (COZAAR) tablet 25 mg (has no administration in time range)   magnesium oxide (MAG-OX) tablet 400 mg (has no administration in time range)   multivitamin w/minerals (THERA-VIT-M) tablet 1 tablet (has no administration in time range)   prednisoLONE acetate (PRED FORTE) 1 % ophthalmic susp 1 drop (has no administration in time range)   ticagrelor (BRILINTA) tablet 90 mg (90 mg Oral Given 6/25/22 2332)   traMADol (ULTRAM) tablet 50 mg (has no administration in time range)   melatonin tablet 6 mg (has no administration in time range)   ondansetron (ZOFRAN ODT) ODT tab 4 mg (has no administration in time range)     Or   ondansetron (ZOFRAN)  injection 4 mg (has no administration in time range)   acetaminophen (TYLENOL) tablet 975 mg (975 mg Oral Given 6/25/22 2333)   bisacodyl (DULCOLAX) EC tablet 5 mg (has no administration in time range)     Or   bisacodyl (DULCOLAX) EC tablet 10 mg (has no administration in time range)   olopatadine (PATANOL) 0.1 % ophthalmic solution 1 drop (has no administration in time range)   acetaminophen (TYLENOL) tablet 650 mg (650 mg Oral Given 6/25/22 2007)   traMADol (ULTRAM) tablet 50 mg (50 mg Oral Given 6/25/22 2332)   potassium chloride ER (KLOR-CON M) CR tablet 40 mEq (40 mEq Oral Given 6/25/22 2333)        Assessments & Plan (with Medical Decision Making)   Patient presents to the emergency department today after mechanical fall.  She is extraordinarily hard of hearing, and apparently the batteries in her hearing aids do not work.  However, she was able to communicate that she slipped off of the chair and landed on her buttocks.  She denies pain anywhere besides her left knee and left shoulder.    Left knee x-ray shows advanced degenerative changes without any evidence of acute fracture.  Left humerus x-ray also shows no evidence of fracture, left shoulder x-ray showed degenerative changes without fracture.    Patient given Tylenol and her tramadol here in the ED for pain.    We did attempt to ambulate the patient here in the ED and she is having difficulty doing so and is quite unsteady on her feet.  At baseline she does use a 4 wheeled walker, but due to her acute on chronic shoulder pain she is having difficulty managing the walker at present.  For this reason, we will plan to admit to the ED observation unit for PT/OT.  Discussed with observation unit DEBRA.    I have reviewed the nursing notes. I have reviewed the findings, diagnosis, plan and need for follow up with the patient.    Current Discharge Medication List          Final diagnoses:   Fall, initial encounter   Left shoulder pain, unspecified chronicity -  acute on chronic   Arthritis   I, Harrison Sainz, am serving as a trained medical scribe to document services personally performed by Mildred Stein MD, based on the provider's statements to me.     I, Mildred Stein MD, was physically present and have reviewed and verified the accuracy of this note documented by Harrison Sainz.      --  Mildred Stein MD  [facility] EMERGENCY DEPARTMENT  6/25/2022     Mildred Stein MD  06/26/22 0149

## 2022-06-25 NOTE — ED TRIAGE NOTES
"Patient BIBA from independent living after being found on the floor after an unwitnessed fall. Patient unable to walk and complains of right knee and left shoulder pain. Patient reports that she \"went to sit on a chair and missed and I landed on my butt.\" Denies hitting head or LOC.       "

## 2022-06-26 ENCOUNTER — APPOINTMENT (OUTPATIENT)
Dept: PHYSICAL THERAPY | Facility: CLINIC | Age: 87
End: 2022-06-26
Attending: PHYSICIAN ASSISTANT
Payer: COMMERCIAL

## 2022-06-26 LAB
ALBUMIN UR-MCNC: 70 MG/DL
ANION GAP SERPL CALCULATED.3IONS-SCNC: 10 MMOL/L (ref 7–15)
APPEARANCE UR: ABNORMAL
BACTERIA #/AREA URNS HPF: ABNORMAL /HPF
BILIRUB UR QL STRIP: NEGATIVE
BUN SERPL-MCNC: 13.2 MG/DL (ref 8–23)
CALCIUM SERPL-MCNC: 7.6 MG/DL (ref 8.8–10.2)
CHLORIDE SERPL-SCNC: 98 MMOL/L (ref 98–107)
COLOR UR AUTO: ABNORMAL
CREAT SERPL-MCNC: 0.72 MG/DL (ref 0.51–0.95)
DEPRECATED HCO3 PLAS-SCNC: 22 MMOL/L (ref 22–29)
ERYTHROCYTE [DISTWIDTH] IN BLOOD BY AUTOMATED COUNT: 14.6 % (ref 10–15)
GFR SERPL CREATININE-BSD FRML MDRD: 80 ML/MIN/1.73M2
GLUCOSE SERPL-MCNC: 173 MG/DL (ref 70–99)
GLUCOSE UR STRIP-MCNC: NEGATIVE MG/DL
HCT VFR BLD AUTO: 29.4 % (ref 35–47)
HGB BLD-MCNC: 9.6 G/DL (ref 11.7–15.7)
HGB UR QL STRIP: ABNORMAL
KETONES UR STRIP-MCNC: NEGATIVE MG/DL
LEUKOCYTE ESTERASE UR QL STRIP: ABNORMAL
MAGNESIUM SERPL-MCNC: 2.1 MG/DL (ref 1.7–2.3)
MCH RBC QN AUTO: 30.8 PG (ref 26.5–33)
MCHC RBC AUTO-ENTMCNC: 32.7 G/DL (ref 31.5–36.5)
MCV RBC AUTO: 94 FL (ref 78–100)
NITRATE UR QL: NEGATIVE
PH UR STRIP: 6 [PH] (ref 5–7)
PHOSPHATE SERPL-MCNC: 2.2 MG/DL (ref 2.5–4.5)
PLATELET # BLD AUTO: 223 10E3/UL (ref 150–450)
POTASSIUM SERPL-SCNC: 3.8 MMOL/L (ref 3.4–5.3)
RBC # BLD AUTO: 3.12 10E6/UL (ref 3.8–5.2)
RBC URINE: 3 /HPF
SODIUM SERPL-SCNC: 130 MMOL/L (ref 136–145)
SP GR UR STRIP: 1.02 (ref 1–1.03)
SQUAMOUS EPITHELIAL: 7 /HPF
UROBILINOGEN UR STRIP-MCNC: NORMAL MG/DL
WBC # BLD AUTO: 10.5 10E3/UL (ref 4–11)
WBC CLUMPS #/AREA URNS HPF: PRESENT /HPF
WBC URINE: >182 /HPF

## 2022-06-26 PROCEDURE — 999N000127 HC STATISTIC PERIPHERAL IV START W US GUIDANCE

## 2022-06-26 PROCEDURE — 87086 URINE CULTURE/COLONY COUNT: CPT

## 2022-06-26 PROCEDURE — 99224 PR SUBSEQUENT OBSERVATION CARE,LEVEL I: CPT

## 2022-06-26 PROCEDURE — 36415 COLL VENOUS BLD VENIPUNCTURE: CPT

## 2022-06-26 PROCEDURE — 80048 BASIC METABOLIC PNL TOTAL CA: CPT

## 2022-06-26 PROCEDURE — 83735 ASSAY OF MAGNESIUM: CPT

## 2022-06-26 PROCEDURE — 96374 THER/PROPH/DIAG INJ IV PUSH: CPT | Mod: XU

## 2022-06-26 PROCEDURE — 85027 COMPLETE CBC AUTOMATED: CPT

## 2022-06-26 PROCEDURE — 81001 URINALYSIS AUTO W/SCOPE: CPT

## 2022-06-26 PROCEDURE — G0378 HOSPITAL OBSERVATION PER HR: HCPCS

## 2022-06-26 PROCEDURE — 250N000013 HC RX MED GY IP 250 OP 250 PS 637: Performed by: PHYSICIAN ASSISTANT

## 2022-06-26 PROCEDURE — 250N000011 HC RX IP 250 OP 636

## 2022-06-26 PROCEDURE — 84100 ASSAY OF PHOSPHORUS: CPT

## 2022-06-26 PROCEDURE — 258N000003 HC RX IP 258 OP 636

## 2022-06-26 PROCEDURE — 97161 PT EVAL LOW COMPLEX 20 MIN: CPT | Mod: GP

## 2022-06-26 PROCEDURE — 97530 THERAPEUTIC ACTIVITIES: CPT | Mod: GP

## 2022-06-26 PROCEDURE — 250N000013 HC RX MED GY IP 250 OP 250 PS 637: Performed by: EMERGENCY MEDICINE

## 2022-06-26 PROCEDURE — 250N000013 HC RX MED GY IP 250 OP 250 PS 637

## 2022-06-26 RX ORDER — SODIUM CHLORIDE 9 MG/ML
INJECTION, SOLUTION INTRAVENOUS CONTINUOUS
Status: DISCONTINUED | OUTPATIENT
Start: 2022-06-26 | End: 2022-06-27

## 2022-06-26 RX ORDER — NALOXONE HYDROCHLORIDE 0.4 MG/ML
0.4 INJECTION, SOLUTION INTRAMUSCULAR; INTRAVENOUS; SUBCUTANEOUS
Status: DISCONTINUED | OUTPATIENT
Start: 2022-06-26 | End: 2022-06-28 | Stop reason: HOSPADM

## 2022-06-26 RX ORDER — NALOXONE HYDROCHLORIDE 0.4 MG/ML
0.2 INJECTION, SOLUTION INTRAMUSCULAR; INTRAVENOUS; SUBCUTANEOUS
Status: DISCONTINUED | OUTPATIENT
Start: 2022-06-26 | End: 2022-06-28 | Stop reason: HOSPADM

## 2022-06-26 RX ORDER — SPIRONOLACTONE 25 MG/1
25 TABLET ORAL DAILY
COMMUNITY

## 2022-06-26 RX ORDER — CEFTRIAXONE 1 G/1
1 INJECTION, POWDER, FOR SOLUTION INTRAMUSCULAR; INTRAVENOUS EVERY 24 HOURS
Status: DISCONTINUED | OUTPATIENT
Start: 2022-06-26 | End: 2022-06-28 | Stop reason: HOSPADM

## 2022-06-26 RX ADMIN — CEFTRIAXONE SODIUM 1 G: 1 INJECTION, POWDER, FOR SOLUTION INTRAMUSCULAR; INTRAVENOUS at 18:12

## 2022-06-26 RX ADMIN — SODIUM CHLORIDE: 9 INJECTION, SOLUTION INTRAVENOUS at 10:20

## 2022-06-26 RX ADMIN — DOCUSATE SODIUM 100 MG: 100 CAPSULE, LIQUID FILLED ORAL at 08:12

## 2022-06-26 RX ADMIN — TRAMADOL HYDROCHLORIDE 50 MG: 50 TABLET, COATED ORAL at 13:41

## 2022-06-26 RX ADMIN — LOSARTAN POTASSIUM 25 MG: 25 TABLET, FILM COATED ORAL at 08:11

## 2022-06-26 RX ADMIN — OXYCODONE HYDROCHLORIDE 2.5 MG: 5 TABLET ORAL at 10:20

## 2022-06-26 RX ADMIN — OXYCODONE HYDROCHLORIDE 2.5 MG: 5 TABLET ORAL at 22:03

## 2022-06-26 RX ADMIN — TICAGRELOR 90 MG: 90 TABLET ORAL at 19:56

## 2022-06-26 RX ADMIN — POTASSIUM & SODIUM PHOSPHATES POWDER PACK 280-160-250 MG 1 PACKET: 280-160-250 PACK at 22:03

## 2022-06-26 RX ADMIN — POTASSIUM & SODIUM PHOSPHATES POWDER PACK 280-160-250 MG 1 PACKET: 280-160-250 PACK at 17:01

## 2022-06-26 RX ADMIN — TICAGRELOR 90 MG: 90 TABLET ORAL at 08:11

## 2022-06-26 RX ADMIN — Medication 1 TABLET: at 08:11

## 2022-06-26 RX ADMIN — ESCITALOPRAM 5 MG: 5 TABLET, FILM COATED ORAL at 08:11

## 2022-06-26 RX ADMIN — ACETAMINOPHEN 975 MG: 325 TABLET, FILM COATED ORAL at 13:41

## 2022-06-26 RX ADMIN — OXYCODONE HYDROCHLORIDE 2.5 MG: 5 TABLET ORAL at 17:01

## 2022-06-26 RX ADMIN — ASPIRIN 81 MG CHEWABLE TABLET 81 MG: 81 TABLET CHEWABLE at 08:12

## 2022-06-26 RX ADMIN — ATORVASTATIN CALCIUM 40 MG: 40 TABLET, FILM COATED ORAL at 22:03

## 2022-06-26 RX ADMIN — ACETAMINOPHEN 975 MG: 325 TABLET, FILM COATED ORAL at 07:10

## 2022-06-26 RX ADMIN — TRAMADOL HYDROCHLORIDE 50 MG: 50 TABLET, COATED ORAL at 19:56

## 2022-06-26 RX ADMIN — CARVEDILOL 12.5 MG: 12.5 TABLET, FILM COATED ORAL at 08:12

## 2022-06-26 RX ADMIN — Medication 400 MG: at 08:12

## 2022-06-26 RX ADMIN — ACETAMINOPHEN 975 MG: 325 TABLET, FILM COATED ORAL at 22:03

## 2022-06-26 RX ADMIN — FUROSEMIDE 20 MG: 20 TABLET ORAL at 08:11

## 2022-06-26 RX ADMIN — FUROSEMIDE 20 MG: 20 TABLET ORAL at 17:01

## 2022-06-26 RX ADMIN — TRAMADOL HYDROCHLORIDE 50 MG: 50 TABLET, COATED ORAL at 08:12

## 2022-06-26 RX ADMIN — POTASSIUM & SODIUM PHOSPHATES POWDER PACK 280-160-250 MG 1 PACKET: 280-160-250 PACK at 13:41

## 2022-06-26 RX ADMIN — DOCUSATE SODIUM 100 MG: 100 CAPSULE, LIQUID FILLED ORAL at 19:55

## 2022-06-26 NOTE — H&P
"  ED OBSERVATION HISTORY & PHYSICAL    Admission Date: 06/25/22        REASON FOR ADMISSION:   Chief Complaint   Patient presents with     Fall   Left shoulder pain  Unable to bear weight, HERO      HPI:    Marina Neves is a 88 year old female with a history of CAD s/p staged PCI with PAT X 1 to mRCA and PAT x1 to rPDA, ischemic cardiomyopathy with EF 33%, HTN, HLD, chronic JFrEF, chronic osteoarthritis, hard of hearing, gout, depression, macular degeneration who presented to the ED with a mechanical fall.  She states she feels she is in her usual state of health, and she experienced a fall yesterday where she \"misjudged the location of a chair.\"  She fell onto her bottom, and since has had a hard time bearing weight on her left shoulder.  This is challenging because she uses a walker.  She states \"I don't want to stay here for months, I want to get up and move!\"  She states that she wants to speak with her cousin, Yo Alberto, who may be able to help her get her hearing aids functioning again.  She states her pain is tolerable and she is able to rest.  She is extremely hard of hearing, but this provider notes that removing mask to facilitate some lip-reading is enormously helpful.  She had recent (June 14 positive test) Covid infection with symptoms of cough, congestion and sore throat treated with Paxlovid.  Review of recent TCU notes indicate that she is likely requiring more assistance with her ADLS.      In the ED she was vitally normal.  She had imaging of her left knee, humerus and shoulder which show no acute fracture and significant osteoarthritis.  She is able to stand, but relies on her upper extremities to WB on her walker, and she isn't able to do that due to pain and hesitancy.  She was dosed with tramadol and tylenol with tolerable relief.  She denies any shortness of breath, chest pain, dizziness, shortness of breath, increased swelling, abdominal pain, nausea, vomiting, diarrhea.  On admission to " the observation unit the patient was stable.      ROS:    ROS negative other than the symptoms noted above.    History:    Past Medical History:   Diagnosis Date     Age-related physical debility      Anemia      Arthritis      Bladder infection 01/03/2015    dx'd- on antibiotics     Blood type AB-     with Anti-C     Breast cancer (H)      Bursitis, knee      Cancer (H)     breast      Cancer (H) 2005    left breast     Colon cancer (H)      Depression      Gout      History of transfusion      Hypertension      Hypertension      Insomnia      Macular degeneration      Osteoarthritis of multiple joints      Osteopenia      Ovarian cyst      Rosacea      Vitamin D deficiency    CAD s/p DESx2  Ischemic cardiomyopathy  HFrEF    Past Surgical History:   Procedure Laterality Date     ABDOMEN SURGERY      colon resection for ca 2015     BACK SURGERY Bilateral     L3-L5, L5-S1 decompression lami     BACK SURGERY      posterior spinal reconstruction     BREAST SURGERY       BREAST SURGERY      left mastectomy     CATARACT EXTRACTION Bilateral      COLON SURGERY       COLONOSCOPY       COLONOSCOPY N/A 8/4/2015    Procedure: COLONOSCOPY;  Surgeon: Yoseph Ferraro MD;  Location: [facility]     CV CORONARY ANGIOGRAM N/A 12/12/2021    Procedure: Coronary Angiogram;  Surgeon: Everett Castellon MD;  Location: [facility] CARDIAC CATH LAB     CV CORONARY ANGIOGRAM N/A 6/7/2022    Procedure: Coronary Angiogram;  Surgeon: Louann Silva MD;  Location: [facility] LAB CV     CV FRACTIONAL FLOW RATIO WIRE N/A 6/7/2022    Procedure: Fractional Flow Ratio Wire;  Surgeon: Louann Silva MD;  Location: [facility] LAB CV     CV PCI N/A 6/7/2022    Procedure: Percutaneous Coronary Intervention;  Surgeon: Louann Silva MD;  Location: [facility] CV     CV PCI STENT DRUG ELUTING N/A 12/12/2021    Procedure: Percutaneous Coronary Intervention Stent Drug Eluting;  Surgeon: Everett Castellon MD;  Location: [facility]  CATH LAB     DILATION AND CURETTAGE      onsil     EYE SURGERY      cataract bilateral     EYE SURGERY       GASTRECTOMY       MASTECTOMY, RADICAL Left      NEUROMA SURGERY Bilateral     feet     ORTHOPEDIC SURGERY      shoulder right, knee left     ORTHOPEDIC SURGERY      lami     OTHER SURGICAL HISTORY      left knee arthroscopy     OTHER SURGICAL HISTORY      right shoulder replacement     OTHER SURGICAL HISTORY      left breast biopsyneedle core     VT LAP,FULGURATE/EXCISE LESIONS N/A 3/29/2017    Procedure: LAPAROSCOPIC BILATERAL SALPING OOPHORECTOMY PELVIC WASHINGS;  Surgeon: Eduardo Smart MD;  Location: [facility];  Service: Gynecology     VT MASTECTOMY, SIMPLE, COMPLETE Right 1/7/2015    Procedure: RIGHT BREAST MASTECTOMY;  Surgeon: Meliton Bazan MD;  Location: [facility];  Service: General     REPLACEMENT TOTAL KNEE Right      TONSILLECTOMY & ADENOIDECTOMY       ZZC PART REMOVAL COLON W ANASTOMOSIS N/A 6/16/2014    Procedure: COLECTOMY;  Surgeon: Meliton Bazan MD;  Location: [facility];  Service: General       Family History   Problem Relation Age of Onset     Squamous cell carcinoma Mother      Colon Cancer Sister      Breast Cancer Maternal Aunt      Bone Cancer Maternal Aunt      Colon Cancer Paternal Aunt      Breast Cancer Cousin        Social History     Socioeconomic History     Marital status:      Spouse name: Not on file     Number of children: Not on file     Years of education: Not on file     Highest education level: Not on file   Occupational History     Not on file   Tobacco Use     Smoking status: Never Smoker     Smokeless tobacco: Never Used   Substance and Sexual Activity     Alcohol use: No     Drug use: No     Sexual activity: Never   Other Topics Concern     Parent/sibling w/ CABG, MI or angioplasty before 65F 55M? Not Asked   Social History Narrative     Not on file     Social Determinants of Health     Financial Resource Strain: Not on file  "  Food Insecurity: Not on file   Transportation Needs: Not on file   Physical Activity: Not on file   Stress: Not on file   Social Connections: Not on file   Intimate Partner Violence: Not on file   Housing Stability: Not on file       No current facility-administered medications on file prior to encounter.  aspirin (ASA) 81 MG chewable tablet, Take 1 tablet (81 mg) by mouth daily Starting tomorrow.  atorvastatin (LIPITOR) 40 MG tablet, TAKE 1 TABLET BY MOUTH ONCE DAILY (Patient taking differently: No sig reported)  carvedilol (COREG) 6.25 MG tablet, TAKE 2 TABLETS BY MOUTH ONCE DAILY (Patient taking differently: No sig reported)  docusate sodium (COLACE) 100 MG capsule, Take 100 mg by mouth 2 times daily  escitalopram (LEXAPRO) 5 MG tablet, Take 5 mg by mouth daily  furosemide (LASIX) 20 MG tablet, Take 1 tablet (20 mg) by mouth 2 times daily (Patient taking differently: Take 20 mg by mouth 2 times daily)  losartan (COZAAR) 25 MG tablet, Take 1 tablet (25 mg) by mouth daily (Patient taking differently: Take 25 mg by mouth daily)  magnesium oxide (MAG-OX) 400 (241.3 Mg) MG tablet, Take 400 mg by mouth daily  Multiple Vitamins-Minerals (PRESERVISION AREDS) CAPS, Take 1 capsule by mouth 2 times daily   nitroGLYcerin (NITROSTAT) 0.4 MG sublingual tablet, One tablet under the tongue every 5 minutes if needed for chest pain. May repeat every 5 minutes for a maximum of 3 doses in 15 minutes\"  olopatadine (PATANOL) 0.1 % ophthalmic solution, Place 1 drop into both eyes 2 times daily  Omega-3 Fatty Acids (FISH OIL OMEGA-3 PO), Take 1 capsule by mouth daily  prednisoLONE acetate (PRED FORTE) 1 % ophthalmic suspension, Place 1 drop into both eyes 2 times daily  ticagrelor (BRILINTA) 90 MG tablet, Take 1 tablet (90 mg) by mouth 2 times daily Start tomorrow morning.  traMADol (ULTRAM) 50 MG tablet, Take 1 tablet (50 mg) by mouth 3 times daily  VITAMIN D, CHOLECALCIFEROL, PO, Take 4,000 Units by mouth " daily        Exam:  Vitals:  B/P: 131/67, T: 96.9, P: 82, R: 16    All vital signs were reviewed.  GENERAL APPEARENCE: Pleasant, generally appears well, A/O x4. NAD,   SKIN: Clean, dry, and intact without visible lesions, rash, jaundice, cyanosis, erythema, ecchymoses to exposed areas.  HEENT: NCAT w/out masses, lesions, or abnormalities. Sclera anicteric, PERRLA, EOMI.  Oral mucosa pink and moist without erythema, exudate, lesions, ulcerations, or thrush. Teeth and gums normal.  Wearing glasses. Very [place].  NECK: Supple, no masses. No jugular venous distention.   CARDIOVASCULAR: S1, S2 RRR. No murmurs, rubs, or gallops.   RESPIRATORY: Respiratory effort WNL. CTA  bilaterally without crackles/rales/wheeze   GI: Active BS in all 4 quadrants. Abdomen soft and non-tender. No masses or hepatosplenomegaly.  : Deferred  MUSCULOSKELETAL: Left shoulder with mild tenderness to palpation  PV: 2+ bilateral radial and pedal pulses. No edema noted.   NEURO: CN II-XII grossly intact. Speech normal. Appropriate throughout interview.   Sensation grossly WNL. Finger to nose and rapid alternating movements WDL.  HEME/LYMPH: No visible bleeding.  PSYCHIATRIC: Mentation and affect appear normal    Data:    Results for orders placed or performed during the hospital encounter of 06/25/22   XR Shoulder Left G/E 3 Views     Status: None    Narrative    EXAM: XR SHOULDER LEFT G/E 3 VIEWS, XR HUMERUS LEFT G/E 2 VIEWS  LOCATION: [facility]  DATE/TIME: 6/25/2022 7:15 PM    INDICATION: fall, pain, eval for injury  COMPARISON: 12/23/2020.      Impression    IMPRESSION: Severe glenohumeral joint degenerative change with remodeling and flattening of the humeral head, chronic. No evidence of an acute fracture. No dislocation. Bones are demineralized.    No evidence of acute left humerus fracture.    Humerus XR,  G/E 2 views, left     Status: None    Narrative    EXAM: XR SHOULDER LEFT G/E 3 VIEWS, XR  HUMERUS LEFT G/E 2 VIEWS  LOCATION: [facility]  DATE/TIME: 6/25/2022 7:15 PM    INDICATION: fall, pain, eval for injury  COMPARISON: 12/23/2020.      Impression    IMPRESSION: Severe glenohumeral joint degenerative change with remodeling and flattening of the humeral head, chronic. No evidence of an acute fracture. No dislocation. Bones are demineralized.    No evidence of acute left humerus fracture.    XR Knee Left 1/2 Views     Status: None    Narrative    EXAM: XR KNEE LT 1/2 VW  LOCATION: [facility]  DATE/TIME: 6/25/2022 7:15 PM    INDICATION: fall, pain, eval for fx  COMPARISON: None.      Impression    IMPRESSION: Advanced medial compartment predominant degenerative changes. No definitive fracture. No joint effusion. Bones are demineralized.    Atherosclerotic vascular calcifications.                Assessment/Plan:  Marina Neves is a 88 year old female with a history of CAD s/p staged PCI with PAT X 1 to mRCA and PAT x1 to rPDA, ischemic cardiomyopathy with EF 33%, HTN, HLD, chronic JFrEF, chronic osteoarthritis, hard of hearing, gout, depression, macular degeneration who presented to the ED with a mechanical fall and left shoulder pain.  She is admitted to observation    ## Left shoulder pain  ## Chronic right knee pain  ## Mechanical fall  -sounds mechanical in nature, no prodrome  - patient not altered, able to give history  - imaging without acute fracture  - PT/OT consults placed and appreciated   - CBC, BMP pending  - continue Tylenol and PTA Tramadol.      # Hypokalemia  - Mild, in setting daily diuretic use  - Replace prn.    - Repeat labs in am    ## CAD s/p PAT X 2 staged procedure   ## Ischemic cardiomyopathy  ## HFrEF  ## HLD  ## HLD  - compensated  - Daily weights, I/O  - Continue PTA DAPT, statin, beta blocker, ARB, furosemide    ## Depression  - continue PTA lexapro    ## Chronic constipation  -  Continue PTA Colace BID with prn docusate    # Macular degeneration  - Continue PTA eye drops      FEN:  -Regular diet as tolerated.  -Monitor BMP and replace electrolytes per protocol    Prophy:  -No VTE prophy as patient is up ad edis and anticipate short observation stay   -Encourage ambulation as tolerated   Consults: PT/OT    CODE STATUS:  FULL CODE    DISPOSITION: [place] to observation, anticipate stay to be < 2 midnights.       Lissy Smith PA-C  Emergency Department Observation Unit

## 2022-06-26 NOTE — PHARMACY-ADMISSION MEDICATION HISTORY
Admission Medication History Completed by Pharmacy    See [facility] Admission Navigator for allergy information, preferred outpatient pharmacy, prior to admission medications and immunization status.     Medication History Sources:     Patient's niece Anh Shirleyrichaparrita records    Changes made to PTA medication list (reason):    Added: spironolactone    Deleted: None    Changed: aspirin (daily --> evening), atorvastatin (daily --> evening), docusate (BID --> BID PRN)    Additional Information:    Anh reports patient would have likely taken her medications last 6/25 AM.     Prior to Admission medications    Medication Sig Last Dose Taking? Auth Provider Long Term End Date   aspirin (ASA) 81 MG chewable tablet Take 1 tablet (81 mg) by mouth daily Starting tomorrow.  Patient taking differently: Take 81 mg by mouth every evening Starting tomorrow. Past Week at Unknown time Yes Louann Silva MD     atorvastatin (LIPITOR) 40 MG tablet TAKE 1 TABLET BY MOUTH ONCE DAILY  Patient taking differently: Take 40 mg by mouth every evening Past Week at Unknown time Yes Bry Jurado, NP Yes    carvedilol (COREG) 6.25 MG tablet TAKE 2 TABLETS BY MOUTH ONCE DAILY  Patient taking differently: No sig reported Past Week at Unknown time Yes Bry Jurado, NP Yes    docusate sodium (COLACE) 100 MG capsule Take 100 mg by mouth 2 times daily as needed  at PRN Yes Reported, Patient     escitalopram (LEXAPRO) 5 MG tablet Take 5 mg by mouth daily Past Week at Unknown time Yes Reported, Patient Yes    furosemide (LASIX) 20 MG tablet Take 1 tablet (20 mg) by mouth 2 times daily  Patient taking differently: Take 20 mg by mouth 2 times daily Past Week at Unknown time Yes Bry Jurado, NP Yes    losartan (COZAAR) 25 MG tablet Take 1 tablet (25 mg) by mouth daily  Patient taking differently: Take 25 mg by mouth daily Past Week at Unknown time Yes Bethanie Velasco, CNP Yes    magnesium oxide (MAG-OX) 400 (241.3 Mg) MG tablet  "Take 400 mg by mouth daily Past Week at Unknown time Yes Unknown, Entered By History     Multiple Vitamins-Minerals (PRESERVISION AREDS) CAPS Take 1 capsule by mouth 2 times daily  Past Week at Unknown time Yes Reported, Patient     nitroGLYcerin (NITROSTAT) 0.4 MG sublingual tablet One tablet under the tongue every 5 minutes if needed for chest pain. May repeat every 5 minutes for a maximum of 3 doses in 15 minutes\"  at PRN Yes Louann Silva MD Yes    olopatadine (PATANOL) 0.1 % ophthalmic solution Place 1 drop into both eyes 2 times daily Past Week at Unknown time Yes Unknown, Entered By History     Omega-3 Fatty Acids (FISH OIL OMEGA-3 PO) Take 1 capsule by mouth daily Past Week at Unknown time Yes Unknown, Entered By History     prednisoLONE acetate (PRED FORTE) 1 % ophthalmic suspension Place 1 drop into both eyes 2 times daily Past Week at Unknown time Yes Unknown, Entered By History     spironolactone (ALDACTONE) 25 MG tablet Take 25 mg by mouth daily Past Week at Unknown time Yes Unknown, Entered By History No    ticagrelor (BRILINTA) 90 MG tablet Take 1 tablet (90 mg) by mouth 2 times daily Start tomorrow morning. Past Week at Unknown time Yes Milly Carrasquillo, CNP Yes    traMADol (ULTRAM) 50 MG tablet Take 1 tablet (50 mg) by mouth 3 times daily Past Week at Unknown time Yes Bry Jurado, NP     VITAMIN D, CHOLECALCIFEROL, PO Take 4,000 Units by mouth daily Past Week at Unknown time Yes Reported, Patient       Date completed: 06/26/22    Medication history completed by: Sophia Castle [facility]            "

## 2022-06-26 NOTE — PROGRESS NOTES
[facility]    Medicine Progress Note - ED Observation   Date of Admission:  6/25/2022    Assessment & Plan          Assessment/Plan:  Marina Neves is a 88 year old female with a history of CAD s/p staged PCI with PAT X 1 to mRCA and PAT x1 to rPDA, ischemic cardiomyopathy with EF 33%, HTN, HLD, chronic JFrEF, chronic osteoarthritis, hard of hearing, gout, depression, macular degeneration who presented to the ED with a mechanical fall and left shoulder pain.  She is admitted to observation for pain control and PT evaluation      ## Left shoulder pain  ## Chronic right knee pain  ## Mechanical fall  - sounds mechanical in nature, no prodrome  - patient not altered, able to give history  - Xray without acute fracture or dislocation, does show Severe glenohumeral joint degenerative change with remodeling and flattening of the humeral head, chronic.   - PT/OT consults  - CBC, BMP pending this AM  - continue Tylenol and PTA Tramadol.    -Oxycodone PRN for breakthrough pain  -PT recommending TCU. SW aware and working on referrals  -Follow up with PCP for ongoing degenerative changes and pain of left shoulder    ## UTI  UA with small blood, large LE, >182 WBC, 3 RBC, many bacteria, WBC clumps present, and 7 squamous cells.   -Follow UC  -IV Rocephin ordered, transition to orals when susceptibilities return.     ##Hyponatremia  Patient Na this morning was 130. Patient is receiving IVF  -Gentle IVF    Addendum: Pharmacy noted that patient takes PTA spironolactone. However, patient is currently hypotensive (91/55) and has hyponatremia (Na 130). Will hold spironolactone for now and continue to assess.    # Hypokalemia  Potassium was 3.1 in the ED. Will replace per potassium replacement protocol. Recheck pending this AM.  - Mild, in setting daily diuretic use  - Potassium Replacement Protocol  - Repeat BNP pending this AM     ## CAD s/p PAT X 2 staged procedure   ## Ischemic  "cardiomyopathy  ## HFrEF  ## HLD  ## HLD  - compensated  - Daily weights, I/O  - Continue PTA DAPT, statin, beta blocker, ARB, furosemide     ## Depression  - continue PTA lexapro     ## Chronic constipation  - Continue PTA Colace BID with prn docusate     # Macular degeneration  - Hold PTA eye drops while on OBS, patient agreed       Diet: Regular Diet Adult    DVT Prophylaxis: Ambulate every shift  De Guzman Catheter: Not present  Central Lines: None  Cardiac Monitoring: None  Code Status: Full Code      Disposition Plan   Disposition: TCU pending referrals     The patient's care was discussed with the Attending Physician, Dr. Edouard, Bedside Nurse, Care Coordinator/, Patient and Patient's Family.    DAVID [facility]  Securely message with the Vocera Web Console (learn more here)  Text page via [employer] Paging/Directory         Clinically Significant Risk Factors Present on Admission                # Platelet Defect: home medication list includes an antiplatelet medication  # Hypertension: home medication list includes antihypertensive(s)  # Chronic systolic heart failure: echo within the past year with EF <40%    # Overweight: Estimated body mass index is 27.99 kg/m  as calculated from the following:    Height as of this encounter: 1.6 m (5' 3\").    Weight as of this encounter: 71.7 kg (158 lb).        ______________________________________________________________________    Interval History   No events overnight. Patient continue to have left shoulder pain with PTA Tylenol and Tramadol. PRN oxycodone ordered for pain. UA ordered. Results pending. Morning CBC and CMP labs pending. Patient was seen by Pt who is recommending patient discharge to TCU or home with 24 hours care. SW is aware and working with patient and family on referrals.     Data reviewed today: I reviewed all medications, new labs and imaging results " over the last 24 hours.     Physical Exam   Vital Signs: Temp: 98.2  F (36.8  C) Temp src: Oral BP: 138/74 Pulse: 72   Resp: 16 SpO2: 97 % O2 Device: None (Room air)    Weight: 158 lbs 0 oz      GENERAL APPEARENCE: Pleasant, generally appears well, A/O x4. NAD. Patient is very [place]  SKIN: Clean, dry, and intact without visible lesions, rash, jaundice, cyanosis, erythema, ecchymoses to exposed areas.  HEENT: NCAT w/out masses, lesions, or abnormalities. Sclera anicteric, PERRLA, EOMI.  Oral mucosa pink and moist without erythema, exudate, lesions, ulcerations, or thrush. Teeth and gums normal.  Wearing glasses. Very [place].  NECK: Supple, no masses. No jugular venous distention.   CARDIOVASCULAR: S1, S2 RRR. No murmurs, rubs, or gallops.   RESPIRATORY: Respiratory effort WNL. CTA  bilaterally without crackles/rales/wheeze   GI: Active BS in all 4 quadrants. Abdomen soft and non-tender. No masses or hepatosplenomegaly.  : Deferred  MUSCULOSKELETAL: Left shoulder with mild tenderness to palpation. Unable to assess LUE ROM due to pain.   PV: 2+ bilateral radial and pedal pulses. No edema noted.   NEURO: CN II-XII grossly intact. Speech normal. Appropriate throughout interview.   Sensation grossly WNL. Finger to nose and rapid alternating movements WDL.  HEME/LYMPH: No visible bleeding.  PSYCHIATRIC: Mentation and affect appear normal    Data   Recent Labs   Lab 06/25/22  2152   WBC 11.6*   HGB 10.0*   MCV 92      *   POTASSIUM 3.1*   CHLORIDE 97*   CO2 23   BUN 12.2   CR 0.60   ANIONGAP 13   MARY 8.1*   *     Recent Results (from the past 24 hour(s))   XR Shoulder Left G/E 3 Views    Narrative    EXAM: XR SHOULDER LEFT G/E 3 VIEWS, XR HUMERUS LEFT G/E 2 VIEWS  LOCATION: [facility]  DATE/TIME: 6/25/2022 7:15 PM    INDICATION: fall, pain, eval for injury  COMPARISON: 12/23/2020.      Impression    IMPRESSION: Severe glenohumeral joint degenerative change with  remodeling and flattening of the humeral head, chronic. No evidence of an acute fracture. No dislocation. Bones are demineralized.    No evidence of acute left humerus fracture.    Humerus XR,  G/E 2 views, left    Narrative    EXAM: XR SHOULDER LEFT G/E 3 VIEWS, XR HUMERUS LEFT G/E 2 VIEWS  LOCATION: [facility]  DATE/TIME: 6/25/2022 7:15 PM    INDICATION: fall, pain, eval for injury  COMPARISON: 12/23/2020.      Impression    IMPRESSION: Severe glenohumeral joint degenerative change with remodeling and flattening of the humeral head, chronic. No evidence of an acute fracture. No dislocation. Bones are demineralized.    No evidence of acute left humerus fracture.    XR Knee Left 1/2 Views    Narrative    EXAM: XR KNEE LT 1/2 VW  LOCATION: [facility]  DATE/TIME: 6/25/2022 7:15 PM    INDICATION: fall, pain, eval for fx  COMPARISON: None.      Impression    IMPRESSION: Advanced medial compartment predominant degenerative changes. No definitive fracture. No joint effusion. Bones are demineralized.    Atherosclerotic vascular calcifications.     Medications     sodium chloride         acetaminophen  975 mg Oral Q8H     aspirin  81 mg Oral Daily     atorvastatin  40 mg Oral At Bedtime     carvedilol  12.5 mg Oral Daily     docusate sodium  100 mg Oral BID     escitalopram  5 mg Oral Daily     furosemide  20 mg Oral BID     losartan  25 mg Oral Daily     magnesium oxide  400 mg Oral Daily     multivitamin w/minerals  1 tablet Oral Daily     olopatadine  1 drop Both Eyes BID     prednisoLONE acetate  1 drop Both Eyes BID     ticagrelor  90 mg Oral BID     traMADol  50 mg Oral TID

## 2022-06-26 NOTE — PLAN OF CARE
Goal Outcome Evaluation:    -diagnostic tests and consults completed and resulted. Not met    -vital signs normal or at patient baseline. Met   /64 Pulse 88   Temp 98.6  F (37  C) (Oral)   Resp 18  SpO2 94% on RA  -tolerating oral antibiotics or has plans for home infusion setup.  Not applicable.   -safe disposition plan has been identified. In Progress.

## 2022-06-26 NOTE — PLAN OF CARE
Goal Outcome Evaluation:  - Diagnostic tests and consults completed and resulted: Not met    - Vital signs normal or at patient baseline: Met  - Tolerating oral antibiotics or has plans for home infusion setup: N/A   - Safe disposition plan has been identified: Not met

## 2022-06-26 NOTE — PROGRESS NOTES
"   06/26/22 0940   Quick Adds   Type of Visit Initial PT Evaluation       Present no   Language [place] with pocket talker   Living Environment   People in Home facility resident   Current Living Arrangements assisted living   Home Accessibility no concerns   Transportation Anticipated family or friend will provide   Living Environment Comments Pt reports she lives in an [place]AN[place] in her unit alone. w/c accessible. Cousin provides transportation.   Self-Care   Usual Activity Tolerance moderate   Current Activity Tolerance poor   Regular Exercise No   Equipment Currently Used at Home walker, rolling   Fall history within last six months yes   Number of times patient has fallen within last six months 1   Activity/Exercise/Self-Care Comment Pt reports she is Sarita with ADL's and mobility with 4WW.  Does report difficulty tying her shoes. 1 fall in last 6 months which lead to admission. Decreased activity tolerance.   General Information   Onset of Illness/Injury or Date of Surgery 06/25/22   Referring Physician Lissy Smith PA-C   Patient/Family Therapy Goals Statement (PT) pain management   Pertinent History of Current Problem (include personal factors and/or comorbidities that impact the POC) per EMR: \"Marina Neves is a 88 year old female with a history of CAD s/p staged PCI with PAT X 1 to mRCA and PAT x1 to rPDA, ischemic cardiomyopathy with EF 33%, HTN, HLD, chronic JFrEF, chronic osteoarthritis, hard of hearing, gout, depression, macular degeneration who presented to the ED with a mechanical fall.  She states she feels she is in her usual state of health, and she experienced a fall yesterday where she \"misjudged the location of a chair.\"  She fell onto her bottom, and since has had a hard time bearing weight on her left shoulder.  This is challenging because she uses a walker.  She states \"I don't want to stay here for months, I want to get up and move!\"  She states that she wants to speak with " "her cousin, Yo Alberto, who may be able to help her get her hearing aids functioning again.  She states her pain is tolerable and she is able to rest.  She is extremely hard of hearing, but this provider notes that removing mask to facilitate some lip-reading is enormously helpful.  She had recent (June 14 positive test) Covid infection with symptoms of cough, congestion and sore throat treated with Paxlovid.  Review of recent TCU notes indicate that she is likely requiring more assistance with her ADLS.  \"   Existing Precautions/Restrictions fall   Weight-Bearing Status - LUE full weight-bearing   Weight-Bearing Status - RUE full weight-bearing   Weight-Bearing Status - LLE full weight-bearing   Weight-Bearing Status - RLE full weight-bearing   Cognition   Orientation Status (Cognition) oriented x 4   Pain Assessment   Patient Currently in Pain Yes, see Vital Sign flowsheet   Integumentary/Edema   Integumentary/Edema no deficits were identifed   Posture    Posture Forward head position;Protracted shoulders   Range of Motion (ROM)   Range of Motion ROM is WFL   Strength (Manual Muscle Testing)   Strength (Manual Muscle Testing) strength is WFL   Strength Comments generalized weakness but grossly 3/5 in B LE   Bed Mobility   Comment, (Bed Mobility) supine > sit with min A   Transfers   Comment, (Transfers) sit > stand with FWW and min A   Gait/Stairs (Locomotion)   Comment, (Gait/Stairs) gait impaired   Balance   Balance Comments fair standing balance   Sensory Examination   Sensory Perception patient reports no sensory changes   Coordination   Coordination no deficits were identified   Muscle Tone   Muscle Tone no deficits were identified   Clinical Impression   Criteria for Skilled Therapeutic Intervention Yes, treatment indicated   PT Diagnosis (PT) impaired functional mobility, pain   Influenced by the following impairments decreased balance, strength, and endurance; increased pain   Functional limitations due " to impairments difficulty with bed mobility, transfers, and walking   Clinical Presentation (PT Evaluation Complexity) Stable/Uncomplicated   Clinical Presentation Rationale per clinical judgment   Clinical Decision Making (Complexity) low complexity   Planned Therapy Interventions (PT) balance training;bed mobility training;gait training;home exercise program;motor coordination training;neuromuscular re-education;patient/family education;ROM (range of motion);strengthening;stretching;transfer training;progressive activity/exercise;risk factor education;home program guidelines   Anticipated Equipment Needs at Discharge (PT)   (tbd)   Risk & Benefits of therapy have been explained evaluation/treatment results reviewed;care plan/treatment goals reviewed;risks/benefits reviewed;current/potential barriers reviewed;participants voiced agreement with care plan;participants included;patient   PT Discharge Planning   PT Discharge Recommendation (DC Rec) Transitional Care Facility;home with assist;home with home care physical therapy   PT Rationale for DC Rec Pt is demonstrating functional mobility below baseline with greatest limitation being L shoulder pain. Pt will benefit from TCU to improve safety and independence with mobility prior to returning to [facility]. If d/c back to facility, pt will require 24/7 assist with ADL's and mobility with walker and HH PT. Team reports unable to obtain 24/7 assist at facility so TCU is safest d/c option.   PT Brief overview of current status Ax1 with FWW and gait belt for transfers   Plan of Care Review   Plan of Care Reviewed With patient   Total Evaluation Time   Total Evaluation Time (Minutes) 10   Physical Therapy Goals   PT Frequency 5x/week   PT Predicted Duration/Target Date for Goal Attainment 07/10/22   PT: Bed Mobility Independent;Supine to/from sit;Rolling;Bridging  (with HOB flat)   PT: Transfers Modified independent;Sit to/from stand;Bed to/from chair  (with LRAD)   PT: Gait  Modified independent;150 feet  (with LRAD)   Eval completed in OBS unit    Elayne Kohler, PT

## 2022-06-26 NOTE — CONSULTS
Care Management Initial Consult    General Information  Assessment completed with: Patient, Family,  (eugenia Dubois)  Type of CM/SW Visit: Initial Assessment    Primary Care Provider verified and updated as needed: Yes (Cale Whatley [phone] [facility] [address], [place])   Readmission within the last 30 days: current reason for admission unrelated to previous admission   Return Category: New Diagnosis  Reason for Consult: discharge planning  Advance Care Planning: Advance Care Planning Reviewed: other (see comments)     Unable to retrieve scans. Eugenia Dubois will provide copy       Communication Assessment  Patient's communication style: spoken language (English or Bilingual)    Hearing Difficulty or Deaf: yes      Cognitive  Cognitive/Neuro/Behavioral: WDL                      Living Environment:   People in home: facility resident     Current living Arrangements: independent living facility      Able to return to prior arrangements: yes       Family/Social Support:  Care provided by: self, other (see comments) (family and facility staff)  Provides care for: no one  Marital Status:   Other (specify), Facility resident(s)/Staff (Eugenia Dubois and his wife Danika, Praveen Condon)          Description of Support System: Supportive, Involved    Support Assessment: Adequate family and caregiver support, Adequate social supports    Current Resources:   Patient receiving home care services: No     Community Resources: None  Equipment currently used at home: walker, rolling, grab bar, toilet, grab bar, tub/shower  Supplies currently used at home: None    Employment/Financial:  Employment Status: retired        Financial Concerns: No concerns identified   Referral to Financial Worker: No       Lifestyle & Psychosocial Needs:  Social Determinants of Health     Tobacco Use: Low Risk      Smoking Tobacco Use: Never Smoker     Smokeless Tobacco Use: Never Used   Alcohol Use: Not  on file   Financial Resource Strain: Not on file   Food Insecurity: Not on file   Transportation Needs: Not on file   Physical Activity: Not on file   Stress: Not on file   Social Connections: Not on file   Intimate Partner Violence: Not on file   Depression: Not on file   Housing Stability: Not on file       Functional Status:  Prior to admission patient needed assistance:   Dependent ADLs:: Ambulation-walker  Dependent IADLs:: Transportation, Cleaning, Medication Management, Laundry, Shopping  Assesssment of Functional Status: Not at baseline with ADL Functioning    Marina is normally independent in her ADLs, other than using a walker to ambulate. Yo and his wife assist her with household chores and transport her to all of her appointments. They provide her ready-to-microwave meals and other food, as well. She can independently use the microwave Marina's niece Roseanna sets up her medication.       Mental Health Status:  Mental Health Status: Current Concern  Mental Health Management: Medication    Chemical Dependency Status:  Chemical Dependency Status: No Current Concerns             Values/Beliefs:  Spiritual, Cultural Beliefs, Druze Practices, Values that affect care:                 Additional Information:      Care Management Follow Up    Length of Stay (days): 0    Expected Discharge Date:  TBD     Concerns to be Addressed:     DIALLO Document and Initial Assessment  Patient plan of care discussed at interdisciplinary rounds: Yes    Anticipated Discharge Disposition:  TBD     Anticipated Discharge Services:    Anticipated Discharge DME:      Patient/family educated on Medicare website which has current facility and service quality ratings:  N/A  Education Provided on the Discharge Plan:  N/A  Patient/Family in Agreement with the Plan:  N/A    Referrals Placed by VICTORINO/WON:  Marisa SAINI  Private pay costs discussed: insurance costs out of pocket expenses, co-pays and deductibles    Additional  "Information:    Marina Neves is a 88 year old female with a history of CAD s/p staged PCI with PAT X 1 to mRCA and PAT x1 to rPDA, ischemic cardiomyopathy with EF 33%, HTN, HLD, chronic JFrEF, chronic osteoarthritis, hard of hearing, gout, depression, macular degeneration who presented to the ED with a mechanical fall.  Marina Neves is a 88 year old female with a history of CAD s/p staged PCI with PAT X 1 to mRCA and PAT x1 to rPDA, ischemic cardiomyopathy with EF 33%, HTN, HLD, chronic JFrEF, chronic osteoarthritis, hard of hearing, gout, depression, macular degeneration who presented to the ED with a mechanical fall. Of note, she is Covid recovered.    Per conversation with PT Elayne, TCU is recommended.    1200 SW met with Marina at bedside to introduce self, explain SW role, explain the Medicare Outpatient Observation Notice (MOON), why she was receiving it, and to perform initial assessment.     After introductions were made, SW explained the DIALLO.  SW then addressed questions and concerns, and asked Marina to sign document acknowledging its receipt. Marina signed DIALLO at 1202.        SW explained PT recommendation of TCU and asked if wanted to go to the TCU affiliated with her Independent Living Facility. She told SW to speak with her cousin Silvino (Yo) because \"I don't know anything about them\". SW agreed and offered to review document with him. She accepted the offer. SW provided availability and contact information and excused self from her bedside.    1205 SW called Yo ([phone]) introduced self and explained the reason for the call. His wife Danika was present and they agreed to speak with WON. Yo put the call on speaker.    Marina is  lives alone in an apartment at [facility]. She has no children but recieves much emotional and practical support from Yo and Danika. Other than using a walker to ambulate. Marina is normally independent in her " "ADLs Yo and his wife assist her with household chores and transport her to all of her appointments. She has the option to have the facility provide her meals but prefers Yo and Danika to bring her ready-to-microwave meals and other food, as well. She can independently use the microwave. Marina's niece Roseanna sets up her medication. Yo has arranged that a staff checks up on her daily at 2030 to ensure her safety and that she has everything she needs before settling in.    Yo told WON that he is Marina's HCA and has her financial POA. SW asked if he would provide a copy of her Health Care Directive to enter into her EHR. He agreed to provide one. SW reviewed the DIALLO document with him and he expressed understanding.    Yo and Danika expressed concern over her shoulder pain. They reported she complained about her shoulder the day she discharged from the TCU on Friday. They had hoped that with \"taking it easy\" once she was home it would feel better. He also expressed concern because Danika had tried multiple times to contact her on Saturday. She tried multiple numbers and left messages, but no one from the facility called back. He told SW that when SW called it was the first he knew of her being in the hospital.    Yo asked if there was anything other than the HCD he should bring. Per conversation with OBS provider, DAVID Newman, SW asked him to provide new hearing aid batteries and her eye drops. He agreed to bring these items as well as some clothing for when she discharges.        WON discussed the SNF/TCU referral process and offered the Medicare Compare list for SNF, with associated star ratings to assist with choice for referrals/discharge planning. Yo told WON that she had been to [facility] and liked it there. He asked that a referral be made there.     No further questions or concerns reported at this time. SW provided contact information and encouraged them to call if any further " needs arise.    WON faxed DIALLO to [facility] ([phone]) at 1258 then placed DIALLO in Marina's chart.    1533 WON sent Initial SNF Referral via Epic to:    [facility]  [address] [place]  [address]  Ph: [phone]  Adm: [phone]  Fax: [phone]    Addendum 2005:    1650 Yo and Danika brought the items requested earlier. Yo gave the HCD to WON. WON emailed the HCD to [employer] at 2003    SW will continue to follow as needed.    SIDDHARTAH Carvalho, [facility]  ED/OBS   [facility]  Phone: [phone]  Pager: [phone]  Fax: [phone]     On-call pager, [phone], 4:00 pm to midnight

## 2022-06-27 ENCOUNTER — APPOINTMENT (OUTPATIENT)
Dept: MRI IMAGING | Facility: CLINIC | Age: 87
End: 2022-06-27
Attending: NURSE PRACTITIONER
Payer: COMMERCIAL

## 2022-06-27 ENCOUNTER — DOCUMENTATION ONLY (OUTPATIENT)
Dept: OTHER | Facility: CLINIC | Age: 87
End: 2022-06-27

## 2022-06-27 LAB
ALBUMIN SERPL BCG-MCNC: 3.1 G/DL (ref 3.5–5.2)
ALP SERPL-CCNC: 79 U/L (ref 35–104)
ALT SERPL W P-5'-P-CCNC: 20 U/L (ref 10–35)
ANION GAP SERPL CALCULATED.3IONS-SCNC: 7 MMOL/L (ref 7–15)
AST SERPL W P-5'-P-CCNC: 16 U/L (ref 10–35)
BASOPHILS # BLD AUTO: 0 10E3/UL (ref 0–0.2)
BASOPHILS NFR BLD AUTO: 0 %
BILIRUB SERPL-MCNC: 0.4 MG/DL
BUN SERPL-MCNC: 12.7 MG/DL (ref 8–23)
CALCIUM SERPL-MCNC: 7.9 MG/DL (ref 8.8–10.2)
CHLORIDE SERPL-SCNC: 101 MMOL/L (ref 98–107)
CREAT SERPL-MCNC: 0.58 MG/DL (ref 0.51–0.95)
DEPRECATED HCO3 PLAS-SCNC: 23 MMOL/L (ref 22–29)
EOSINOPHIL # BLD AUTO: 0.2 10E3/UL (ref 0–0.7)
EOSINOPHIL NFR BLD AUTO: 2 %
ERYTHROCYTE [DISTWIDTH] IN BLOOD BY AUTOMATED COUNT: 14.6 % (ref 10–15)
GFR SERPL CREATININE-BSD FRML MDRD: 87 ML/MIN/1.73M2
GLUCOSE SERPL-MCNC: 100 MG/DL (ref 70–99)
HCT VFR BLD AUTO: 30.8 % (ref 35–47)
HGB BLD-MCNC: 9.9 G/DL (ref 11.7–15.7)
IMM GRANULOCYTES # BLD: 0.1 10E3/UL
IMM GRANULOCYTES NFR BLD: 1 %
LYMPHOCYTES # BLD AUTO: 2.5 10E3/UL (ref 0.8–5.3)
LYMPHOCYTES NFR BLD AUTO: 25 %
MAGNESIUM SERPL-MCNC: 2 MG/DL (ref 1.7–2.3)
MCH RBC QN AUTO: 31 PG (ref 26.5–33)
MCHC RBC AUTO-ENTMCNC: 32.1 G/DL (ref 31.5–36.5)
MCV RBC AUTO: 97 FL (ref 78–100)
MONOCYTES # BLD AUTO: 1.4 10E3/UL (ref 0–1.3)
MONOCYTES NFR BLD AUTO: 14 %
NEUTROPHILS # BLD AUTO: 5.7 10E3/UL (ref 1.6–8.3)
NEUTROPHILS NFR BLD AUTO: 58 %
NRBC # BLD AUTO: 0 10E3/UL
NRBC BLD AUTO-RTO: 0 /100
PHOSPHATE SERPL-MCNC: 2.5 MG/DL (ref 2.5–4.5)
PLATELET # BLD AUTO: 239 10E3/UL (ref 150–450)
POTASSIUM SERPL-SCNC: 3.6 MMOL/L (ref 3.4–5.3)
PROT SERPL-MCNC: 6.2 G/DL (ref 6.4–8.3)
RBC # BLD AUTO: 3.19 10E6/UL (ref 3.8–5.2)
SODIUM SERPL-SCNC: 131 MMOL/L (ref 136–145)
TSH SERPL DL<=0.005 MIU/L-ACNC: 2.03 UIU/ML (ref 0.3–4.2)
WBC # BLD AUTO: 9.8 10E3/UL (ref 4–11)

## 2022-06-27 PROCEDURE — 250N000013 HC RX MED GY IP 250 OP 250 PS 637: Performed by: NURSE PRACTITIONER

## 2022-06-27 PROCEDURE — 84443 ASSAY THYROID STIM HORMONE: CPT | Performed by: PHYSICIAN ASSISTANT

## 2022-06-27 PROCEDURE — 250N000011 HC RX IP 250 OP 636

## 2022-06-27 PROCEDURE — 36415 COLL VENOUS BLD VENIPUNCTURE: CPT | Performed by: PHYSICIAN ASSISTANT

## 2022-06-27 PROCEDURE — 73221 MRI JOINT UPR EXTREM W/O DYE: CPT | Mod: 26 | Performed by: RADIOLOGY

## 2022-06-27 PROCEDURE — 258N000003 HC RX IP 258 OP 636

## 2022-06-27 PROCEDURE — 99224 PR SUBSEQUENT OBSERVATION CARE,LEVEL I: CPT | Performed by: NURSE PRACTITIONER

## 2022-06-27 PROCEDURE — 85004 AUTOMATED DIFF WBC COUNT: CPT | Performed by: PHYSICIAN ASSISTANT

## 2022-06-27 PROCEDURE — G0378 HOSPITAL OBSERVATION PER HR: HCPCS

## 2022-06-27 PROCEDURE — 83735 ASSAY OF MAGNESIUM: CPT | Performed by: PHYSICIAN ASSISTANT

## 2022-06-27 PROCEDURE — 73221 MRI JOINT UPR EXTREM W/O DYE: CPT | Mod: LT

## 2022-06-27 PROCEDURE — 84100 ASSAY OF PHOSPHORUS: CPT | Performed by: EMERGENCY MEDICINE

## 2022-06-27 PROCEDURE — 250N000013 HC RX MED GY IP 250 OP 250 PS 637: Performed by: PHYSICIAN ASSISTANT

## 2022-06-27 PROCEDURE — 80053 COMPREHEN METABOLIC PANEL: CPT | Performed by: PHYSICIAN ASSISTANT

## 2022-06-27 PROCEDURE — 96376 TX/PRO/DX INJ SAME DRUG ADON: CPT | Mod: XU

## 2022-06-27 RX ORDER — LIDOCAINE 4 G/G
1 PATCH TOPICAL
Status: DISCONTINUED | OUTPATIENT
Start: 2022-06-27 | End: 2022-06-28 | Stop reason: HOSPADM

## 2022-06-27 RX ORDER — HYDROMORPHONE HCL IN WATER/PF 6 MG/30 ML
0.2 PATIENT CONTROLLED ANALGESIA SYRINGE INTRAVENOUS
Status: DISCONTINUED | OUTPATIENT
Start: 2022-06-27 | End: 2022-06-28 | Stop reason: HOSPADM

## 2022-06-27 RX ORDER — POLYETHYLENE GLYCOL 3350 17 G/17G
17 POWDER, FOR SOLUTION ORAL DAILY
Status: DISCONTINUED | OUTPATIENT
Start: 2022-06-27 | End: 2022-06-28 | Stop reason: HOSPADM

## 2022-06-27 RX ORDER — OXYCODONE HYDROCHLORIDE 5 MG/1
5 TABLET ORAL EVERY 6 HOURS PRN
Status: DISCONTINUED | OUTPATIENT
Start: 2022-06-27 | End: 2022-06-28 | Stop reason: HOSPADM

## 2022-06-27 RX ORDER — SPIRONOLACTONE 25 MG/1
25 TABLET ORAL DAILY
Status: DISCONTINUED | OUTPATIENT
Start: 2022-06-27 | End: 2022-06-28 | Stop reason: HOSPADM

## 2022-06-27 RX ORDER — IBUPROFEN 200 MG
400 TABLET ORAL ONCE
Status: COMPLETED | OUTPATIENT
Start: 2022-06-27 | End: 2022-06-27

## 2022-06-27 RX ADMIN — IBUPROFEN 400 MG: 200 TABLET, FILM COATED ORAL at 09:29

## 2022-06-27 RX ADMIN — LIDOCAINE PATCH 4% 1 PATCH: 40 PATCH TOPICAL at 10:30

## 2022-06-27 RX ADMIN — LOSARTAN POTASSIUM 25 MG: 25 TABLET, FILM COATED ORAL at 08:08

## 2022-06-27 RX ADMIN — ATORVASTATIN CALCIUM 40 MG: 40 TABLET, FILM COATED ORAL at 21:23

## 2022-06-27 RX ADMIN — ACETAMINOPHEN 975 MG: 325 TABLET, FILM COATED ORAL at 06:30

## 2022-06-27 RX ADMIN — POLYETHYLENE GLYCOL 3350 17 G: 17 POWDER, FOR SOLUTION ORAL at 19:31

## 2022-06-27 RX ADMIN — TICAGRELOR 90 MG: 90 TABLET ORAL at 08:09

## 2022-06-27 RX ADMIN — CEFTRIAXONE SODIUM 1 G: 1 INJECTION, POWDER, FOR SOLUTION INTRAMUSCULAR; INTRAVENOUS at 18:25

## 2022-06-27 RX ADMIN — TRAMADOL HYDROCHLORIDE 50 MG: 50 TABLET, COATED ORAL at 08:09

## 2022-06-27 RX ADMIN — DOCUSATE SODIUM 100 MG: 100 CAPSULE, LIQUID FILLED ORAL at 08:08

## 2022-06-27 RX ADMIN — SODIUM CHLORIDE: 9 INJECTION, SOLUTION INTRAVENOUS at 00:59

## 2022-06-27 RX ADMIN — OXYCODONE HYDROCHLORIDE 5 MG: 5 TABLET ORAL at 21:23

## 2022-06-27 RX ADMIN — ESCITALOPRAM 5 MG: 5 TABLET, FILM COATED ORAL at 08:09

## 2022-06-27 RX ADMIN — ACETAMINOPHEN 975 MG: 325 TABLET, FILM COATED ORAL at 13:25

## 2022-06-27 RX ADMIN — ACETAMINOPHEN 975 MG: 325 TABLET, FILM COATED ORAL at 21:23

## 2022-06-27 RX ADMIN — SODIUM CHLORIDE: 9 INJECTION, SOLUTION INTRAVENOUS at 13:30

## 2022-06-27 RX ADMIN — DOCUSATE SODIUM 100 MG: 100 CAPSULE, LIQUID FILLED ORAL at 19:31

## 2022-06-27 RX ADMIN — TICAGRELOR 90 MG: 90 TABLET ORAL at 19:31

## 2022-06-27 RX ADMIN — ASPIRIN 81 MG CHEWABLE TABLET 81 MG: 81 TABLET CHEWABLE at 08:08

## 2022-06-27 RX ADMIN — CARVEDILOL 12.5 MG: 12.5 TABLET, FILM COATED ORAL at 08:08

## 2022-06-27 RX ADMIN — FUROSEMIDE 20 MG: 20 TABLET ORAL at 08:08

## 2022-06-27 RX ADMIN — Medication 400 MG: at 08:08

## 2022-06-27 RX ADMIN — Medication 1 TABLET: at 08:08

## 2022-06-27 RX ADMIN — OXYCODONE HYDROCHLORIDE 5 MG: 5 TABLET ORAL at 09:30

## 2022-06-27 NOTE — PLAN OF CARE
Goal Outcome Evaluation:    - Diagnostic tests and consults completed and resulted: Not met; waiting on MRI      - Vital signs normal or at patient baseline: Met     - Tolerating oral antibiotics or has plans for home infusion setup: Not met     - Safe disposition plan has been identified: Not met

## 2022-06-27 NOTE — PROGRESS NOTES
Care Management Follow Up    Length of Stay (days): 0    Expected Discharge Date: 06/28/2022     Concerns to be Addressed: all concerns addressed in this encounter     Patient plan of care discussed at interdisciplinary rounds: No    Anticipated Discharge Disposition: Transitional Care    [facility]  [address] [place]  [address]  P: [phone]  P: [phone] - Admissions  F: [phone]     Anticipated Discharge Services: None  Anticipated Discharge DME: None    Patient/family educated on Medicare website which has current facility and service quality ratings: yes  Education Provided on the Discharge Plan:    Patient/Family in Agreement with the Plan: yes    Referrals Placed by CM/SW: Post Acute Facilities  Private pay costs discussed: Not applicable    Additional Information:  Chart reviewed. Case discussed with bedside RN and medical provider. Pt can discharge with confirmation of TCU placement.    Writer met with pt and updated her on her acceptance to [facility] TCU. Pt is agreeable to discharge to TCU, but is disappointed in not being able to discharge to SNF today. Writer explained the barrier to discharging today (SNF unable to accept today). Pt is agreeable to discharging tomorrow. Discussed transportation. Pt reports she defers to her cousin, Silvino & Pearl. Writer spoke with Pearl (P: [phone]) who reports she cannot transport pt d/t the pt's weakness and requested medical transportation. Writer confirmed there would be a cost, which Pearl reported would be okay.     Writer updated NP-Valarie for discharge plan for tomorrow.    Writer scheduled for Tuesday a 1100 W/c p/u via [facility] (P: [phone]).    PAS: [id number]    Referrals have been made to the following facilities and their status is as follows:    [facility]  [address] [place]  [address]  P: [phone]  P: [phone] - Admissions  F: [phone]  -  Writer left VM with SNF admissions.   - Writer spoke with Leila in admissions. SNF has clinically accepted pt to a private room and can admit pt tomorrow.    ________________    SIDDHARTHA Escamilla, [facility]  ED/Observation   [facility]  Phone: [phone]  Pager: [phone]  Fax: [phone]    On-call pager, [phone], 4:00pm to midnight

## 2022-06-27 NOTE — PLAN OF CARE
Goal Outcome Evaluation:    - Diagnostic tests and consults completed and resulted: Not met      - Vital signs normal or at patient baseline: Met    - Tolerating oral antibiotics or has plans for home infusion setup: Not met, currently on Rocephin q24h.      - Safe disposition plan has been identified: Not met       /63  Pulse 73   Temp 97.8  F (36.6  C) (Oral)   Resp 15    SpO2 93% on RA

## 2022-06-27 NOTE — PLAN OF CARE
Goal Outcome Evaluation:    - Diagnostic tests and consults completed and resulted: Not met      - Vital signs normal or at patient baseline: Met  /60  Pulse 69   Temp 97.9  F (36.6  C) (Oral)   Resp 12  SpO2 95% on RA    - Tolerating oral antibiotics or has plans for home infusion setup: Not met, currently on Rocephin.      - Safe disposition plan has been identified: Not met

## 2022-06-27 NOTE — PLAN OF CARE
"Goal Outcome Evaluation:    - Diagnostic tests and consults completed and resulted: Not met;      - Vital signs normal or at patient baseline: Met     - Tolerating oral antibiotics or has plans for home infusion setup: NA     - Safe disposition plan has been identified: met, discharge to tcu 6/28    BP 95/54 (BP Location: Right arm)   Pulse 61   Temp 98.3  F (36.8  C) (Oral)   Resp 18   Ht 1.6 m (5' 3\")   Wt 71.7 kg (158 lb)   SpO2 97%   BMI 27.99 kg/m             "

## 2022-06-27 NOTE — PROGRESS NOTES
[facility]    Medicine Progress Note - Hospitalist Service    Date of Admission:  6/25/2022    Assessment & Plan         Marina Neves is a 88 year old female with a history of NSTEMI s/p staged PCI with PAT X 1 to mRCA and PAT x1 to rPDA December 2021, ischemic cardiomyopathy with EF 33%, HTN, HLD, chronic JFrEF, chronic osteoarthritis, hard of hearing, gout, depression, macular degeneration who presented to the ED with a mechanical fall and left shoulder pain.  She is admitted to observation for pain control and PT evaluation      ## Left shoulder pain  ## Chronic right knee pain Patient denies falling. Reports she woke up with left shoulder pain. Per chart review has had left shoulder pain and has had cortisone injections with improvement of her pain. Patient denies any trauma or increased use of her left arm. Xray without acute fracture or dislocation, does show Severe glenohumeral joint degenerative change with remodeling and flattening of the humeral head, chronic. PT consulted and recommended TCU. Patient has been accepted tomorrow.  Hgb bL appears to be around 10. BMP with Na 131.  Stopped Tramadol and started Oxycodone. Per chart review, patient has been seen for chronic pain in pain clinic. MRI partially completed today: Large amount of fluid in the subacromial and subdeltoid bursa consistent with very prominent subacromial bursitis.  Advanced glenohumeral degenerative changes.  Abnormal signal distal subscapularis tendon consistent with tendinosis and significant partial tearing. There do appear to be intact fibers. Probable tendinosis and significant perhaps near complete partial tearing of the intra-articular portion of the biceps tendon  - Ortho consult  - Sling  - Schedule Tylenol  -Oxycodone PRN for breakthrough pain  - Dilaudid 0.2 IV prn q 3 hours for breakthrough pain  - Lidoderm patch  -Follow up with PCP for ongoing degenerative changes and pain of  left shoulder    ## UTI  UA with small blood, large LE, >182 WBC, 3 RBC, many bacteria, WBC clumps present, and 7 squamous cells.   -Follow UC  -IV Rocephin ordered, transition to orals when susceptibilities return.     ##Hyponatremia Patient on PTA spironolactone and Lasix. However, patient is currently hypotensive (95/54) and has hyponatremia (Na 131). Will hold spironolactone and Lasix until BP improves. Stop IVF due to HF  - Repeat BMP in am        ## NSTEMI s/p PAT X 2 staged procedure 12/2021   ## Ischemic cardiomyopathy  ## HFrEF  ## HLD  ## HLD  Denies any chest pain  - Continuous cardiac monitoring  - compensated  - Daily weights, I/O  - Continue PTA DAPT, statin, beta blocker, ARB     ## Depression  - continue PTA lexapro     ## Chronic constipation  - Continue PTA Colace BID with prn docusate     # Macular degeneration  - Hold PTA eye drops while on OBS, patient agreed         Diet: Regular Diet Adult    DVT Prophylaxis: Ambulate every shift  De Guzman Catheter: Not present  Central Lines: None  Cardiac Monitoring: None  Code Status: Full Code      Disposition Plan      Expected Discharge Date: 06/28/2022      Destination: nursing home          The patient's care was discussed with the Attending Physician, Dr. Wilkins, Bedside Nurse, Care Coordinator/ and Patient.    DAVID Patton CNP___________________________________________________________    Interval History   No events overnight     Data reviewed today: I reviewed all medications, new labs and imaging results over the last 24 hours.    Physical Exam   Vital Signs: Temp: 98.3  F (36.8  C) Temp src: Oral BP: 95/54 Pulse: 61   Resp: 18 SpO2: 97 % O2 Device: None (Room air)    Weight: 158 lbs 0 oz  GENERAL APPEARENCE: Pleasant, generally appears well, A/O x4. NAD. Patient is very [place]  SKIN: Clean, dry, and intact without visible lesions, rash, jaundice, cyanosis, erythema, ecchymoses to exposed areas.  HEENT: NCAT w/out masses,  lesions, or abnormalities. Sclera anicteric, PERRLA, EOMI.  Oral mucosa pink and moist without erythema, exudate, lesions, ulcerations, or thrush. Teeth and gums normal.  Wearing glasses. Very [place].  NECK: Supple, no masses. No jugular venous distention.   CARDIOVASCULAR: S1, S2 RRR. No murmurs, rubs, or gallops.   RESPIRATORY: Respiratory effort WNL. CTA  bilaterally without crackles/rales/wheeze   GI: Active BS in all 4 quadrants. Abdomen soft and non-tender. No masses or hepatosplenomegaly.  : Deferred  MUSCULOSKELETAL: Left shoulder with mild tenderness to palpation. Unable to assess LUE ROM due to pain.   PV: 2+ bilateral radial and pedal pulses. No edema noted.   NEURO: CN II-XII grossly intact. Speech normal. Appropriate throughout interview.   Sensation grossly WNL. Finger to nose and rapid alternating movements WDL.  HEME/LYMPH: No visible bleeding.  PSYCHIATRIC: Mentation and affect appear normal    Data   Recent Labs   Lab 06/27/22  0700 06/26/22  1059 06/25/22  2152   WBC 9.8 10.5 11.6*   HGB 9.9* 9.6* 10.0*   MCV 97 94 92    223 239   * 130* 133*   POTASSIUM 3.6 3.8 3.1*   CHLORIDE 101 98 97*   CO2 23 22 23   BUN 12.7 13.2 12.2   CR 0.58 0.72 0.60   ANIONGAP 7 10 13   MARY 7.9* 7.6* 8.1*   * 173* 146*   ALBUMIN 3.1*  --   --    PROTTOTAL 6.2*  --   --    BILITOTAL 0.4  --   --    ALKPHOS 79  --   --    ALT 20  --   --    AST 16  --   --      No results found for this or any previous visit (from the past 24 hour(s)).  Medications       acetaminophen  975 mg Oral Q8H     aspirin  81 mg Oral Daily     atorvastatin  40 mg Oral At Bedtime     carvedilol  12.5 mg Oral Daily     cefTRIAXone  1 g Intravenous Q24H     docusate sodium  100 mg Oral BID     escitalopram  5 mg Oral Daily     [Held by provider] furosemide  20 mg Oral BID     lidocaine  1 patch Transdermal Q24H     lidocaine   Transdermal Q8H SHERRILL     losartan  25 mg Oral Daily     magnesium oxide  400 mg Oral Daily     multivitamin  w/minerals  1 tablet Oral Daily     [Held by provider] spironolactone  25 mg Oral Daily     ticagrelor  90 mg Oral BID

## 2022-06-27 NOTE — PLAN OF CARE
Goal Outcome Evaluation:    - Diagnostic tests and consults completed and resulted: Not met; waiting on MRI      - Vital signs normal or at patient baseline: Met     - Tolerating oral antibiotics or has plans for home infusion setup: NA     - Safe disposition plan has been identified: Not met

## 2022-06-27 NOTE — UTILIZATION REVIEW
"  [facility] Utilization Review  Admission Status; Secondary Review Determination     Admission Date: 6/25/2022  4:53 PM      Under the authority of the Utilization Management Committee, the utilization review process indicated a secondary review on the above patient.  The review outcome is based on review of the medical records, discussions with staff, and applying clinical experience noted on the date of the review.        (X) Observation Status Appropriate - This patient does not meet hospital inpatient criteria and is placed in observation status. If this patient's primary payer is Medicare and was admitted as an inpatient, Condition Code 44 should be used and patient status changed to \"observation\".   () Observation Status concurrent Review           RATIONALE FOR DETERMINATION   Marina Neves is a 88 year old female with with history of CAD, ischemic cardiomyopathy, who presented after a fall and is registered to observation for management and work-up of generalized weakness, left shoulder pain.  Initial work-up is unrevealing for any cardiac or infectious etiologies, as asymptomatic pyuria without signs of Sirs/pyelonephritis.  She is started on oral analgesics, PT/OT, supportive cares and conservative management and is currently awaiting TCU bed availability, likely on 6/28 otherwise patient is stable and no plans for any aggressive work-up or intervention.Based on severity of illness and intensity of service, patient does not meet criteria for inpatient admission. Observation status is appropriate with likely transfer to TCU on 6/28.           The definitions of Inpatient Status and Observation Status used in making the determination above are those provided in the CMS Coverage Manual, Chapter 1 and Chapter 6, section 70.4.      Sincerely,       Cecilia Guzman MD, MS  Physician Advisor  Utilization Review-[place]    Phone: [phone]   "

## 2022-06-28 ENCOUNTER — TELEPHONE (OUTPATIENT)
Dept: ORTHOPEDICS | Facility: CLINIC | Age: 87
End: 2022-06-28

## 2022-06-28 VITALS
BODY MASS INDEX: 28 KG/M2 | SYSTOLIC BLOOD PRESSURE: 150 MMHG | DIASTOLIC BLOOD PRESSURE: 74 MMHG | TEMPERATURE: 98.4 F | HEIGHT: 63 IN | HEART RATE: 74 BPM | RESPIRATION RATE: 18 BRPM | OXYGEN SATURATION: 99 % | WEIGHT: 158 LBS

## 2022-06-28 VITALS
DIASTOLIC BLOOD PRESSURE: 75 MMHG | BODY MASS INDEX: 27.99 KG/M2 | HEART RATE: 100 BPM | TEMPERATURE: 98.2 F | SYSTOLIC BLOOD PRESSURE: 146 MMHG | WEIGHT: 158 LBS | RESPIRATION RATE: 18 BRPM | OXYGEN SATURATION: 98 %

## 2022-06-28 DIAGNOSIS — M25.561 CHRONIC PAIN OF RIGHT KNEE: ICD-10-CM

## 2022-06-28 DIAGNOSIS — M75.52 SUBACROMIAL BURSITIS OF LEFT SHOULDER JOINT: ICD-10-CM

## 2022-06-28 DIAGNOSIS — G89.29 CHRONIC PAIN OF RIGHT KNEE: ICD-10-CM

## 2022-06-28 PROBLEM — Z95.5 PRESENCE OF CORONARY ANGIOPLASTY IMPLANT AND GRAFT: Status: ACTIVE | Noted: 2021-12-14

## 2022-06-28 PROBLEM — G89.4 CHRONIC PAIN DISORDER: Status: ACTIVE | Noted: 2019-09-23

## 2022-06-28 PROBLEM — Z96.651 PRESENCE OF RIGHT ARTIFICIAL KNEE JOINT: Status: ACTIVE | Noted: 2021-12-14

## 2022-06-28 PROBLEM — H35.3290: Status: ACTIVE | Noted: 2021-12-15

## 2022-06-28 PROBLEM — M81.0 AGE-RELATED OSTEOPOROSIS WITHOUT CURRENT PATHOLOGICAL FRACTURE: Status: ACTIVE | Noted: 2022-06-08

## 2022-06-28 PROBLEM — H10.45 OTHER CHRONIC ALLERGIC CONJUNCTIVITIS: Status: ACTIVE | Noted: 2022-06-08

## 2022-06-28 PROBLEM — N39.41 URGE INCONTINENCE: Status: ACTIVE | Noted: 2021-12-15

## 2022-06-28 PROBLEM — E78.2 MIXED HYPERLIPIDEMIA: Status: ACTIVE | Noted: 2021-12-15

## 2022-06-28 PROBLEM — I25.5 ISCHEMIC CARDIOMYOPATHY: Status: ACTIVE | Noted: 2021-12-14

## 2022-06-28 PROBLEM — Z98.1 ARTHRODESIS STATUS: Status: ACTIVE | Noted: 2021-12-15

## 2022-06-28 PROBLEM — R39.15 URGENCY OF URINATION: Status: ACTIVE | Noted: 2021-12-15

## 2022-06-28 PROBLEM — R54 AGE-RELATED PHYSICAL DEBILITY: Status: ACTIVE | Noted: 2020-07-23

## 2022-06-28 PROBLEM — R60.0 LOCALIZED EDEMA: Status: ACTIVE | Noted: 2021-12-15

## 2022-06-28 PROBLEM — Z91.81 HISTORY OF FALLING: Status: ACTIVE | Noted: 2021-12-15

## 2022-06-28 PROBLEM — Z96.1 PRESENCE OF INTRAOCULAR LENS: Status: ACTIVE | Noted: 2021-12-14

## 2022-06-28 PROBLEM — Z85.3 PERSONAL HISTORY OF MALIGNANT NEOPLASM OF BREAST: Status: ACTIVE | Noted: 2021-12-15

## 2022-06-28 PROBLEM — I25.10 ATHEROSCLEROTIC HEART DISEASE OF NATIVE CORONARY ARTERY WITHOUT ANGINA PECTORIS: Status: ACTIVE | Noted: 2020-10-21

## 2022-06-28 PROBLEM — G47.00 INSOMNIA, UNSPECIFIED: Status: ACTIVE | Noted: 2022-06-08

## 2022-06-28 PROBLEM — E66.09 OTHER OBESITY DUE TO EXCESS CALORIES: Status: ACTIVE | Noted: 2021-12-14

## 2022-06-28 PROBLEM — M89.49 OTHER HYPERTROPHIC OSTEOARTHROPATHY, MULTIPLE SITES: Status: ACTIVE | Noted: 2021-12-15

## 2022-06-28 PROBLEM — M10.9 GOUT, UNSPECIFIED: Status: ACTIVE | Noted: 2021-12-14

## 2022-06-28 PROBLEM — Z79.891 LONG TERM (CURRENT) USE OF OPIATE ANALGESIC: Status: ACTIVE | Noted: 2021-12-15

## 2022-06-28 PROBLEM — M51.379 OTHER INTERVERTEBRAL DISC DEGENERATION, LUMBOSACRAL REGION: Status: ACTIVE | Noted: 2021-12-15

## 2022-06-28 PROBLEM — E55.9 VITAMIN D DEFICIENCY, UNSPECIFIED: Status: ACTIVE | Noted: 2022-06-08

## 2022-06-28 PROBLEM — Z85.038 PERSONAL HISTORY OF OTHER MALIGNANT NEOPLASM OF LARGE INTESTINE: Status: ACTIVE | Noted: 2021-12-15

## 2022-06-28 PROBLEM — I50.20 UNSPECIFIED SYSTOLIC (CONGESTIVE) HEART FAILURE (H): Status: ACTIVE | Noted: 2021-12-14

## 2022-06-28 PROBLEM — Z90.722 ACQUIRED ABSENCE OF OVARIES, BILATERAL: Status: ACTIVE | Noted: 2021-12-14

## 2022-06-28 PROBLEM — M19.90 UNSPECIFIED OSTEOARTHRITIS, UNSPECIFIED SITE: Status: ACTIVE | Noted: 2021-12-14

## 2022-06-28 PROBLEM — F32.A DEPRESSION, UNSPECIFIED: Status: ACTIVE | Noted: 2022-06-08

## 2022-06-28 PROBLEM — U07.1 COVID-19: Status: ACTIVE | Noted: 2022-06-15

## 2022-06-28 PROBLEM — H54.8 LEGAL BLINDNESS, AS DEFINED IN USA: Status: ACTIVE | Noted: 2021-12-15

## 2022-06-28 PROBLEM — R06.00 DYSPNEA, UNSPECIFIED: Status: ACTIVE | Noted: 2022-06-08

## 2022-06-28 PROBLEM — Z79.82 LONG TERM (CURRENT) USE OF ASPIRIN: Status: ACTIVE | Noted: 2021-12-14

## 2022-06-28 PROBLEM — Z87.440 PERSONAL HISTORY OF URINARY (TRACT) INFECTIONS: Status: ACTIVE | Noted: 2022-06-08

## 2022-06-28 LAB
ANION GAP SERPL CALCULATED.3IONS-SCNC: 11 MMOL/L (ref 7–15)
BACTERIA UR CULT: NORMAL
BUN SERPL-MCNC: 12.7 MG/DL (ref 8–23)
CALCIUM SERPL-MCNC: 7.5 MG/DL (ref 8.8–10.2)
CHLORIDE SERPL-SCNC: 106 MMOL/L (ref 98–107)
CREAT SERPL-MCNC: 0.64 MG/DL (ref 0.51–0.95)
DEPRECATED HCO3 PLAS-SCNC: 20 MMOL/L (ref 22–29)
ERYTHROCYTE [DISTWIDTH] IN BLOOD BY AUTOMATED COUNT: 14.7 % (ref 10–15)
GFR SERPL CREATININE-BSD FRML MDRD: 85 ML/MIN/1.73M2
GLUCOSE SERPL-MCNC: 120 MG/DL (ref 70–99)
HCT VFR BLD AUTO: 29.8 % (ref 35–47)
HGB BLD-MCNC: 9.6 G/DL (ref 11.7–15.7)
MCH RBC QN AUTO: 30.8 PG (ref 26.5–33)
MCHC RBC AUTO-ENTMCNC: 32.2 G/DL (ref 31.5–36.5)
MCV RBC AUTO: 96 FL (ref 78–100)
PLATELET # BLD AUTO: 253 10E3/UL (ref 150–450)
POTASSIUM SERPL-SCNC: 3.8 MMOL/L (ref 3.4–5.3)
RBC # BLD AUTO: 3.12 10E6/UL (ref 3.8–5.2)
SODIUM SERPL-SCNC: 137 MMOL/L (ref 136–145)
WBC # BLD AUTO: 8.3 10E3/UL (ref 4–11)

## 2022-06-28 PROCEDURE — 250N000013 HC RX MED GY IP 250 OP 250 PS 637: Performed by: PHYSICIAN ASSISTANT

## 2022-06-28 PROCEDURE — 82947 ASSAY GLUCOSE BLOOD QUANT: CPT | Performed by: NURSE PRACTITIONER

## 2022-06-28 PROCEDURE — G0378 HOSPITAL OBSERVATION PER HR: HCPCS

## 2022-06-28 PROCEDURE — 96375 TX/PRO/DX INJ NEW DRUG ADDON: CPT

## 2022-06-28 PROCEDURE — 99217 PR OBSERVATION CARE DISCHARGE: CPT | Performed by: NURSE PRACTITIONER

## 2022-06-28 PROCEDURE — 82310 ASSAY OF CALCIUM: CPT | Performed by: NURSE PRACTITIONER

## 2022-06-28 PROCEDURE — 250N000011 HC RX IP 250 OP 636: Performed by: NURSE PRACTITIONER

## 2022-06-28 PROCEDURE — 85018 HEMOGLOBIN: CPT | Performed by: NURSE PRACTITIONER

## 2022-06-28 PROCEDURE — 250N000013 HC RX MED GY IP 250 OP 250 PS 637: Performed by: NURSE PRACTITIONER

## 2022-06-28 PROCEDURE — 36415 COLL VENOUS BLD VENIPUNCTURE: CPT | Performed by: NURSE PRACTITIONER

## 2022-06-28 RX ORDER — BISACODYL 5 MG
5 TABLET, DELAYED RELEASE (ENTERIC COATED) ORAL DAILY PRN
DISCHARGE
Start: 2022-06-28

## 2022-06-28 RX ORDER — OXYCODONE HYDROCHLORIDE 5 MG/1
5 TABLET ORAL EVERY 6 HOURS PRN
Qty: 10 TABLET | Refills: 0 | Status: SHIPPED | OUTPATIENT
Start: 2022-06-28 | End: 2022-06-28

## 2022-06-28 RX ORDER — ACETAMINOPHEN 325 MG/1
975 TABLET ORAL EVERY 4 HOURS PRN
DISCHARGE
Start: 2022-06-28 | End: 2022-06-29

## 2022-06-28 RX ORDER — OXYCODONE HYDROCHLORIDE 5 MG/1
5 TABLET ORAL EVERY 6 HOURS PRN
Qty: 10 TABLET | Refills: 0 | Status: SHIPPED | OUTPATIENT
Start: 2022-06-28 | End: 2022-07-20

## 2022-06-28 RX ORDER — TRAMADOL HYDROCHLORIDE 50 MG/1
50 TABLET ORAL 3 TIMES DAILY
Qty: 15 TABLET | Refills: 0 | Status: SHIPPED | OUTPATIENT
Start: 2022-06-28 | End: 2022-07-03

## 2022-06-28 RX ORDER — LIDOCAINE 4 G/G
1 PATCH TOPICAL EVERY 24 HOURS
DISCHARGE
Start: 2022-06-28

## 2022-06-28 RX ORDER — IBUPROFEN 400 MG/1
400 TABLET, FILM COATED ORAL EVERY 6 HOURS PRN
Qty: 10 TABLET | Refills: 0 | DISCHARGE
Start: 2022-06-28

## 2022-06-28 RX ADMIN — CARVEDILOL 12.5 MG: 12.5 TABLET, FILM COATED ORAL at 07:42

## 2022-06-28 RX ADMIN — LOSARTAN POTASSIUM 25 MG: 25 TABLET, FILM COATED ORAL at 07:42

## 2022-06-28 RX ADMIN — ESCITALOPRAM 5 MG: 5 TABLET, FILM COATED ORAL at 07:43

## 2022-06-28 RX ADMIN — TICAGRELOR 90 MG: 90 TABLET ORAL at 07:43

## 2022-06-28 RX ADMIN — OXYCODONE HYDROCHLORIDE 5 MG: 5 TABLET ORAL at 06:25

## 2022-06-28 RX ADMIN — Medication 1 TABLET: at 07:43

## 2022-06-28 RX ADMIN — HYDROMORPHONE HYDROCHLORIDE 0.2 MG: 0.2 INJECTION, SOLUTION INTRAMUSCULAR; INTRAVENOUS; SUBCUTANEOUS at 01:37

## 2022-06-28 RX ADMIN — ASPIRIN 81 MG CHEWABLE TABLET 81 MG: 81 TABLET CHEWABLE at 07:42

## 2022-06-28 RX ADMIN — POLYETHYLENE GLYCOL 3350 17 G: 17 POWDER, FOR SOLUTION ORAL at 07:42

## 2022-06-28 RX ADMIN — Medication 400 MG: at 07:42

## 2022-06-28 RX ADMIN — ACETAMINOPHEN 975 MG: 325 TABLET, FILM COATED ORAL at 06:25

## 2022-06-28 RX ADMIN — DOCUSATE SODIUM 100 MG: 100 CAPSULE, LIQUID FILLED ORAL at 07:42

## 2022-06-28 RX ADMIN — LIDOCAINE PATCH 4% 1 PATCH: 40 PATCH TOPICAL at 07:49

## 2022-06-28 NOTE — CARE PLAN
"/63 (BP Location: Right arm)   Pulse 69   Temp 98.4  F (36.9  C) (Oral)   Resp 18   Ht 1.6 m (5' 3\")   Wt 71.7 kg (158 lb)   SpO2 99%   BMI 27.99 kg/m      Outpatient/Observation goals to be met before discharge home:    - Diagnostic tests and consults completed and resulted: Not met   - Vital signs normal or at patient baseline: Met  - Tolerating oral antibiotics or has plans for home infusion setup: N/A  - Safe disposition plan has been identified: Met, discharge to tcu 6/28  "

## 2022-06-28 NOTE — CARE PLAN
"/63 (BP Location: Right arm)   Pulse 69   Temp 98.4  F (36.9  C) (Oral)   Resp 18   Ht 1.6 m (5' 3\")   Wt 71.7 kg (158 lb)   SpO2 98%   BMI 27.99 kg/m         Outpatient/Observation goals to be met before discharge home:    - Diagnostic tests and consults completed and resulted: Not met   - Vital signs normal or at patient baseline: Met  - Tolerating oral antibiotics or has plans for home infusion setup: N/A  - Safe disposition plan has been identified: Met, discharge to tcu 6/28  "

## 2022-06-28 NOTE — PROGRESS NOTES
Care Management Discharge Note    Discharge Date: 06/28/2022       Discharge Disposition:     [facility]  [address] [place]  [address]  Ph: [phone]  Adm: [phone]  Fax: [phone]    Discharge Services: Transportation [facility] (Ph: [phone]) @ 1100.    Discharge DME: None    Discharge Transportation: agency    Private pay costs discussed: Not applicable    PAS Confirmation Code:  ([id number])  Patient/family educated on Medicare website which has current facility and service quality ratings: yes    Education Provided on the Discharge Plan:  Yes  Persons Notified of Discharge Plans: Pt, family, facility, provider, care team  Patient/Family in Agreement with the Plan: yes    Handoff Referral Completed: Yes  Care Transitions sent [phone]    Additional Information:      1044 discharge orders faxed    SW will continue to follow as needed.    SIDDHARTHA Carvalho, GRETA  ED/OBS   [facility]  Phone: [phone]  Pager: [phone]  Fax: [phone]     On-call pager, [phone], 4:00 pm to midnight

## 2022-06-28 NOTE — PROGRESS NOTES
"   Outpatient/Observation goals to be met before discharge home:   - Diagnostic tests and consults completed and resulted:Met     - Vital signs normal or at patient baseline: Met, BP (!) 150/74 (BP Location: Right arm)   Pulse 74   Temp 98.4  F (36.9  C) (Oral)   Resp 18   Ht 1.6 m (5' 3\")   Wt 71.7 kg (158 lb)   SpO2 99%   BMI 27.99 kg/m      - Tolerating oral antibiotics or has plans for home infusion setup: N/A    - Safe disposition plan has been identified: Met, discharge  Be to tcu  today at 11 AM  Ate breakfast and tolerated.  "

## 2022-06-28 NOTE — PROGRESS NOTES
The patient was seen and examined by me and the case was discussed with the DEBRA. We are in agreement with the assessment and plan.  9:05 AM     This is hospital day 4 for this 88-year-old female complaining of left shoulder pain.  Yesterday we had an MRI of the shoulder performed that shows severe subacromial bursitis which almost certainly is a source of her pain.  Her pain is adequately managed now appreciate orthopedic recommendations.    Past Medical History:   Diagnosis Date     Age-related physical debility      Anemia      Arthritis      Bladder infection 01/03/2015    dx'd- on antibiotics     Blood type AB-     with Anti-C     Breast cancer (H)      Bursitis, knee      Cancer (H)     breast      Cancer (H) 2005    left breast     Colon cancer (H)      Depression      Gout      History of transfusion      Hypertension      Hypertension      Insomnia      Macular degeneration      Osteoarthritis of multiple joints      Osteopenia      Ovarian cyst      Rosacea      Vitamin D deficiency      Social History     Socioeconomic History     Marital status:      Spouse name: Not on file     Number of children: Not on file     Years of education: Not on file     Highest education level: Not on file   Occupational History     Not on file   Tobacco Use     Smoking status: Never Smoker     Smokeless tobacco: Never Used   Substance and Sexual Activity     Alcohol use: No     Drug use: No     Sexual activity: Never   Other Topics Concern     Parent/sibling w/ CABG, MI or angioplasty before 65F 55M? Not Asked   Social History Narrative     Not on file     Social Determinants of Health     Financial Resource Strain: Not on file   Food Insecurity: Not on file   Transportation Needs: Not on file   Physical Activity: Not on file   Stress: Not on file   Social Connections: Not on file   Intimate Partner Violence: Not on file   Housing Stability: Not on file         Physical examination:    General: Awake alert somewhat  irritated mood  Cardiac: Regular rate and rhythm grade 2 systolic murmur  Respiratory: Lungs are clear  Musculoskeletal: Patient has pain with palpation or any passive range of motion of the left shoulder.  She is in a shoulder sling.    Assessment and plan: Appreciate orthopedic recommendations for management of acute left shoulder bursitis and elderly woman with impaired daily function because of above.  Unclear whether she would discharge today versus needing placement for inability to care for self.

## 2022-06-28 NOTE — PROGRESS NOTES
Discharge paperwork reviewed with patient, packet sent along for facility. Pt has no other questions and understands. IV removed prior to leaving. Pt belongings sent with. Ride set up by [facility].

## 2022-06-28 NOTE — CONSULTS
Orthopaedic Surgery Consultation    Marina Neves MRN# [id number]   Age: 88 year old YOB: 1934   Date of Admission:  6/25/2022    Reason for consult: Left shoulder pain, MRI radiology read of subacromial bursitis with no discrete full thickness rotator cuff tears    Requesting physician: No att. providers found   Level of consult: One-time consult to assist in determining a diagnosis and to recommend an appropriate treatment plan            Impression and Recommendation (Resident / Clinician):   Impression:  Marina Neves is a right-hand dominant 88 year old female with a significant PMH of CAD s/p staged PCI with PAT X 1 to mRCA and PAT x1 to rPDA, ischemic cardiomyopathy with EF 33%, HTN, HLD, chronic JFrEF, chronic osteoarthritis, hard of hearing, gout, depression, macular degeneration who presented to the ED with a mechanical fall on 6/25/2022 admitted to OBS for pain control and PT.  MRI obtained on 6/27/2022 with radiology read of subacromial bursitis with no discrete full thickness rotator cuff tears.  Orthopaedics was consulted for recommendations regarding WB status and treatment.    Independent review of imaging, agree with radiology read, though limited 2/2 motion artifact.  No acute fractures, dislocations, full thickness rotator cuff tears.  No pain at rest, no erythema/echymosis.  Globally TTP about shoulder.  Tolerates short arcs of motion limited 2/2 pain.    Differential diagnosis includes partial thickness rotator cuff strain, aggravation of advanced glenohumeral arthritis, hematoma 2/2 fall on antiplatelet therapy.  No clinical concern for infection given traumatic onset, AF stable vitals, WBC wnl, no erythema, toleration of short motion arcs.  Lower concern for occult fracture given no pain at rest and toleration of short motion arcs.    Recomendations:  Operative Plan: None at this time    Activity: WBAT. Gentle shoulder ROM as tolerated. No restrictions.  Patient may  self-limit 2/2 pain  Wound Cares/Dressing/Splint: Sling for comfort.  Recommend removing sling for ROM exercises with PT/nursing multiple times per day  DVT  PPx: DVT ppx per primary team.  None from orthopaedics perspective  Antibiotic: No antibiotic therapy indicated from orthopaedics perspective  Imaging: No further imaging needed at this time  PT/OT: Work on ROM, strengthening, gait training, mobility, and ADLs    Dispo: Per Primary team.  Okay to discharge from orthopaedics perspective  Follow Up: Recommend re-evaluation by [facility] Orthopaedic Shoulder team in 2-3 weeks once symptoms calm down. Orthopaedics will coordinate scheduling follow up appointment.      Alexx Beltrán MD  Orthopaedic Surgery PGY-4  [phone]      Please page me directly with any questions/concerns during regular weekday hours before 5pm. If there is no response, if it is a weekend, or if it is during evening hours then please page the orthopaedic surgery resident on call.    Attestation:  This patient was discussed with Dr. Salazar, orthopaedic staff, who agrees with the above.         Chief Complaint:   Left shoulder pain          History of Present Illness (Resident / Clinician):   Marina Neves is a right-hand dominant 88 year old female with a significant PMH of CAD s/p staged PCI with PAT X 1 to mRCA and PAT x1 to rPDA, ischemic cardiomyopathy with EF 33%, HTN, HLD, chronic JFrEF, chronic osteoarthritis, hard of hearing, gout, depression, macular degeneration who presented to the ED with a mechanical fall on 6/25/2022 admitted to OBS for pain control and PT.  MRI obtained on 6/27/2022 with radiology read of subacromial bursitis with no discrete full thickness rotator cuff tears.  Orthopaedics was consulted for recommendations regarding WB status and treatment.    At the bedside, patient is a poor historian about events surrounding injury/presentation.  States that she went to bed a couple nights ago and awoke with generalized left  shoulder pain, different from above stated mechanism per chart review.  Nonetheless, patient denies head trauma or pain elsewhere in the body.  Denies pain at rest, only with shoulder ROM.  No associated numbness, tingling.  Denies history of injury or previous surgery to LUE; prior R TSA.    Lives alone in [facility].  Ambulates with walker at baseline.     History obtained from patient interview and chart review.        Past Medical History:     Past Medical History:   Diagnosis Date     Age-related physical debility      Anemia      Arthritis      Bladder infection 01/03/2015    dx'd- on antibiotics     Blood type AB-     with Anti-C     Breast cancer (H)      Bursitis, knee      Cancer (H)     breast      Cancer (H) 2005    left breast     Colon cancer (H)      Depression      Gout      History of transfusion      Hypertension      Hypertension      Insomnia      Macular degeneration      Osteoarthritis of multiple joints      Osteopenia      Ovarian cyst      Rosacea      Vitamin D deficiency              Past Surgical History:     Past Surgical History:   Procedure Laterality Date     ABDOMEN SURGERY      colon resection for ca 2015     BACK SURGERY Bilateral     L3-L5, L5-S1 decompression lami     BACK SURGERY      posterior spinal reconstruction     BREAST SURGERY       BREAST SURGERY      left mastectomy     CATARACT EXTRACTION Bilateral      COLON SURGERY       COLONOSCOPY       COLONOSCOPY N/A 8/4/2015    Procedure: COLONOSCOPY;  Surgeon: Yoseph Ferraro MD;  Location:  GI     CV CORONARY ANGIOGRAM N/A 12/12/2021    Procedure: Coronary Angiogram;  Surgeon: Everett Castellon MD;  Location: [facility] CARDIAC CATH LAB     CV CORONARY ANGIOGRAM N/A 6/7/2022    Procedure: Coronary Angiogram;  Surgeon: Louann Silva MD;  Location: [facility] LAB CV     CV FRACTIONAL FLOW RATIO WIRE N/A 6/7/2022    Procedure: Fractional Flow Ratio Wire;  Surgeon: Louann Silva MD;  Location: [facility]  LAB CV     CV PCI N/A 6/7/2022    Procedure: Percutaneous Coronary Intervention;  Surgeon: Louann Silva MD;  Location: [facility] CATH LAB CV     CV PCI STENT DRUG ELUTING N/A 12/12/2021    Procedure: Percutaneous Coronary Intervention Stent Drug Eluting;  Surgeon: Everett Castellon MD;  Location:  HEART CARDIAC CATH LAB     DILATION AND CURETTAGE      onsil     EYE SURGERY      cataract bilateral     EYE SURGERY       GASTRECTOMY       MASTECTOMY, RADICAL Left      NEUROMA SURGERY Bilateral     feet     ORTHOPEDIC SURGERY      shoulder right, knee left     ORTHOPEDIC SURGERY      lami     OTHER SURGICAL HISTORY      left knee arthroscopy     OTHER SURGICAL HISTORY      right shoulder replacement     OTHER SURGICAL HISTORY      left breast biopsyneedle core     DC LAP,FULGURATE/EXCISE LESIONS N/A 3/29/2017    Procedure: LAPAROSCOPIC BILATERAL SALPING OOPHORECTOMY PELVIC WASHINGS;  Surgeon: Eduardo Smart MD;  Location: [facility];  Service: Gynecology     DC MASTECTOMY, SIMPLE, COMPLETE Right 1/7/2015    Procedure: RIGHT BREAST MASTECTOMY;  Surgeon: Meliotn Bazan MD;  Location: [facility];  Service: General     REPLACEMENT TOTAL KNEE Right      TONSILLECTOMY & ADENOIDECTOMY       ZZC PART REMOVAL COLON W ANASTOMOSIS N/A 6/16/2014    Procedure: COLECTOMY;  Surgeon: Meliton Bazan MD;  Location: [facility];  Service: General          Social History:   Tobacco use: denies  Alcohol use: denies   Elicit drug use: denies  Occupation: retired  [place] [facility] [facility] [place] [facility]          Family History:   Denies known family history of anesthesia, bleeding or clotting complications.           Allergies:     Allergies   Allergen Reactions     Blood-Group Specific Substance Other (See Comments)     Anti-D and Anti-C present. A delay in compatible RBC's may occur.     Celecoxib Unknown     Avoids due to sulfa allergy     Lisinopril Cough     Other reaction(s):  "cough  Other reaction(s): cough     Oxybutynin Other (See Comments)     Other reaction(s): intolerable dry eye and mouth  Other reaction(s): intolerable dry eye and mouth     Sulfa Drugs      Tetanus Toxoid Blisters     Tetanus Toxoids      Other reaction(s): Unknown  Other reaction(s): Unknown     Tolterodine Other (See Comments)     Other reaction(s): intolerable dry eye and mouth  Other reaction(s): intolerable dry eye and mouth     Piroxicam Rash     Other reaction(s): stomach upset  Other reaction(s): stomach upset             Medications:   Medication reviewed with patient and in chart.  Anticoagulation: Ticagralor  Antibiotics: Ceftriaxone for UTI          Review of Systems:   A 12 point ROS was conducted and was otherwise negative except for HPI above.          Physical Exam:   /63 (BP Location: Right arm)   Pulse 69   Temp 98.4  F (36.9  C) (Oral)   Resp 18   Ht 1.6 m (5' 3\")   Wt 71.7 kg (158 lb)   SpO2 99%   BMI 27.99 kg/m    General: Awake, A&O x2 (not place or situation), NAD, appears stated age  HEENT: Normocephalic, atraumatic, EOMI, no scleral icterus  Respiratory: Breathing non-labored, no wheezing  Cardiovascular: Nontachycardic  Skin: No rashes or lesions  Neurological: A&Ox3, CN II-XII grossly intact    Musculoskeletal:  LUE:   - No gross deformity. Skin intact.  No erythema or ecchymosis  - Sling in place  - Globally TTP about clavicle, AC joint, shoulder  - NTTP over arm, elbow, forearm, wrist, hand  - No pain with wrist/elbow ROM  - Tolerates PROM: FE 30, Abd 90, ER at side 35 limited 2/2 pain   - Able to actively hold arm in 30 deg FE when arm held in position and let go  - Fires deltoid, EPL, FPL, intrinsics  - SILT axillary, radial, ulnar, and median nerve distribution.   - Radial pulse 2+ and fingers wwp         Imaging:   Review of L shoulder/humerus XR from 6/25/2022 demonstrates no acute fractures or dislocations, concentrically reduced glenohumeral joint, advanced " glenohumeral joint osteoarthritis with inferior bone on bone degeneration and large inferior humeral osteophyte.     Review of L knee XR from 6/25/2022 demonstrates no acute fractures or dislocations, advanced tricompartmental degenerative change    Reivew of L shoulder NORA from 6/27/2022 limited 2/2 motion artifact, demonstrates no full thickness rotator cuff tears, significant subacromial bursitis, biceps tendonopathy without subluxation, significant glenohumeral osteoarthritis.         Laboratory date:   CBC:  Lab Results   Component Value Date    WBC 9.8 06/27/2022    HGB 9.9 (L) 06/27/2022     06/27/2022       BMP:  Lab Results   Component Value Date     (L) 06/27/2022    POTASSIUM 3.6 06/27/2022    CHLORIDE 101 06/27/2022    CO2 23 06/27/2022    BUN 12.7 06/27/2022    CR 0.58 06/27/2022    ANIONGAP 7 06/27/2022    MARY 7.9 (L) 06/27/2022     (H) 06/27/2022       Inflammatory Markers:  Lab Results   Component Value Date    WBC 9.8 06/27/2022    WBC 10.5 06/26/2022    WBC 11.6 (H) 06/25/2022    CRP 0.6 01/09/2020    CRP 0.3 11/18/2019    SED 20 01/09/2020    SED 13 11/18/2019

## 2022-06-28 NOTE — TELEPHONE ENCOUNTER
Called patient and spoke with someone who informed me that the patient is being transferred from the hospital to a transitional care unit.  I cannot see that she is in the hospital anywhere and there is not any contact information for the TCU.

## 2022-06-28 NOTE — PROGRESS NOTES
Nurse to Nurse report called in to [facility].  Transport here and Pt assisted into w/c.  Script sent with.

## 2022-06-28 NOTE — PLAN OF CARE
Physical Therapy Discharge Summary    Reason for therapy discharge:    Discharged to transitional care facility.    Progress towards therapy goal(s). See goals on Care Plan in [facility] electronic health record for goal details.  Goals partially met.  Barriers to achieving goals:   discharge from facility.    Therapy recommendation(s):    Continued therapy is recommended.  Rationale/Recommendations:  TCU.

## 2022-06-28 NOTE — DISCHARGE SUMMARY
[facility]  Hospitalist Discharge Summary      Date of Admission:  6/25/2022  Date of Discharge:  6/28/2022  Discharging Provider: DAVID Patton CNP  Discharge Service: Hospitalist Service    Discharge Diagnoses   Left shoulder subacromial and subdeltoid bursa     Follow-ups Needed After Discharge   Follow-up Appointments     Follow Up and recommended labs and tests      Follow up with primary care provider in one week for hospital follow up.    The following labs/tests are recommended: CBC, BMP.    Recommend re-evaluation by [facility] Orthopaedic Shoulder team in 2-3 weeks   once symptoms calm down. Orthopaedics will coordinate scheduling follow up   appointment.         {Additional follow-up instructions/to-do's for PCP    :Hospital follow up    Unresulted Labs Ordered in the Past 30 Days of this Admission     Date and Time Order Name Status Description    6/7/2022 11:40 AM Prepare red blood cells (unit) Preliminary     6/7/2022 11:40 AM Prepare red blood cells (unit) Preliminary       These results will be followed up by PCP    Discharge Disposition   Discharged to TCU  Condition at discharge: Stable    Hospital Course   Marina Neves is a 88 year old female with a history of NSTEMI s/p staged PCI with PAT X 1 to mRCA and PAT x1 to rPDA December 2021, ischemic cardiomyopathy with EF 33%, HTN, HLD, chronic JFrEF, chronic osteoarthritis, hard of hearing, gout, depression, macular degeneration who presented to the ED with a mechanical fall and left shoulder pain.  She is admitted to observation for pain control and PT evaluation      ## Left shoulder pain  ## Chronic right knee pain Patient denies falling. Reports she woke up with left shoulder pain. Per chart review has had left shoulder pain and has had cortisone injections with improvement of her pain. Patient denies any trauma or increased use of her left arm. Xray without acute fracture or dislocation, does show  Severe glenohumeral joint degenerative change with remodeling and flattening of the humeral head, chronic. PT consulted and recommended TCU. Patient has been accepted tomorrow.  Hgb bL appears to be around 10. BMP with Na 131.  Stopped Tramadol and started Oxycodone. Per chart review, patient has been seen for chronic pain in pain clinic. MRI partially completed today: Large amount of fluid in the subacromial and subdeltoid bursa consistent with very prominent subacromial bursitis.  Advanced glenohumeral degenerative changes.  Abnormal signal distal subscapularis tendon consistent with tendinosis and significant partial tearing. There do appear to be intact fibers. Probable tendinosis and significant perhaps near complete partial tearing of the intra-articular portion of the biceps tendon. - Orthopedics consulted and recommended   Pain management:  Voltaren gel, Limit Ibuprofen, Tylenol, limit narcs, Lidocaine patches.   Activity: WBAT. Gentle shoulder ROM as tolerated. No restrictions.  Patient may self-limit 2/2 pain  Wound Cares/Dressing/Splint: Sling for comfort.  Recommend removing sling for ROM exercises with PT/nursing multiple times per day  DVT  PPx: DVT ppx per primary team.  None from orthopaedics perspective  Antibiotic: No antibiotic therapy indicated from orthopaedics perspective  Imaging: No further imaging needed at this time  PT/OT: Work on ROM, strengthening, gait training, mobility, and ADLs     Dispo: Per Primary team.  Okay to discharge from orthopaedics perspective  Follow Up: Recommend re-evaluation by [facility] Orthopaedic Shoulder team in 2-3 weeks once symptoms calm down. Orthopaedics will coordinate scheduling follow up appointment.        ##Hyponatremia  ## Hypotension Patient on PTA spironolactone and Lasix. Hypotension improved. Hyponatremia improved prior to discharge.   - Repeat BMP in am         ## NSTEMI s/p PAT X 2 staged procedure 12/2021   ## Ischemic cardiomyopathy  ## HFrEF  ##  HLD  ## HLD  Denies any chest pain  - Continuous cardiac monitoring  - Continue PTA DAPT, statin, beta blocker, ARB     ## Depression  - continue PTA lexapro     ## Chronic constipation  -PTA Colace BID      # Macular degeneration  - PTA eye drops     Consultations This Hospital Stay   PHYSICAL THERAPY ADULT IP CONSULT  OCCUPATIONAL THERAPY ADULT IP CONSULT  CARE MANAGEMENT / SOCIAL WORK IP CONSULT  NURSING TO CONSULT FOR VASCULAR ACCESS CARE IP CONSULT  ORTHOPAEDIC SURGERY ADULT/PEDS IP CONSULT  PHYSICAL THERAPY ADULT IP CONSULT  OCCUPATIONAL THERAPY ADULT IP CONSULT    Code Status   Full Code    Time Spent on this Encounter   I, DAVID Patton CNP, personally saw the patient today and spent less than or equal to 30 minutes discharging this patient.       DAVID Patton CNP  [facility] [address] [facility]  [address] [place] [id number]  Phone: [phone]  Fax: [phone]  ______________________________________________________________________    Physical Exam   Vital Signs: Temp: 98.4  F (36.9  C) Temp src: Oral BP: (!) 150/74 Pulse: 74   Resp: 18 SpO2: 99 % O2 Device: None (Room air)    Weight: 158 lbs 0 oz  GENERAL APPEARENCE: Pleasant, generally appears well, A/O x4. NAD. Patient is very [place]  SKIN: Clean, dry, and intact without visible lesions, rash, jaundice, cyanosis, erythema, ecchymoses to exposed areas.  HEENT: NCAT w/out masses, lesions, or abnormalities. Sclera anicteric, PERRLA, EOMI.  Oral mucosa pink and moist without erythema, exudate, lesions, ulcerations, or thrush. Teeth and gums normal.  Wearing glasses. Very [place].  NECK: Supple, no masses. No jugular venous distention.   CARDIOVASCULAR: S1, S2 RRR. No murmurs, rubs, or gallops.   RESPIRATORY: Respiratory effort WNL. CTA  bilaterally without crackles/rales/wheeze   GI: Active BS in all 4 quadrants. Abdomen soft and non-tender. No masses or hepatosplenomegaly.  :  Deferred  MUSCULOSKELETAL: Left shoulder with mild tenderness to palpation. Unable to assess LUE ROM due to pain.   PV: 2+ bilateral radial and pedal pulses. No edema noted.   NEURO: CN II-XII grossly intact. Speech normal. Appropriate throughout interview.   Sensation grossly WNL. Finger to nose and rapid alternating movements WDL.  HEME/LYMPH: No visible bleeding.  PSYCHIATRIC: Mentation and affect appear normal       Primary Care Physician   Cale Whatley    Discharge Orders      General info for SNF    Length of Stay Estimate: Short Term Care: Estimated # of Days <30  Condition at Discharge: Stable  Level of care:skilled   Rehabilitation Potential: Good  Admission H&P remains valid and up-to-date: Yes  Recent Chemotherapy: N/A  Use Nursing Home Standing Orders: Yes     Mantoux instructions    Give two-step Mantoux (PPD) Per Facility Policy Yes     Follow Up and recommended labs and tests    Follow up with primary care provider in one week for hospital follow up.  The following labs/tests are recommended: CBC, BMP.    Recommend re-evaluation by [facility] Orthopaedic Shoulder team in 2-3 weeks once symptoms calm down. Orthopaedics will coordinate scheduling follow up appointment.     Reason for your hospital stay    Left subacromial bursitis     Intake and output    Every shift     Daily weights    Call Provider for weight gain of more than 2 pounds per day or 5 pounds per week.     Wound care    Site:   Left shoulder  Instructions: WBAT. Gentle shoulder ROM as tolerated. No restrictions.  Patient may self-limit 2/2 pain  Wound Cares/Dressing/Splint: Sling for comfort.     Activity - Up with assistive device     Activity - Up with nursing assistance     Weight bearing status    WBAT LUE     Physical Therapy Adult Consult    Evaluate and treat as clinically indicated.    Reason:  Left subacromial bursitis. Work on ROM, strengthening, gait training, mobility, and ADLs     Occupational Therapy Adult Consult     Evaluate and treat as clinically indicated.    Reason:  Left subacromial bursitis. Work on ROM, strengthening, gait training, mobility, and ADLs     Fall precautions     Diet    Follow this diet upon discharge: Regular       Significant Results and Procedures   Results for orders placed or performed during the hospital encounter of 06/25/22   XR Shoulder Left G/E 3 Views    Narrative    EXAM: XR SHOULDER LEFT G/E 3 VIEWS, XR HUMERUS LEFT G/E 2 VIEWS  LOCATION: [facility]  DATE/TIME: 6/25/2022 7:15 PM    INDICATION: fall, pain, eval for injury  COMPARISON: 12/23/2020.      Impression    IMPRESSION: Severe glenohumeral joint degenerative change with remodeling and flattening of the humeral head, chronic. No evidence of an acute fracture. No dislocation. Bones are demineralized.    No evidence of acute left humerus fracture.    Humerus XR,  G/E 2 views, left    Narrative    EXAM: XR SHOULDER LEFT G/E 3 VIEWS, XR HUMERUS LEFT G/E 2 VIEWS  LOCATION: [facility]  DATE/TIME: 6/25/2022 7:15 PM    INDICATION: fall, pain, eval for injury  COMPARISON: 12/23/2020.      Impression    IMPRESSION: Severe glenohumeral joint degenerative change with remodeling and flattening of the humeral head, chronic. No evidence of an acute fracture. No dislocation. Bones are demineralized.    No evidence of acute left humerus fracture.    XR Knee Left 1/2 Views    Narrative    EXAM: XR KNEE LT 1/2 VW  LOCATION: [facility]  DATE/TIME: 6/25/2022 7:15 PM    INDICATION: fall, pain, eval for fx  COMPARISON: None.      Impression    IMPRESSION: Advanced medial compartment predominant degenerative changes. No definitive fracture. No joint effusion. Bones are demineralized.    Atherosclerotic vascular calcifications.   MR Shoulder Left w/o Contrast    Narrative    EXAM: MR SHOULDER LEFT W/O CONTRAST  LOCATION:   [facility]  DATE/TIME: 6/27/2022 4:44 PM    INDICATION: Left shoulder pain, no trauma, unable to lift left arm.  COMPARISON: Left shoulder x-ray from 06/25/2022.  TECHNIQUE: Unenhanced.    FINDINGS: Very limited exam, including only a coronal fat sat T2 image with motion artifact on two axial images. I assume the patient was unable to complete the exam.    ROTATOR CUFF:  -Supraspinatus: No definite tear.  -Infraspinatus: No definite tear.  -Subscapularis: Prominent tendinosis and likely partial tearing. There are some intact fibers.  -Teres minor: Likely intact.    CORACOACROMIAL ARCH:  -Morphology: Morphology not evaluated as no sagittal images were obtained. No definite subacromial spur.   -Bursa: Very large amount of subacromial and subdeltoid fluid consistent with very prominent subacromial bursitis.    ACROMIOCLAVICULAR JOINT:   -Mild degenerative change.     LONG HEAD OF BICEPS TENDON:   -Intra-articular portion difficult to identify due to motion artifact. This is likely related to prominent tendinosis and/or prominent/near complete partial tearing. No significant distal retraction or full-thickness tear definitely identified.    GLENOHUMERAL JOINT:   -Advanced glenohumeral degenerative changes. Probable diffuse labral degeneration and degenerative tearing associated with advanced glenohumeral degenerative changes.    BONES:   -No fracture or concerning marrow replacing lesion.    SOFT TISSUES:   -Mild edema in the anterior deltoid muscle consistent with muscle strain.      Impression    IMPRESSION:  1.  Limited exam as I suspect the patient was unable to complete the exam.  2.  Large amount of fluid in the subacromial and subdeltoid bursa consistent with very prominent subacromial bursitis.  3.  Advanced glenohumeral degenerative changes.  4.  Abnormal signal distal subscapularis tendon consistent with tendinosis and significant partial tearing. There do appear to  be intact fibers.  5.  Probable tendinosis and significant perhaps near complete partial tearing of the intra-articular portion of the biceps tendon. No definite distal retraction.  6.  Mild edema in the anterior deltoid muscle consistent with muscle strain or contusion.       Discharge Medications   Current Discharge Medication List      START taking these medications    Details   acetaminophen (TYLENOL) 325 MG tablet Take 3 tablets (975 mg) by mouth every 4 hours as needed for mild pain    Associated Diagnoses: Subacromial bursitis of left shoulder joint      bisacodyl (DULCOLAX) 5 MG EC tablet Take 1 tablet (5 mg) by mouth daily as needed for constipation    Associated Diagnoses: Constipation, unspecified constipation type      diclofenac (VOLTAREN) 1 % topical gel Apply 2 g topically 4 times daily    Associated Diagnoses: Subacromial bursitis of left shoulder joint      ibuprofen (ADVIL/MOTRIN) 400 MG tablet Take 1 tablet (400 mg) by mouth every 6 hours as needed for moderate pain  Qty: 10 tablet, Refills: 0    Associated Diagnoses: Subacromial bursitis of left shoulder joint      Lidocaine (LIDOCARE) 4 % Patch Place 1 patch onto the skin every 24 hours To prevent lidocaine toxicity, patient should be patch free for 12 hrs daily.    Associated Diagnoses: Subacromial bursitis of left shoulder joint      oxyCODONE (ROXICODONE) 5 MG tablet Take 1 tablet (5 mg) by mouth every 6 hours as needed for moderate to severe pain  Qty: 10 tablet, Refills: 0    Associated Diagnoses: Subacromial bursitis of left shoulder joint         CONTINUE these medications which have NOT CHANGED    Details   aspirin (ASA) 81 MG chewable tablet Take 1 tablet (81 mg) by mouth daily Starting tomorrow.  Qty: 30 tablet, Refills: 3    Associated Diagnoses: Coronary artery disease due to calcified coronary lesion      atorvastatin (LIPITOR) 40 MG tablet TAKE 1 TABLET BY MOUTH ONCE DAILY  Qty: 90 tablet, Refills: 3    Associated Diagnoses:  "Coronary artery disease involving native coronary artery of native heart without angina pectoris      carvedilol (COREG) 6.25 MG tablet TAKE 2 TABLETS BY MOUTH ONCE DAILY  Qty: 60 tablet, Refills: 0    Associated Diagnoses: Coronary artery disease involving native coronary artery of native heart without angina pectoris      docusate sodium (COLACE) 100 MG capsule Take 100 mg by mouth 2 times daily as needed      escitalopram (LEXAPRO) 5 MG tablet Take 5 mg by mouth daily      furosemide (LASIX) 20 MG tablet Take 1 tablet (20 mg) by mouth 2 times daily  Qty: 180 tablet, Refills: 3    Associated Diagnoses: Heart failure with reduced ejection fraction, NYHA class II (H)      losartan (COZAAR) 25 MG tablet Take 1 tablet (25 mg) by mouth daily    Associated Diagnoses: Coronary artery disease involving native coronary artery of native heart without angina pectoris      magnesium oxide (MAG-OX) 400 (241.3 Mg) MG tablet Take 400 mg by mouth daily      Multiple Vitamins-Minerals (PRESERVISION AREDS) CAPS Take 1 capsule by mouth 2 times daily       nitroGLYcerin (NITROSTAT) 0.4 MG sublingual tablet One tablet under the tongue every 5 minutes if needed for chest pain. May repeat every 5 minutes for a maximum of 3 doses in 15 minutes\"  Qty: 25 tablet, Refills: 3    Associated Diagnoses: Coronary artery disease due to calcified coronary lesion      olopatadine (PATANOL) 0.1 % ophthalmic solution Place 1 drop into both eyes 2 times daily      Omega-3 Fatty Acids (FISH OIL OMEGA-3 PO) Take 1 capsule by mouth daily      prednisoLONE acetate (PRED FORTE) 1 % ophthalmic suspension Place 1 drop into both eyes 2 times daily      spironolactone (ALDACTONE) 25 MG tablet Take 25 mg by mouth daily      ticagrelor (BRILINTA) 90 MG tablet Take 1 tablet (90 mg) by mouth 2 times daily Start tomorrow morning.  Qty: 180 tablet, Refills: 3    Associated Diagnoses: Coronary artery disease due to calcified coronary lesion      traMADol (ULTRAM) 50 " MG tablet Take 1 tablet (50 mg) by mouth 3 times daily  Qty: 90 tablet, Refills: 0    Associated Diagnoses: Chronic pain of right knee      VITAMIN D, CHOLECALCIFEROL, PO Take 4,000 Units by mouth daily           Allergies   Allergies   Allergen Reactions     Blood-Group Specific Substance Other (See Comments)     Anti-D and Anti-C present. A delay in compatible RBC's may occur.     Celecoxib Unknown     Avoids due to sulfa allergy     Lisinopril Cough     Other reaction(s): cough  Other reaction(s): cough     Oxybutynin Other (See Comments)     Other reaction(s): intolerable dry eye and mouth  Other reaction(s): intolerable dry eye and mouth     Sulfa Drugs      Tetanus Toxoid Blisters     Tetanus Toxoids      Other reaction(s): Unknown  Other reaction(s): Unknown     Tolterodine Other (See Comments)     Other reaction(s): intolerable dry eye and mouth  Other reaction(s): intolerable dry eye and mouth     Piroxicam Rash     Other reaction(s): stomach upset  Other reaction(s): stomach upset

## 2022-06-28 NOTE — PLAN OF CARE
Goal Outcome Evaluation:       - Diagnostic tests and consults completed and resulted: Not met     - Vital signs normal or at patient baseline: Met     - Tolerating oral antibiotics or has plans for home infusion setup: NA     - Safe disposition plan has been identified: met, discharge to tcu 6/28

## 2022-06-29 ENCOUNTER — TRANSITIONAL CARE UNIT VISIT (OUTPATIENT)
Dept: GERIATRICS | Facility: CLINIC | Age: 87
End: 2022-06-29
Payer: COMMERCIAL

## 2022-06-29 DIAGNOSIS — M19.012 GLENOHUMERAL ARTHRITIS, LEFT: Primary | ICD-10-CM

## 2022-06-29 DIAGNOSIS — G89.4 CHRONIC PAIN DISORDER: ICD-10-CM

## 2022-06-29 DIAGNOSIS — I10 ESSENTIAL (PRIMARY) HYPERTENSION: ICD-10-CM

## 2022-06-29 DIAGNOSIS — M17.12 TRICOMPARTMENT OSTEOARTHRITIS OF LEFT KNEE: ICD-10-CM

## 2022-06-29 PROCEDURE — 99310 SBSQ NF CARE HIGH MDM 45: CPT | Performed by: FAMILY MEDICINE

## 2022-06-29 RX ORDER — ACETAMINOPHEN 325 MG/1
650 TABLET ORAL EVERY 6 HOURS
COMMUNITY

## 2022-06-29 NOTE — PLAN OF CARE
[facility]      OUTPATIENT PHYSICAL THERAPY EVALUATION  PLAN OF TREATMENT FOR OUTPATIENT REHABILITATION  (COMPLETE FOR INITIAL CLAIMS ONLY)  Patient's Last Name, First Name, M.I.  YOB: 1934  Marina Neves                        Provider's Name  [facility] Medical Record No.  [id number]                               Onset Date:  06/25/22   Start of Care Date:  06/26/22      Type:     _X_PT   ___OT   ___SLP Medical Diagnosis:  falls                        PT Diagnosis:  impaired functional mobility, pain   Visits from SOC:  1   _________________________________________________________________________________  Plan of Treatment/Functional Goals    Planned Interventions: balance training, bed mobility training, gait training, home exercise program, motor coordination training, neuromuscular re-education, patient/family education, ROM (range of motion), strengthening, stretching, transfer training, progressive activity/exercise, risk factor education, home program guidelines     Goals: See Physical Therapy Goals on Care Plan in [employer] electronic health record.    Therapy Frequency: 5x/week  Predicted Duration of Therapy Intervention: 07/10/22  _________________________________________________________________________________    I CERTIFY THE NEED FOR THESE SERVICES FURNISHED UNDER        THIS PLAN OF TREATMENT AND WHILE UNDER MY CARE     (Physician co-signature of this document indicates review and certification of the therapy plan).              Certification date from: 06/26/22, Certification date to: 06/26/22    Referring Physician: Lissy Smith PA-C            Initial Assessment        See Physical Therapy evaluation dated 06/26/22 in Epic electronic health record.

## 2022-06-29 NOTE — PROGRESS NOTES
[facility] GERIATRIC SERVICES    Facility:   [facility] ([facility]) [[id number]]   Code Status: FULL CODE      HPI:   Marina is a 88 year old female who was hospitalized June 25, 2022 secondary to left shoulder pain secondary to a fall at home.  She recently was in the transitional care unit for rehabilitation services and she went home and had a mechanical fall the next day.  Prior to that she was hospitalized June 20, 2022 secondary to dyspnea on exertion a history of CAD and chronic pain and the echo and stress test were normal.  Also while in the transitional care unit she did end up testing positive for COVID and was placed on Paxlovid with good results.  Her most current work-up did show a sodium of 131, potassium 3.6, BUN 23, creatinine 0.58, white cell count of 9.8 and hemoglobin 9.9.  Her left shoulder x-rays did show severe glenohumeral joint degenerative changes with remodeling and flattening of the humeral head which is chronic but no evidence of acute fracture or dislocation.  The left knee x-rays did show advanced medial compartment predominant degenerative changes but no definite fracture.  She was admitted to observation.  Her working diagnosis was left shoulder pain with a history of chronic right knee pain with a recent left knee pain secondary to a fall.  She does have a history of CAD status post PTA x2 along with ischemic cardiomyopathy, hyperlipidemia, depression, chronic constipation, macular degeneration.  After being on observation she was treated with her common dose of tramadol and Tylenol with tolerable relief eventually was placed on oxycodone as needed.  It was all thought that she could do some weightbearing as tolerated gentle range of motion to her shoulder as well as come back to the rehabilitation services for more rehabilitation.  She is now in the transitional care unit which we had again discussed the role of the transitional care unit as well as the rehabilitation component.   Her appetite is good.  Is not pleased not being able to use her walker so we did have a lengthy conversation today regarding her history of falls as well as her recent hospitalization.  She does have cognitive impairment.  I also had a chance to talk to therapy today.  Her systolic blood pressures ranging 130-150 she has been afebrile and also on room air and her current weight 158 pounds in comparison to Bianka 10 also 158 pounds.  For pain is on tramadol scheduled and does have oxycodone and Tylenol as needed we will go ahead and schedule the Tylenol also does have a lidocaine patch and diclofenac gel.  Has not required any ibuprofen as needed.  She feels her appetite to be fine.  Denies any bowel or bladder problems.  Does have a history of constipation and currently on stool softeners.  Past Medical History:  Past Medical History:   Diagnosis Date     Age-related physical debility      Anemia      Arthritis      Bladder infection 01/03/2015    dx'd- on antibiotics     Blood type AB-     with Anti-C     Breast cancer (H)      Bursitis, knee      Cancer (H)     breast      Cancer (H) 2005    left breast     Colon cancer (H)      Depression      Gout      History of transfusion      Hypertension      Hypertension      Insomnia      Macular degeneration      Osteoarthritis of multiple joints      Osteopenia      Ovarian cyst      Rosacea      Vitamin D deficiency            Surgical History:  Past Surgical History:   Procedure Laterality Date     ABDOMEN SURGERY      colon resection for ca 2015     BACK SURGERY Bilateral     L3-L5, L5-S1 decompression lami     BACK SURGERY      posterior spinal reconstruction     BREAST SURGERY       BREAST SURGERY      left mastectomy     CATARACT EXTRACTION Bilateral      COLON SURGERY       COLONOSCOPY       COLONOSCOPY N/A 8/4/2015    Procedure: COLONOSCOPY;  Surgeon: Yoseph Ferraro MD;  Location:  GI     CV CORONARY ANGIOGRAM N/A 12/12/2021    Procedure: Coronary Angiogram;   Surgeon: Everett Castellon MD;  Location: [facility] CARDIAC CATH LAB     CV CORONARY ANGIOGRAM N/A 6/7/2022    Procedure: Coronary Angiogram;  Surgeon: Louann Silva MD;  Location: [facility] CV     CV FRACTIONAL FLOW RATIO WIRE N/A 6/7/2022    Procedure: Fractional Flow Ratio Wire;  Surgeon: Louann Silva MD;  Location: Health system LAB CV     CV PCI N/A 6/7/2022    Procedure: Percutaneous Coronary Intervention;  Surgeon: Louann Silva MD;  Location: [facility] CV     CV PCI STENT DRUG ELUTING N/A 12/12/2021    Procedure: Percutaneous Coronary Intervention Stent Drug Eluting;  Surgeon: Everett Castellon MD;  Location: [facility] CARDIAC CATH LAB     DILATION AND CURETTAGE      onsil     EYE SURGERY      cataract bilateral     EYE SURGERY       GASTRECTOMY       MASTECTOMY, RADICAL Left      NEUROMA SURGERY Bilateral     feet     ORTHOPEDIC SURGERY      shoulder right, knee left     ORTHOPEDIC SURGERY      lami     OTHER SURGICAL HISTORY      left knee arthroscopy     OTHER SURGICAL HISTORY      right shoulder replacement     OTHER SURGICAL HISTORY      left breast biopsyneedle core     ID LAP,FULGURATE/EXCISE LESIONS N/A 3/29/2017    Procedure: LAPAROSCOPIC BILATERAL SALPING OOPHORECTOMY PELVIC WASHINGS;  Surgeon: Eduardo Smart MD;  Location: [facility];  Service: Gynecology     ID MASTECTOMY, SIMPLE, COMPLETE Right 1/7/2015    Procedure: RIGHT BREAST MASTECTOMY;  Surgeon: Meliton Bazan MD;  Location: [facility];  Service: General     REPLACEMENT TOTAL KNEE Right      TONSILLECTOMY & ADENOIDECTOMY       ZZC PART REMOVAL COLON W ANASTOMOSIS N/A 6/16/2014    Procedure: COLECTOMY;  Surgeon: Meliton Bazan MD;  Location: [facility];  Service: General       Family History:   Family History   Problem Relation Age of Onset     Squamous cell carcinoma Mother      Colon Cancer Sister      Breast Cancer Maternal Aunt      Bone Cancer Maternal Aunt      Colon  Cancer Paternal Aunt      Breast Cancer Cousin        Social History:    Social History     Socioeconomic History     Marital status:    Tobacco Use     Smoking status: Never Smoker     Smokeless tobacco: Never Used   Substance and Sexual Activity     Alcohol use: No     Drug use: No     Sexual activity: Never        REVIEW OF SYSTEM:  She currently denies any new symptoms of shortness of breath dyspnea coughing sore throat postnasal drip allergies wheezing chest pain dizziness vertigo nausea vomiting diarrhea dysuria frequency urgency.  Does have a history of chronic pain as well as osteoarthritis, hard of hearing, macular degeneration, ischemic cardiomyopathy, hypertension.    PHYSICAL EXAM:   Pleasant female in no acute distress.  Had a lengthy conversation again today regarding her most recent 2 hospitalizations as well as a reminder of her last day here on the transitional care unit.  Was able to summarize her last stay.  Her head is normocephalic.  Conjunctiva is pink and sclera is clear.  Neck is supple without adenopathy.  Oropharynx pink and moist.  Lung sounds are clear throughout.  Cardiovascular S1-S2 regular rate and rhythm chronic but nonpitting lower extremity edema.  Gastrointestinal soft nontender with positive bowel sounds.  Musculoskeletal history of chronic osteoarthritis and chronic pain.  Left arm sling.  Good CMS to her left hand.  Psychiatric: Pleasant affect.    Vitals:    06/28/22 2124   BP: (!) 146/75   Pulse: 100   Resp: 18   Temp: 98.2  F (36.8  C)   SpO2: 98%   Weight: 71.7 kg (158 lb)         Medication List:  Current Outpatient Medications   Medication Sig     acetaminophen (TYLENOL) 325 MG tablet Take 650 mg by mouth every 6 hours     aspirin (ASA) 81 MG chewable tablet Take 1 tablet (81 mg) by mouth daily Starting tomorrow. (Patient taking differently: Take 81 mg by mouth every evening Starting tomorrow.)     atorvastatin (LIPITOR) 40 MG tablet TAKE 1 TABLET BY MOUTH ONCE  "DAILY (Patient taking differently: Take 40 mg by mouth every evening)     bisacodyl (DULCOLAX) 5 MG EC tablet Take 1 tablet (5 mg) by mouth daily as needed for constipation     carvedilol (COREG) 6.25 MG tablet TAKE 2 TABLETS BY MOUTH ONCE DAILY     diclofenac (VOLTAREN) 1 % topical gel Apply 2 g topically 4 times daily     docusate sodium (COLACE) 100 MG capsule Take 100 mg by mouth 2 times daily as needed     escitalopram (LEXAPRO) 5 MG tablet Take 5 mg by mouth daily     furosemide (LASIX) 20 MG tablet Take 1 tablet (20 mg) by mouth 2 times daily     ibuprofen (ADVIL/MOTRIN) 400 MG tablet Take 1 tablet (400 mg) by mouth every 6 hours as needed for moderate pain     Lidocaine (LIDOCARE) 4 % Patch Place 1 patch onto the skin every 24 hours To prevent lidocaine toxicity, patient should be patch free for 12 hrs daily.     losartan (COZAAR) 25 MG tablet Take 1 tablet (25 mg) by mouth daily     magnesium oxide (MAG-OX) 400 (241.3 Mg) MG tablet Take 400 mg by mouth daily     Multiple Vitamins-Minerals (PRESERVISION AREDS) CAPS Take 1 capsule by mouth 2 times daily      nitroGLYcerin (NITROSTAT) 0.4 MG sublingual tablet One tablet under the tongue every 5 minutes if needed for chest pain. May repeat every 5 minutes for a maximum of 3 doses in 15 minutes\"     olopatadine (PATANOL) 0.1 % ophthalmic solution Place 1 drop into both eyes 2 times daily     Omega-3 Fatty Acids (FISH OIL OMEGA-3 PO) Take 1 capsule by mouth daily     oxyCODONE (ROXICODONE) 5 MG tablet Take 1 tablet (5 mg) by mouth every 6 hours as needed for moderate to severe pain     prednisoLONE acetate (PRED FORTE) 1 % ophthalmic suspension Place 1 drop into both eyes 2 times daily     spironolactone (ALDACTONE) 25 MG tablet Take 25 mg by mouth daily     ticagrelor (BRILINTA) 90 MG tablet Take 1 tablet (90 mg) by mouth 2 times daily Start tomorrow morning.     traMADol (ULTRAM) 50 MG tablet Take 1 tablet (50 mg) by mouth 3 times daily     VITAMIN D, " CHOLECALCIFEROL, PO Take 4,000 Units by mouth daily     No current facility-administered medications for this visit.     Post Medication Reconciliation Status: Discharge medications reconciled and changed, see notes/orders                  Labs:  Last Comprehensive Metabolic Panel:  Sodium   Date Value Ref Range Status   06/28/2022 137 136 - 145 mmol/L Final   09/15/2006 134 133 - 144 mmol/L Final     Comment:     Delta Verified     Potassium   Date Value Ref Range Status   06/28/2022 3.8 3.4 - 5.3 mmol/L Final   06/14/2022 3.5 3.5 - 5.0 mmol/L Final   09/15/2006 4.1 3.4 - 5.3 mmol/L Final     Chloride   Date Value Ref Range Status   06/28/2022 106 98 - 107 mmol/L Final   06/14/2022 102 98 - 107 mmol/L Final   09/15/2006 99 94 - 109 mmol/L Final     Carbon Dioxide   Date Value Ref Range Status   09/15/2006 28 20 - 32 mmol/L Final     Carbon Dioxide (CO2)   Date Value Ref Range Status   06/28/2022 20 (L) 22 - 29 mmol/L Final   06/14/2022 26 22 - 31 mmol/L Final     Anion Gap   Date Value Ref Range Status   06/28/2022 11 7 - 15 mmol/L Final   06/14/2022 11 5 - 18 mmol/L Final   09/15/2006 7 6 - 17 mmol/L Final     Glucose   Date Value Ref Range Status   06/28/2022 120 (H) 70 - 99 mg/dL Final   06/14/2022 106 70 - 125 mg/dL Final   09/14/2006 110 60 - 110 mg/dL Final     Urea Nitrogen   Date Value Ref Range Status   06/28/2022 12.7 8.0 - 23.0 mg/dL Final   06/14/2022 14 8 - 28 mg/dL Final   09/14/2006 24 7 - 30 mg/dL Final     Creatinine   Date Value Ref Range Status   06/28/2022 0.64 0.51 - 0.95 mg/dL Final   09/14/2006 1.08 0.60 - 1.30 mg/dL Final     GFR Estimate   Date Value Ref Range Status   06/28/2022 85 >60 mL/min/1.73m2 Final     Comment:     Effective December 21, 2021 eGFRcr in adults is calculated using the 2021 CKD-EPI creatinine equation which includes age and gender (Maria Del Carmen woodruff al., NEJM, DOI: 10.1056/PNBMzi6755836)   05/26/2021 >60 >60 mL/min/1.73m2 Final   09/14/2006 53 (L) >60 mL/min/1.7m2 Final      Calcium   Date Value Ref Range Status   06/28/2022 7.5 (L) 8.8 - 10.2 mg/dL Final   09/14/2006 9.2 8.5 - 10.4 mg/dL Final     CBC RESULTS: Recent Labs   Lab Test 06/28/22  0925   WBC 8.3   RBC 3.12*   HGB 9.6*   HCT 29.8*   MCV 96   MCH 30.8   MCHC 32.2   RDW 14.7            Assessment:   (M19.012) Glenohumeral arthritis, left  (primary encounter diagnosis)  Comment: Currently in a sling  Plan: Discussed her recent MRI as well as pain management and therapy recommendations.  Also talk to therapy about range of motion    (M17.12) Tricompartment osteoarthritis of left knee  Comment: Discussed knee pain  Plan: Does have diclofenac gel    (G89.4) Chronic pain disorder  Comment: Prior to this hospitalization she was on scheduled Tylenol and tramadol  Plan: We will go ahead and reschedule the Tylenol 650 mg every 6 hours    (I10) Essential (primary) hypertension  Comment: Stable  Plan: Continue to manage and follow      PLAN:    Had a lengthy conversation today again discussing her recent 2 hospitalizations as well as her history of falls as well as concerns regarding medical as well as doing her ADLs.  Talked about pain management with the current regiment and the new of the oxycodone where she was only on tramadol and I will go ahead and schedule the Tylenol.  She does have a CBC and BMP scheduled by the hospital on July 1.  Her follow-up to orthopedics is on July 13.  Had a chance to talk to therapy as well.  She did not have any other questions.    For documentation purposes total visit 40 minutes which over 50% was spent with the patient going over her previous hospitalization, her fall, current hospitalization, work-up in the hospital, current laboratory studies, current medications, pain management, nutrition, rehabilitation and the importance of following the instructions of the therapy department due to her history of falls.      Electronically signed by: Bry Jurado NP

## 2022-06-29 NOTE — LETTER
6/29/2022        RE: Marina ADAME Edinson  [address]  [place] [address]        [facility] GERIATRIC SERVICES    Facility:   [facility] ([facility]) [[id number]]   Code Status: FULL CODE      HPI:   Marina is a 88 year old female who was hospitalized June 25, 2022 secondary to left shoulder pain secondary to a fall at home.  She recently was in the transitional care unit for rehabilitation services and she went home and had a mechanical fall the next day.  Prior to that she was hospitalized June 20, 2022 secondary to dyspnea on exertion a history of CAD and chronic pain and the echo and stress test were normal.  Also while in the transitional care unit she did end up testing positive for COVID and was placed on Paxlovid with good results.  Her most current work-up did show a sodium of 131, potassium 3.6, BUN 23, creatinine 0.58, white cell count of 9.8 and hemoglobin 9.9.  Her left shoulder x-rays did show severe glenohumeral joint degenerative changes with remodeling and flattening of the humeral head which is chronic but no evidence of acute fracture or dislocation.  The left knee x-rays did show advanced medial compartment predominant degenerative changes but no definite fracture.  She was admitted to observation.  Her working diagnosis was left shoulder pain with a history of chronic right knee pain with a recent left knee pain secondary to a fall.  She does have a history of CAD status post PAT x2 along with ischemic cardiomyopathy, hyperlipidemia, depression, chronic constipation, macular degeneration.  After being on observation she was treated with her common dose of tramadol and Tylenol with tolerable relief eventually was placed on oxycodone as needed.  It was all thought that she could do some weightbearing as tolerated gentle range of motion to her shoulder as well as come back to the rehabilitation services for more rehabilitation.  She is now in the transitional care unit which we had  again discussed the role of the transitional care unit as well as the rehabilitation component.  Her appetite is good.  Is not pleased not being able to use her walker so we did have a lengthy conversation today regarding her history of falls as well as her recent hospitalization.  She does have cognitive impairment.  I also had a chance to talk to therapy today.  Her systolic blood pressures ranging 130-150 she has been afebrile and also on room air and her current weight 158 pounds in comparison to Bianka 10 also 158 pounds.  For pain is on tramadol scheduled and does have oxycodone and Tylenol as needed we will go ahead and schedule the Tylenol also does have a lidocaine patch and diclofenac gel.  Has not required any ibuprofen as needed.  She feels her appetite to be fine.  Denies any bowel or bladder problems.  Does have a history of constipation and currently on stool softeners.  Past Medical History:  Past Medical History:   Diagnosis Date     Age-related physical debility      Anemia      Arthritis      Bladder infection 01/03/2015    dx'd- on antibiotics     Blood type AB-     with Anti-C     Breast cancer (H)      Bursitis, knee      Cancer (H)     breast      Cancer (H) 2005    left breast     Colon cancer (H)      Depression      Gout      History of transfusion      Hypertension      Hypertension      Insomnia      Macular degeneration      Osteoarthritis of multiple joints      Osteopenia      Ovarian cyst      Rosacea      Vitamin D deficiency            Surgical History:  Past Surgical History:   Procedure Laterality Date     ABDOMEN SURGERY      colon resection for ca 2015     BACK SURGERY Bilateral     L3-L5, L5-S1 decompression lami     BACK SURGERY      posterior spinal reconstruction     BREAST SURGERY       BREAST SURGERY      left mastectomy     CATARACT EXTRACTION Bilateral      COLON SURGERY       COLONOSCOPY       COLONOSCOPY N/A 8/4/2015    Procedure: COLONOSCOPY;  Surgeon: Ysoeph Ferraro,  MD;  Location:  GI     CV CORONARY ANGIOGRAM N/A 12/12/2021    Procedure: Coronary Angiogram;  Surgeon: Everett Castellon MD;  Location: [facility] CARDIAC CATH LAB     CV CORONARY ANGIOGRAM N/A 6/7/2022    Procedure: Coronary Angiogram;  Surgeon: Louann Silva MD;  Location: [facility] LAB CV     CV FRACTIONAL FLOW RATIO WIRE N/A 6/7/2022    Procedure: Fractional Flow Ratio Wire;  Surgeon: Louann Silva MD;  Location: [facility] LAB CV     CV PCI N/A 6/7/2022    Procedure: Percutaneous Coronary Intervention;  Surgeon: Louann Silva MD;  Location: [facility] CV     CV PCI STENT DRUG ELUTING N/A 12/12/2021    Procedure: Percutaneous Coronary Intervention Stent Drug Eluting;  Surgeon: Everett Castellon MD;  Location: [facility] CARDIAC CATH LAB     DILATION AND CURETTAGE      onsil     EYE SURGERY      cataract bilateral     EYE SURGERY       GASTRECTOMY       MASTECTOMY, RADICAL Left      NEUROMA SURGERY Bilateral     feet     ORTHOPEDIC SURGERY      shoulder right, knee left     ORTHOPEDIC SURGERY      lami     OTHER SURGICAL HISTORY      left knee arthroscopy     OTHER SURGICAL HISTORY      right shoulder replacement     OTHER SURGICAL HISTORY      left breast biopsyneedle core     GA LAP,FULGURATE/EXCISE LESIONS N/A 3/29/2017    Procedure: LAPAROSCOPIC BILATERAL SALPING OOPHORECTOMY PELVIC WASHINGS;  Surgeon: Eduardo Smart MD;  Location: [facility];  Service: Gynecology     GA MASTECTOMY, SIMPLE, COMPLETE Right 1/7/2015    Procedure: RIGHT BREAST MASTECTOMY;  Surgeon: Meliton Bazan MD;  Location: [facility];  Service: General     REPLACEMENT TOTAL KNEE Right      TONSILLECTOMY & ADENOIDECTOMY       ZZC PART REMOVAL COLON W ANASTOMOSIS N/A 6/16/2014    Procedure: COLECTOMY;  Surgeon: Meliton Bazan MD;  Location: [facility];  Service: General       Family History:   Family History   Problem Relation Age of Onset     Squamous cell carcinoma Mother       Colon Cancer Sister      Breast Cancer Maternal Aunt      Bone Cancer Maternal Aunt      Colon Cancer Paternal Aunt      Breast Cancer Cousin        Social History:    Social History     Socioeconomic History     Marital status:    Tobacco Use     Smoking status: Never Smoker     Smokeless tobacco: Never Used   Substance and Sexual Activity     Alcohol use: No     Drug use: No     Sexual activity: Never        REVIEW OF SYSTEM:  She currently denies any new symptoms of shortness of breath dyspnea coughing sore throat postnasal drip allergies wheezing chest pain dizziness vertigo nausea vomiting diarrhea dysuria frequency urgency.  Does have a history of chronic pain as well as osteoarthritis, hard of hearing, macular degeneration, ischemic cardiomyopathy, hypertension.    PHYSICAL EXAM:   Pleasant female in no acute distress.  Had a lengthy conversation again today regarding her most recent 2 hospitalizations as well as a reminder of her last day here on the transitional care unit.  Was able to summarize her last stay.  Her head is normocephalic.  Conjunctiva is pink and sclera is clear.  Neck is supple without adenopathy.  Oropharynx pink and moist.  Lung sounds are clear throughout.  Cardiovascular S1-S2 regular rate and rhythm chronic but nonpitting lower extremity edema.  Gastrointestinal soft nontender with positive bowel sounds.  Musculoskeletal history of chronic osteoarthritis and chronic pain.  Left arm sling.  Good CMS to her left hand.  Psychiatric: Pleasant affect.    Vitals:    06/28/22 2124   BP: (!) 146/75   Pulse: 100   Resp: 18   Temp: 98.2  F (36.8  C)   SpO2: 98%   Weight: 71.7 kg (158 lb)         Medication List:  Current Outpatient Medications   Medication Sig     acetaminophen (TYLENOL) 325 MG tablet Take 650 mg by mouth every 6 hours     aspirin (ASA) 81 MG chewable tablet Take 1 tablet (81 mg) by mouth daily Starting tomorrow. (Patient taking differently: Take 81 mg by mouth every  "evening Starting tomorrow.)     atorvastatin (LIPITOR) 40 MG tablet TAKE 1 TABLET BY MOUTH ONCE DAILY (Patient taking differently: Take 40 mg by mouth every evening)     bisacodyl (DULCOLAX) 5 MG EC tablet Take 1 tablet (5 mg) by mouth daily as needed for constipation     carvedilol (COREG) 6.25 MG tablet TAKE 2 TABLETS BY MOUTH ONCE DAILY     diclofenac (VOLTAREN) 1 % topical gel Apply 2 g topically 4 times daily     docusate sodium (COLACE) 100 MG capsule Take 100 mg by mouth 2 times daily as needed     escitalopram (LEXAPRO) 5 MG tablet Take 5 mg by mouth daily     furosemide (LASIX) 20 MG tablet Take 1 tablet (20 mg) by mouth 2 times daily     ibuprofen (ADVIL/MOTRIN) 400 MG tablet Take 1 tablet (400 mg) by mouth every 6 hours as needed for moderate pain     Lidocaine (LIDOCARE) 4 % Patch Place 1 patch onto the skin every 24 hours To prevent lidocaine toxicity, patient should be patch free for 12 hrs daily.     losartan (COZAAR) 25 MG tablet Take 1 tablet (25 mg) by mouth daily     magnesium oxide (MAG-OX) 400 (241.3 Mg) MG tablet Take 400 mg by mouth daily     Multiple Vitamins-Minerals (PRESERVISION AREDS) CAPS Take 1 capsule by mouth 2 times daily      nitroGLYcerin (NITROSTAT) 0.4 MG sublingual tablet One tablet under the tongue every 5 minutes if needed for chest pain. May repeat every 5 minutes for a maximum of 3 doses in 15 minutes\"     olopatadine (PATANOL) 0.1 % ophthalmic solution Place 1 drop into both eyes 2 times daily     Omega-3 Fatty Acids (FISH OIL OMEGA-3 PO) Take 1 capsule by mouth daily     oxyCODONE (ROXICODONE) 5 MG tablet Take 1 tablet (5 mg) by mouth every 6 hours as needed for moderate to severe pain     prednisoLONE acetate (PRED FORTE) 1 % ophthalmic suspension Place 1 drop into both eyes 2 times daily     spironolactone (ALDACTONE) 25 MG tablet Take 25 mg by mouth daily     ticagrelor (BRILINTA) 90 MG tablet Take 1 tablet (90 mg) by mouth 2 times daily Start tomorrow morning.     " traMADol (ULTRAM) 50 MG tablet Take 1 tablet (50 mg) by mouth 3 times daily     VITAMIN D, CHOLECALCIFEROL, PO Take 4,000 Units by mouth daily     No current facility-administered medications for this visit.        Labs:  Last Comprehensive Metabolic Panel:  Sodium   Date Value Ref Range Status   06/28/2022 137 136 - 145 mmol/L Final   09/15/2006 134 133 - 144 mmol/L Final     Comment:     Delta Verified     Potassium   Date Value Ref Range Status   06/28/2022 3.8 3.4 - 5.3 mmol/L Final   06/14/2022 3.5 3.5 - 5.0 mmol/L Final   09/15/2006 4.1 3.4 - 5.3 mmol/L Final     Chloride   Date Value Ref Range Status   06/28/2022 106 98 - 107 mmol/L Final   06/14/2022 102 98 - 107 mmol/L Final   09/15/2006 99 94 - 109 mmol/L Final     Carbon Dioxide   Date Value Ref Range Status   09/15/2006 28 20 - 32 mmol/L Final     Carbon Dioxide (CO2)   Date Value Ref Range Status   06/28/2022 20 (L) 22 - 29 mmol/L Final   06/14/2022 26 22 - 31 mmol/L Final     Anion Gap   Date Value Ref Range Status   06/28/2022 11 7 - 15 mmol/L Final   06/14/2022 11 5 - 18 mmol/L Final   09/15/2006 7 6 - 17 mmol/L Final     Glucose   Date Value Ref Range Status   06/28/2022 120 (H) 70 - 99 mg/dL Final   06/14/2022 106 70 - 125 mg/dL Final   09/14/2006 110 60 - 110 mg/dL Final     Urea Nitrogen   Date Value Ref Range Status   06/28/2022 12.7 8.0 - 23.0 mg/dL Final   06/14/2022 14 8 - 28 mg/dL Final   09/14/2006 24 7 - 30 mg/dL Final     Creatinine   Date Value Ref Range Status   06/28/2022 0.64 0.51 - 0.95 mg/dL Final   09/14/2006 1.08 0.60 - 1.30 mg/dL Final     GFR Estimate   Date Value Ref Range Status   06/28/2022 85 >60 mL/min/1.73m2 Final     Comment:     Effective December 21, 2021 eGFRcr in adults is calculated using the 2021 CKD-EPI creatinine equation which includes age and gender (Maria Del Carmen et al., NEJM, DOI: 10.1056/EZJUju5090350)   05/26/2021 >60 >60 mL/min/1.73m2 Final   09/14/2006 53 (L) >60 mL/min/1.7m2 Final     Calcium   Date Value Ref  Range Status   06/28/2022 7.5 (L) 8.8 - 10.2 mg/dL Final   09/14/2006 9.2 8.5 - 10.4 mg/dL Final     CBC RESULTS: Recent Labs   Lab Test 06/28/22  0925   WBC 8.3   RBC 3.12*   HGB 9.6*   HCT 29.8*   MCV 96   MCH 30.8   MCHC 32.2   RDW 14.7            Assessment:   (M19.012) Glenohumeral arthritis, left  (primary encounter diagnosis)  Comment: Currently in a sling  Plan: Discussed her recent MRI as well as pain management and therapy recommendations.  Also talk to therapy about range of motion    (M17.12) Tricompartment osteoarthritis of left knee  Comment: Discussed knee pain  Plan: Does have diclofenac gel    (G89.4) Chronic pain disorder  Comment: Prior to this hospitalization she was on scheduled Tylenol and tramadol  Plan: We will go ahead and reschedule the Tylenol 650 mg every 6 hours    (I10) Essential (primary) hypertension  Comment: Stable  Plan: Continue to manage and follow      PLAN:    Had a lengthy conversation today again discussing her recent 2 hospitalizations as well as her history of falls as well as concerns regarding medical as well as doing her ADLs.  Talked about pain management with the current regiment and the new of the oxycodone where she was only on tramadol and I will go ahead and schedule the Tylenol.  She does have a CBC and BMP scheduled by the hospital on July 1.  Her follow-up to orthopedics is on July 13.  Had a chance to talk to therapy as well.  She did not have any other questions.    For documentation purposes total visit 40 minutes which over 50% was spent with the patient going over her previous hospitalization, her fall, current hospitalization, work-up in the hospital, current laboratory studies, current medications, pain management, nutrition, rehabilitation and the importance of following the instructions of the therapy department due to her history of falls.      Electronically signed by: Bry Jurado NP          Sincerely,        Bry Jurado NP

## 2022-06-29 NOTE — TELEPHONE ENCOUNTER
DIAGNOSIS: Follow-up (L) shoulder injury / ED   APPOINTMENT DATE: 7.13.22   NOTES STATUS DETAILS   DISCHARGE SUMMARY from hospital Internal 6.25.22-6.28.22 [facility]   MEDICATION LIST Internal    MRI Internal 6.27.22 L shoulder   XRAYS (IMAGES & REPORTS) Internal 6.25.22 L humerus, L shoulder  12.23.20 L shoulder

## 2022-07-01 ENCOUNTER — LAB REQUISITION (OUTPATIENT)
Dept: LAB | Facility: CLINIC | Age: 87
End: 2022-07-01
Payer: COMMERCIAL

## 2022-07-01 ENCOUNTER — TELEPHONE (OUTPATIENT)
Dept: GERIATRICS | Facility: CLINIC | Age: 87
End: 2022-07-01

## 2022-07-01 DIAGNOSIS — R68.89 OTHER GENERAL SYMPTOMS AND SIGNS: ICD-10-CM

## 2022-07-01 DIAGNOSIS — R79.89 OTHER SPECIFIED ABNORMAL FINDINGS OF BLOOD CHEMISTRY: ICD-10-CM

## 2022-07-01 NOTE — TELEPHONE ENCOUNTER
[facility] Geriatrics Triage Nurse Telephone Encounter    Provider: MARY Arreola  [facility]  Facility Type:  TCU    Caller: Pattie  Call Back Number: [phone]    Allergies:    Allergies   Allergen Reactions     Blood-Group Specific Substance Other (See Comments)     Anti-D and Anti-C present. A delay in compatible RBC's may occur.     Celecoxib Unknown     Avoids due to sulfa allergy     Lisinopril Cough     Other reaction(s): cough  Other reaction(s): cough     Oxybutynin Other (See Comments)     Other reaction(s): intolerable dry eye and mouth  Other reaction(s): intolerable dry eye and mouth     Sulfa Drugs      Tetanus Toxoid Blisters     Tetanus Toxoids      Other reaction(s): Unknown  Other reaction(s): Unknown     Tolterodine Other (See Comments)     Other reaction(s): intolerable dry eye and mouth  Other reaction(s): intolerable dry eye and mouth     Piroxicam Rash     Other reaction(s): stomach upset  Other reaction(s): stomach upset        Reason for call: Nurse is calling to report that patient slid off the toilet.  No injury noted and VSS.  Also nurse is reporting that Heme 2 and BMP were not drawn today.      Verbal Order/Direction given by Provider: Continue to monitor per fall protocol and update with changes.  Check Heme 2 and BMP on 7/6/22.       Provider giving Order:  MARY Arreola    Verbal Order given to: Bárbara Deal RN

## 2022-07-02 ENCOUNTER — LAB REQUISITION (OUTPATIENT)
Dept: LAB | Facility: CLINIC | Age: 87
End: 2022-07-02
Payer: COMMERCIAL

## 2022-07-02 DIAGNOSIS — R68.89 OTHER GENERAL SYMPTOMS AND SIGNS: ICD-10-CM

## 2022-07-02 DIAGNOSIS — R79.89 OTHER SPECIFIED ABNORMAL FINDINGS OF BLOOD CHEMISTRY: ICD-10-CM

## 2022-07-02 LAB
ANION GAP SERPL CALCULATED.3IONS-SCNC: 16 MMOL/L (ref 7–15)
BUN SERPL-MCNC: 17.7 MG/DL (ref 8–23)
CALCIUM SERPL-MCNC: 8.6 MG/DL (ref 8.8–10.2)
CHLORIDE SERPL-SCNC: 100 MMOL/L (ref 98–107)
CREAT SERPL-MCNC: 0.71 MG/DL (ref 0.51–0.95)
DEPRECATED HCO3 PLAS-SCNC: 20 MMOL/L (ref 22–29)
ERYTHROCYTE [DISTWIDTH] IN BLOOD BY AUTOMATED COUNT: 14.6 % (ref 10–15)
GFR SERPL CREATININE-BSD FRML MDRD: 81 ML/MIN/1.73M2
GLUCOSE SERPL-MCNC: 120 MG/DL (ref 70–99)
HCT VFR BLD AUTO: 30.8 % (ref 35–47)
HGB BLD-MCNC: 9.9 G/DL (ref 11.7–15.7)
MCH RBC QN AUTO: 30.7 PG (ref 26.5–33)
MCHC RBC AUTO-ENTMCNC: 32.1 G/DL (ref 31.5–36.5)
MCV RBC AUTO: 95 FL (ref 78–100)
PLATELET # BLD AUTO: 318 10E3/UL (ref 150–450)
POTASSIUM SERPL-SCNC: 3.6 MMOL/L (ref 3.4–5.3)
RBC # BLD AUTO: 3.23 10E6/UL (ref 3.8–5.2)
SODIUM SERPL-SCNC: 136 MMOL/L (ref 136–145)
WBC # BLD AUTO: 8.2 10E3/UL (ref 4–11)

## 2022-07-02 PROCEDURE — 82310 ASSAY OF CALCIUM: CPT | Performed by: INTERNAL MEDICINE

## 2022-07-02 PROCEDURE — 85027 COMPLETE CBC AUTOMATED: CPT | Performed by: INTERNAL MEDICINE

## 2022-07-03 ENCOUNTER — TELEPHONE (OUTPATIENT)
Dept: GERIATRICS | Facility: CLINIC | Age: 87
End: 2022-07-03

## 2022-07-03 DIAGNOSIS — G89.29 CHRONIC PAIN OF RIGHT KNEE: ICD-10-CM

## 2022-07-03 DIAGNOSIS — M25.561 CHRONIC PAIN OF RIGHT KNEE: ICD-10-CM

## 2022-07-03 RX ORDER — TRAMADOL HYDROCHLORIDE 50 MG/1
50 TABLET ORAL 3 TIMES DAILY
Qty: 15 TABLET | Refills: 0 | Status: SHIPPED | OUTPATIENT
Start: 2022-07-03

## 2022-07-04 ENCOUNTER — LAB REQUISITION (OUTPATIENT)
Dept: LAB | Facility: CLINIC | Age: 87
End: 2022-07-04
Payer: COMMERCIAL

## 2022-07-04 DIAGNOSIS — R79.89 OTHER SPECIFIED ABNORMAL FINDINGS OF BLOOD CHEMISTRY: ICD-10-CM

## 2022-07-04 DIAGNOSIS — R68.89 OTHER GENERAL SYMPTOMS AND SIGNS: ICD-10-CM

## 2022-07-06 ENCOUNTER — TELEPHONE (OUTPATIENT)
Dept: GERIATRICS | Facility: CLINIC | Age: 87
End: 2022-07-06

## 2022-07-06 ENCOUNTER — TRANSITIONAL CARE UNIT VISIT (OUTPATIENT)
Dept: GERIATRICS | Facility: CLINIC | Age: 87
End: 2022-07-06
Payer: COMMERCIAL

## 2022-07-06 VITALS
OXYGEN SATURATION: 95 % | WEIGHT: 164.4 LBS | DIASTOLIC BLOOD PRESSURE: 75 MMHG | HEART RATE: 62 BPM | BODY MASS INDEX: 29.12 KG/M2 | SYSTOLIC BLOOD PRESSURE: 137 MMHG | TEMPERATURE: 98.2 F | RESPIRATION RATE: 20 BRPM

## 2022-07-06 DIAGNOSIS — I10 ESSENTIAL (PRIMARY) HYPERTENSION: ICD-10-CM

## 2022-07-06 DIAGNOSIS — M25.512 LEFT SHOULDER PAIN, UNSPECIFIED CHRONICITY: Primary | ICD-10-CM

## 2022-07-06 DIAGNOSIS — R26.9 ABNORMAL GAIT: ICD-10-CM

## 2022-07-06 DIAGNOSIS — K59.00 CONSTIPATION, UNSPECIFIED CONSTIPATION TYPE: ICD-10-CM

## 2022-07-06 LAB
ANION GAP SERPL CALCULATED.3IONS-SCNC: 13 MMOL/L (ref 7–15)
BUN SERPL-MCNC: 15.8 MG/DL (ref 8–23)
CALCIUM SERPL-MCNC: 8.9 MG/DL (ref 8.8–10.2)
CHLORIDE SERPL-SCNC: 103 MMOL/L (ref 98–107)
CREAT SERPL-MCNC: 0.63 MG/DL (ref 0.51–0.95)
DEPRECATED HCO3 PLAS-SCNC: 22 MMOL/L (ref 22–29)
ERYTHROCYTE [DISTWIDTH] IN BLOOD BY AUTOMATED COUNT: 15 % (ref 10–15)
GFR SERPL CREATININE-BSD FRML MDRD: 85 ML/MIN/1.73M2
GLUCOSE SERPL-MCNC: 141 MG/DL (ref 70–99)
HCT VFR BLD AUTO: 32.1 % (ref 35–47)
HGB BLD-MCNC: 10.3 G/DL (ref 11.7–15.7)
MCH RBC QN AUTO: 31 PG (ref 26.5–33)
MCHC RBC AUTO-ENTMCNC: 32.1 G/DL (ref 31.5–36.5)
MCV RBC AUTO: 97 FL (ref 78–100)
PLATELET # BLD AUTO: 312 10E3/UL (ref 150–450)
POTASSIUM SERPL-SCNC: 3.9 MMOL/L (ref 3.4–5.3)
RBC # BLD AUTO: 3.32 10E6/UL (ref 3.8–5.2)
SODIUM SERPL-SCNC: 138 MMOL/L (ref 136–145)
WBC # BLD AUTO: 7.5 10E3/UL (ref 4–11)

## 2022-07-06 PROCEDURE — 99309 SBSQ NF CARE MODERATE MDM 30: CPT | Performed by: FAMILY MEDICINE

## 2022-07-06 PROCEDURE — 80048 BASIC METABOLIC PNL TOTAL CA: CPT | Performed by: INTERNAL MEDICINE

## 2022-07-06 PROCEDURE — 36415 COLL VENOUS BLD VENIPUNCTURE: CPT | Performed by: INTERNAL MEDICINE

## 2022-07-06 PROCEDURE — P9604 ONE-WAY ALLOW PRORATED TRIP: HCPCS | Performed by: INTERNAL MEDICINE

## 2022-07-06 PROCEDURE — 85027 COMPLETE CBC AUTOMATED: CPT | Performed by: INTERNAL MEDICINE

## 2022-07-06 NOTE — TELEPHONE ENCOUNTER
[facility] Geriatrics Lab Note     Provider: MARY Arreola  Facility: [facility]  Facility Type:  TCU    Allergies   Allergen Reactions     Blood-Group Specific Substance Other (See Comments)     Anti-D and Anti-C present. A delay in compatible RBC's may occur.     Celecoxib Unknown     Avoids due to sulfa allergy     Lisinopril Cough     Other reaction(s): cough  Other reaction(s): cough     Oxybutynin Other (See Comments)     Other reaction(s): intolerable dry eye and mouth  Other reaction(s): intolerable dry eye and mouth     Sulfa Drugs      Tetanus Toxoid Blisters     Tetanus Toxoids      Other reaction(s): Unknown  Other reaction(s): Unknown     Tolterodine Other (See Comments)     Other reaction(s): intolerable dry eye and mouth  Other reaction(s): intolerable dry eye and mouth     Piroxicam Rash     Other reaction(s): stomach upset  Other reaction(s): stomach upset       Labs Reviewed by provider: Heme 2, BMP     Verbal Order/Direction given by Provider: No new orders.      Provider giving Order:  MARY Arreola    Verbal Order given to: Yusra([phone])    Meliton Deal RN

## 2022-07-06 NOTE — LETTER
7/6/2022        RE: Marina ADAME Edinson  [address]  [place] [address]        [facility] GERIATRIC SERVICES    Facility:   [facility] ([facility]) [[id number]]   Code Status: FULL CODE      CHIEF COMPLAINT/REASON FOR VISIT:  Chief Complaint   Patient presents with     RECHECK       HISTORY:      HPI: Marina is a 88 year old female who currently is in the transitional care unit room 120 and who I the opportunity to revisit with once again today not only secondary to her hospitalization June 25, 2022 secondary left shoulder pain secondary to a fall at home as well as a history of chronic pain including her knees as well as a history of gait instability as well as discussion of pain management and the rehabilitation process.  First of all she want to talk about her constipation to which we have addressed that with her stool softeners and she can actually have prune juice which has worked in the past so staff is aware.  Also regarding therapy she does have a history of falls even while on the transitional care unit.  No increase in injury.  She does wear a sling to her left shoulder.  History of chronic knee pain as well as degenerative joint disease currently on Tylenol scheduled also has diclofenac gel to the left shoulder tramadol scheduled and does take between 1 and 2 oxycodone per day as needed and does not want any changes to those medications today.  She also has a lidocaine patch.  Her systolic blood pressures ranging 117-150 she has been afebrile and also on room air.  Her weight 164 pounds in comparison to June 30 165 pounds.  She recently did have laboratory studies on July 2 to see results above her hemoglobin also 9.9.  Does have a follow-up with orthopedics on July 13.  Her appetite is good.  Does not have any nutritional complaints.    Past Medical History:   Diagnosis Date     Age-related physical debility      Anemia      Arthritis      Bladder infection 01/03/2015    dx'd- on  antibiotics     Blood type AB-     with Anti-C     Breast cancer (H)      Bursitis, knee      Cancer (H)     breast      Cancer (H) 2005    left breast     Colon cancer (H)      Depression      Gout      History of transfusion      Hypertension      Hypertension      Insomnia      Macular degeneration      Osteoarthritis of multiple joints      Osteopenia      Ovarian cyst      Rosacea      Vitamin D deficiency             Family History   Problem Relation Age of Onset     Squamous cell carcinoma Mother      Colon Cancer Sister      Breast Cancer Maternal Aunt      Bone Cancer Maternal Aunt      Colon Cancer Paternal Aunt      Breast Cancer Cousin       Social History     Socioeconomic History     Marital status:    Tobacco Use     Smoking status: Never Smoker     Smokeless tobacco: Never Used   Substance and Sexual Activity     Alcohol use: No     Drug use: No     Sexual activity: Never      No new changes of review of systems or physical exam from our last visit on June 29  RShe currently denies any new symptoms of shortness of breath dyspnea coughing sore throat postnasal drip allergies wheezing chest pain dizziness vertigo nausea vomiting diarrhea dysuria frequency urgency.  Does have a history of chronic pain as well as osteoarthritis, hard of hearing, macular degeneration, ischemic cardiomyopathy, hypertension.EVIEW OF SYSTEM:      PHYSICAL EXAM:   Pleasant female in no acute distress..  Her head is normocephalic.  Conjunctiva is pink and sclera is clear.  Neck is supple without adenopathy.  Oropharynx pink and moist.  Lung sounds are clear throughout.  Cardiovascular S1-S2 regular rate and rhythm chronic but nonpitting lower extremity edema.  Gastrointestinal soft nontender with positive bowel sounds.  Musculoskeletal history of chronic osteoarthritis and chronic pain.  Left arm sling.  Good CMS to her left hand.  Psychiatric: Pleasant affect.       Current Outpatient Medications:      acetaminophen  "(TYLENOL) 325 MG tablet, Take 650 mg by mouth every 6 hours, Disp: , Rfl:      aspirin (ASA) 81 MG chewable tablet, Take 1 tablet (81 mg) by mouth daily Starting tomorrow. (Patient taking differently: Take 81 mg by mouth every evening Starting tomorrow.), Disp: 30 tablet, Rfl: 3     atorvastatin (LIPITOR) 40 MG tablet, TAKE 1 TABLET BY MOUTH ONCE DAILY (Patient taking differently: Take 40 mg by mouth every evening), Disp: 90 tablet, Rfl: 3     bisacodyl (DULCOLAX) 5 MG EC tablet, Take 1 tablet (5 mg) by mouth daily as needed for constipation, Disp: , Rfl:      carvedilol (COREG) 6.25 MG tablet, TAKE 2 TABLETS BY MOUTH ONCE DAILY, Disp: 60 tablet, Rfl: 0     diclofenac (VOLTAREN) 1 % topical gel, Apply 2 g topically 4 times daily, Disp: , Rfl:      docusate sodium (COLACE) 100 MG capsule, Take 100 mg by mouth 2 times daily as needed, Disp: , Rfl:      escitalopram (LEXAPRO) 5 MG tablet, Take 5 mg by mouth daily, Disp: , Rfl:      furosemide (LASIX) 20 MG tablet, Take 1 tablet (20 mg) by mouth 2 times daily, Disp: 180 tablet, Rfl: 3     ibuprofen (ADVIL/MOTRIN) 400 MG tablet, Take 1 tablet (400 mg) by mouth every 6 hours as needed for moderate pain, Disp: 10 tablet, Rfl: 0     Lidocaine (LIDOCARE) 4 % Patch, Place 1 patch onto the skin every 24 hours To prevent lidocaine toxicity, patient should be patch free for 12 hrs daily., Disp: , Rfl:      losartan (COZAAR) 25 MG tablet, Take 1 tablet (25 mg) by mouth daily, Disp: , Rfl:      magnesium oxide (MAG-OX) 400 (241.3 Mg) MG tablet, Take 400 mg by mouth daily, Disp: , Rfl:      Multiple Vitamins-Minerals (PRESERVISION AREDS) CAPS, Take 1 capsule by mouth 2 times daily , Disp: , Rfl:      nitroGLYcerin (NITROSTAT) 0.4 MG sublingual tablet, One tablet under the tongue every 5 minutes if needed for chest pain. May repeat every 5 minutes for a maximum of 3 doses in 15 minutes\", Disp: 25 tablet, Rfl: 3     olopatadine (PATANOL) 0.1 % ophthalmic solution, Place 1 drop into " both eyes 2 times daily, Disp: , Rfl:      Omega-3 Fatty Acids (FISH OIL OMEGA-3 PO), Take 1 capsule by mouth daily, Disp: , Rfl:      oxyCODONE (ROXICODONE) 5 MG tablet, Take 1 tablet (5 mg) by mouth every 6 hours as needed for moderate to severe pain, Disp: 10 tablet, Rfl: 0     prednisoLONE acetate (PRED FORTE) 1 % ophthalmic suspension, Place 1 drop into both eyes 2 times daily, Disp: , Rfl:      spironolactone (ALDACTONE) 25 MG tablet, Take 25 mg by mouth daily, Disp: , Rfl:      ticagrelor (BRILINTA) 90 MG tablet, Take 1 tablet (90 mg) by mouth 2 times daily Start tomorrow morning., Disp: 180 tablet, Rfl: 3     traMADol (ULTRAM) 50 MG tablet, Take 1 tablet (50 mg) by mouth 3 times daily, Disp: 15 tablet, Rfl: 0     VITAMIN D, CHOLECALCIFEROL, PO, Take 4,000 Units by mouth daily, Disp: , Rfl:     /75   Pulse 62   Temp 98.2  F (36.8  C)   Resp 20   Wt 74.6 kg (164 lb 6.4 oz)   SpO2 95%   BMI 29.12 kg/m      LABS:   Last Comprehensive Metabolic Panel:  Sodium   Date Value Ref Range Status   07/02/2022 136 136 - 145 mmol/L Final   09/15/2006 134 133 - 144 mmol/L Final     Comment:     Delta Verified     Potassium   Date Value Ref Range Status   07/02/2022 3.6 3.4 - 5.3 mmol/L Final   06/14/2022 3.5 3.5 - 5.0 mmol/L Final   09/15/2006 4.1 3.4 - 5.3 mmol/L Final     Chloride   Date Value Ref Range Status   07/02/2022 100 98 - 107 mmol/L Final   06/14/2022 102 98 - 107 mmol/L Final   09/15/2006 99 94 - 109 mmol/L Final     Carbon Dioxide   Date Value Ref Range Status   09/15/2006 28 20 - 32 mmol/L Final     Carbon Dioxide (CO2)   Date Value Ref Range Status   07/02/2022 20 (L) 22 - 29 mmol/L Final   06/14/2022 26 22 - 31 mmol/L Final     Anion Gap   Date Value Ref Range Status   07/02/2022 16 (H) 7 - 15 mmol/L Final   06/14/2022 11 5 - 18 mmol/L Final   09/15/2006 7 6 - 17 mmol/L Final     Glucose   Date Value Ref Range Status   07/02/2022 120 (H) 70 - 99 mg/dL Final   06/14/2022 106 70 - 125 mg/dL Final    09/14/2006 110 60 - 110 mg/dL Final     Urea Nitrogen   Date Value Ref Range Status   07/02/2022 17.7 8.0 - 23.0 mg/dL Final   06/14/2022 14 8 - 28 mg/dL Final   09/14/2006 24 7 - 30 mg/dL Final     Creatinine   Date Value Ref Range Status   07/02/2022 0.71 0.51 - 0.95 mg/dL Final   09/14/2006 1.08 0.60 - 1.30 mg/dL Final     GFR Estimate   Date Value Ref Range Status   07/02/2022 81 >60 mL/min/1.73m2 Final     Comment:     Effective December 21, 2021 eGFRcr in adults is calculated using the 2021 CKD-EPI creatinine equation which includes age and gender (Maria Del Carmen et al., NEJ, DOI: 10.1056/ISFVld5784834)   05/26/2021 >60 >60 mL/min/1.73m2 Final   09/14/2006 53 (L) >60 mL/min/1.7m2 Final     Calcium   Date Value Ref Range Status   07/02/2022 8.6 (L) 8.8 - 10.2 mg/dL Final   09/14/2006 9.2 8.5 - 10.4 mg/dL Final     CBC RESULTS: Recent Labs   Lab Test 07/02/22  1313   WBC 8.2   RBC 3.23*   HGB 9.9*   HCT 30.8*   MCV 95   MCH 30.7   MCHC 32.1   RDW 14.6              ASSESSMENT:    Encounter Diagnoses   Name Primary?     Left shoulder pain, unspecified chronicity Yes     Constipation, unspecified constipation type      Essential (primary) hypertension      Abnormal gait        PLAN:    Had a chance to go over previous laboratory studies, nutrition, pain management, constipation and the rehabilitation component.  Also staff are aware of her chronic medical conditions and according to the charge nurse there is not yet been a decision made to either go back home or go to long-term care.  She does live in the apartments [place]-door and has had a history of falls as well as self-care deficits.        Electronically signed by: Bry Jurado NP          Sincerely,        Bry Jurado NP

## 2022-07-06 NOTE — PROGRESS NOTES
[facility] GERIATRIC SERVICES    Facility:   [facility] ([facility]) [[id number]]   Code Status: FULL CODE      CHIEF COMPLAINT/REASON FOR VISIT:  Chief Complaint   Patient presents with     RECHECK       HISTORY:      HPI: Marina is a 88 year old female who currently is in the transitional care unit room 120 and who I the opportunity to revisit with once again today not only secondary to her hospitalization June 25, 2022 secondary left shoulder pain secondary to a fall at home as well as a history of chronic pain including her knees as well as a history of gait instability as well as discussion of pain management and the rehabilitation process.  First of all she want to talk about her constipation to which we have addressed that with her stool softeners and she can actually have prune juice which has worked in the past so staff is aware.  Also regarding therapy she does have a history of falls even while on the transitional care unit.  No increase in injury.  She does wear a sling to her left shoulder.  History of chronic knee pain as well as degenerative joint disease currently on Tylenol scheduled also has diclofenac gel to the left shoulder tramadol scheduled and does take between 1 and 2 oxycodone per day as needed and does not want any changes to those medications today.  She also has a lidocaine patch.  Her systolic blood pressures ranging 117-150 she has been afebrile and also on room air.  Her weight 164 pounds in comparison to June 30 165 pounds.  She recently did have laboratory studies on July 2 to see results above her hemoglobin also 9.9.  Does have a follow-up with orthopedics on July 13.  Her appetite is good.  Does not have any nutritional complaints.    Past Medical History:   Diagnosis Date     Age-related physical debility      Anemia      Arthritis      Bladder infection 01/03/2015    dx'd- on antibiotics     Blood type AB-     with Anti-C     Breast cancer (H)      Bursitis, knee      Cancer  (H)     breast      Cancer (H) 2005    left breast     Colon cancer (H)      Depression      Gout      History of transfusion      Hypertension      Hypertension      Insomnia      Macular degeneration      Osteoarthritis of multiple joints      Osteopenia      Ovarian cyst      Rosacea      Vitamin D deficiency             Family History   Problem Relation Age of Onset     Squamous cell carcinoma Mother      Colon Cancer Sister      Breast Cancer Maternal Aunt      Bone Cancer Maternal Aunt      Colon Cancer Paternal Aunt      Breast Cancer Cousin       Social History     Socioeconomic History     Marital status:    Tobacco Use     Smoking status: Never Smoker     Smokeless tobacco: Never Used   Substance and Sexual Activity     Alcohol use: No     Drug use: No     Sexual activity: Never      No new changes of review of systems or physical exam from our last visit on June 29  RShe currently denies any new symptoms of shortness of breath dyspnea coughing sore throat postnasal drip allergies wheezing chest pain dizziness vertigo nausea vomiting diarrhea dysuria frequency urgency.  Does have a history of chronic pain as well as osteoarthritis, hard of hearing, macular degeneration, ischemic cardiomyopathy, hypertension.EVIEW OF SYSTEM:      PHYSICAL EXAM:   Pleasant female in no acute distress..  Her head is normocephalic.  Conjunctiva is pink and sclera is clear.  Neck is supple without adenopathy.  Oropharynx pink and moist.  Lung sounds are clear throughout.  Cardiovascular S1-S2 regular rate and rhythm chronic but nonpitting lower extremity edema.  Gastrointestinal soft nontender with positive bowel sounds.  Musculoskeletal history of chronic osteoarthritis and chronic pain.  Left arm sling.  Good CMS to her left hand.  Psychiatric: Pleasant affect.       Current Outpatient Medications:      acetaminophen (TYLENOL) 325 MG tablet, Take 650 mg by mouth every 6 hours, Disp: , Rfl:      aspirin (ASA) 81 MG  "chewable tablet, Take 1 tablet (81 mg) by mouth daily Starting tomorrow. (Patient taking differently: Take 81 mg by mouth every evening Starting tomorrow.), Disp: 30 tablet, Rfl: 3     atorvastatin (LIPITOR) 40 MG tablet, TAKE 1 TABLET BY MOUTH ONCE DAILY (Patient taking differently: Take 40 mg by mouth every evening), Disp: 90 tablet, Rfl: 3     bisacodyl (DULCOLAX) 5 MG EC tablet, Take 1 tablet (5 mg) by mouth daily as needed for constipation, Disp: , Rfl:      carvedilol (COREG) 6.25 MG tablet, TAKE 2 TABLETS BY MOUTH ONCE DAILY, Disp: 60 tablet, Rfl: 0     diclofenac (VOLTAREN) 1 % topical gel, Apply 2 g topically 4 times daily, Disp: , Rfl:      docusate sodium (COLACE) 100 MG capsule, Take 100 mg by mouth 2 times daily as needed, Disp: , Rfl:      escitalopram (LEXAPRO) 5 MG tablet, Take 5 mg by mouth daily, Disp: , Rfl:      furosemide (LASIX) 20 MG tablet, Take 1 tablet (20 mg) by mouth 2 times daily, Disp: 180 tablet, Rfl: 3     ibuprofen (ADVIL/MOTRIN) 400 MG tablet, Take 1 tablet (400 mg) by mouth every 6 hours as needed for moderate pain, Disp: 10 tablet, Rfl: 0     Lidocaine (LIDOCARE) 4 % Patch, Place 1 patch onto the skin every 24 hours To prevent lidocaine toxicity, patient should be patch free for 12 hrs daily., Disp: , Rfl:      losartan (COZAAR) 25 MG tablet, Take 1 tablet (25 mg) by mouth daily, Disp: , Rfl:      magnesium oxide (MAG-OX) 400 (241.3 Mg) MG tablet, Take 400 mg by mouth daily, Disp: , Rfl:      Multiple Vitamins-Minerals (PRESERVISION AREDS) CAPS, Take 1 capsule by mouth 2 times daily , Disp: , Rfl:      nitroGLYcerin (NITROSTAT) 0.4 MG sublingual tablet, One tablet under the tongue every 5 minutes if needed for chest pain. May repeat every 5 minutes for a maximum of 3 doses in 15 minutes\", Disp: 25 tablet, Rfl: 3     olopatadine (PATANOL) 0.1 % ophthalmic solution, Place 1 drop into both eyes 2 times daily, Disp: , Rfl:      Omega-3 Fatty Acids (FISH OIL OMEGA-3 PO), Take 1 capsule " by mouth daily, Disp: , Rfl:      oxyCODONE (ROXICODONE) 5 MG tablet, Take 1 tablet (5 mg) by mouth every 6 hours as needed for moderate to severe pain, Disp: 10 tablet, Rfl: 0     prednisoLONE acetate (PRED FORTE) 1 % ophthalmic suspension, Place 1 drop into both eyes 2 times daily, Disp: , Rfl:      spironolactone (ALDACTONE) 25 MG tablet, Take 25 mg by mouth daily, Disp: , Rfl:      ticagrelor (BRILINTA) 90 MG tablet, Take 1 tablet (90 mg) by mouth 2 times daily Start tomorrow morning., Disp: 180 tablet, Rfl: 3     traMADol (ULTRAM) 50 MG tablet, Take 1 tablet (50 mg) by mouth 3 times daily, Disp: 15 tablet, Rfl: 0     VITAMIN D, CHOLECALCIFEROL, PO, Take 4,000 Units by mouth daily, Disp: , Rfl:       Post Discharge Medication Reconciliation Status: medication reconcilation previously completed during another office visit.        /75   Pulse 62   Temp 98.2  F (36.8  C)   Resp 20   Wt 74.6 kg (164 lb 6.4 oz)   SpO2 95%   BMI 29.12 kg/m      LABS:   Last Comprehensive Metabolic Panel:  Sodium   Date Value Ref Range Status   07/02/2022 136 136 - 145 mmol/L Final   09/15/2006 134 133 - 144 mmol/L Final     Comment:     Delta Verified     Potassium   Date Value Ref Range Status   07/02/2022 3.6 3.4 - 5.3 mmol/L Final   06/14/2022 3.5 3.5 - 5.0 mmol/L Final   09/15/2006 4.1 3.4 - 5.3 mmol/L Final     Chloride   Date Value Ref Range Status   07/02/2022 100 98 - 107 mmol/L Final   06/14/2022 102 98 - 107 mmol/L Final   09/15/2006 99 94 - 109 mmol/L Final     Carbon Dioxide   Date Value Ref Range Status   09/15/2006 28 20 - 32 mmol/L Final     Carbon Dioxide (CO2)   Date Value Ref Range Status   07/02/2022 20 (L) 22 - 29 mmol/L Final   06/14/2022 26 22 - 31 mmol/L Final     Anion Gap   Date Value Ref Range Status   07/02/2022 16 (H) 7 - 15 mmol/L Final   06/14/2022 11 5 - 18 mmol/L Final   09/15/2006 7 6 - 17 mmol/L Final     Glucose   Date Value Ref Range Status   07/02/2022 120 (H) 70 - 99 mg/dL Final    06/14/2022 106 70 - 125 mg/dL Final   09/14/2006 110 60 - 110 mg/dL Final     Urea Nitrogen   Date Value Ref Range Status   07/02/2022 17.7 8.0 - 23.0 mg/dL Final   06/14/2022 14 8 - 28 mg/dL Final   09/14/2006 24 7 - 30 mg/dL Final     Creatinine   Date Value Ref Range Status   07/02/2022 0.71 0.51 - 0.95 mg/dL Final   09/14/2006 1.08 0.60 - 1.30 mg/dL Final     GFR Estimate   Date Value Ref Range Status   07/02/2022 81 >60 mL/min/1.73m2 Final     Comment:     Effective December 21, 2021 eGFRcr in adults is calculated using the 2021 CKD-EPI creatinine equation which includes age and gender (Maria Del Carmen et al., NEJ, DOI: 10.1056/FIRCxk7973229)   05/26/2021 >60 >60 mL/min/1.73m2 Final   09/14/2006 53 (L) >60 mL/min/1.7m2 Final     Calcium   Date Value Ref Range Status   07/02/2022 8.6 (L) 8.8 - 10.2 mg/dL Final   09/14/2006 9.2 8.5 - 10.4 mg/dL Final     CBC RESULTS: Recent Labs   Lab Test 07/02/22  1313   WBC 8.2   RBC 3.23*   HGB 9.9*   HCT 30.8*   MCV 95   MCH 30.7   MCHC 32.1   RDW 14.6              ASSESSMENT:    Encounter Diagnoses   Name Primary?     Left shoulder pain, unspecified chronicity Yes     Constipation, unspecified constipation type      Essential (primary) hypertension      Abnormal gait        PLAN:    Had a chance to go over previous laboratory studies, nutrition, pain management, constipation and the rehabilitation component.  Also staff are aware of her chronic medical conditions and according to the charge nurse there is not yet been a decision made to either go back home or go to long-term care.  She does live in the apartments next-door and has had a history of falls as well as self-care deficits.        Electronically signed by: Bry Jurado NP

## 2022-07-07 ENCOUNTER — PATIENT OUTREACH (OUTPATIENT)
Dept: GERIATRIC MEDICINE | Facility: CLINIC | Age: 87
End: 2022-07-07

## 2022-07-13 ENCOUNTER — TRANSITIONAL CARE UNIT VISIT (OUTPATIENT)
Dept: GERIATRICS | Facility: CLINIC | Age: 87
End: 2022-07-13
Payer: COMMERCIAL

## 2022-07-13 ENCOUNTER — PRE VISIT (OUTPATIENT)
Dept: ORTHOPEDICS | Facility: CLINIC | Age: 87
End: 2022-07-13

## 2022-07-13 VITALS
RESPIRATION RATE: 20 BRPM | WEIGHT: 161.8 LBS | DIASTOLIC BLOOD PRESSURE: 65 MMHG | SYSTOLIC BLOOD PRESSURE: 172 MMHG | HEART RATE: 71 BPM | TEMPERATURE: 97.2 F | OXYGEN SATURATION: 97 % | BODY MASS INDEX: 28.66 KG/M2

## 2022-07-13 DIAGNOSIS — M25.512 LEFT SHOULDER PAIN, UNSPECIFIED CHRONICITY: ICD-10-CM

## 2022-07-13 DIAGNOSIS — R53.81 PHYSICAL DEBILITY: ICD-10-CM

## 2022-07-13 DIAGNOSIS — I10 ESSENTIAL (PRIMARY) HYPERTENSION: Primary | ICD-10-CM

## 2022-07-13 DIAGNOSIS — G89.4 CHRONIC PAIN DISORDER: ICD-10-CM

## 2022-07-13 PROCEDURE — 99309 SBSQ NF CARE MODERATE MDM 30: CPT | Performed by: FAMILY MEDICINE

## 2022-07-13 NOTE — LETTER
7/13/2022        RE: Marina ADAME Edinson  [address]  [place] [address]        [facility] GERIATRIC SERVICES    Facility:   [facility] ([facility]) [[id number]]   Code Status: FULL CODE      CHIEF COMPLAINT/REASON FOR VISIT:  Chief Complaint   Patient presents with     RECHECK       HISTORY:      HPI: Marina is a 88 year old female who I had the opportunity to revisit with once again today not only secondary to her hospitalization June 25, 2022 secondary left shoulder pain secondary to fall as well as a history of chronic pain including bilateral knee pain as well as discussion of her medications, blood pressures, nutrition and the rehabilitation component.  Regarding the rehabilitation component she is actually starting to be able to transfer.  She does complain of left shoulder and bilateral knee pain and for that is on scheduled Tylenol as well as tramadol does have oxycodone as needed did take a dose on July 8 through July 10.  She also has diclofenac gel.  Her weight 161 pounds in comparison to July 8 164 pounds.  She is on Lasix and spironolactone.  She did have some blood work done on July 6 and see results below.  She has been afebrile and also on room air.  According to therapy now she will continue to work with therapy no discharge date yet set plus apparently they are going to send her back to the apartments next-door.  She does have a follow-up with orthopedics today.    Past Medical History:   Diagnosis Date     Age-related physical debility      Anemia      Arthritis      Bladder infection 01/03/2015    dx'd- on antibiotics     Blood type AB-     with Anti-C     Breast cancer (H)      Bursitis, knee      Cancer (H)     breast      Cancer (H) 2005    left breast     Colon cancer (H)      Depression      Gout      History of transfusion      Hypertension      Hypertension      Insomnia      Macular degeneration      Osteoarthritis of multiple joints      Osteopenia      Ovarian cyst       Rosacea      Vitamin D deficiency             Family History   Problem Relation Age of Onset     Squamous cell carcinoma Mother      Colon Cancer Sister      Breast Cancer Maternal Aunt      Bone Cancer Maternal Aunt      Colon Cancer Paternal Aunt      Breast Cancer Cousin       Social History     Socioeconomic History     Marital status:    Tobacco Use     Smoking status: Never Smoker     Smokeless tobacco: Never Used   Substance and Sexual Activity     Alcohol use: No     Drug use: No     Sexual activity: Never      No new changes of her review of systems or physical exam from our last visit on July 6  REVIEW OF SYSTEM:  She currently denies any new symptoms of shortness of breath dyspnea coughing sore throat postnasal drip allergies wheezing chest pain dizziness vertigo nausea vomiting diarrhea dysuria frequency urgency.  Does have a history of chronic pain as well as osteoarthritis, hard of hearing, macular degeneration, ischemic cardiomyopathy, hypertension    PHYSICAL EXAM:   Pleasant female in no acute distress..  Her head is normocephalic.   Neck is supple without adenopathy.  Oropharynx pink and moist.  Lung sounds are clear throughout.  Cardiovascular S1-S2 regular rate and rhythm chronic but nonpitting lower extremity edema.  Gastrointestinal soft nontender with positive bowel sounds.  Musculoskeletal history of chronic osteoarthritis and chronic pain.  Left arm sling.  Good CMS to her left hand.  Psychiatric: Pleasant affect.    Current Outpatient Medications:      acetaminophen (TYLENOL) 325 MG tablet, Take 650 mg by mouth every 6 hours, Disp: , Rfl:      aspirin (ASA) 81 MG chewable tablet, Take 1 tablet (81 mg) by mouth daily Starting tomorrow. (Patient taking differently: Take 81 mg by mouth every evening Starting tomorrow.), Disp: 30 tablet, Rfl: 3     atorvastatin (LIPITOR) 40 MG tablet, TAKE 1 TABLET BY MOUTH ONCE DAILY (Patient taking differently: Take 40 mg by mouth every evening), Disp:  "90 tablet, Rfl: 3     bisacodyl (DULCOLAX) 5 MG EC tablet, Take 1 tablet (5 mg) by mouth daily as needed for constipation, Disp: , Rfl:      carvedilol (COREG) 6.25 MG tablet, TAKE 2 TABLETS BY MOUTH ONCE DAILY, Disp: 60 tablet, Rfl: 0     diclofenac (VOLTAREN) 1 % topical gel, Apply 2 g topically 4 times daily, Disp: , Rfl:      docusate sodium (COLACE) 100 MG capsule, Take 100 mg by mouth 2 times daily as needed, Disp: , Rfl:      escitalopram (LEXAPRO) 5 MG tablet, Take 5 mg by mouth daily, Disp: , Rfl:      furosemide (LASIX) 20 MG tablet, Take 1 tablet (20 mg) by mouth 2 times daily, Disp: 180 tablet, Rfl: 3     ibuprofen (ADVIL/MOTRIN) 400 MG tablet, Take 1 tablet (400 mg) by mouth every 6 hours as needed for moderate pain, Disp: 10 tablet, Rfl: 0     Lidocaine (LIDOCARE) 4 % Patch, Place 1 patch onto the skin every 24 hours To prevent lidocaine toxicity, patient should be patch free for 12 hrs daily., Disp: , Rfl:      losartan (COZAAR) 25 MG tablet, Take 1 tablet (25 mg) by mouth daily, Disp: , Rfl:      magnesium oxide (MAG-OX) 400 (241.3 Mg) MG tablet, Take 400 mg by mouth daily, Disp: , Rfl:      Multiple Vitamins-Minerals (PRESERVISION AREDS) CAPS, Take 1 capsule by mouth 2 times daily , Disp: , Rfl:      nitroGLYcerin (NITROSTAT) 0.4 MG sublingual tablet, One tablet under the tongue every 5 minutes if needed for chest pain. May repeat every 5 minutes for a maximum of 3 doses in 15 minutes\", Disp: 25 tablet, Rfl: 3     olopatadine (PATANOL) 0.1 % ophthalmic solution, Place 1 drop into both eyes 2 times daily, Disp: , Rfl:      Omega-3 Fatty Acids (FISH OIL OMEGA-3 PO), Take 1 capsule by mouth daily, Disp: , Rfl:      oxyCODONE (ROXICODONE) 5 MG tablet, Take 1 tablet (5 mg) by mouth every 6 hours as needed for moderate to severe pain, Disp: 10 tablet, Rfl: 0     prednisoLONE acetate (PRED FORTE) 1 % ophthalmic suspension, Place 1 drop into both eyes 2 times daily, Disp: , Rfl:      spironolactone " (ALDACTONE) 25 MG tablet, Take 25 mg by mouth daily, Disp: , Rfl:      ticagrelor (BRILINTA) 90 MG tablet, Take 1 tablet (90 mg) by mouth 2 times daily Start tomorrow morning., Disp: 180 tablet, Rfl: 3     traMADol (ULTRAM) 50 MG tablet, Take 1 tablet (50 mg) by mouth 3 times daily, Disp: 15 tablet, Rfl: 0     VITAMIN D, CHOLECALCIFEROL, PO, Take 4,000 Units by mouth daily, Disp: , Rfl:        BP (!) 172/65   Pulse 71   Temp 97.2  F (36.2  C)   Resp 20   Wt 73.4 kg (161 lb 12.8 oz)   SpO2 97%   BMI 28.66 kg/m      LABS:   CBC RESULTS: Recent Labs   Lab Test 07/06/22 0814   WBC 7.5   RBC 3.32*   HGB 10.3*   HCT 32.1*   MCV 97   MCH 31.0   MCHC 32.1   RDW 15.0        Last Comprehensive Metabolic Panel:  Sodium   Date Value Ref Range Status   07/06/2022 138 136 - 145 mmol/L Final   09/15/2006 134 133 - 144 mmol/L Final     Comment:     Delta Verified     Potassium   Date Value Ref Range Status   07/06/2022 3.9 3.4 - 5.3 mmol/L Final   06/14/2022 3.5 3.5 - 5.0 mmol/L Final   09/15/2006 4.1 3.4 - 5.3 mmol/L Final     Chloride   Date Value Ref Range Status   07/06/2022 103 98 - 107 mmol/L Final   06/14/2022 102 98 - 107 mmol/L Final   09/15/2006 99 94 - 109 mmol/L Final     Carbon Dioxide   Date Value Ref Range Status   09/15/2006 28 20 - 32 mmol/L Final     Carbon Dioxide (CO2)   Date Value Ref Range Status   07/06/2022 22 22 - 29 mmol/L Final   06/14/2022 26 22 - 31 mmol/L Final     Anion Gap   Date Value Ref Range Status   07/06/2022 13 7 - 15 mmol/L Final   06/14/2022 11 5 - 18 mmol/L Final   09/15/2006 7 6 - 17 mmol/L Final     Glucose   Date Value Ref Range Status   07/06/2022 141 (H) 70 - 99 mg/dL Final   06/14/2022 106 70 - 125 mg/dL Final   09/14/2006 110 60 - 110 mg/dL Final     Urea Nitrogen   Date Value Ref Range Status   07/06/2022 15.8 8.0 - 23.0 mg/dL Final   06/14/2022 14 8 - 28 mg/dL Final   09/14/2006 24 7 - 30 mg/dL Final     Creatinine   Date Value Ref Range Status   07/06/2022 0.63 0.51  - 0.95 mg/dL Final   09/14/2006 1.08 0.60 - 1.30 mg/dL Final     GFR Estimate   Date Value Ref Range Status   07/06/2022 85 >60 mL/min/1.73m2 Final     Comment:     Effective December 21, 2021 eGFRcr in adults is calculated using the 2021 CKD-EPI creatinine equation which includes age and gender (Maria Del Carmen woodruff al., [place], DOI: 10.1056/HKTVks1530041)   05/26/2021 >60 >60 mL/min/1.73m2 Final   09/14/2006 53 (L) >60 mL/min/1.7m2 Final     Calcium   Date Value Ref Range Status   07/06/2022 8.9 8.8 - 10.2 mg/dL Final   09/14/2006 9.2 8.5 - 10.4 mg/dL Final           ASSESSMENT:    Encounter Diagnoses   Name Primary?     Essential (primary) hypertension Yes     Physical debility      Chronic pain disorder      Left shoulder pain, unspecified chronicity        PLAN:    No medication changes.  We did talk about her pain meds as well as her blood pressures and her orthopedic visit coming up.  The meantime continue to work with therapy and the plan is to send her back to the apartments next-door.        Electronically signed by: Bry Jurado NP          Sincerely,        Bry Jurado NP

## 2022-07-13 NOTE — PROGRESS NOTES
[facility] GERIATRIC SERVICES    Facility:   [facility] ([facility]) [[id number]]   Code Status: FULL CODE      CHIEF COMPLAINT/REASON FOR VISIT:  Chief Complaint   Patient presents with     RECHECK       HISTORY:      HPI: Marina is a 88 year old female who I had the opportunity to revisit with once again today not only secondary to her hospitalization June 25, 2022 secondary left shoulder pain secondary to fall as well as a history of chronic pain including bilateral knee pain as well as discussion of her medications, blood pressures, nutrition and the rehabilitation component.  Regarding the rehabilitation component she is actually starting to be able to transfer.  She does complain of left shoulder and bilateral knee pain and for that is on scheduled Tylenol as well as tramadol does have oxycodone as needed did take a dose on July 8 through July 10.  She also has diclofenac gel.  Her weight 161 pounds in comparison to July 8 164 pounds.  She is on Lasix and spironolactone.  She did have some blood work done on July 6 and see results below.  She has been afebrile and also on room air.  According to therapy now she will continue to work with therapy no discharge date yet set plus apparently they are going to send her back to the apartments next-door.  She does have a follow-up with orthopedics today.    Past Medical History:   Diagnosis Date     Age-related physical debility      Anemia      Arthritis      Bladder infection 01/03/2015    dx'd- on antibiotics     Blood type AB-     with Anti-C     Breast cancer (H)      Bursitis, knee      Cancer (H)     breast      Cancer (H) 2005    left breast     Colon cancer (H)      Depression      Gout      History of transfusion      Hypertension      Hypertension      Insomnia      Macular degeneration      Osteoarthritis of multiple joints      Osteopenia      Ovarian cyst      Rosacea      Vitamin D deficiency             Family History   Problem Relation Age of Onset      Squamous cell carcinoma Mother      Colon Cancer Sister      Breast Cancer Maternal Aunt      Bone Cancer Maternal Aunt      Colon Cancer Paternal Aunt      Breast Cancer Cousin       Social History     Socioeconomic History     Marital status:    Tobacco Use     Smoking status: Never Smoker     Smokeless tobacco: Never Used   Substance and Sexual Activity     Alcohol use: No     Drug use: No     Sexual activity: Never      No new changes of her review of systems or physical exam from our last visit on July 6  REVIEW OF SYSTEM:  She currently denies any new symptoms of shortness of breath dyspnea coughing sore throat postnasal drip allergies wheezing chest pain dizziness vertigo nausea vomiting diarrhea dysuria frequency urgency.  Does have a history of chronic pain as well as osteoarthritis, hard of hearing, macular degeneration, ischemic cardiomyopathy, hypertension    PHYSICAL EXAM:   Pleasant female in no acute distress..  Her head is normocephalic.   Neck is supple without adenopathy.  Oropharynx pink and moist.  Lung sounds are clear throughout.  Cardiovascular S1-S2 regular rate and rhythm chronic but nonpitting lower extremity edema.  Gastrointestinal soft nontender with positive bowel sounds.  Musculoskeletal history of chronic osteoarthritis and chronic pain.  Left arm sling.  Good CMS to her left hand.  Psychiatric: Pleasant affect.    Current Outpatient Medications:      acetaminophen (TYLENOL) 325 MG tablet, Take 650 mg by mouth every 6 hours, Disp: , Rfl:      aspirin (ASA) 81 MG chewable tablet, Take 1 tablet (81 mg) by mouth daily Starting tomorrow. (Patient taking differently: Take 81 mg by mouth every evening Starting tomorrow.), Disp: 30 tablet, Rfl: 3     atorvastatin (LIPITOR) 40 MG tablet, TAKE 1 TABLET BY MOUTH ONCE DAILY (Patient taking differently: Take 40 mg by mouth every evening), Disp: 90 tablet, Rfl: 3     bisacodyl (DULCOLAX) 5 MG EC tablet, Take 1 tablet (5 mg) by mouth  "daily as needed for constipation, Disp: , Rfl:      carvedilol (COREG) 6.25 MG tablet, TAKE 2 TABLETS BY MOUTH ONCE DAILY, Disp: 60 tablet, Rfl: 0     diclofenac (VOLTAREN) 1 % topical gel, Apply 2 g topically 4 times daily, Disp: , Rfl:      docusate sodium (COLACE) 100 MG capsule, Take 100 mg by mouth 2 times daily as needed, Disp: , Rfl:      escitalopram (LEXAPRO) 5 MG tablet, Take 5 mg by mouth daily, Disp: , Rfl:      furosemide (LASIX) 20 MG tablet, Take 1 tablet (20 mg) by mouth 2 times daily, Disp: 180 tablet, Rfl: 3     ibuprofen (ADVIL/MOTRIN) 400 MG tablet, Take 1 tablet (400 mg) by mouth every 6 hours as needed for moderate pain, Disp: 10 tablet, Rfl: 0     Lidocaine (LIDOCARE) 4 % Patch, Place 1 patch onto the skin every 24 hours To prevent lidocaine toxicity, patient should be patch free for 12 hrs daily., Disp: , Rfl:      losartan (COZAAR) 25 MG tablet, Take 1 tablet (25 mg) by mouth daily, Disp: , Rfl:      magnesium oxide (MAG-OX) 400 (241.3 Mg) MG tablet, Take 400 mg by mouth daily, Disp: , Rfl:      Multiple Vitamins-Minerals (PRESERVISION AREDS) CAPS, Take 1 capsule by mouth 2 times daily , Disp: , Rfl:      nitroGLYcerin (NITROSTAT) 0.4 MG sublingual tablet, One tablet under the tongue every 5 minutes if needed for chest pain. May repeat every 5 minutes for a maximum of 3 doses in 15 minutes\", Disp: 25 tablet, Rfl: 3     olopatadine (PATANOL) 0.1 % ophthalmic solution, Place 1 drop into both eyes 2 times daily, Disp: , Rfl:      Omega-3 Fatty Acids (FISH OIL OMEGA-3 PO), Take 1 capsule by mouth daily, Disp: , Rfl:      oxyCODONE (ROXICODONE) 5 MG tablet, Take 1 tablet (5 mg) by mouth every 6 hours as needed for moderate to severe pain, Disp: 10 tablet, Rfl: 0     prednisoLONE acetate (PRED FORTE) 1 % ophthalmic suspension, Place 1 drop into both eyes 2 times daily, Disp: , Rfl:      spironolactone (ALDACTONE) 25 MG tablet, Take 25 mg by mouth daily, Disp: , Rfl:      ticagrelor (BRILINTA) 90 " MG tablet, Take 1 tablet (90 mg) by mouth 2 times daily Start tomorrow morning., Disp: 180 tablet, Rfl: 3     traMADol (ULTRAM) 50 MG tablet, Take 1 tablet (50 mg) by mouth 3 times daily, Disp: 15 tablet, Rfl: 0     VITAMIN D, CHOLECALCIFEROL, PO, Take 4,000 Units by mouth daily, Disp: , Rfl:        BP (!) 172/65   Pulse 71   Temp 97.2  F (36.2  C)   Resp 20   Wt 73.4 kg (161 lb 12.8 oz)   SpO2 97%   BMI 28.66 kg/m      LABS:   CBC RESULTS: Recent Labs   Lab Test 07/06/22  0814   WBC 7.5   RBC 3.32*   HGB 10.3*   HCT 32.1*   MCV 97   MCH 31.0   MCHC 32.1   RDW 15.0        Last Comprehensive Metabolic Panel:  Sodium   Date Value Ref Range Status   07/06/2022 138 136 - 145 mmol/L Final   09/15/2006 134 133 - 144 mmol/L Final     Comment:     Delta Verified     Potassium   Date Value Ref Range Status   07/06/2022 3.9 3.4 - 5.3 mmol/L Final   06/14/2022 3.5 3.5 - 5.0 mmol/L Final   09/15/2006 4.1 3.4 - 5.3 mmol/L Final     Chloride   Date Value Ref Range Status   07/06/2022 103 98 - 107 mmol/L Final   06/14/2022 102 98 - 107 mmol/L Final   09/15/2006 99 94 - 109 mmol/L Final     Carbon Dioxide   Date Value Ref Range Status   09/15/2006 28 20 - 32 mmol/L Final     Carbon Dioxide (CO2)   Date Value Ref Range Status   07/06/2022 22 22 - 29 mmol/L Final   06/14/2022 26 22 - 31 mmol/L Final     Anion Gap   Date Value Ref Range Status   07/06/2022 13 7 - 15 mmol/L Final   06/14/2022 11 5 - 18 mmol/L Final   09/15/2006 7 6 - 17 mmol/L Final     Glucose   Date Value Ref Range Status   07/06/2022 141 (H) 70 - 99 mg/dL Final   06/14/2022 106 70 - 125 mg/dL Final   09/14/2006 110 60 - 110 mg/dL Final     Urea Nitrogen   Date Value Ref Range Status   07/06/2022 15.8 8.0 - 23.0 mg/dL Final   06/14/2022 14 8 - 28 mg/dL Final   09/14/2006 24 7 - 30 mg/dL Final     Creatinine   Date Value Ref Range Status   07/06/2022 0.63 0.51 - 0.95 mg/dL Final   09/14/2006 1.08 0.60 - 1.30 mg/dL Final     GFR Estimate   Date Value Ref  Range Status   07/06/2022 85 >60 mL/min/1.73m2 Final     Comment:     Effective December 21, 2021 eGFRcr in adults is calculated using the 2021 CKD-EPI creatinine equation which includes age and gender (Maria Del Carmen et al., NEJM, DOI: 10.1056/FTISsm3953863)   05/26/2021 >60 >60 mL/min/1.73m2 Final   09/14/2006 53 (L) >60 mL/min/1.7m2 Final     Calcium   Date Value Ref Range Status   07/06/2022 8.9 8.8 - 10.2 mg/dL Final   09/14/2006 9.2 8.5 - 10.4 mg/dL Final           ASSESSMENT:    Encounter Diagnoses   Name Primary?     Essential (primary) hypertension Yes     Physical debility      Chronic pain disorder      Left shoulder pain, unspecified chronicity        PLAN:    No medication changes.  We did talk about her pain meds as well as her blood pressures and her orthopedic visit coming up.  The meantime continue to work with therapy and [facility]        Electronically signed by: Bry Jurdao NP

## 2022-07-19 VITALS
BODY MASS INDEX: 28.52 KG/M2 | RESPIRATION RATE: 20 BRPM | SYSTOLIC BLOOD PRESSURE: 162 MMHG | TEMPERATURE: 98.2 F | OXYGEN SATURATION: 96 % | WEIGHT: 161 LBS | DIASTOLIC BLOOD PRESSURE: 73 MMHG | HEART RATE: 78 BPM

## 2022-07-20 ENCOUNTER — TRANSITIONAL CARE UNIT VISIT (OUTPATIENT)
Dept: GERIATRICS | Facility: CLINIC | Age: 87
End: 2022-07-20
Payer: COMMERCIAL

## 2022-07-20 DIAGNOSIS — H91.93 BILATERAL HEARING LOSS, UNSPECIFIED HEARING LOSS TYPE: ICD-10-CM

## 2022-07-20 DIAGNOSIS — M15.0 PRIMARY OSTEOARTHRITIS INVOLVING MULTIPLE JOINTS: Primary | ICD-10-CM

## 2022-07-20 DIAGNOSIS — G89.4 CHRONIC PAIN DISORDER: ICD-10-CM

## 2022-07-20 DIAGNOSIS — I10 ESSENTIAL (PRIMARY) HYPERTENSION: ICD-10-CM

## 2022-07-20 PROCEDURE — 99316 NF DSCHRG MGMT 30 MIN+: CPT | Performed by: FAMILY MEDICINE

## 2022-07-20 NOTE — LETTER
7/20/2022        RE: Marina ADAME Edinson  [address]  [place] [address]        [facility] GERIATRIC [facility]    Facility:   [facility] ([facility]) [[id number]]   Code Status: FULL CODE      CHIEF COMPLAINT/REASON FOR VISIT:  Chief Complaint   Patient presents with     Discharge Summary Nursing Home       HISTORY:      HPI: Marina is a 88 year old female who was hospitalized June 25, 2022 secondary to left shoulder pain secondary to a fall at home.  She recently was in the transitional care unit for rehabilitation services and she went home and had a mechanical fall the next day.  Prior to that she was hospitalized June 20, 2022 secondary to dyspnea on exertion a history of CAD and chronic pain and the echo and stress test were normal.  Also while in the transitional care unit she did end up testing positive for COVID and was placed on Paxlovid with good results.  Her most current work-up did show a sodium of 131, potassium 3.6, BUN 23, creatinine 0.58, white cell count of 9.8 and hemoglobin 9.9.  Her left shoulder x-rays did show severe glenohumeral joint degenerative changes with remodeling and flattening of the humeral head which is chronic but no evidence of acute fracture or dislocation.  The left knee x-rays did show advanced medial compartment predominant degenerative changes but no definite fracture.  She was admitted to observation.  Her working diagnosis was left shoulder pain with a history of chronic right knee pain with a recent left knee pain secondary to a fall.  She does have a history of CAD status post PAT x2 along with ischemic cardiomyopathy, hyperlipidemia, depression, chronic constipation, macular degeneration.  After being on observation she was treated with her common dose of tramadol and Tylenol with tolerable relief eventually was placed on oxycodone as needed.  It was all thought that she could do some weightbearing as tolerated gentle range of motion to her shoulder as well  as come back to the rehabilitation services for more rehabilitation.  She has been on the transitional care unit and has been able to participate with the rehabilitation services.  She is limited due to her history of chronic pain and her history of falls as well as macular degeneration and hard of hearing.  She also has a history of chronic pain throughout her multiple joints to which she is on scheduled Tylenol and tramadol which has been her dose for many years she is also on a lidocaine patch and not sure if that will be covered in the apartments after she is discharged or not.  She also has diclofenac gel to her major joints.  During her stay she did have a couple of the doses of oxycodone as needed currently has not been using it regularly so that will now be discontinued.  No appetite changes.  Does have a history of constipation.  Also being treated.  Does need assistance with ADLs.  For the most part her systolic blood pressures ranging 120-140 her last weight 161 pounds in comparison to July 13 also 161 pounds.  No new nursing notes.  Did have a chance to see orthopedics for her left shoulder on July 13 and no longer using her sling.  See results below over most recent laboratory studies.  Past Medical History:   Diagnosis Date     Age-related physical debility      Anemia      Arthritis      Bladder infection 01/03/2015    dx'd- on antibiotics     Blood type AB-     with Anti-C     Breast cancer (H)      Bursitis, knee      Cancer (H)     breast      Cancer (H) 2005    left breast     Colon cancer (H)      Depression      Gout      History of transfusion      Hypertension      Hypertension      Insomnia      Macular degeneration      Osteoarthritis of multiple joints      Osteopenia      Ovarian cyst      Rosacea      Vitamin D deficiency             Family History   Problem Relation Age of Onset     Squamous cell carcinoma Mother      Colon Cancer Sister      Breast Cancer Maternal Aunt      Bone Cancer  Maternal Aunt      Colon Cancer Paternal Aunt      Breast Cancer Cousin       Social History     Socioeconomic History     Marital status:    Tobacco Use     Smoking status: Never Smoker     Smokeless tobacco: Never Used   Substance and Sexual Activity     Alcohol use: No     Drug use: No     Sexual activity: Never        REVIEW OF SYSTEM:  She currently denies any new symptoms of shortness of breath dyspnea coughing sore throat postnasal drip allergies wheezing chest pain dizziness vertigo nausea vomiting diarrhea dysuria frequency urgency.  Does have a history of chronic pain as well as osteoarthritis, hard of hearing, macular degeneration, ischemic cardiomyopathy, hypertension    PHYSICAL EXAM:   Pleasant female in no acute distress..  Her head is normocephalic.   Neck is supple without adenopathy.  Oropharynx pink and moist.  Lung sounds are clear throughout.  Cardiovascular S1-S2 regular rate and rhythm chronic but nonpitting lower extremity edema.  Gastrointestinal soft nontender with positive bowel sounds.  Musculoskeletal history of chronic osteoarthritis and chronic pain..    Psychiatric: Pleasant affect.    Current Outpatient Medications:      acetaminophen (TYLENOL) 325 MG tablet, Take 650 mg by mouth every 6 hours, Disp: , Rfl:      aspirin (ASA) 81 MG chewable tablet, Take 1 tablet (81 mg) by mouth daily Starting tomorrow. (Patient taking differently: Take 81 mg by mouth every evening Starting tomorrow.), Disp: 30 tablet, Rfl: 3     atorvastatin (LIPITOR) 40 MG tablet, TAKE 1 TABLET BY MOUTH ONCE DAILY (Patient taking differently: Take 40 mg by mouth every evening), Disp: 90 tablet, Rfl: 3     bisacodyl (DULCOLAX) 5 MG EC tablet, Take 1 tablet (5 mg) by mouth daily as needed for constipation, Disp: , Rfl:      carvedilol (COREG) 6.25 MG tablet, TAKE 2 TABLETS BY MOUTH ONCE DAILY, Disp: 60 tablet, Rfl: 0     diclofenac (VOLTAREN) 1 % topical gel, Apply 2 g topically 4 times daily, Disp: , Rfl:      " docusate sodium (COLACE) 100 MG capsule, Take 100 mg by mouth 2 times daily as needed, Disp: , Rfl:      escitalopram (LEXAPRO) 5 MG tablet, Take 5 mg by mouth daily, Disp: , Rfl:      furosemide (LASIX) 20 MG tablet, Take 1 tablet (20 mg) by mouth 2 times daily, Disp: 180 tablet, Rfl: 3     ibuprofen (ADVIL/MOTRIN) 400 MG tablet, Take 1 tablet (400 mg) by mouth every 6 hours as needed for moderate pain, Disp: 10 tablet, Rfl: 0     Lidocaine (LIDOCARE) 4 % Patch, Place 1 patch onto the skin every 24 hours To prevent lidocaine toxicity, patient should be patch free for 12 hrs daily., Disp: , Rfl:      losartan (COZAAR) 25 MG tablet, Take 1 tablet (25 mg) by mouth daily, Disp: , Rfl:      magnesium oxide (MAG-OX) 400 (241.3 Mg) MG tablet, Take 400 mg by mouth daily, Disp: , Rfl:      Multiple Vitamins-Minerals (PRESERVISION AREDS) CAPS, Take 1 capsule by mouth 2 times daily , Disp: , Rfl:      nitroGLYcerin (NITROSTAT) 0.4 MG sublingual tablet, One tablet under the tongue every 5 minutes if needed for chest pain. May repeat every 5 minutes for a maximum of 3 doses in 15 minutes\", Disp: 25 tablet, Rfl: 3     olopatadine (PATANOL) 0.1 % ophthalmic solution, Place 1 drop into both eyes 2 times daily, Disp: , Rfl:      Omega-3 Fatty Acids (FISH OIL OMEGA-3 PO), Take 1 capsule by mouth daily, Disp: , Rfl:      prednisoLONE acetate (PRED FORTE) 1 % ophthalmic suspension, Place 1 drop into both eyes 2 times daily, Disp: , Rfl:      spironolactone (ALDACTONE) 25 MG tablet, Take 25 mg by mouth daily, Disp: , Rfl:      ticagrelor (BRILINTA) 90 MG tablet, Take 1 tablet (90 mg) by mouth 2 times daily Start tomorrow morning., Disp: 180 tablet, Rfl: 3     traMADol (ULTRAM) 50 MG tablet, Take 1 tablet (50 mg) by mouth 3 times daily, Disp: 15 tablet, Rfl: 0     VITAMIN D, CHOLECALCIFEROL, PO, Take 4,000 Units by mouth daily, Disp: , Rfl:     BP (!) 162/73   Pulse 78   Temp 98.2  F (36.8  C)   Resp 20   Wt 73 kg (161 lb)   SpO2 " 96%   BMI 28.52 kg/m        LABS:   CBC RESULTS: Recent Labs   Lab Test 07/06/22 0814   WBC 7.5   RBC 3.32*   HGB 10.3*   HCT 32.1*   MCV 97   MCH 31.0   MCHC 32.1   RDW 15.0        Last Comprehensive Metabolic Panel:  Sodium   Date Value Ref Range Status   07/06/2022 138 136 - 145 mmol/L Final   09/15/2006 134 133 - 144 mmol/L Final     Comment:     Delta Verified     Potassium   Date Value Ref Range Status   07/06/2022 3.9 3.4 - 5.3 mmol/L Final   06/14/2022 3.5 3.5 - 5.0 mmol/L Final   09/15/2006 4.1 3.4 - 5.3 mmol/L Final     Chloride   Date Value Ref Range Status   07/06/2022 103 98 - 107 mmol/L Final   06/14/2022 102 98 - 107 mmol/L Final   09/15/2006 99 94 - 109 mmol/L Final     Carbon Dioxide   Date Value Ref Range Status   09/15/2006 28 20 - 32 mmol/L Final     Carbon Dioxide (CO2)   Date Value Ref Range Status   07/06/2022 22 22 - 29 mmol/L Final   06/14/2022 26 22 - 31 mmol/L Final     Anion Gap   Date Value Ref Range Status   07/06/2022 13 7 - 15 mmol/L Final   06/14/2022 11 5 - 18 mmol/L Final   09/15/2006 7 6 - 17 mmol/L Final     Glucose   Date Value Ref Range Status   07/06/2022 141 (H) 70 - 99 mg/dL Final   06/14/2022 106 70 - 125 mg/dL Final   09/14/2006 110 60 - 110 mg/dL Final     Urea Nitrogen   Date Value Ref Range Status   07/06/2022 15.8 8.0 - 23.0 mg/dL Final   06/14/2022 14 8 - 28 mg/dL Final   09/14/2006 24 7 - 30 mg/dL Final     Creatinine   Date Value Ref Range Status   07/06/2022 0.63 0.51 - 0.95 mg/dL Final   09/14/2006 1.08 0.60 - 1.30 mg/dL Final     GFR Estimate   Date Value Ref Range Status   07/06/2022 85 >60 mL/min/1.73m2 Final     Comment:     Effective December 21, 2021 eGFRcr in adults is calculated using the 2021 CKD-EPI creatinine equation which includes age and gender (Maria Del Carmen et al., NEJM, DOI: 10.1056/YBWZdg4917852)   05/26/2021 >60 >60 mL/min/1.73m2 Final   09/14/2006 53 (L) >60 mL/min/1.7m2 Final     Calcium   Date Value Ref Range Status   07/06/2022 8.9 8.8 -  10.2 mg/dL Final   09/14/2006 9.2 8.5 - 10.4 mg/dL Final     Last Comprehensive Metabolic Panel:  Sodium   Date Value Ref Range Status   07/06/2022 138 136 - 145 mmol/L Final   09/15/2006 134 133 - 144 mmol/L Final     Comment:     Delta Verified     Potassium   Date Value Ref Range Status   07/06/2022 3.9 3.4 - 5.3 mmol/L Final   06/14/2022 3.5 3.5 - 5.0 mmol/L Final   09/15/2006 4.1 3.4 - 5.3 mmol/L Final     Chloride   Date Value Ref Range Status   07/06/2022 103 98 - 107 mmol/L Final   06/14/2022 102 98 - 107 mmol/L Final   09/15/2006 99 94 - 109 mmol/L Final     Carbon Dioxide   Date Value Ref Range Status   09/15/2006 28 20 - 32 mmol/L Final     Carbon Dioxide (CO2)   Date Value Ref Range Status   07/06/2022 22 22 - 29 mmol/L Final   06/14/2022 26 22 - 31 mmol/L Final     Anion Gap   Date Value Ref Range Status   07/06/2022 13 7 - 15 mmol/L Final   06/14/2022 11 5 - 18 mmol/L Final   09/15/2006 7 6 - 17 mmol/L Final     Glucose   Date Value Ref Range Status   07/06/2022 141 (H) 70 - 99 mg/dL Final   06/14/2022 106 70 - 125 mg/dL Final   09/14/2006 110 60 - 110 mg/dL Final     Urea Nitrogen   Date Value Ref Range Status   07/06/2022 15.8 8.0 - 23.0 mg/dL Final   06/14/2022 14 8 - 28 mg/dL Final   09/14/2006 24 7 - 30 mg/dL Final     Creatinine   Date Value Ref Range Status   07/06/2022 0.63 0.51 - 0.95 mg/dL Final   09/14/2006 1.08 0.60 - 1.30 mg/dL Final     GFR Estimate   Date Value Ref Range Status   07/06/2022 85 >60 mL/min/1.73m2 Final     Comment:     Effective December 21, 2021 eGFRcr in adults is calculated using the 2021 CKD-EPI creatinine equation which includes age and gender (Maria Del Carmen woodruff al., NEJM, DOI: 10.1056/EPBBiv9984227)   05/26/2021 >60 >60 mL/min/1.73m2 Final   09/14/2006 53 (L) >60 mL/min/1.7m2 Final     Calcium   Date Value Ref Range Status   07/06/2022 8.9 8.8 - 10.2 mg/dL Final   09/14/2006 9.2 8.5 - 10.4 mg/dL Final     Bilirubin Total   Date Value Ref Range Status   06/27/2022 0.4 <=1.2  mg/dL Final     Alkaline Phosphatase   Date Value Ref Range Status   06/27/2022 79 35 - 104 U/L Final     ALT   Date Value Ref Range Status   06/27/2022 20 10 - 35 U/L Final     AST   Date Value Ref Range Status   06/27/2022 16 10 - 35 U/L Final             Recent Labs   Lab Test 06/07/22  0757 05/11/22  1649   CHOL 109 120   HDL 44* 49*   LDL 46 47   TRIG 93 121     No results found for: PCW685, XBDX157, [id number], VITD3, D2VIT, D3VIT, DTOT, [id number], [id number], [id number], [id number], [id number], [id number]      ASSESSMENT:    Encounter Diagnoses   Name Primary?     Primary osteoarthritis involving multiple joints Yes     Essential (primary) hypertension      Bilateral hearing loss, unspecified hearing loss type      Chronic pain disorder        MEDICAL EQUIPMENT NEEDS:  None    DISCHARGE PLAN/FACE TO FACE:  I certify that services are/were furnished while this patient was under the care of a physician and that a physician or an allowed non-physician practitioner (NPP), had a face-to-face encounter that meets the physician face-to-face encounter requirements. The encounter was in whole, or in part, related to the primary reason for home health. The patient is confined to his/her home and needs intermittent skilled nursing, physical therapy, speech-language pathology, or the continued need for occupational therapy. A plan of care has been established by a physician and is periodically reviewed by a physician.  Date of Face-to-Face Encounter: July 20, 2022    I certify that, based on my findings, the following services are medically necessary home health services: She will be discharging to her apartment with current medications and narcotics as well as physical and occupational therapy home health aide and nursing with an approximated discharge date of July 23, 2022    My clinical findings support the need for the above skilled services because: (Please write a brief narrative summary that describes what the  RN, PT, SLP, or other services will be doing in the home. A list of diagnoses in this section does not meet the CMS requirements.)  She will require the skilled services secondary to not only her hospitalization but also stay in the transitional care unit but her history of falls as well as generalized debility hard of hearing and macular degeneration as well as nursing for medication management including hypertension.    This patient is homebound because: (Please write a brief narrative summary describing the functional limitations as to why this patient is homebound and specifically what makes this patient homebound.)  Secondary to multiple chronic medical conditions including history of falls as well as self-care deficits but continuation of the rehabilitation component and home safety as well as nursing for medication management    The patient is, or has been, under my care and I have initiated the establishment of the plan of care. This patient will be followed by a physician who will periodically review the plan of care.    Schedule follow up visit with primary care provider within 7 days to reestablish care.  Her last orthopedic visit was on July 13.  Next eye clinic visit is July 20, 2022 and her next cardiology visit is August 11.  She will also follow-up with her primary care provider regarding medication management as well as assessment of her chronic medical conditions.  She did not have any other questions.    Discharge coordination care greater than 30 minutes    Electronically signed by: Bry Jurado NP          Sincerely,        Bry Jurado NP

## 2022-07-20 NOTE — PROGRESS NOTES
[facility] GERIATRIC SERVICES    Facility:   [facility] ([facility]) [[id number]]   Code Status: FULL CODE      CHIEF COMPLAINT/REASON FOR VISIT:  Chief Complaint   Patient presents with     Discharge Summary Nursing Home       HISTORY:      HPI: Marina is a 88 year old female who was hospitalized June 25, 2022 secondary to left shoulder pain secondary to a fall at home.  She recently was in the transitional care unit for rehabilitation services and she went home and had a mechanical fall the next day.  Prior to that she was hospitalized June 20, 2022 secondary to dyspnea on exertion a history of CAD and chronic pain and the echo and stress test were normal.  Also while in the transitional care unit she did end up testing positive for COVID and was placed on Paxlovid with good results.  Her most current work-up did show a sodium of 131, potassium 3.6, BUN 23, creatinine 0.58, white cell count of 9.8 and hemoglobin 9.9.  Her left shoulder x-rays did show severe glenohumeral joint degenerative changes with remodeling and flattening of the humeral head which is chronic but no evidence of acute fracture or dislocation.  The left knee x-rays did show advanced medial compartment predominant degenerative changes but no definite fracture.  She was admitted to observation.  Her working diagnosis was left shoulder pain with a history of chronic right knee pain with a recent left knee pain secondary to a fall.  She does have a history of CAD status post PAT x2 along with ischemic cardiomyopathy, hyperlipidemia, depression, chronic constipation, macular degeneration.  After being on observation she was treated with her common dose of tramadol and Tylenol with tolerable relief eventually was placed on oxycodone as needed.  It was all thought that she could do some weightbearing as tolerated gentle range of motion to her shoulder as well as come back to the rehabilitation services for more rehabilitation.  She has been on the  transitional care unit and has been able to participate with the rehabilitation services.  She is limited due to her history of chronic pain and her history of falls as well as macular degeneration and hard of hearing.  She also has a history of chronic pain throughout her multiple joints to which she is on scheduled Tylenol and tramadol which has been her dose for many years she is also on a lidocaine patch and not sure if that will be covered in the apartments after she is discharged or not.  She also has diclofenac gel to her major joints.  During her stay she did have a couple of the doses of oxycodone as needed currently has not been using it regularly so that will now be discontinued.  No appetite changes.  Does have a history of constipation.  Also being treated.  Does need assistance with ADLs.  For the most part her systolic blood pressures ranging 120-140 her last weight 161 pounds in comparison to July 13 also 161 pounds.  No new nursing notes.  Did have a chance to see orthopedics for her left shoulder on July 13 and no longer using her sling.  See results below over most recent laboratory studies.  Past Medical History:   Diagnosis Date     Age-related physical debility      Anemia      Arthritis      Bladder infection 01/03/2015    dx'd- on antibiotics     Blood type AB-     with Anti-C     Breast cancer (H)      Bursitis, knee      Cancer (H)     breast      Cancer (H) 2005    left breast     Colon cancer (H)      Depression      Gout      History of transfusion      Hypertension      Hypertension      Insomnia      Macular degeneration      Osteoarthritis of multiple joints      Osteopenia      Ovarian cyst      Rosacea      Vitamin D deficiency             Family History   Problem Relation Age of Onset     Squamous cell carcinoma Mother      Colon Cancer Sister      Breast Cancer Maternal Aunt      Bone Cancer Maternal Aunt      Colon Cancer Paternal Aunt      Breast Cancer Cousin       Social  History     Socioeconomic History     Marital status:    Tobacco Use     Smoking status: Never Smoker     Smokeless tobacco: Never Used   Substance and Sexual Activity     Alcohol use: No     Drug use: No     Sexual activity: Never        REVIEW OF SYSTEM:  She currently denies any new symptoms of shortness of breath dyspnea coughing sore throat postnasal drip allergies wheezing chest pain dizziness vertigo nausea vomiting diarrhea dysuria frequency urgency.  Does have a history of chronic pain as well as osteoarthritis, hard of hearing, macular degeneration, ischemic cardiomyopathy, hypertension    PHYSICAL EXAM:   Pleasant female in no acute distress..  Her head is normocephalic.   Neck is supple without adenopathy.  Oropharynx pink and moist.  Lung sounds are clear throughout.  Cardiovascular S1-S2 regular rate and rhythm chronic but nonpitting lower extremity edema.  Gastrointestinal soft nontender with positive bowel sounds.  Musculoskeletal history of chronic osteoarthritis and chronic pain..    Psychiatric: Pleasant affect.    Current Outpatient Medications:      acetaminophen (TYLENOL) 325 MG tablet, Take 650 mg by mouth every 6 hours, Disp: , Rfl:      aspirin (ASA) 81 MG chewable tablet, Take 1 tablet (81 mg) by mouth daily Starting tomorrow. (Patient taking differently: Take 81 mg by mouth every evening Starting tomorrow.), Disp: 30 tablet, Rfl: 3     atorvastatin (LIPITOR) 40 MG tablet, TAKE 1 TABLET BY MOUTH ONCE DAILY (Patient taking differently: Take 40 mg by mouth every evening), Disp: 90 tablet, Rfl: 3     bisacodyl (DULCOLAX) 5 MG EC tablet, Take 1 tablet (5 mg) by mouth daily as needed for constipation, Disp: , Rfl:      carvedilol (COREG) 6.25 MG tablet, TAKE 2 TABLETS BY MOUTH ONCE DAILY, Disp: 60 tablet, Rfl: 0     diclofenac (VOLTAREN) 1 % topical gel, Apply 2 g topically 4 times daily, Disp: , Rfl:      docusate sodium (COLACE) 100 MG capsule, Take 100 mg by mouth 2 times daily as  "needed, Disp: , Rfl:      escitalopram (LEXAPRO) 5 MG tablet, Take 5 mg by mouth daily, Disp: , Rfl:      furosemide (LASIX) 20 MG tablet, Take 1 tablet (20 mg) by mouth 2 times daily, Disp: 180 tablet, Rfl: 3     ibuprofen (ADVIL/MOTRIN) 400 MG tablet, Take 1 tablet (400 mg) by mouth every 6 hours as needed for moderate pain, Disp: 10 tablet, Rfl: 0     Lidocaine (LIDOCARE) 4 % Patch, Place 1 patch onto the skin every 24 hours To prevent lidocaine toxicity, patient should be patch free for 12 hrs daily., Disp: , Rfl:      losartan (COZAAR) 25 MG tablet, Take 1 tablet (25 mg) by mouth daily, Disp: , Rfl:      magnesium oxide (MAG-OX) 400 (241.3 Mg) MG tablet, Take 400 mg by mouth daily, Disp: , Rfl:      Multiple Vitamins-Minerals (PRESERVISION AREDS) CAPS, Take 1 capsule by mouth 2 times daily , Disp: , Rfl:      nitroGLYcerin (NITROSTAT) 0.4 MG sublingual tablet, One tablet under the tongue every 5 minutes if needed for chest pain. May repeat every 5 minutes for a maximum of 3 doses in 15 minutes\", Disp: 25 tablet, Rfl: 3     olopatadine (PATANOL) 0.1 % ophthalmic solution, Place 1 drop into both eyes 2 times daily, Disp: , Rfl:      Omega-3 Fatty Acids (FISH OIL OMEGA-3 PO), Take 1 capsule by mouth daily, Disp: , Rfl:      prednisoLONE acetate (PRED FORTE) 1 % ophthalmic suspension, Place 1 drop into both eyes 2 times daily, Disp: , Rfl:      spironolactone (ALDACTONE) 25 MG tablet, Take 25 mg by mouth daily, Disp: , Rfl:      ticagrelor (BRILINTA) 90 MG tablet, Take 1 tablet (90 mg) by mouth 2 times daily Start tomorrow morning., Disp: 180 tablet, Rfl: 3     traMADol (ULTRAM) 50 MG tablet, Take 1 tablet (50 mg) by mouth 3 times daily, Disp: 15 tablet, Rfl: 0     VITAMIN D, CHOLECALCIFEROL, PO, Take 4,000 Units by mouth daily, Disp: , Rfl:     BP (!) 162/73   Pulse 78   Temp 98.2  F (36.8  C)   Resp 20   Wt 73 kg (161 lb)   SpO2 96%   BMI 28.52 kg/m        LABS:   CBC RESULTS: Recent Labs   Lab Test " 07/06/22  0814   WBC 7.5   RBC 3.32*   HGB 10.3*   HCT 32.1*   MCV 97   MCH 31.0   MCHC 32.1   RDW 15.0        Last Comprehensive Metabolic Panel:  Sodium   Date Value Ref Range Status   07/06/2022 138 136 - 145 mmol/L Final   09/15/2006 134 133 - 144 mmol/L Final     Comment:     Delta Verified     Potassium   Date Value Ref Range Status   07/06/2022 3.9 3.4 - 5.3 mmol/L Final   06/14/2022 3.5 3.5 - 5.0 mmol/L Final   09/15/2006 4.1 3.4 - 5.3 mmol/L Final     Chloride   Date Value Ref Range Status   07/06/2022 103 98 - 107 mmol/L Final   06/14/2022 102 98 - 107 mmol/L Final   09/15/2006 99 94 - 109 mmol/L Final     Carbon Dioxide   Date Value Ref Range Status   09/15/2006 28 20 - 32 mmol/L Final     Carbon Dioxide (CO2)   Date Value Ref Range Status   07/06/2022 22 22 - 29 mmol/L Final   06/14/2022 26 22 - 31 mmol/L Final     Anion Gap   Date Value Ref Range Status   07/06/2022 13 7 - 15 mmol/L Final   06/14/2022 11 5 - 18 mmol/L Final   09/15/2006 7 6 - 17 mmol/L Final     Glucose   Date Value Ref Range Status   07/06/2022 141 (H) 70 - 99 mg/dL Final   06/14/2022 106 70 - 125 mg/dL Final   09/14/2006 110 60 - 110 mg/dL Final     Urea Nitrogen   Date Value Ref Range Status   07/06/2022 15.8 8.0 - 23.0 mg/dL Final   06/14/2022 14 8 - 28 mg/dL Final   09/14/2006 24 7 - 30 mg/dL Final     Creatinine   Date Value Ref Range Status   07/06/2022 0.63 0.51 - 0.95 mg/dL Final   09/14/2006 1.08 0.60 - 1.30 mg/dL Final     GFR Estimate   Date Value Ref Range Status   07/06/2022 85 >60 mL/min/1.73m2 Final     Comment:     Effective December 21, 2021 eGFRcr in adults is calculated using the 2021 CKD-EPI creatinine equation which includes age and gender (Maria Del Carmen woodruff al., NEJ, DOI: 10.1056/HWRTfo5189361)   05/26/2021 >60 >60 mL/min/1.73m2 Final   09/14/2006 53 (L) >60 mL/min/1.7m2 Final     Calcium   Date Value Ref Range Status   07/06/2022 8.9 8.8 - 10.2 mg/dL Final   09/14/2006 9.2 8.5 - 10.4 mg/dL Final     Last  Comprehensive Metabolic Panel:  Sodium   Date Value Ref Range Status   07/06/2022 138 136 - 145 mmol/L Final   09/15/2006 134 133 - 144 mmol/L Final     Comment:     Delta Verified     Potassium   Date Value Ref Range Status   07/06/2022 3.9 3.4 - 5.3 mmol/L Final   06/14/2022 3.5 3.5 - 5.0 mmol/L Final   09/15/2006 4.1 3.4 - 5.3 mmol/L Final     Chloride   Date Value Ref Range Status   07/06/2022 103 98 - 107 mmol/L Final   06/14/2022 102 98 - 107 mmol/L Final   09/15/2006 99 94 - 109 mmol/L Final     Carbon Dioxide   Date Value Ref Range Status   09/15/2006 28 20 - 32 mmol/L Final     Carbon Dioxide (CO2)   Date Value Ref Range Status   07/06/2022 22 22 - 29 mmol/L Final   06/14/2022 26 22 - 31 mmol/L Final     Anion Gap   Date Value Ref Range Status   07/06/2022 13 7 - 15 mmol/L Final   06/14/2022 11 5 - 18 mmol/L Final   09/15/2006 7 6 - 17 mmol/L Final     Glucose   Date Value Ref Range Status   07/06/2022 141 (H) 70 - 99 mg/dL Final   06/14/2022 106 70 - 125 mg/dL Final   09/14/2006 110 60 - 110 mg/dL Final     Urea Nitrogen   Date Value Ref Range Status   07/06/2022 15.8 8.0 - 23.0 mg/dL Final   06/14/2022 14 8 - 28 mg/dL Final   09/14/2006 24 7 - 30 mg/dL Final     Creatinine   Date Value Ref Range Status   07/06/2022 0.63 0.51 - 0.95 mg/dL Final   09/14/2006 1.08 0.60 - 1.30 mg/dL Final     GFR Estimate   Date Value Ref Range Status   07/06/2022 85 >60 mL/min/1.73m2 Final     Comment:     Effective December 21, 2021 eGFRcr in adults is calculated using the 2021 CKD-EPI creatinine equation which includes age and gender (Maria Del Carmen woodruff al., NEJM, DOI: 10.1056/CQJLha3459743)   05/26/2021 >60 >60 mL/min/1.73m2 Final   09/14/2006 53 (L) >60 mL/min/1.7m2 Final     Calcium   Date Value Ref Range Status   07/06/2022 8.9 8.8 - 10.2 mg/dL Final   09/14/2006 9.2 8.5 - 10.4 mg/dL Final     Bilirubin Total   Date Value Ref Range Status   06/27/2022 0.4 <=1.2 mg/dL Final     Alkaline Phosphatase   Date Value Ref Range Status    06/27/2022 79 35 - 104 U/L Final     ALT   Date Value Ref Range Status   06/27/2022 20 10 - 35 U/L Final     AST   Date Value Ref Range Status   06/27/2022 16 10 - 35 U/L Final             Recent Labs   Lab Test 06/07/22  0757 05/11/22  1649   CHOL 109 120   HDL 44* 49*   LDL 46 47   TRIG 93 121     No results found for: FPJ087, AYEK764, JLWC00OFTCM, VITD3, D2VIT, D3VIT, DTOT, [id number], [id number], [id number], [id number], [id number], [id number]      ASSESSMENT:    Encounter Diagnoses   Name Primary?     Primary osteoarthritis involving multiple joints Yes     Essential (primary) hypertension      Bilateral hearing loss, unspecified hearing loss type      Chronic pain disorder        MEDICAL EQUIPMENT NEEDS:  None    DISCHARGE PLAN/FACE TO FACE:  I certify that services are/were furnished while this patient was under the care of a physician and that a physician or an allowed non-physician practitioner (NPP), had a face-to-face encounter that meets the physician face-to-face encounter requirements. The encounter was in whole, or in part, related to the primary reason for home health. The patient is confined to his/her home and needs intermittent skilled nursing, physical therapy, speech-language pathology, or the continued need for occupational therapy. A plan of care has been established by a physician and is periodically reviewed by a physician.  Date of Face-to-Face Encounter: July 20, 2022    I certify that, based on my findings, the following services are medically necessary home health services: She will be discharging to her apartment with current medications and narcotics as well as physical and occupational therapy home health aide and nursing with an approximated discharge date of July 23, 2022    My clinical findings support the need for the above skilled services because: (Please write a brief narrative summary that describes what the RN, PT, SLP, or other services will be doing in the home. A list of  diagnoses in this section does not meet the CMS requirements.)  She will require the skilled services secondary to not only her hospitalization but also stay in the transitional care unit but her history of falls as well as generalized debility hard of hearing and macular degeneration as well as nursing for medication management including hypertension.    This patient is homebound because: (Please write a brief narrative summary describing the functional limitations as to why this patient is homebound and specifically what makes this patient homebound.)  Secondary to multiple chronic medical conditions including history of falls as well as self-care deficits but continuation of the rehabilitation component and home safety as well as nursing for medication management    The patient is, or has been, under my care and I have initiated the establishment of the plan of care. This patient will be followed by a physician who will periodically review the plan of care.    Schedule follow up visit with primary care provider within 7 days to reestablish care.  Her last orthopedic visit was on July 13.  Next eye clinic visit is July 20, 2022 and her next cardiology visit is August 11.  She will also follow-up with her primary care provider regarding medication management as well as assessment of her chronic medical conditions.  She did not have any other questions.    Discharge coordination care greater than 30 minutes    Electronically signed by: Bry Jurado NP

## 2022-07-25 ENCOUNTER — MEDICAL CORRESPONDENCE (OUTPATIENT)
Dept: HEALTH INFORMATION MANAGEMENT | Facility: CLINIC | Age: 87
End: 2022-07-25

## 2022-07-27 NOTE — PROGRESS NOTES
[facility] Open to Case Management Assessment - [employer] (Assisted Living)    Member identified and opened to case management per [facility] & Geriatric Services protocol.    PCP: No primary care provider on file.  PCC: [facility]  Living arrangement: Assisted Living: [facility]       Utilization History (last 12 months): ED Visits, inpatient, TCU, last PCP visit, Specialty providers    Case Management Episode Diagnosis:   Past Medical History:   Diagnosis Date     Age-related physical debility      Anemia      Arthritis      Bladder infection 01/03/2015    dx'd- on antibiotics     Blood type AB-     with Anti-C     Breast cancer (H)      Bursitis, knee      Cancer (H)     breast      Cancer (H) 2005    left breast     Colon cancer (H)      Depression      Gout      History of transfusion      Hypertension      Hypertension      Insomnia      Macular degeneration      Osteoarthritis of multiple joints      Osteopenia      Ovarian cyst      Rosacea      Vitamin D deficiency        Processes completed: Completed transitions of care log. Followed up with facilities as needed/requested. Care Plan initiated and will be open to case management for 3 to 6 months for follow up.     Follow Up: EPIC notes reviewed, call placed to AL facility for updates as needed.  UFS POC  initiated, goals reviewed and updated.    Care Coordination Initial Assessment    Identity verified.  Marina Neves had fall and then admission.      Utilization:   Hospital Admits in last year: 1  Member had same day procedure and was discharge to TCU to be observed/ monitored.  At TCU Marina Neves had a positive Covid test and stated until the day before this admission due to fall.      Current Medical Health Concerns:   Cardiac and fall     Plan:    Member will be discharge to TCU after hospital stay for rehab and CC will work with NP on site as needed.   Haleigh Edge MA LSW   [facility]  Coordinator   [phone] - work cell phone   [phone] - work fax

## 2022-07-27 NOTE — PROGRESS NOTES
CC reviewed notes from NP visit with Marina Neves at the TCU.  Discussion is still being had about member staying LTC or returned to [facility].  CC will continue to follow up with NP and offer supports and resources as needed.   Haleigh Edge MA [facility] Coordinator   [phone] - nrjh cell phone   [phone] - vaez fax

## 2022-08-11 ENCOUNTER — OFFICE VISIT (OUTPATIENT)
Dept: CARDIOLOGY | Facility: CLINIC | Age: 87
End: 2022-08-11
Payer: COMMERCIAL

## 2022-08-11 VITALS
WEIGHT: 162 LBS | BODY MASS INDEX: 28.7 KG/M2 | RESPIRATION RATE: 16 BRPM | OXYGEN SATURATION: 99 % | DIASTOLIC BLOOD PRESSURE: 46 MMHG | HEART RATE: 63 BPM | SYSTOLIC BLOOD PRESSURE: 100 MMHG

## 2022-08-11 DIAGNOSIS — I25.5 ISCHEMIC CARDIOMYOPATHY: Primary | ICD-10-CM

## 2022-08-11 PROCEDURE — 99214 OFFICE O/P EST MOD 30 MIN: CPT | Performed by: INTERNAL MEDICINE

## 2022-08-11 NOTE — PATIENT INSTRUCTIONS
It was a pleasure seeing you at [facility] today.        Here are my suggestions for your care:    1.  No medication changes      Let's meet again in 10 months.    You can always call my nurse Alondra Hamm RN who is a nurse helping me in the care of my patients. She can be reached at [phone] if you have any questions.    For scheduling, please call my  Laura Reece at [phone].    Thank you again for trusting me with your care. Please feel free to call my office at any time if you have any question or if I can assist you in any way.    Louann Silva MD  [facility]

## 2022-08-11 NOTE — PROGRESS NOTES
HEART CARE ENCOUNTER CONSULTATON NOTE      [facility]  [phone]      Assessment/Recommendations   Assessment/Plan:  CAD - stable, no angina s/p PCI on brillinta, aspirin and statin    Ischemic cardiomyopathy - Euvolemic on coreg, losartan and aldactone. Not titrating medications due to lower BP.     Hyperlipidemia - at goal on statin    F/U 12 months       History of Present Illness/Subjective    HPI: Marina Neves is a 88 year old female with hypertension who I met in 2020 for complaints of chest pain with elevated BNP. Echo and stress test were grossly normal with mild LVH and LAE with aortic valve sclerosis.  CT chest incidentally notes coronary calcification (2/2017). She was admitted at the [facility] 12/2021 with chest pain and a NSTEMI. EF was 33%. Marina underwent complicated LAD PCI.  I performed an uncomplicated staged PCI of the RCA in June 2022.      Marina returns for post-PCI follow up. She denies any chest pain, dyspnea, dizziness, palpitations, edema, pnd/orthopnea or syncope. She is able to walk down and get her mail without trouble. Marina is tolerating her medications well. Her main complaint today is her chronic right knee pain.        Recent Echocardiogram Results: 12/2021  Left ventricular function is moderately reduced. Biplane LVEF is 33%.  Severe hypokinesis to akinesis of the mid anterior, anteroseptal, inferoseptal, inferior segments and all distal segments consistent with LAD ischemia/infarction.  Global right ventricular function is normal.  No hemodynamically significant valve abnormalities.  The inferior vena cava is normal.  There is no prior study for direct comparison.        Recent Coronary Angiogram Results: 6/2022    Staged PCI in a woman with an ischemic cardiomyopathy.    Diffuse coronary calcification.    Mild left main stenosis.    Patent mid LAD stent with mild disease proximal and distal to the stent.    Moderate proximal and distal LCx disease  without flow limitation across both lesions.    Mild-moderate proximal RCA lesions. Severe mid RCA and right PDA stenosis, each treated with a PAT with good angiographic results.             Physical Examination  Review of Systems   Vitals: /46 (BP Location: Left arm, Patient Position: Sitting, Cuff Size: Adult Large)   Pulse 63   Resp 16   Wt 73.5 kg (162 lb)   SpO2 99%   BMI 28.70 kg/m    BMI= Body mass index is 28.7 kg/m .  Wt Readings from Last 3 Encounters:   08/11/22 73.5 kg (162 lb)   07/19/22 73 kg (161 lb)   07/13/22 73.4 kg (161 lb 12.8 oz)       General Appearance:   no distress, overweight body habitus   ENT/Mouth: membranes moist, no oral lesions or bleeding gums.      EYES:  no scleral icterus, normal conjunctivae   Neck: no carotid bruits or thyromegaly   Chest/Lungs:   lungs are clear to auscultation, no rales or wheezing, no sternal scar, equal chest wall expansion    Cardiovascular:   Regular. Normal first and second heart sounds with no murmur. No rubs or gallops; the right carotid, radial and posterior tibial pulses are intact and the left carotid, radial and posterior tibial pulses are intact.  Jugular venous pressure is flat, no edema bilaterally    Abdomen:  no organomegaly, masses, bruits, or tenderness; bowel sounds are present   Extremities: no cyanosis or clubbing   Skin: no xanthelasma, warm.    Neurologic: normal  bilateral, no tremors     Psychiatric: alert and oriented x3, calm        Please refer above for cardiac ROS details.        Medical History  Surgical History Family History Social History   Past Medical History:   Diagnosis Date     Age-related physical debility      Anemia      Arthritis      Bladder infection 01/03/2015    dx'd- on antibiotics     Blood type AB-     with Anti-C     Breast cancer (H)      Bursitis, knee      Cancer (H)     breast      Cancer (H) 2005    left breast     Colon cancer (H)      Depression      Gout      History of transfusion       Hypertension      Hypertension      Insomnia      Macular degeneration      Osteoarthritis of multiple joints      Osteopenia      Ovarian cyst      Rosacea      Vitamin D deficiency      Past Surgical History:   Procedure Laterality Date     ABDOMEN SURGERY      colon resection for ca 2015     BACK SURGERY Bilateral     L3-L5, L5-S1 decompression lami     BACK SURGERY      posterior spinal reconstruction     BREAST SURGERY       BREAST SURGERY      left mastectomy     CATARACT EXTRACTION Bilateral      COLON SURGERY       COLONOSCOPY       COLONOSCOPY N/A 8/4/2015    Procedure: COLONOSCOPY;  Surgeon: Yoseph Ferraro MD;  Location:  GI     CV CORONARY ANGIOGRAM N/A 12/12/2021    Procedure: Coronary Angiogram;  Surgeon: Everett Castellon MD;  Location: [facility] CARDIAC CATH LAB     CV CORONARY ANGIOGRAM N/A 6/7/2022    Procedure: Coronary Angiogram;  Surgeon: Louann Silva MD;  Location: [facility] LAB CV     CV FRACTIONAL FLOW RATIO WIRE N/A 6/7/2022    Procedure: Fractional Flow Ratio Wire;  Surgeon: Louann Silva MD;  [place] [facility] [place] PCI N/A 6/7/2022    Procedure: Percutaneous Coronary Intervention;  Surgeon: Louann Silva MD;  [place] [facility] [place]     CV PCI STENT DRUG ELUTING N/A 12/12/2021    Procedure: Percutaneous Coronary Intervention Stent Drug Eluting;  Surgeon: Everett Castellon MD;  Location: [facility] CARDIAC CATH LAB     DILATION AND CURETTAGE      onsil     EYE SURGERY      cataract bilateral     EYE SURGERY       GASTRECTOMY       MASTECTOMY, RADICAL Left      NEUROMA SURGERY Bilateral     feet     ORTHOPEDIC SURGERY      shoulder right, knee left     ORTHOPEDIC SURGERY      lami     OTHER SURGICAL HISTORY      left knee arthroscopy     OTHER SURGICAL HISTORY      right shoulder replacement     OTHER SURGICAL HISTORY      left breast biopsyneedle core     ID LAP,FULGURATE/EXCISE LESIONS N/A 3/29/2017    Procedure: LAPAROSCOPIC BILATERAL SALPING  OOPHORECTOMY PELVIC WASHINGS;  Surgeon: Eduardo Smart MD;  [place] [facility] [place]  Service: [place]necology     KY MASTECTOMY, SIMPLE, COMPLETE Right 1/7/2015    Procedure: RIGHT BREAST MASTECTOMY;  Surgeon: Meliton Bazan MD;  Location: [facility];  Service: General     REPLACEMENT TOTAL KNEE Right      TONSILLECTOMY & ADENOIDECTOMY       ZZC PART REMOVAL COLON W ANASTOMOSIS N/A 6/16/2014    Procedure: COLECTOMY;  Surgeon: Meliton Bazan MD;  Location: [facility];  Service: General     Family History   Problem Relation Age of Onset     Squamous cell carcinoma Mother      Colon Cancer Sister      Breast Cancer Maternal Aunt      Bone Cancer Maternal Aunt      Colon Cancer Paternal Aunt      Breast Cancer Cousin         Social History     Socioeconomic History     Marital status:      Spouse name: Not on file     Number of children: Not on file     Years of education: Not on file     Highest education level: Not on file   Occupational History     Not on file   Tobacco Use     Smoking status: Never Smoker     Smokeless tobacco: Never Used   Substance and Sexual Activity     Alcohol use: No     Drug use: No     Sexual activity: Never   Other Topics Concern     Parent/sibling w/ CABG, MI or angioplasty before 65F 55M? Not Asked   Social History Narrative     Not on file     Social Determinants of Health     Financial Resource Strain: Not on file   Food Insecurity: Not on file   Transportation Needs: Not on file   Physical Activity: Not on file   Stress: Not on file   Social Connections: Not on file   Intimate Partner Violence: Not on file   Housing Stability: Not on file           Medications  Allergies   Current Outpatient Medications   Medication Sig Dispense Refill     acetaminophen (TYLENOL) 325 MG tablet Take 650 mg by mouth every 6 hours       aspirin (ASA) 81 MG chewable tablet Take 1 tablet (81 mg) by mouth daily Starting tomorrow. (Patient taking differently: Take 81 mg by  "mouth every evening Starting tomorrow.) 30 tablet 3     atorvastatin (LIPITOR) 40 MG tablet TAKE 1 TABLET BY MOUTH ONCE DAILY (Patient taking differently: Take 40 mg by mouth every evening) 90 tablet 3     bisacodyl (DULCOLAX) 5 MG EC tablet Take 1 tablet (5 mg) by mouth daily as needed for constipation       calcium carb-cholecalciferol (CALCIUM 500 + D3) 500-200 MG-UNIT tablet Take 1 tablet by mouth daily       carvedilol (COREG) 6.25 MG tablet TAKE 2 TABLETS BY MOUTH ONCE DAILY 60 tablet 0     diclofenac (VOLTAREN) 1 % topical gel Apply 2 g topically 4 times daily       docusate sodium (COLACE) 100 MG capsule Take 100 mg by mouth 2 times daily as needed       escitalopram (LEXAPRO) 5 MG tablet Take 5 mg by mouth daily       furosemide (LASIX) 20 MG tablet Take 1 tablet (20 mg) by mouth 2 times daily 180 tablet 3     ibuprofen (ADVIL/MOTRIN) 400 MG tablet Take 1 tablet (400 mg) by mouth every 6 hours as needed for moderate pain 10 tablet 0     Lidocaine (LIDOCARE) 4 % Patch Place 1 patch onto the skin every 24 hours To prevent lidocaine toxicity, patient should be patch free for 12 hrs daily.       losartan (COZAAR) 25 MG tablet Take 1 tablet (25 mg) by mouth daily       magnesium oxide (MAG-OX) 400 (241.3 Mg) MG tablet Take 400 mg by mouth daily       Multiple Vitamins-Minerals (PRESERVISION AREDS) CAPS Take 1 capsule by mouth 2 times daily        nitroGLYcerin (NITROSTAT) 0.4 MG sublingual tablet One tablet under the tongue every 5 minutes if needed for chest pain. May repeat every 5 minutes for a maximum of 3 doses in 15 minutes\" 25 tablet 3     olopatadine (PATANOL) 0.1 % ophthalmic solution Place 1 drop into both eyes 2 times daily       Omega-3 Fatty Acids (FISH OIL OMEGA-3 PO) Take 1 capsule by mouth daily       prednisoLONE acetate (PRED FORTE) 1 % ophthalmic suspension Place 1 drop into both eyes 2 times daily       spironolactone (ALDACTONE) 25 MG tablet Take 25 mg by mouth daily       ticagrelor " (BRILINTA) 90 MG tablet Take 1 tablet (90 mg) by mouth 2 times daily Start tomorrow morning. 180 tablet 3     traMADol (ULTRAM) 50 MG tablet Take 1 tablet (50 mg) by mouth 3 times daily 15 tablet 0     VITAMIN D, CHOLECALCIFEROL, PO Take 4,000 Units by mouth daily         Allergies   Allergen Reactions     Blood-Group Specific Substance Other (See Comments)     Anti-D and Anti-C present. A delay in compatible RBC's may occur.     Celecoxib Unknown     Avoids due to sulfa allergy     Lisinopril Cough     Other reaction(s): cough  Other reaction(s): cough     Oxybutynin Other (See Comments)     Other reaction(s): intolerable dry eye and mouth  Other reaction(s): intolerable dry eye and mouth     Sulfa Drugs      Tetanus Toxoid Blisters     Tetanus Toxoids      Other reaction(s): Unknown  Other reaction(s): Unknown     Tolterodine Other (See Comments)     Other reaction(s): intolerable dry eye and mouth  Other reaction(s): intolerable dry eye and mouth     Piroxicam Rash     Other reaction(s): stomach upset  Other reaction(s): stomach upset          Lab Results    Chemistry/lipid CBC Cardiac Enzymes/BNP/TSH/INR   Recent Labs   Lab Test 06/07/22  0757   CHOL 109   HDL 44*   LDL 46   TRIG 93     Recent Labs   Lab Test 06/07/22  0757 05/11/22  1649 02/22/22  1522   LDL 46 47 53     Recent Labs   Lab Test 07/06/22  0814      POTASSIUM 3.9   CHLORIDE 103   CO2 22   *   BUN 15.8   CR 0.63   GFRESTIMATED 85   MARY 8.9     Recent Labs   Lab Test 07/06/22  0814 07/02/22  1313 06/28/22  0925   CR 0.63 0.71 0.64     No results for input(s): A1C in the last 30398 hours.       Recent Labs   Lab Test 07/06/22  0814   WBC 7.5   HGB 10.3*   HCT 32.1*   MCV 97        Recent Labs   Lab Test 07/06/22  0814 07/02/22  1313 06/28/22  0925   HGB 10.3* 9.9* 9.6*    Recent Labs   Lab Test 05/21/19  1540 05/21/19  1315   TROPONINI <0.01 <0.01     Recent Labs   Lab Test 05/11/22  1649 12/11/21  0321 05/21/19  1315   BNP 76  --   208*   NTBNPI  --  1,757  --      Recent Labs   Lab Test 06/27/22  0700   TSH 2.03     Recent Labs   Lab Test 05/21/19  1315   INR 0.94        Today's clinic visit entailed:  Review of prior external note(s) from - [facility] information from epic reviewed  40 minutes spent on the date of the encounter doing chart review, review of outside records, review of test results, interpretation of tests, patient visit and documentation   Provider  Link to [facility] Help Grid     The level of medical decision making during this visit was of moderate complexity.        Louann Silva MD

## 2022-09-19 ENCOUNTER — LAB REQUISITION (OUTPATIENT)
Dept: LAB | Facility: CLINIC | Age: 87
End: 2022-09-19

## 2022-09-19 DIAGNOSIS — I10 ESSENTIAL (PRIMARY) HYPERTENSION: ICD-10-CM

## 2022-09-19 LAB
ANION GAP SERPL CALCULATED.3IONS-SCNC: 12 MMOL/L (ref 7–15)
BUN SERPL-MCNC: 16.3 MG/DL (ref 8–23)
CALCIUM SERPL-MCNC: 8.7 MG/DL (ref 8.8–10.2)
CHLORIDE SERPL-SCNC: 100 MMOL/L (ref 98–107)
CREAT SERPL-MCNC: 1.06 MG/DL (ref 0.51–0.95)
DEPRECATED HCO3 PLAS-SCNC: 26 MMOL/L (ref 22–29)
GFR SERPL CREATININE-BSD FRML MDRD: 50 ML/MIN/1.73M2
GLUCOSE SERPL-MCNC: 148 MG/DL (ref 70–99)
POTASSIUM SERPL-SCNC: 4.1 MMOL/L (ref 3.4–5.3)
SODIUM SERPL-SCNC: 138 MMOL/L (ref 136–145)

## 2022-09-19 PROCEDURE — 82310 ASSAY OF CALCIUM: CPT | Performed by: FAMILY MEDICINE

## 2022-09-26 ENCOUNTER — TELEPHONE (OUTPATIENT)
Dept: GERIATRICS | Facility: CLINIC | Age: 87
End: 2022-09-26

## 2022-09-26 NOTE — TELEPHONE ENCOUNTER
[facility] Geriatrics Triage Nurse Telephone Encounter    Provider: MARY Arreola  Facility: [facility]  Facility Type:  [place]    Caller:  Muna  Call Back Number: [phone]    Allergies:    Allergies   Allergen Reactions     Blood-Group Specific Substance Other (See Comments)     Anti-D and Anti-C present. A delay in compatible RBC's may occur.     Celecoxib Unknown     Avoids due to sulfa allergy     Lisinopril Cough     Other reaction(s): cough  Other reaction(s): cough     Oxybutynin Other (See Comments)     Other reaction(s): intolerable dry eye and mouth  Other reaction(s): intolerable dry eye and mouth     Sulfa Drugs      Tetanus Toxoid Blisters     Tetanus Toxoids      Other reaction(s): Unknown  Other reaction(s): Unknown     Tolterodine Other (See Comments)     Other reaction(s): intolerable dry eye and mouth  Other reaction(s): intolerable dry eye and mouth     Piroxicam Rash     Other reaction(s): stomach upset  Other reaction(s): stomach upset        Reason for call: Pt had a fall on 9/23. Pt's friend assisted her back up and did not notify staff until today, 9/26 that pt had fallen. Today pt is c/o pain 7/10 on left side - ribs. Vitals WNL for pt.    Verbal Order/Direction given by Provider: Send to ED for further assessment due to chronic fall hx.    Provider giving Order:  MARY Arreola    Verbal Order given to: Muna Duque RN

## 2022-10-07 ENCOUNTER — PATIENT OUTREACH (OUTPATIENT)
Dept: GERIATRIC MEDICINE | Facility: CLINIC | Age: 87
End: 2022-10-07

## 2022-11-02 ENCOUNTER — TELEPHONE (OUTPATIENT)
Dept: ONCOLOGY | Facility: HOSPITAL | Age: 87
End: 2022-11-02

## 2022-11-02 NOTE — TELEPHONE ENCOUNTER
Received call from ruba family member, that patient is no longer mobile and cannot go to any appointment. At this time she is no longer following up with us an longer

## 2023-01-10 NOTE — ED TRIAGE NOTES
Patient BIBA due to chest pain that started yesterday. EMS reports poor historian. Patient administered one nitroglycerin and ASA PTA.   
You can access the FollowMyHealth Patient Portal offered by [facility] by registering at the following website: [email or web address]. By joining [employer]’s FollowMyHealth portal, you will also be able to view your health information using other applications (apps) compatible with our system.

## 2023-06-02 ENCOUNTER — PATIENT OUTREACH (OUTPATIENT)
Dept: GERIATRIC MEDICINE | Facility: CLINIC | Age: 88
End: 2023-06-02
Payer: COMMERCIAL

## 2023-06-02 NOTE — PROGRESS NOTES
[employer] Medicare Disenrollment    Member was disenrolled from [employer] effective: 3-31-23  Reason for disenrollment: Change in Care System     Haleigh LOPEZ   [facility] Care Coordinator   [phone] - work cell phone   [phone] - qnkr fax

## 2023-06-19 DIAGNOSIS — I25.84 CORONARY ARTERY DISEASE DUE TO CALCIFIED CORONARY LESION: ICD-10-CM

## 2023-06-19 DIAGNOSIS — I25.10 CORONARY ARTERY DISEASE DUE TO CALCIFIED CORONARY LESION: ICD-10-CM

## 2023-06-19 NOTE — TELEPHONE ENCOUNTER
===View-only below this line===  ----- Message -----  From: Víctor Rey MD  Sent: 6/19/2023   4:27 PM CDT  To: Nick Hamm RN    Don't know her full story, but would definitely continue ASA. Wouldn't stop ticagrelor until she is a year post-PCI, at which point can stop.  Discuss long-terms DAPT with  at the time of routine follow up.    Thx!  Víctor

## 2023-06-19 NOTE — TELEPHONE ENCOUNTER
Dr. Rey, in the absence of Dr. Silva, please review. Pt is 1 year post-PCI on 6/7/22. Ok to discontinue Brilinta at this time? Continue on Aspirin 81 mg daily? Thank you KALA,RN    Group Topic:  Coping Skills Education    Date: January 7  Start Time:  5:30 PM  End Time:  7:00 PM    Focus: Mindfulness: What Skills  Number in attendance: 9    A presentation was given on the \"What\" mindfulness skills of observing, describing, and participating. This included guided discussions of the value of mindfulness skills to decrease ruminating and worrying.     Beto Marley, [facility]      Attendance: Present  Patient Response: Appropriate feedback, Attentive and Good eye contact     Pt was attentive during the presentation and asked questions about how to work through our negative thoughts and feelings when being mindful.

## 2023-06-20 RX ORDER — TICAGRELOR 90 MG/1
TABLET ORAL
Qty: 60 TABLET | Refills: 0 | Status: SHIPPED | OUTPATIENT
Start: 2023-06-20 | End: 2023-06-28

## 2023-06-20 NOTE — TELEPHONE ENCOUNTER
Dr. Silva, 1 year anniversary post PCI passed 6/7/23. Continue on DAPT? Or stop Brilinta? NO openings for in clinic visit, recommendations? Thank you Pasquale ARMENDARIZ ___________________________________________________  Prescription renewed for a 30 day supply. Pt is 1 year past PCI anniversary date, however will await discussion and recommendation from [facility], if DAPT needed. Pt very hard of hearing. Pt is due for [facility] appointment. Discussion with cousin via phone. Review of continuation of Brilinta and 30 day supply sent. Will await return of [facility] to give discontinuation order or alternate recommendation. Pt is attempting to make OV with [facility]> Informed that there are no current openings. Will send message to [facility] for upon return. Will call to discuss once advised. PASQUALE ARMENDARIZ

## 2023-06-28 NOTE — TELEPHONE ENCOUNTER
===View-only below this line===  ----- Message -----  From: Louann Silva MD  Sent: 6/26/2023  12:32 PM CDT  To: Nick Hamm RN; Laura Reece    Mrs. Neves is due for a follow up visit.  Can you get her set up to see one of my colleagues to establish care?    CMM- if she is feeling well she can stop the brillinta now as she is 1 year out from her last PCI.    Thanks, EG

## 2023-06-28 NOTE — TELEPHONE ENCOUNTER
PC with caregiver Pearl. Denies any complaints from Marina of chest pain or SOB, exercise/activity intolerance. Review of provider recommendation to stop the Brilinta. Removed from medication list. Caregiver verbalized understanding.   Discussion of follow-up with [facility]. No open appointments. Direct transfer to schedulers to help arrange OV with [facility] or [facility] provider. No further questions or concerns. Appt arranged with DONNA for in October. CMM,Rn

## 2023-07-03 ENCOUNTER — LAB REQUISITION (OUTPATIENT)
Dept: LAB | Facility: CLINIC | Age: 88
End: 2023-07-03

## 2023-07-03 DIAGNOSIS — I10 ESSENTIAL (PRIMARY) HYPERTENSION: ICD-10-CM

## 2023-07-03 DIAGNOSIS — E78.2 MIXED HYPERLIPIDEMIA: ICD-10-CM

## 2023-07-03 LAB
ANION GAP SERPL CALCULATED.3IONS-SCNC: 12 MMOL/L (ref 7–15)
BUN SERPL-MCNC: 21.9 MG/DL (ref 8–23)
CALCIUM SERPL-MCNC: 8.7 MG/DL (ref 8.8–10.2)
CHLORIDE SERPL-SCNC: 101 MMOL/L (ref 98–107)
CHOLEST SERPL-MCNC: 93 MG/DL
CREAT SERPL-MCNC: 0.96 MG/DL (ref 0.51–0.95)
DEPRECATED HCO3 PLAS-SCNC: 22 MMOL/L (ref 22–29)
GFR SERPL CREATININE-BSD FRML MDRD: 56 ML/MIN/1.73M2
GLUCOSE SERPL-MCNC: 135 MG/DL (ref 70–99)
HDLC SERPL-MCNC: 48 MG/DL
HGB BLD-MCNC: 10.5 G/DL (ref 11.7–15.7)
LDLC SERPL CALC-MCNC: 32 MG/DL
NONHDLC SERPL-MCNC: 45 MG/DL
POTASSIUM SERPL-SCNC: 4.1 MMOL/L (ref 3.4–5.3)
SODIUM SERPL-SCNC: 135 MMOL/L (ref 136–145)
TRIGL SERPL-MCNC: 65 MG/DL

## 2023-07-03 PROCEDURE — 80061 LIPID PANEL: CPT | Performed by: FAMILY MEDICINE

## 2023-07-03 PROCEDURE — 80048 BASIC METABOLIC PNL TOTAL CA: CPT | Performed by: FAMILY MEDICINE

## 2023-07-03 PROCEDURE — 85018 HEMOGLOBIN: CPT | Performed by: FAMILY MEDICINE

## 2023-09-16 ENCOUNTER — LAB REQUISITION (OUTPATIENT)
Dept: LAB | Facility: CLINIC | Age: 88
End: 2023-09-16
Payer: COMMERCIAL

## 2023-09-16 DIAGNOSIS — D64.9 ANEMIA, UNSPECIFIED: ICD-10-CM

## 2023-09-18 ENCOUNTER — LAB REQUISITION (OUTPATIENT)
Dept: LAB | Facility: CLINIC | Age: 88
End: 2023-09-18
Payer: COMMERCIAL

## 2023-09-18 DIAGNOSIS — D64.9 ANEMIA, UNSPECIFIED: ICD-10-CM

## 2023-09-18 DIAGNOSIS — R32 UNSPECIFIED URINARY INCONTINENCE: ICD-10-CM

## 2023-09-18 LAB
ALBUMIN UR-MCNC: 30 MG/DL
AMORPH CRY #/AREA URNS HPF: ABNORMAL /HPF
ANION GAP SERPL CALCULATED.3IONS-SCNC: 12 MMOL/L (ref 7–15)
APPEARANCE UR: ABNORMAL
BACTERIA #/AREA URNS HPF: ABNORMAL /HPF
BILIRUB UR QL STRIP: NEGATIVE
BUN SERPL-MCNC: 16.3 MG/DL (ref 8–23)
CALCIUM SERPL-MCNC: 8.6 MG/DL (ref 8.8–10.2)
CHLORIDE SERPL-SCNC: 100 MMOL/L (ref 98–107)
COLOR UR AUTO: ABNORMAL
CREAT SERPL-MCNC: 0.78 MG/DL (ref 0.51–0.95)
DEPRECATED HCO3 PLAS-SCNC: 22 MMOL/L (ref 22–29)
EGFRCR SERPLBLD CKD-EPI 2021: 72 ML/MIN/1.73M2
ERYTHROCYTE [DISTWIDTH] IN BLOOD BY AUTOMATED COUNT: 14.5 % (ref 10–15)
GLUCOSE SERPL-MCNC: 149 MG/DL (ref 70–99)
GLUCOSE UR STRIP-MCNC: NEGATIVE MG/DL
HCT VFR BLD AUTO: 35.1 % (ref 35–47)
HGB BLD-MCNC: 10.9 G/DL (ref 11.7–15.7)
HGB UR QL STRIP: NEGATIVE
KETONES UR STRIP-MCNC: NEGATIVE MG/DL
LEUKOCYTE ESTERASE UR QL STRIP: ABNORMAL
MCH RBC QN AUTO: 30.4 PG (ref 26.5–33)
MCHC RBC AUTO-ENTMCNC: 31.1 G/DL (ref 31.5–36.5)
MCV RBC AUTO: 98 FL (ref 78–100)
MUCOUS THREADS #/AREA URNS LPF: PRESENT /LPF
NITRATE UR QL: NEGATIVE
PH UR STRIP: 8.5 [PH] (ref 5–7)
PLATELET # BLD AUTO: 237 10E3/UL (ref 150–450)
POTASSIUM SERPL-SCNC: 4.2 MMOL/L (ref 3.4–5.3)
RBC # BLD AUTO: 3.58 10E6/UL (ref 3.8–5.2)
RBC URINE: 2 /HPF
SODIUM SERPL-SCNC: 134 MMOL/L (ref 136–145)
SP GR UR STRIP: 1.02 (ref 1–1.03)
SQUAMOUS EPITHELIAL: 32 /HPF
UROBILINOGEN UR STRIP-MCNC: NORMAL MG/DL
WBC # BLD AUTO: 8.9 10E3/UL (ref 4–11)
WBC CLUMPS #/AREA URNS HPF: PRESENT /HPF
WBC URINE: 138 /HPF

## 2023-09-18 PROCEDURE — 81001 URINALYSIS AUTO W/SCOPE: CPT | Mod: ORL

## 2023-09-18 PROCEDURE — 87086 URINE CULTURE/COLONY COUNT: CPT | Mod: ORL

## 2023-09-18 PROCEDURE — 85027 COMPLETE CBC AUTOMATED: CPT | Mod: ORL | Performed by: NURSE PRACTITIONER

## 2023-09-18 PROCEDURE — P9604 ONE-WAY ALLOW PRORATED TRIP: HCPCS | Mod: ORL | Performed by: NURSE PRACTITIONER

## 2023-09-18 PROCEDURE — 80048 BASIC METABOLIC PNL TOTAL CA: CPT | Mod: ORL | Performed by: NURSE PRACTITIONER

## 2023-09-18 PROCEDURE — 36415 COLL VENOUS BLD VENIPUNCTURE: CPT | Mod: ORL | Performed by: NURSE PRACTITIONER

## 2023-09-19 LAB
ANION GAP SERPL CALCULATED.3IONS-SCNC: 11 MMOL/L (ref 7–15)
BUN SERPL-MCNC: 17.4 MG/DL (ref 8–23)
CALCIUM SERPL-MCNC: 8.6 MG/DL (ref 8.8–10.2)
CHLORIDE SERPL-SCNC: 101 MMOL/L (ref 98–107)
CREAT SERPL-MCNC: 0.81 MG/DL (ref 0.51–0.95)
DEPRECATED HCO3 PLAS-SCNC: 23 MMOL/L (ref 22–29)
EGFRCR SERPLBLD CKD-EPI 2021: 69 ML/MIN/1.73M2
ERYTHROCYTE [DISTWIDTH] IN BLOOD BY AUTOMATED COUNT: 14.6 % (ref 10–15)
GLUCOSE SERPL-MCNC: 107 MG/DL (ref 70–99)
HCT VFR BLD AUTO: 36.4 % (ref 35–47)
HGB BLD-MCNC: 11.2 G/DL (ref 11.7–15.7)
MCH RBC QN AUTO: 29.6 PG (ref 26.5–33)
MCHC RBC AUTO-ENTMCNC: 30.8 G/DL (ref 31.5–36.5)
MCV RBC AUTO: 96 FL (ref 78–100)
PLATELET # BLD AUTO: 257 10E3/UL (ref 150–450)
POTASSIUM SERPL-SCNC: 4.4 MMOL/L (ref 3.4–5.3)
RBC # BLD AUTO: 3.78 10E6/UL (ref 3.8–5.2)
SODIUM SERPL-SCNC: 135 MMOL/L (ref 136–145)
WBC # BLD AUTO: 8.5 10E3/UL (ref 4–11)

## 2023-09-19 PROCEDURE — 85027 COMPLETE CBC AUTOMATED: CPT | Mod: ORL | Performed by: NURSE PRACTITIONER

## 2023-09-19 PROCEDURE — 36415 COLL VENOUS BLD VENIPUNCTURE: CPT | Mod: ORL | Performed by: NURSE PRACTITIONER

## 2023-09-19 PROCEDURE — P9604 ONE-WAY ALLOW PRORATED TRIP: HCPCS | Mod: ORL | Performed by: NURSE PRACTITIONER

## 2023-09-19 PROCEDURE — 80048 BASIC METABOLIC PNL TOTAL CA: CPT | Mod: ORL | Performed by: NURSE PRACTITIONER

## 2023-09-20 LAB — BACTERIA UR CULT: NORMAL

## 2023-10-04 ENCOUNTER — OFFICE VISIT (OUTPATIENT)
Dept: CARDIOLOGY | Facility: CLINIC | Age: 88
End: 2023-10-04
Payer: COMMERCIAL

## 2023-10-04 VITALS
RESPIRATION RATE: 16 BRPM | OXYGEN SATURATION: 98 % | SYSTOLIC BLOOD PRESSURE: 120 MMHG | DIASTOLIC BLOOD PRESSURE: 60 MMHG | HEART RATE: 59 BPM

## 2023-10-04 DIAGNOSIS — I50.22 CHRONIC SYSTOLIC CONGESTIVE HEART FAILURE (H): Primary | ICD-10-CM

## 2023-10-04 LAB
ANION GAP SERPL CALCULATED.3IONS-SCNC: 10 MMOL/L (ref 7–15)
BUN SERPL-MCNC: 24.9 MG/DL (ref 8–23)
CALCIUM SERPL-MCNC: 8.8 MG/DL (ref 8.8–10.2)
CHLORIDE SERPL-SCNC: 102 MMOL/L (ref 98–107)
CREAT SERPL-MCNC: 0.98 MG/DL (ref 0.51–0.95)
DEPRECATED HCO3 PLAS-SCNC: 24 MMOL/L (ref 22–29)
EGFRCR SERPLBLD CKD-EPI 2021: 55 ML/MIN/1.73M2
GLUCOSE SERPL-MCNC: 100 MG/DL (ref 70–99)
NT-PROBNP SERPL-MCNC: 342 PG/ML (ref 0–1800)
POTASSIUM SERPL-SCNC: 4.7 MMOL/L (ref 3.4–5.3)
SODIUM SERPL-SCNC: 136 MMOL/L (ref 135–145)

## 2023-10-04 PROCEDURE — 80048 BASIC METABOLIC PNL TOTAL CA: CPT | Performed by: INTERNAL MEDICINE

## 2023-10-04 PROCEDURE — 36415 COLL VENOUS BLD VENIPUNCTURE: CPT | Performed by: INTERNAL MEDICINE

## 2023-10-04 PROCEDURE — 99214 OFFICE O/P EST MOD 30 MIN: CPT | Performed by: INTERNAL MEDICINE

## 2023-10-04 PROCEDURE — 83880 ASSAY OF NATRIURETIC PEPTIDE: CPT | Performed by: INTERNAL MEDICINE

## 2023-10-04 RX ORDER — ESCITALOPRAM OXALATE 10 MG/1
10 TABLET ORAL DAILY
COMMUNITY
Start: 2023-09-11

## 2023-10-04 RX ORDER — TRIAMCINOLONE ACETONIDE 1 MG/G
CREAM TOPICAL 2 TIMES DAILY
COMMUNITY
Start: 2023-09-13

## 2023-10-04 NOTE — LETTER
10/4/2023    Physician No Ref-Primary  No address on file    RE: Marina DEEP Neves       Dear Colleague,     I had the pleasure of seeing Marina Neves in the [facility].      Cardiology Progress Note     Assessment:  Coronary artery disease with history of LAD stenting in December 2001 and RCA stenting in June 2022, no angina  Chronic systolic heart failure with moderately depressed LV systolic function, no obvious fluid overload on exam today  Chronic dyspnea  Gait instability with frequent falls possibly related to orthostatic hypotension  Hearing impairment      Plan:  BMP and BNP today and then adjust diuretics accordingly  Out of concern for her safety/gait instability/orthostatic hypotension we will stop losartan  I offered the patient echocardiogram to reassess LV function but she has very difficult time coming for any test she would like to avoid it if possible.        Subjective:   This is 89 year old female who comes in today for follow-up visit.  I have not seen her before.  She had stenting of LAD for non-ST segment elevation myocardial infarction December 2021 and then elective stenting of RCA + Adrien in June 2022.  She was seen by Dr. STRAUSS after that last event.  She denies any recent episode of chest pain.  She is chronically short of breath.  She has lost some weight.  She has no PND and orthopnea.  She denies heart palpitations  She has fallen down a few times but denies loss of consciousness.  Reportedly she had low blood pressure at her independent living facility    Review of Systems:   Negative other than history of present illness    Objective:   /60 (BP Location: Right arm, Patient Position: Sitting, Cuff Size: Adult Regular)   Pulse 59   Resp 16   SpO2 98%   Physical Exam:  GENERAL: no distress  NECK: No JVD  LUNGS: Clear to auscultation.  CARDIAC: regular rhythm, S1 & S2 normal.  No heaves, thrills, gallops or murmurs.  ABDOMEN: flat, negative hepatosplenomegaly,  "soft and non-tender.  EXTREMITIES: No evidence of cyanosis, clubbing or edema.    Current Outpatient Medications   Medication Sig Dispense Refill    acetaminophen (TYLENOL) 325 MG tablet Take 650 mg by mouth every 6 hours      aspirin (ASA) 81 MG chewable tablet Take 1 tablet (81 mg) by mouth daily Starting tomorrow. (Patient taking differently: Take 81 mg by mouth every evening Starting tomorrow.) 30 tablet 3    atorvastatin (LIPITOR) 40 MG tablet TAKE 1 TABLET BY MOUTH ONCE DAILY (Patient taking differently: Take 40 mg by mouth every evening) 90 tablet 3    bisacodyl (DULCOLAX) 5 MG EC tablet Take 1 tablet (5 mg) by mouth daily as needed for constipation      calcium carb-cholecalciferol (CALCIUM 500 + D3) 500-200 MG-UNIT tablet Take 1 tablet by mouth daily      carvedilol (COREG) 6.25 MG tablet TAKE 2 TABLETS BY MOUTH ONCE DAILY 60 tablet 0    diclofenac (VOLTAREN) 1 % topical gel Apply 2 g topically 4 times daily      docusate sodium (COLACE) 100 MG capsule Take 100 mg by mouth 2 times daily as needed      escitalopram (LEXAPRO) 10 MG tablet Take 10 mg by mouth daily      escitalopram (LEXAPRO) 5 MG tablet Take 5 mg by mouth daily      furosemide (LASIX) 20 MG tablet Take 1 tablet (20 mg) by mouth 2 times daily 180 tablet 3    ibuprofen (ADVIL/MOTRIN) 400 MG tablet Take 1 tablet (400 mg) by mouth every 6 hours as needed for moderate pain 10 tablet 0    Lidocaine (LIDOCARE) 4 % Patch Place 1 patch onto the skin every 24 hours To prevent lidocaine toxicity, patient should be patch free for 12 hrs daily.      magnesium oxide (MAG-OX) 400 (241.3 Mg) MG tablet Take 400 mg by mouth daily      Multiple Vitamins-Minerals (PRESERVISION AREDS) CAPS Take 1 capsule by mouth 2 times daily       nitroGLYcerin (NITROSTAT) 0.4 MG sublingual tablet One tablet under the tongue every 5 minutes if needed for chest pain. May repeat every 5 minutes for a maximum of 3 doses in 15 minutes\" 25 tablet 3    olopatadine (PATANOL) 0.1 % " ophthalmic solution Place 1 drop into both eyes 2 times daily      Omega-3 Fatty Acids (FISH OIL OMEGA-3 PO) Take 1 capsule by mouth daily      prednisoLONE acetate (PRED FORTE) 1 % ophthalmic suspension Place 1 drop into both eyes 2 times daily      spironolactone (ALDACTONE) 25 MG tablet Take 25 mg by mouth daily      traMADol (ULTRAM) 50 MG tablet Take 1 tablet (50 mg) by mouth 3 times daily 15 tablet 0    triamcinolone (KENALOG) 0.1 % external cream Apply topically 2 times daily      VITAMIN D, CHOLECALCIFEROL, PO Take 4,000 Units by mouth daily         Cardiographics:      Echocardiogram Results: 12/2021  Left ventricular function is moderately reduced. Biplane LVEF is 33%.  Severe hypokinesis to akinesis of the mid anterior, anteroseptal, inferoseptal, inferior segments and all distal segments consistent with LAD ischemia/infarction.  Global right ventricular function is normal.  No hemodynamically significant valve abnormalities.  The inferior vena cava is normal.  There is no prior study for direct comparison.        Coronary Angiogram Results: 6/2022  Staged PCI in a woman with an ischemic cardiomyopathy.  Diffuse coronary calcification.  Mild left main stenosis.  Patent mid LAD stent with mild disease proximal and distal to the stent.  Moderate proximal and distal LCx disease without flow limitation across both lesions.  Mild-moderate proximal RCA lesions. Severe mid RCA and right PDA stenosis, each treated with a PAT with good angiographic results.   Lab Results    Chemistry/lipid CBC Cardiac Enzymes/BNP/TSH/INR   Recent Labs   Lab Test 07/03/23  1620   CHOL 93   HDL 48*   LDL 32   TRIG 65     Recent Labs   Lab Test 07/03/23  1620 06/07/22  0757 05/11/22  1649   LDL 32 46 47     Recent Labs   Lab Test 09/19/23  0821   *   POTASSIUM 4.4   CHLORIDE 101   CO2 23   *   BUN 17.4   CR 0.81   GFRESTIMATED 69   MARY 8.6*     Recent Labs   Lab Test 09/19/23  0821 09/18/23  0800 07/03/23  1620   CR 0.81  0.78 0.96*     No results for input(s): A1C in the last 29293 hours.       Recent Labs   Lab Test 09/19/23  0821   WBC 8.5   HGB 11.2*   HCT 36.4   MCV 96        Recent Labs   Lab Test 09/19/23  0821 09/18/23  0800 07/03/23  1620   HGB 11.2* 10.9* 10.5*    Recent Labs   Lab Test 05/21/19  1540 05/21/19  1315   TROPONINI <0.01 <0.01     Recent Labs   Lab Test 05/11/22  1649 12/11/21  0321 05/21/19  1315   BNP 76  --  208*   NTBNPI  --  1,757  --      Recent Labs   Lab Test 06/27/22  0700   TSH 2.03     Recent Labs   Lab Test 05/21/19  1315   INR 0.94                         Thank you for allowing me to participate in the care of your patient.      Sincerely,     Juvenal Bourne MD     [facility]  cc:   No referring provider defined for this encounter.

## 2023-10-04 NOTE — LETTER
October 5, 2023      Marina ADAME Edinson  [address]   [place] [address]        Dear ,    We are writing to inform you of your test results.    Your test results fall within the expected range(s) or remain unchanged from previous results.  Please continue with current treatment plan.       Juvenal Bourne MD  10/5/2023  8:18 AM CDT Back to Top      Normal BNP and basic metabolic panel.  Continue current cardiac medications     Resulted Orders   Basic metabolic panel   Result Value Ref Range    Sodium 136 135 - 145 mmol/L      Comment:      Reference intervals for this test were updated on 09/26/2023 to more accurately reflect our healthy population. There may be differences in the flagging of prior results with similar values performed with this method. Interpretation of those prior results can be made in the context of the updated reference intervals.     Potassium 4.7 3.4 - 5.3 mmol/L    Chloride 102 98 - 107 mmol/L    Carbon Dioxide (CO2) 24 22 - 29 mmol/L    Anion Gap 10 7 - 15 mmol/L    Urea Nitrogen 24.9 (H) 8.0 - 23.0 mg/dL    Creatinine 0.98 (H) 0.51 - 0.95 mg/dL    GFR Estimate 55 (L) >60 mL/min/1.73m2    Calcium 8.8 8.8 - 10.2 mg/dL    Glucose 100 (H) 70 - 99 mg/dL   N terminal pro BNP outpatient   Result Value Ref Range    N Terminal Pro BNP Outpatient 342 0 - 1,800 pg/mL      Comment:      Reference range shown and results flagged as abnormal are for the outpatient, non acute settings. Establishing a baseline value for each individual patient is useful for follow-up.    Suggested inpatient cut points for confirming diagnosis of CHF in an acute setting are:  >450 pg/mL (age 18 to less than 50)  >900 pg/mL (age 50 to less than 75)  >1800 pg/mL (75 yrs and older)    An inpatient or emergency department NT-proPBNP <300 pg/mL effectively rules out acute CHF, with 99% negative predictive value.           If you have any questions or concerns, please call the clinic at the number listed above.        Sincerely,      Juvenal Bourne MD

## 2023-10-04 NOTE — PATIENT INSTRUCTIONS
Marina Neves,    It was a pleasure to see you today at the [facility].     My recommendations after this visit include:    Stop losartan  Blood work    TWIN Bourne MD, FACC, MEHREEN

## 2023-10-04 NOTE — PROGRESS NOTES
Cardiology Progress Note     Assessment:  Coronary artery disease with history of LAD stenting in December 2001 and RCA stenting in June 2022, no angina  Chronic systolic heart failure with moderately depressed LV systolic function, no obvious fluid overload on exam today  Chronic dyspnea  Gait instability with frequent falls possibly related to orthostatic hypotension  Hearing impairment      Plan:  BMP and BNP today and then adjust diuretics accordingly  Out of concern for her safety/gait instability/orthostatic hypotension we will stop losartan  I offered the patient echocardiogram to reassess LV function but she has very difficult time coming for any test she would like to avoid it if possible.        Subjective:   This is 89 year old female who comes in today for follow-up visit.  I have not seen her before.  She had stenting of LAD for non-ST segment elevation myocardial infarction December 2021 and then elective stenting of RCA + Adrien in June 2022.  She was seen by Dr. STRAUSS after that last event.  She denies any recent episode of chest pain.  She is chronically short of breath.  She has lost some weight.  She has no PND and orthopnea.  She denies heart palpitations  She has fallen down a few times but denies loss of consciousness.  Reportedly she had low blood pressure at her independent living facility    Review of Systems:   Negative other than history of present illness    Objective:   /60 (BP Location: Right arm, Patient Position: Sitting, Cuff Size: Adult Regular)   Pulse 59   Resp 16   SpO2 98%   Physical Exam:  GENERAL: no distress  NECK: No JVD  LUNGS: Clear to auscultation.  CARDIAC: regular rhythm, S1 & S2 normal.  No heaves, thrills, gallops or murmurs.  ABDOMEN: flat, negative hepatosplenomegaly, soft and non-tender.  EXTREMITIES: No evidence of cyanosis, clubbing or edema.    Current Outpatient Medications   Medication Sig Dispense Refill     acetaminophen (TYLENOL) 325 MG tablet  "Take 650 mg by mouth every 6 hours       aspirin (ASA) 81 MG chewable tablet Take 1 tablet (81 mg) by mouth daily Starting tomorrow. (Patient taking differently: Take 81 mg by mouth every evening Starting tomorrow.) 30 tablet 3     atorvastatin (LIPITOR) 40 MG tablet TAKE 1 TABLET BY MOUTH ONCE DAILY (Patient taking differently: Take 40 mg by mouth every evening) 90 tablet 3     bisacodyl (DULCOLAX) 5 MG EC tablet Take 1 tablet (5 mg) by mouth daily as needed for constipation       calcium carb-cholecalciferol (CALCIUM 500 + D3) 500-200 MG-UNIT tablet Take 1 tablet by mouth daily       carvedilol (COREG) 6.25 MG tablet TAKE 2 TABLETS BY MOUTH ONCE DAILY 60 tablet 0     diclofenac (VOLTAREN) 1 % topical gel Apply 2 g topically 4 times daily       docusate sodium (COLACE) 100 MG capsule Take 100 mg by mouth 2 times daily as needed       escitalopram (LEXAPRO) 10 MG tablet Take 10 mg by mouth daily       escitalopram (LEXAPRO) 5 MG tablet Take 5 mg by mouth daily       furosemide (LASIX) 20 MG tablet Take 1 tablet (20 mg) by mouth 2 times daily 180 tablet 3     ibuprofen (ADVIL/MOTRIN) 400 MG tablet Take 1 tablet (400 mg) by mouth every 6 hours as needed for moderate pain 10 tablet 0     Lidocaine (LIDOCARE) 4 % Patch Place 1 patch onto the skin every 24 hours To prevent lidocaine toxicity, patient should be patch free for 12 hrs daily.       magnesium oxide (MAG-OX) 400 (241.3 Mg) MG tablet Take 400 mg by mouth daily       Multiple Vitamins-Minerals (PRESERVISION AREDS) CAPS Take 1 capsule by mouth 2 times daily        nitroGLYcerin (NITROSTAT) 0.4 MG sublingual tablet One tablet under the tongue every 5 minutes if needed for chest pain. May repeat every 5 minutes for a maximum of 3 doses in 15 minutes\" 25 tablet 3     olopatadine (PATANOL) 0.1 % ophthalmic solution Place 1 drop into both eyes 2 times daily       Omega-3 Fatty Acids (FISH OIL OMEGA-3 PO) Take 1 capsule by mouth daily       prednisoLONE acetate (PRED " FORTE) 1 % ophthalmic suspension Place 1 drop into both eyes 2 times daily       spironolactone (ALDACTONE) 25 MG tablet Take 25 mg by mouth daily       traMADol (ULTRAM) 50 MG tablet Take 1 tablet (50 mg) by mouth 3 times daily 15 tablet 0     triamcinolone (KENALOG) 0.1 % external cream Apply topically 2 times daily       VITAMIN D, CHOLECALCIFEROL, PO Take 4,000 Units by mouth daily         Cardiographics:      Echocardiogram Results: 12/2021  Left ventricular function is moderately reduced. Biplane LVEF is 33%.  Severe hypokinesis to akinesis of the mid anterior, anteroseptal, inferoseptal, inferior segments and all distal segments consistent with LAD ischemia/infarction.  Global right ventricular function is normal.  No hemodynamically significant valve abnormalities.  The inferior vena cava is normal.  There is no prior study for direct comparison.        Coronary Angiogram Results: 6/2022  Staged PCI in a woman with an ischemic cardiomyopathy.  Diffuse coronary calcification.  Mild left main stenosis.  Patent mid LAD stent with mild disease proximal and distal to the stent.  Moderate proximal and distal LCx disease without flow limitation across both lesions.  Mild-moderate proximal RCA lesions. Severe mid RCA and right PDA stenosis, each treated with a PAT with good angiographic results.   Lab Results    Chemistry/lipid CBC Cardiac Enzymes/BNP/TSH/INR   Recent Labs   Lab Test 07/03/23  1620   CHOL 93   HDL 48*   LDL 32   TRIG 65     Recent Labs   Lab Test 07/03/23  1620 06/07/22  0757 05/11/22  1649   LDL 32 46 47     Recent Labs   Lab Test 09/19/23  0821   *   POTASSIUM 4.4   CHLORIDE 101   CO2 23   *   BUN 17.4   CR 0.81   GFRESTIMATED 69   MARY 8.6*     Recent Labs   Lab Test 09/19/23  0821 09/18/23  0800 07/03/23  1620   CR 0.81 0.78 0.96*     No results for input(s): A1C in the last 34108 hours.       Recent Labs   Lab Test 09/19/23  0821   WBC 8.5   HGB 11.2*   HCT 36.4   MCV 96         Recent Labs   Lab Test 09/19/23  0821 09/18/23  0800 07/03/23  1620   HGB 11.2* 10.9* 10.5*    Recent Labs   Lab Test 05/21/19  1540 05/21/19  1315   TROPONINI <0.01 <0.01     Recent Labs   Lab Test 05/11/22  1649 12/11/21  0321 05/21/19  1315   BNP 76  --  208*   NTBNPI  --  1,757  --      Recent Labs   Lab Test 06/27/22  0700   TSH 2.03     Recent Labs   Lab Test 05/21/19  1315   INR 0.94

## 2023-11-02 ENCOUNTER — LAB REQUISITION (OUTPATIENT)
Dept: LAB | Facility: CLINIC | Age: 88
End: 2023-11-02
Payer: COMMERCIAL

## 2023-11-02 DIAGNOSIS — I50.20 UNSPECIFIED SYSTOLIC (CONGESTIVE) HEART FAILURE (H): ICD-10-CM

## 2023-11-02 DIAGNOSIS — N18.30 CHRONIC KIDNEY DISEASE, STAGE 3 UNSPECIFIED (H): ICD-10-CM

## 2023-11-03 LAB
ANION GAP SERPL CALCULATED.3IONS-SCNC: 11 MMOL/L (ref 7–15)
BUN SERPL-MCNC: 15.8 MG/DL (ref 8–23)
CALCIUM SERPL-MCNC: 9 MG/DL (ref 8.8–10.2)
CHLORIDE SERPL-SCNC: 105 MMOL/L (ref 98–107)
CREAT SERPL-MCNC: 0.92 MG/DL (ref 0.51–0.95)
DEPRECATED HCO3 PLAS-SCNC: 24 MMOL/L (ref 22–29)
EGFRCR SERPLBLD CKD-EPI 2021: 59 ML/MIN/1.73M2
ERYTHROCYTE [DISTWIDTH] IN BLOOD BY AUTOMATED COUNT: 16 % (ref 10–15)
GLUCOSE SERPL-MCNC: 127 MG/DL (ref 70–99)
HCT VFR BLD AUTO: 38.7 % (ref 35–47)
HGB BLD-MCNC: 11.7 G/DL (ref 11.7–15.7)
MCH RBC QN AUTO: 29.7 PG (ref 26.5–33)
MCHC RBC AUTO-ENTMCNC: 30.2 G/DL (ref 31.5–36.5)
MCV RBC AUTO: 98 FL (ref 78–100)
PLATELET # BLD AUTO: 208 10E3/UL (ref 150–450)
POTASSIUM SERPL-SCNC: 4.5 MMOL/L (ref 3.4–5.3)
RBC # BLD AUTO: 3.94 10E6/UL (ref 3.8–5.2)
SODIUM SERPL-SCNC: 140 MMOL/L (ref 135–145)
WBC # BLD AUTO: 7.7 10E3/UL (ref 4–11)

## 2023-11-03 PROCEDURE — 36415 COLL VENOUS BLD VENIPUNCTURE: CPT | Performed by: FAMILY MEDICINE

## 2023-11-03 PROCEDURE — 85027 COMPLETE CBC AUTOMATED: CPT | Performed by: FAMILY MEDICINE

## 2023-11-03 PROCEDURE — 80048 BASIC METABOLIC PNL TOTAL CA: CPT | Performed by: FAMILY MEDICINE

## 2023-11-03 PROCEDURE — P9604 ONE-WAY ALLOW PRORATED TRIP: HCPCS | Performed by: FAMILY MEDICINE

## 2023-12-06 ENCOUNTER — LAB REQUISITION (OUTPATIENT)
Dept: LAB | Facility: CLINIC | Age: 88
End: 2023-12-06
Payer: COMMERCIAL

## 2023-12-06 DIAGNOSIS — F02.B3 DEMENTIA IN OTHER DISEASES CLASSIFIED ELSEWHERE, MODERATE, WITH MOOD DISTURBANCE (H): ICD-10-CM
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
## 2023-12-07 LAB
VIT B12 SERPL-MCNC: 431 PG/ML (ref 232–1245)
VIT D+METAB SERPL-MCNC: 66 NG/ML (ref 20–50)

## 2023-12-07 PROCEDURE — 82306 VITAMIN D 25 HYDROXY: CPT | Mod: ORL | Performed by: FAMILY MEDICINE

## 2023-12-07 PROCEDURE — 82607 VITAMIN B-12: CPT | Mod: ORL | Performed by: FAMILY MEDICINE

## 2023-12-07 PROCEDURE — 36415 COLL VENOUS BLD VENIPUNCTURE: CPT | Mod: ORL | Performed by: FAMILY MEDICINE

## 2023-12-07 PROCEDURE — P9604 ONE-WAY ALLOW PRORATED TRIP: HCPCS | Mod: ORL | Performed by: FAMILY MEDICINE

## 2023-12-23 ENCOUNTER — LAB REQUISITION (OUTPATIENT)
Dept: LAB | Facility: CLINIC | Age: 88
End: 2023-12-23
Payer: COMMERCIAL

## 2023-12-23 DIAGNOSIS — I50.9 HEART FAILURE, UNSPECIFIED (H): ICD-10-CM

## 2023-12-26 LAB
ANION GAP SERPL CALCULATED.3IONS-SCNC: 10 MMOL/L (ref 7–15)
BUN SERPL-MCNC: 15.6 MG/DL (ref 8–23)
CALCIUM SERPL-MCNC: 8.9 MG/DL (ref 8.8–10.2)
CHLORIDE SERPL-SCNC: 105 MMOL/L (ref 98–107)
CREAT SERPL-MCNC: 0.76 MG/DL (ref 0.51–0.95)
DEPRECATED HCO3 PLAS-SCNC: 24 MMOL/L (ref 22–29)
EGFRCR SERPLBLD CKD-EPI 2021: 74 ML/MIN/1.73M2
GLUCOSE SERPL-MCNC: 95 MG/DL (ref 70–99)
POTASSIUM SERPL-SCNC: 4.2 MMOL/L (ref 3.4–5.3)
SODIUM SERPL-SCNC: 139 MMOL/L (ref 135–145)

## 2023-12-26 PROCEDURE — P9604 ONE-WAY ALLOW PRORATED TRIP: HCPCS | Mod: ORL | Performed by: FAMILY MEDICINE

## 2023-12-26 PROCEDURE — 36415 COLL VENOUS BLD VENIPUNCTURE: CPT | Mod: ORL | Performed by: FAMILY MEDICINE

## 2023-12-26 PROCEDURE — 80048 BASIC METABOLIC PNL TOTAL CA: CPT | Mod: ORL | Performed by: FAMILY MEDICINE

## 2024-01-13 ENCOUNTER — LAB REQUISITION (OUTPATIENT)
Dept: LAB | Facility: CLINIC | Age: 89
End: 2024-01-13
Payer: COMMERCIAL

## 2024-01-13 DIAGNOSIS — I50.20 UNSPECIFIED SYSTOLIC (CONGESTIVE) HEART FAILURE (H): ICD-10-CM

## 2024-01-15 LAB
ANION GAP SERPL CALCULATED.3IONS-SCNC: 12 MMOL/L (ref 7–15)
BASOPHILS # BLD AUTO: 0 10E3/UL (ref 0–0.2)
BASOPHILS NFR BLD AUTO: 1 %
BUN SERPL-MCNC: 19.4 MG/DL (ref 8–23)
CALCIUM SERPL-MCNC: 9 MG/DL (ref 8.8–10.2)
CHLORIDE SERPL-SCNC: 103 MMOL/L (ref 98–107)
CREAT SERPL-MCNC: 0.77 MG/DL (ref 0.51–0.95)
DEPRECATED HCO3 PLAS-SCNC: 22 MMOL/L (ref 22–29)
EGFRCR SERPLBLD CKD-EPI 2021: 73 ML/MIN/1.73M2
EOSINOPHIL # BLD AUTO: 0.2 10E3/UL (ref 0–0.7)
EOSINOPHIL NFR BLD AUTO: 4 %
ERYTHROCYTE [DISTWIDTH] IN BLOOD BY AUTOMATED COUNT: 15.5 % (ref 10–15)
GLUCOSE SERPL-MCNC: 96 MG/DL (ref 70–99)
HCT VFR BLD AUTO: 36.4 % (ref 35–47)
HGB BLD-MCNC: 11.3 G/DL (ref 11.7–15.7)
IMM GRANULOCYTES # BLD: 0 10E3/UL
IMM GRANULOCYTES NFR BLD: 0 %
LYMPHOCYTES # BLD AUTO: 4 10E3/UL (ref 0.8–5.3)
LYMPHOCYTES NFR BLD AUTO: 57 %
MCH RBC QN AUTO: 30.1 PG (ref 26.5–33)
MCHC RBC AUTO-ENTMCNC: 31 G/DL (ref 31.5–36.5)
MCV RBC AUTO: 97 FL (ref 78–100)
MONOCYTES # BLD AUTO: 0.8 10E3/UL (ref 0–1.3)
MONOCYTES NFR BLD AUTO: 11 %
NEUTROPHILS # BLD AUTO: 1.9 10E3/UL (ref 1.6–8.3)
NEUTROPHILS NFR BLD AUTO: 27 %
NRBC # BLD AUTO: 0 10E3/UL
NRBC BLD AUTO-RTO: 0 /100
NT-PROBNP SERPL-MCNC: 468 PG/ML (ref 0–1800)
PLATELET # BLD AUTO: 194 10E3/UL (ref 150–450)
POTASSIUM SERPL-SCNC: 4.6 MMOL/L (ref 3.4–5.3)
RBC # BLD AUTO: 3.76 10E6/UL (ref 3.8–5.2)
SODIUM SERPL-SCNC: 137 MMOL/L (ref 135–145)
WBC # BLD AUTO: 7 10E3/UL (ref 4–11)

## 2024-01-15 PROCEDURE — 80048 BASIC METABOLIC PNL TOTAL CA: CPT | Mod: ORL | Performed by: FAMILY MEDICINE

## 2024-01-15 PROCEDURE — P9604 ONE-WAY ALLOW PRORATED TRIP: HCPCS | Mod: ORL | Performed by: FAMILY MEDICINE

## 2024-01-15 PROCEDURE — 85025 COMPLETE CBC W/AUTO DIFF WBC: CPT | Mod: ORL | Performed by: FAMILY MEDICINE

## 2024-01-15 PROCEDURE — 36415 COLL VENOUS BLD VENIPUNCTURE: CPT | Mod: ORL | Performed by: FAMILY MEDICINE

## 2024-01-15 PROCEDURE — 83880 ASSAY OF NATRIURETIC PEPTIDE: CPT | Mod: ORL | Performed by: FAMILY MEDICINE

## 2024-01-19 ENCOUNTER — LAB REQUISITION (OUTPATIENT)
Dept: LAB | Facility: CLINIC | Age: 89
End: 2024-01-19
Payer: COMMERCIAL

## 2024-01-19 DIAGNOSIS — I50.9 HEART FAILURE, UNSPECIFIED (H): ICD-10-CM

## 2024-01-22 LAB
ANION GAP SERPL CALCULATED.3IONS-SCNC: 11 MMOL/L (ref 7–15)
BUN SERPL-MCNC: 19 MG/DL (ref 8–23)
CALCIUM SERPL-MCNC: 9.4 MG/DL (ref 8.8–10.2)
CHLORIDE SERPL-SCNC: 100 MMOL/L (ref 98–107)
CREAT SERPL-MCNC: 0.75 MG/DL (ref 0.51–0.95)
DEPRECATED HCO3 PLAS-SCNC: 23 MMOL/L (ref 22–29)
EGFRCR SERPLBLD CKD-EPI 2021: 76 ML/MIN/1.73M2
GLUCOSE SERPL-MCNC: 97 MG/DL (ref 70–99)
POTASSIUM SERPL-SCNC: 4.5 MMOL/L (ref 3.4–5.3)
SODIUM SERPL-SCNC: 134 MMOL/L (ref 135–145)

## 2024-01-22 PROCEDURE — P9604 ONE-WAY ALLOW PRORATED TRIP: HCPCS | Mod: ORL | Performed by: FAMILY MEDICINE

## 2024-01-22 PROCEDURE — 36415 COLL VENOUS BLD VENIPUNCTURE: CPT | Mod: ORL | Performed by: FAMILY MEDICINE

## 2024-01-22 PROCEDURE — 80048 BASIC METABOLIC PNL TOTAL CA: CPT | Mod: ORL | Performed by: FAMILY MEDICINE

## 2024-03-15 NOTE — PROGRESS NOTES
AUDIOLOGY REPORT    SUBJECTIVE:  Marina Neves is a 89 year old female who was seen in the Audiology Clinic at the [facility] for audiologic evaluation, referred by Referred Self. The patient has been seen previously in this clinic on 11/2/2020 for assessment and results indicated mild sloping to profound sensorineural hearing loss (SNHL) in the right ear, and moderately-severe to profound SNHL in the left ear. Met with ENT 11/2/2020 who referred her to the clinics and surgery center in [place] for a cochlear implant (CI) evaluation. It does not appear she ever completed a CI eval. Patient reports she's no longer interested in a CI. Patient reports a decline in her hearing since last visit. Reports intermittent pain in the right ear. Tinnitus in both ears, but more prominent in the right ear. Has in the ear (ITE)  hearing aids from an outside clinic, unsure where. Inspection of hearing aids revealed they are likely True hearing hearing aids. Serial numbers from 2023. Denied otorrhea, aural fullness, dizziness, and history of ear surgery. The patient notes difficulty with communication in a variety of listening situations. They were accompanied today by their nursing home attendant.     OBJECTIVE:  Abuse Screening:  Did not administer.     Fall Risk Screen:  Did not administer    Otoscopic exam indicates partial obstruction with cerumen bilaterally     Pure Tone Thresholds assessed using conventional audiometry with good reliability from 250-8000 Hz bilaterally using insert earphones and circumaural headphones.     RIGHT:  Severe to profound likely SNHL      LEFT:  Severe to profound likely SNHL     Masking dilemmas present for bone conduction.    Tympanogram:    RIGHT: Negative pressure     LEFT:  Negative pressure     Reflexes (reported by stimulus ear):   DNT due to equipment limitations    Speech Detection Threshold:    RIGHT: 80 dB HL    LEFT:   75 dB HL    Patient unable to complete  speech reception threshold.     Word Recognition Score:     RIGHT: 12% at 95 dB HL using NU-6 recorded word list.    LEFT:  4% at 95 dB HL using NU-6 recorded word list.      ASSESSMENT:   Today's results reveal severe to profound likely SNHL bilat. Masking dilemmas present for bone conduction. Compared to audio dated 11/2/2020 significant decline in hearing in the low to mid frequencies. Significant decline in right word recognition score. Today s results were discussed with the patient in detail. Patient signed an release of information (ARASELI) for today's results to be shared with the [facility] where she lives, [facility].     PLAN:   Consider ENT follow up due to significant decline in hearing, decline in right word recognition score, intermittent right otalgia, abnormal tympanograms, and tinnitus louder in the right ear. Monitor hearing with repeat hearing eval in 6-12 months.  Follow up with hearing aid dispensing audiologist for hearing aid check. Please call this clinic with questions regarding these results or recommendations.    Perry Mcleod., CCC-A  [place] Licensed Audiologist #[id number]

## 2024-03-22 ENCOUNTER — OFFICE VISIT (OUTPATIENT)
Dept: AUDIOLOGY | Facility: CLINIC | Age: 89
End: 2024-03-22
Payer: COMMERCIAL

## 2024-03-22 DIAGNOSIS — H90.3 SENSORINEURAL HEARING LOSS (SNHL) OF BOTH EARS: Primary | ICD-10-CM

## 2024-03-22 DIAGNOSIS — H93.13 TINNITUS OF BOTH EARS: ICD-10-CM

## 2024-03-22 PROCEDURE — 92557 COMPREHENSIVE HEARING TEST: CPT | Performed by: AUDIOLOGIST

## 2024-03-22 PROCEDURE — 92567 TYMPANOMETRY: CPT | Performed by: AUDIOLOGIST

## 2024-04-10 ENCOUNTER — LAB REQUISITION (OUTPATIENT)
Dept: LAB | Facility: CLINIC | Age: 89
End: 2024-04-10
Payer: COMMERCIAL

## 2024-04-10 DIAGNOSIS — I50.22 CHRONIC SYSTOLIC (CONGESTIVE) HEART FAILURE (H): ICD-10-CM

## 2024-04-11 LAB
ANION GAP SERPL CALCULATED.3IONS-SCNC: 11 MMOL/L (ref 7–15)
BUN SERPL-MCNC: 23.7 MG/DL (ref 8–23)
CALCIUM SERPL-MCNC: 9 MG/DL (ref 8.2–9.6)
CHLORIDE SERPL-SCNC: 105 MMOL/L (ref 98–107)
CREAT SERPL-MCNC: 0.83 MG/DL (ref 0.51–0.95)
DEPRECATED HCO3 PLAS-SCNC: 23 MMOL/L (ref 22–29)
EGFRCR SERPLBLD CKD-EPI 2021: 67 ML/MIN/1.73M2
GLUCOSE SERPL-MCNC: 98 MG/DL (ref 70–99)
POTASSIUM SERPL-SCNC: 4.2 MMOL/L (ref 3.4–5.3)
SODIUM SERPL-SCNC: 139 MMOL/L (ref 135–145)

## 2024-04-11 PROCEDURE — P9604 ONE-WAY ALLOW PRORATED TRIP: HCPCS | Mod: ORL | Performed by: FAMILY MEDICINE

## 2024-04-11 PROCEDURE — 36415 COLL VENOUS BLD VENIPUNCTURE: CPT | Mod: ORL | Performed by: FAMILY MEDICINE

## 2024-04-11 PROCEDURE — 80048 BASIC METABOLIC PNL TOTAL CA: CPT | Mod: ORL | Performed by: FAMILY MEDICINE

## 2024-05-01 ENCOUNTER — LAB REQUISITION (OUTPATIENT)
Dept: LAB | Facility: CLINIC | Age: 89
End: 2024-05-01
Payer: COMMERCIAL

## 2024-05-01 DIAGNOSIS — I50.20 UNSPECIFIED SYSTOLIC (CONGESTIVE) HEART FAILURE (H): ICD-10-CM

## 2024-05-02 PROCEDURE — 36415 COLL VENOUS BLD VENIPUNCTURE: CPT | Mod: ORL | Performed by: FAMILY MEDICINE

## 2024-05-02 PROCEDURE — 80048 BASIC METABOLIC PNL TOTAL CA: CPT | Mod: ORL | Performed by: FAMILY MEDICINE

## 2024-05-02 PROCEDURE — P9604 ONE-WAY ALLOW PRORATED TRIP: HCPCS | Mod: ORL | Performed by: FAMILY MEDICINE

## 2024-05-03 LAB
ANION GAP SERPL CALCULATED.3IONS-SCNC: 13 MMOL/L (ref 7–15)
BUN SERPL-MCNC: 23.2 MG/DL (ref 8–23)
CALCIUM SERPL-MCNC: 9.1 MG/DL (ref 8.2–9.6)
CHLORIDE SERPL-SCNC: 101 MMOL/L (ref 98–107)
CREAT SERPL-MCNC: 1.07 MG/DL (ref 0.51–0.95)
DEPRECATED HCO3 PLAS-SCNC: 24 MMOL/L (ref 22–29)
EGFRCR SERPLBLD CKD-EPI 2021: 49 ML/MIN/1.73M2
GLUCOSE SERPL-MCNC: 116 MG/DL (ref 70–99)
POTASSIUM SERPL-SCNC: 4.1 MMOL/L (ref 3.4–5.3)
SODIUM SERPL-SCNC: 138 MMOL/L (ref 135–145)

## 2024-09-18 ENCOUNTER — LAB REQUISITION (OUTPATIENT)
Dept: LAB | Facility: CLINIC | Age: 89
End: 2024-09-18
Payer: COMMERCIAL

## 2024-09-18 DIAGNOSIS — I50.22 CHRONIC SYSTOLIC (CONGESTIVE) HEART FAILURE (H): ICD-10-CM

## 2024-09-19 LAB
ANION GAP SERPL CALCULATED.3IONS-SCNC: 11 MMOL/L (ref 7–15)
BUN SERPL-MCNC: 16.1 MG/DL (ref 8–23)
CALCIUM SERPL-MCNC: 8.6 MG/DL (ref 8.8–10.4)
CHLORIDE SERPL-SCNC: 102 MMOL/L (ref 98–107)
CREAT SERPL-MCNC: 0.77 MG/DL (ref 0.51–0.95)
EGFRCR SERPLBLD CKD-EPI 2021: 73 ML/MIN/1.73M2
ERYTHROCYTE [DISTWIDTH] IN BLOOD BY AUTOMATED COUNT: 13.8 % (ref 10–15)
GLUCOSE SERPL-MCNC: 101 MG/DL (ref 70–99)
HCO3 SERPL-SCNC: 24 MMOL/L (ref 22–29)
HCT VFR BLD AUTO: 34.3 % (ref 35–47)
HGB BLD-MCNC: 10.6 G/DL (ref 11.7–15.7)
MCH RBC QN AUTO: 31.2 PG (ref 26.5–33)
MCHC RBC AUTO-ENTMCNC: 30.9 G/DL (ref 31.5–36.5)
MCV RBC AUTO: 101 FL (ref 78–100)
PLATELET # BLD AUTO: 195 10E3/UL (ref 150–450)
POTASSIUM SERPL-SCNC: 3.6 MMOL/L (ref 3.4–5.3)
RBC # BLD AUTO: 3.4 10E6/UL (ref 3.8–5.2)
SODIUM SERPL-SCNC: 137 MMOL/L (ref 135–145)
WBC # BLD AUTO: 7.1 10E3/UL (ref 4–11)

## 2024-09-19 PROCEDURE — 85027 COMPLETE CBC AUTOMATED: CPT | Mod: ORL | Performed by: FAMILY MEDICINE

## 2024-09-19 PROCEDURE — P9604 ONE-WAY ALLOW PRORATED TRIP: HCPCS | Mod: ORL | Performed by: FAMILY MEDICINE

## 2024-09-19 PROCEDURE — 36415 COLL VENOUS BLD VENIPUNCTURE: CPT | Mod: ORL | Performed by: FAMILY MEDICINE

## 2024-09-19 PROCEDURE — 80048 BASIC METABOLIC PNL TOTAL CA: CPT | Mod: ORL | Performed by: FAMILY MEDICINE

## 2024-10-15 ENCOUNTER — LAB REQUISITION (OUTPATIENT)
Dept: LAB | Facility: CLINIC | Age: 89
End: 2024-10-15
Payer: COMMERCIAL

## 2024-10-15 DIAGNOSIS — I50.22 CHRONIC SYSTOLIC (CONGESTIVE) HEART FAILURE (H): ICD-10-CM

## 2024-10-16 LAB
ANION GAP SERPL CALCULATED.3IONS-SCNC: 11 MMOL/L (ref 7–15)
BUN SERPL-MCNC: 16 MG/DL (ref 8–23)
CALCIUM SERPL-MCNC: 8.8 MG/DL (ref 8.8–10.4)
CHLORIDE SERPL-SCNC: 102 MMOL/L (ref 98–107)
CREAT SERPL-MCNC: 0.81 MG/DL (ref 0.51–0.95)
EGFRCR SERPLBLD CKD-EPI 2021: 69 ML/MIN/1.73M2
GLUCOSE SERPL-MCNC: 105 MG/DL (ref 70–99)
HCO3 SERPL-SCNC: 24 MMOL/L (ref 22–29)
POTASSIUM SERPL-SCNC: 3.5 MMOL/L (ref 3.4–5.3)
SODIUM SERPL-SCNC: 137 MMOL/L (ref 135–145)

## 2024-10-16 PROCEDURE — 36415 COLL VENOUS BLD VENIPUNCTURE: CPT | Mod: ORL | Performed by: FAMILY MEDICINE

## 2024-10-16 PROCEDURE — 80048 BASIC METABOLIC PNL TOTAL CA: CPT | Mod: ORL | Performed by: FAMILY MEDICINE

## 2024-10-16 PROCEDURE — P9604 ONE-WAY ALLOW PRORATED TRIP: HCPCS | Mod: ORL | Performed by: FAMILY MEDICINE

## 2024-11-11 ENCOUNTER — LAB REQUISITION (OUTPATIENT)
Dept: LAB | Facility: CLINIC | Age: 89
End: 2024-11-11
Payer: COMMERCIAL

## 2024-11-11 DIAGNOSIS — R06.00 DYSPNEA, UNSPECIFIED: ICD-10-CM

## 2024-11-11 DIAGNOSIS — R06.2 WHEEZING: ICD-10-CM

## 2024-11-12 LAB
ANION GAP SERPL CALCULATED.3IONS-SCNC: 14 MMOL/L (ref 7–15)
BASOPHILS # BLD AUTO: 0.1 10E3/UL (ref 0–0.2)
BASOPHILS NFR BLD AUTO: 1 %
BUN SERPL-MCNC: 12.4 MG/DL (ref 8–23)
CALCIUM SERPL-MCNC: 8.7 MG/DL (ref 8.8–10.4)
CHLORIDE SERPL-SCNC: 100 MMOL/L (ref 98–107)
CREAT SERPL-MCNC: 0.69 MG/DL (ref 0.51–0.95)
EGFRCR SERPLBLD CKD-EPI 2021: 82 ML/MIN/1.73M2
EOSINOPHIL # BLD AUTO: 0.5 10E3/UL (ref 0–0.7)
EOSINOPHIL NFR BLD AUTO: 6 %
ERYTHROCYTE [DISTWIDTH] IN BLOOD BY AUTOMATED COUNT: 13.4 % (ref 10–15)
GLUCOSE SERPL-MCNC: 104 MG/DL (ref 70–99)
HCO3 SERPL-SCNC: 23 MMOL/L (ref 22–29)
HCT VFR BLD AUTO: 35.9 % (ref 35–47)
HGB BLD-MCNC: 11.1 G/DL (ref 11.7–15.7)
IMM GRANULOCYTES # BLD: 0 10E3/UL
IMM GRANULOCYTES NFR BLD: 0 %
LYMPHOCYTES # BLD AUTO: 4 10E3/UL (ref 0.8–5.3)
LYMPHOCYTES NFR BLD AUTO: 48 %
MCH RBC QN AUTO: 31.3 PG (ref 26.5–33)
MCHC RBC AUTO-ENTMCNC: 30.9 G/DL (ref 31.5–36.5)
MCV RBC AUTO: 101 FL (ref 78–100)
MONOCYTES # BLD AUTO: 0.8 10E3/UL (ref 0–1.3)
MONOCYTES NFR BLD AUTO: 10 %
NEUTROPHILS # BLD AUTO: 3 10E3/UL (ref 1.6–8.3)
NEUTROPHILS NFR BLD AUTO: 36 %
NRBC # BLD AUTO: 0 10E3/UL
NRBC BLD AUTO-RTO: 0 /100
NT-PROBNP SERPL-MCNC: 365 PG/ML (ref 0–1800)
PLATELET # BLD AUTO: 191 10E3/UL (ref 150–450)
POTASSIUM SERPL-SCNC: 3.6 MMOL/L (ref 3.4–5.3)
RBC # BLD AUTO: 3.55 10E6/UL (ref 3.8–5.2)
SODIUM SERPL-SCNC: 137 MMOL/L (ref 135–145)
WBC # BLD AUTO: 8.3 10E3/UL (ref 4–11)

## 2025-02-17 ENCOUNTER — LAB REQUISITION (OUTPATIENT)
Dept: LAB | Facility: CLINIC | Age: OVER 89
End: 2025-02-17
Payer: COMMERCIAL

## 2025-02-17 DIAGNOSIS — I10 ESSENTIAL (PRIMARY) HYPERTENSION: ICD-10-CM

## 2025-02-19 LAB
ANION GAP SERPL CALCULATED.3IONS-SCNC: 15 MMOL/L (ref 7–15)
BUN SERPL-MCNC: 16.2 MG/DL (ref 8–23)
CALCIUM SERPL-MCNC: 9 MG/DL (ref 8.8–10.4)
CHLORIDE SERPL-SCNC: 101 MMOL/L (ref 98–107)
CREAT SERPL-MCNC: 0.81 MG/DL (ref 0.51–0.95)
EGFRCR SERPLBLD CKD-EPI 2021: 69 ML/MIN/1.73M2
GLUCOSE SERPL-MCNC: 101 MG/DL (ref 70–99)
HCO3 SERPL-SCNC: 22 MMOL/L (ref 22–29)
POTASSIUM SERPL-SCNC: 3.9 MMOL/L (ref 3.4–5.3)
SODIUM SERPL-SCNC: 138 MMOL/L (ref 135–145)

## 2025-02-19 PROCEDURE — 36415 COLL VENOUS BLD VENIPUNCTURE: CPT | Mod: ORL | Performed by: FAMILY MEDICINE

## 2025-02-19 PROCEDURE — P9604 ONE-WAY ALLOW PRORATED TRIP: HCPCS | Mod: ORL | Performed by: FAMILY MEDICINE

## 2025-02-19 PROCEDURE — 80048 BASIC METABOLIC PNL TOTAL CA: CPT | Mod: ORL | Performed by: FAMILY MEDICINE

## (undated) DEVICE — ELECTRODE ADULT PACING MULTI P-211-M1

## (undated) DEVICE — KIT HAND CONTROL ACIST 014644 AR-P54

## (undated) DEVICE — GW VASC .035IN DIA 260CML 7CML 3 MM RADIUS J CURVE 502455

## (undated) DEVICE — SHEATH GUIDING R2P DESTINATION 75CM 6FR STERIL GS-R6ST1C75W

## (undated) DEVICE — PACK HEART LEFT CUSTOM

## (undated) DEVICE — CATH GUIDING BLUE YELLOW PTFE XB3.5 6FRX100CM 67005400

## (undated) DEVICE — MANIFOLD KIT ANGIO AUTOMATED 014613

## (undated) DEVICE — GUIDEWIRE FORTE FLOPPY J TOP 34949-05J

## (undated) DEVICE — VALVE HEMOSTASIS .096" COPILOT MECH 1003331

## (undated) DEVICE — CATH LAUNCHER 6FR JR 5.0 LA6JR50

## (undated) DEVICE — SHEATH GLIDE RADIAL 6FR 25CM 0.021

## (undated) DEVICE — CATH ANGIO INFINITI JL3.5 4FRX100CM 538418

## (undated) DEVICE — CUSTOM PACK CORONARY SAN5BCRHEA

## (undated) DEVICE — INTRO GLIDESHEATH SLENDER 6FR 10X45CM 60-1060

## (undated) DEVICE — CATH BALLOON NC EMERGE 2.75X15MM H7493926715270

## (undated) DEVICE — SHTH INTRO 0.021IN ID 6FR DIA

## (undated) DEVICE — TUBING PRESSURE 30"

## (undated) DEVICE — CATH ANGIO INFINITI JL4 4FRX100CM 538420

## (undated) DEVICE — KIT DVC ANGIO IBASIXCOMPAK 13INX20ML 3WAY IN4430

## (undated) DEVICE — CATH BALLOON EMERGE 2.0X12MM H7493918912200

## (undated) DEVICE — Device

## (undated) DEVICE — GUIDEWIRE STARTER 260CM X 0.035

## (undated) DEVICE — CATH GUIDELINER 6FR 5571

## (undated) DEVICE — GLIDEWIRE TERUMO .035X180CM 1.5,, J-TIP GR3525

## (undated) DEVICE — GUIDEWIRE VASCULAR COMET II 185CML PRESSURE H74939359110

## (undated) DEVICE — INTRO MICRO MINI STICK 4FR STD NITINOL

## (undated) DEVICE — WIRE GUIDE HI-TRQ PILOT 50 JTIP 0.014"X190CM 1010480-HJ

## (undated) DEVICE — SLEEVE TR BAND RADIAL COMPRESSION DEVICE 24CM TRB24-REG

## (undated) DEVICE — SYR ANGIOGRAPHY MULTIUSE KIT ACIST 014612

## (undated) DEVICE — CATH BALLOON EMERGE 2.0X8MM H7493918908200

## (undated) DEVICE — CATH BALLOON NC EMERGE 3.50X12MM H7493926712350

## (undated) DEVICE — KIT ACCESSORY INTRO INFLATION SYS 20/30 PRIORITY 1000186-115

## (undated) DEVICE — CATH BALLOON EMERGE 2.5X15MM H7493918915250

## (undated) DEVICE — GUIDEWIRE STARTER .035INX150CM

## (undated) RX ORDER — FENTANYL CITRATE 50 UG/ML
INJECTION, SOLUTION INTRAMUSCULAR; INTRAVENOUS
Status: DISPENSED
Start: 2021-12-12

## (undated) RX ORDER — HEPARIN SODIUM 1000 [USP'U]/ML
INJECTION, SOLUTION INTRAVENOUS; SUBCUTANEOUS
Status: DISPENSED
Start: 2021-12-12

## (undated) RX ORDER — ASPIRIN 325 MG
TABLET ORAL
Status: DISPENSED
Start: 2022-06-07

## (undated) RX ORDER — ADENOSINE 3 MG/ML
INJECTION, SOLUTION INTRAVENOUS
Status: DISPENSED
Start: 2022-06-07

## (undated) RX ORDER — FENTANYL CITRATE 50 UG/ML
INJECTION, SOLUTION INTRAMUSCULAR; INTRAVENOUS
Status: DISPENSED
Start: 2022-06-07

## (undated) RX ORDER — LIDOCAINE HYDROCHLORIDE 10 MG/ML
INJECTION, SOLUTION EPIDURAL; INFILTRATION; INTRACAUDAL; PERINEURAL
Status: DISPENSED
Start: 2021-12-12

## (undated) RX ORDER — NICARDIPINE HCL-0.9% SOD CHLOR 1 MG/10 ML
SYRINGE (ML) INTRAVENOUS
Status: DISPENSED
Start: 2021-12-12